# Patient Record
Sex: FEMALE | Race: WHITE | Employment: OTHER | URBAN - METROPOLITAN AREA
[De-identification: names, ages, dates, MRNs, and addresses within clinical notes are randomized per-mention and may not be internally consistent; named-entity substitution may affect disease eponyms.]

---

## 2017-01-09 DIAGNOSIS — M47.812 OSTEOARTHRITIS OF CERVICAL SPINE, UNSPECIFIED SPINAL OSTEOARTHRITIS COMPLICATION STATUS: ICD-10-CM

## 2017-01-09 DIAGNOSIS — G43.909 MIGRAINE SYNDROME: ICD-10-CM

## 2017-01-09 NOTE — TELEPHONE ENCOUNTER
----- Message from Jose Payton sent at 1/9/2017 11:04 AM EST -----  Regarding: Dr. Lisa Koo   Pt requests a Rx for Meperidine.  The best number is   [phone]

## 2017-01-31 RX ORDER — PROMETHAZINE HYDROCHLORIDE 25 MG/1
TABLET ORAL
Qty: 50 TAB | Refills: 0 | Status: SHIPPED | OUTPATIENT
Start: 2017-01-31 | End: 2017-02-28 | Stop reason: SDUPTHER

## 2017-02-01 DIAGNOSIS — G43.909 MIGRAINE SYNDROME: ICD-10-CM

## 2017-02-01 NOTE — TELEPHONE ENCOUNTER
----- Message from Joana Lackey sent at 2/1/2017  4:40 PM EST -----  Regarding: Dr. Juan Crespo          Patient needs refill for Hydrocodone 7.5-3.25mg. Her father advises that he came to the  to  prescription but  told him  it was called into the pharmacy. He went to the pharmacy and found that it was not called in. Best contact number is [phone].

## 2017-02-02 RX ORDER — HYDROCODONE BITARTRATE AND ACETAMINOPHEN 7.5; 325 MG/1; MG/1
1 TABLET ORAL 3 TIMES DAILY
Qty: 90 TAB | Refills: 0 | Status: SHIPPED | OUTPATIENT
Start: 2017-02-02 | End: 2017-03-13 | Stop reason: SDUPTHER

## 2017-02-09 DIAGNOSIS — M47.812 OSTEOARTHRITIS OF CERVICAL SPINE, UNSPECIFIED SPINAL OSTEOARTHRITIS COMPLICATION STATUS: ICD-10-CM

## 2017-02-09 DIAGNOSIS — G43.909 MIGRAINE SYNDROME: ICD-10-CM

## 2017-02-09 NOTE — TELEPHONE ENCOUNTER
----- Message from Kelvin Mckeon sent at 2/9/2017  8:20 AM EST -----  Regarding: Dr. Kavya Victor: [phone]  Patient request Rx refill \"Meperidine\"  Best contact number is [phone]. Patient is out of medication, call when ready to pickup.

## 2017-03-01 RX ORDER — PROMETHAZINE HYDROCHLORIDE 25 MG/1
TABLET ORAL
Qty: 50 TAB | Refills: 0 | Status: SHIPPED | OUTPATIENT
Start: 2017-03-01 | End: 2017-04-11 | Stop reason: SDUPTHER

## 2017-03-09 DIAGNOSIS — G43.909 MIGRAINE SYNDROME: ICD-10-CM

## 2017-03-09 DIAGNOSIS — M47.812 OSTEOARTHRITIS OF CERVICAL SPINE, UNSPECIFIED SPINAL OSTEOARTHRITIS COMPLICATION STATUS: ICD-10-CM

## 2017-03-09 NOTE — TELEPHONE ENCOUNTER
----- Message from Lainey Barillas sent at 3/9/2017  1:24 PM EST -----  Regarding:  Dr. Ruslan Garcia / Refill  Notes: Patient's mother, Elle Kirkpatrick, is requesting a written refill of the controlled medication \"Meperidine\" to be prepared to  at the office. The patient is out of the medication. Best contact number is [phone].

## 2017-03-13 DIAGNOSIS — G43.909 MIGRAINE SYNDROME: ICD-10-CM

## 2017-03-13 RX ORDER — HYDROCODONE BITARTRATE AND ACETAMINOPHEN 7.5; 325 MG/1; MG/1
1 TABLET ORAL 3 TIMES DAILY
Qty: 90 TAB | Refills: 0 | Status: SHIPPED | OUTPATIENT
Start: 2017-03-13 | End: 2017-04-11 | Stop reason: SDUPTHER

## 2017-03-13 RX ORDER — CYCLOBENZAPRINE HCL 10 MG
TABLET ORAL
Qty: 50 TAB | Refills: 5 | Status: SHIPPED | OUTPATIENT
Start: 2017-03-13 | End: 2017-10-23 | Stop reason: SDUPTHER

## 2017-03-24 ENCOUNTER — OFFICE VISIT (OUTPATIENT)
Dept: FAMILY MEDICINE CLINIC | Age: 42
End: 2017-03-24

## 2017-03-24 VITALS
WEIGHT: 284.6 LBS | SYSTOLIC BLOOD PRESSURE: 130 MMHG | OXYGEN SATURATION: 97 % | TEMPERATURE: 98.3 F | HEIGHT: 66 IN | RESPIRATION RATE: 28 BRPM | HEART RATE: 90 BPM | DIASTOLIC BLOOD PRESSURE: 88 MMHG | BODY MASS INDEX: 45.74 KG/M2

## 2017-03-24 DIAGNOSIS — M47.812 OSTEOARTHRITIS OF CERVICAL SPINE, UNSPECIFIED SPINAL OSTEOARTHRITIS COMPLICATION STATUS: ICD-10-CM

## 2017-03-24 DIAGNOSIS — M79.7 FIBROMYALGIA: ICD-10-CM

## 2017-03-24 DIAGNOSIS — F32.A DEPRESSION, UNSPECIFIED DEPRESSION TYPE: ICD-10-CM

## 2017-03-24 DIAGNOSIS — K21.00 GASTROESOPHAGEAL REFLUX DISEASE WITH ESOPHAGITIS: ICD-10-CM

## 2017-03-24 DIAGNOSIS — R73.9 HYPERGLYCEMIA: ICD-10-CM

## 2017-03-24 DIAGNOSIS — E78.2 MIXED HYPERLIPIDEMIA: ICD-10-CM

## 2017-03-24 LAB
GLUCOSE POC: 76 MG/DL
HBA1C MFR BLD HPLC: 5.2 %

## 2017-03-24 RX ORDER — ZOLPIDEM TARTRATE 5 MG/1
5 TABLET ORAL
Qty: 30 TAB | Refills: 2 | Status: SHIPPED | OUTPATIENT
Start: 2017-03-24 | End: 2017-06-13 | Stop reason: SDUPTHER

## 2017-03-24 RX ORDER — GABAPENTIN 100 MG/1
300 CAPSULE ORAL 3 TIMES DAILY
Qty: 270 CAP | Refills: 2 | Status: SHIPPED | OUTPATIENT
Start: 2017-03-24 | End: 2020-10-02 | Stop reason: RX

## 2017-03-24 NOTE — PROGRESS NOTES
HISTORY OF PRESENT ILLNESS  Mick Dietrich is a 38 y.o. female. f/u fibro,chronic migraine, followed by Dr Elena Sifuentes at [facility] 1257 Botox injections q 3 mo. Remains completely debilitated with daily head pain and nausea. Depression worsening,gaining weight,worsening myalgias. Disorganised sleepstaying up all night ,often sleeping till after noon. Migraine    The history is provided by the patient. This is a chronic problem. The problem has not changed since onset. The pain is located in the occipital region. The quality of the pain is described as dull. The pain is at a severity of 8/10. Associated symptoms include malaise/fatigue, dizziness and nausea. Pertinent negatives include no fever and no visual change. Nausea    The history is provided by the patient. This is a recurrent problem. The problem has not changed since onset. Associated symptoms include headaches, myalgias and headaches. Pertinent negatives include no fever and no diarrhea. Myalgia   The history is provided by the patient. This is a chronic problem. The problem occurs daily. The problem has not changed since onset. Associated symptoms include headaches. Review of Systems   Constitutional: Positive for malaise/fatigue. Negative for fever. Gastrointestinal: Positive for nausea. Negative for diarrhea. Musculoskeletal: Positive for myalgias. Neurological: Positive for dizziness and headaches. Physical Exam   Constitutional: She is oriented to person, place, and time. She appears well-developed and well-nourished. She appears distressed. HENT:   Head: Normocephalic and atraumatic. Right Ear: External ear normal.   Left Ear: External ear normal.   Nose: Nose normal.   Mouth/Throat: Oropharynx is clear and moist.   Cardiovascular: Normal rate and regular rhythm. Pulmonary/Chest: Effort normal and breath sounds normal.   Abdominal: Soft.  Bowel sounds are normal.   Musculoskeletal:        Cervical back: She exhibits decreased range of motion and tenderness. Lumbar back: She exhibits decreased range of motion and tenderness. She exhibits no spasm. Back:    Neurological: She is alert and oriented to person, place, and time. She has normal reflexes. Skin: Skin is warm and dry. Psychiatric: She has a normal mood and affect. ASSESSMENT and PLAN  Jess Rios was seen today for migraine. Diagnoses and all orders for this visit:    Chronic migraine  -     METABOLIC PANEL, COMPREHENSIVE  -     CBC WITH AUTOMATED DIFF  -     AMB POC GLUCOSE, QUANTITATIVE, BLOOD  -     [id number] 9+OXYCODONE+CRT-UNBUND  -     gabapentin (NEURONTIN) 100 mg capsule; Take 3 Caps by mouth three (3) times daily. Mixed hyperlipidemia  -     LIPID PANEL    Hyperglycemia  -     LIPID PANEL  -     METABOLIC PANEL, COMPREHENSIVE  -     AMB POC HEMOGLOBIN A1C    Gastroesophageal reflux disease with esophagitis    Depression, unspecified depression type  -     zolpidem (AMBIEN) 5 mg tablet; Take 1 Tab by mouth nightly as needed for Sleep. Max Daily Amount: 5 mg. Osteoarthritis of cervical spine, unspecified spinal osteoarthritis complication status    Fibromyalgia      Follow-up Disposition:  Return in about 3 months (around 6/24/2017).

## 2017-03-24 NOTE — MR AVS SNAPSHOT
Visit Information Date & Time Provider Department Dept. Phone Encounter #  
 3/24/2017  2:00 PM Abi EnglandAlejandra [phone] [id number] Follow-up Instructions Return in about 3 months (around 6/24/2017). Upcoming Health Maintenance Date Due  
 PAP AKA CERVICAL CYTOLOGY 7/18/2016 INFLUENZA AGE 9 TO ADULT 8/1/2016 COLONOSCOPY 5/8/2018 DTaP/Tdap/Td series (2 - Td) 12/19/2024 Allergies as of 3/24/2017  Review Complete On: 3/24/2017 By: Abi England MD  
  
 Severity Noted Reaction Type Reactions Atacand [Candesartan] High 08/29/2014   Systemic Other (comments) Patient B/P increase Compazine [Prochlorperazine] High 04/16/2010   Side Effect Other (comments), Unable to Obtain Ciprofloxacin  04/16/2010   Systemic Other (comments) Codeine  04/16/2010   Side Effect Nausea and Vomiting Zonisamide Low 04/11/2011   Side Effect Nausea and Vomiting Current Immunizations  Reviewed on 12/19/2014 Name Date Influenza Vaccine PF 12/19/2014 Tdap 12/19/2014 Not reviewed this visit You Were Diagnosed With   
  
 Codes Comments Chronic migraine    -  Primary ICD-10-CM: K24.039 ICD-9-CM: 346.70 Mixed hyperlipidemia     ICD-10-CM: E78.2 ICD-9-CM: 272.2 Hyperglycemia     ICD-10-CM: R73.9 ICD-9-CM: 790.29 Gastroesophageal reflux disease with esophagitis     ICD-10-CM: K21.0 ICD-9-CM: 530.11 Depression, unspecified depression type     ICD-10-CM: F32.9 ICD-9-CM: 782 Osteoarthritis of cervical spine, unspecified spinal osteoarthritis complication status     ZAS-08-FU: G23.460 ICD-9-CM: 721.0 Vitals BP Pulse Temp Resp Height(growth percentile) Weight(growth percentile) 130/88 (BP 1 Location: Right arm, BP Patient Position: Sitting) 90 98.3 °F (36.8 °C) (Oral) 28 5' 6\" (1.676 m) 284 lb 9.6 oz (129.1 kg) SpO2 BMI OB Status Smoking Status 97% 45.94 kg/m2 Unknown Never Smoker Vitals History BMI and BSA Data Body Mass Index Body Surface Area 45.94 kg/m 2 2.45 m 2 Preferred Pharmacy Pharmacy Name Phone [facility] [address] [place] [address] [address] [phone] Your Updated Medication List  
  
   
This list is accurate as of: 3/24/17  2:34 PM.  Always use your most recent med list.  
  
  
  
  
 ALPRAZolam 1 mg tablet Commonly known as:  Jayme Beecham Take 1 Tab by mouth nightly as needed for Anxiety. Max Daily Amount: 1 mg. cholecalciferol (VITAMIN D3) 5,000 unit Tab tablet Commonly known as:  VITAMIN D3 Take 5,000 Units by mouth. Takes one tablet 4 times a week  
  
 cyclobenzaprine 10 mg tablet Commonly known as:  FLEXERIL  
TAKE 1 TABLET BY MOUTH 3 TIMES A DAY WITH MEALS  
  
 DENTA 5000 PLUS 1.1 % Crea Generic drug:  sodium fluoride DULoxetine 60 mg capsule Commonly known as:  CYMBALTA Take 1 Cap by mouth daily. furosemide 20 mg tablet Commonly known as:  LASIX TAKE 1 TAB BY MOUTH DAILY. AS NEEDED FOR SWELLING  
  
 hydroCHLOROthiazide 25 mg tablet Commonly known as:  HYDRODIURIL  
TAKE 1 TABLET BY MOUTH EVERY DAY  
  
 HYDROcodone-acetaminophen 7.5-325 mg per tablet Commonly known as:  Juanetta Rasp Take 1 Tab by mouth three (3) times daily. meperidine 100 mg tablet Commonly known as:  DEMEROL Take 1 Tab by mouth every four (4) hours as needed. Max Daily Amount: 600 mg.  
  
 metFORMIN  mg tablet Commonly known as:  GLUCOPHAGE XR Take 1 Tab by mouth daily (with dinner). nystatin 100,000 unit/mL suspension Commonly known as:  MYCOSTATIN Take 5 mL by mouth four (4) times daily. omeprazole 40 mg capsule Commonly known as:  PRILOSEC  
TAKE 1 CAPSULE BY MOUTH DAILY promethazine 25 mg tablet Commonly known as:  PHENERGAN  
TAKE 1 TABLET BY MOUTH EVERY 8 HOURS AS NEEDED FOR NAUSEA  
  
 STOOL SOFTENER 100 mg capsule Generic drug:  docusate sodium 100 mg two (2) times a day. Once daily  
  
 topiramate 100 mg tablet Commonly known as:  TOPAMAX  
  
 verapamil  mg CR tablet Commonly known as:  CALAN-SR  
TAKE 1 TABLET BY MOUTH AT BEDTIME  
  
 vitamin E 400 unit capsule Commonly known as:  [address] Take  by mouth daily. We Performed the Following [id number] 9+OXYCODONE+CRT-UNBUND [19344 CPT(R)] AMB POC GLUCOSE, QUANTITATIVE, BLOOD [65149 CPT(R)] AMB POC HEMOGLOBIN A1C [97759 CPT(R)] CBC WITH AUTOMATED DIFF [52125 CPT(R)] LIPID PANEL [17231 CPT(R)] METABOLIC PANEL, COMPREHENSIVE [49294 CPT(R)] Follow-up Instructions Return in about 3 months (around 6/24/2017). Introducing [facility] SERVICES! Dear Letty Green: Thank you for requesting a [employer] account. Our records indicate that you already have an active [employer] account. You can access your account anytime at [email or web address] Did you know that you can access your hospital and ER discharge instructions at any time in [employer]? You can also review all of your test results from your hospital stay or ER visit. Additional Information If you have questions, please visit the Frequently Asked Questions section of the [employer] website at [email or web address] Remember, [employer] is NOT to be used for urgent needs. For medical emergencies, dial 911. Now available from your iPhone and Android! Please provide this summary of care documentation to your next provider. Your primary care clinician is listed as Janet Prieto. If you have any questions after today's visit, please call [phone].

## 2017-03-25 LAB
ALBUMIN SERPL-MCNC: 4.2 G/DL (ref 3.5–5.5)
ALBUMIN/GLOB SERPL: 1.4 {RATIO} (ref 1.2–2.2)
ALP SERPL-CCNC: 71 IU/L (ref 39–117)
ALT SERPL-CCNC: 15 IU/L (ref 0–32)
AST SERPL-CCNC: 17 IU/L (ref 0–40)
BASOPHILS # BLD AUTO: 0 X10E3/UL (ref 0–0.2)
BASOPHILS NFR BLD AUTO: 0 %
BILIRUB SERPL-MCNC: <0.2 MG/DL (ref 0–1.2)
BUN SERPL-MCNC: 12 MG/DL (ref 6–24)
BUN/CREAT SERPL: 20 (ref 9–23)
CALCIUM SERPL-MCNC: 9.5 MG/DL (ref 8.7–10.2)
CHLORIDE SERPL-SCNC: 98 MMOL/L (ref 96–106)
CHOLEST SERPL-MCNC: 249 MG/DL (ref 100–199)
CO2 SERPL-SCNC: 23 MMOL/L (ref 18–29)
CREAT SERPL-MCNC: 0.61 MG/DL (ref 0.57–1)
EOSINOPHIL # BLD AUTO: 0.2 X10E3/UL (ref 0–0.4)
EOSINOPHIL NFR BLD AUTO: 2 %
ERYTHROCYTE [DISTWIDTH] IN BLOOD BY AUTOMATED COUNT: 14.7 % (ref 12.3–15.4)
GLOBULIN SER CALC-MCNC: 2.9 G/DL (ref 1.5–4.5)
GLUCOSE SERPL-MCNC: 80 MG/DL (ref 65–99)
HCT VFR BLD AUTO: 38.8 % (ref 34–46.6)
HDLC SERPL-MCNC: 35 MG/DL
HGB BLD-MCNC: 13 G/DL (ref 11.1–15.9)
IMM GRANULOCYTES # BLD: 0.1 X10E3/UL (ref 0–0.1)
IMM GRANULOCYTES NFR BLD: 1 %
INTERPRETATION, 910389: NORMAL
LDLC SERPL CALC-MCNC: 148 MG/DL (ref 0–99)
LYMPHOCYTES # BLD AUTO: 4.2 X10E3/UL (ref 0.7–3.1)
LYMPHOCYTES NFR BLD AUTO: 38 %
MCH RBC QN AUTO: 31.3 PG (ref 26.6–33)
MCHC RBC AUTO-ENTMCNC: 33.5 G/DL (ref 31.5–35.7)
MCV RBC AUTO: 93 FL (ref 79–97)
MONOCYTES # BLD AUTO: 0.9 X10E3/UL (ref 0.1–0.9)
MONOCYTES NFR BLD AUTO: 8 %
NEUTROPHILS # BLD AUTO: 5.6 X10E3/UL (ref 1.4–7)
NEUTROPHILS NFR BLD AUTO: 51 %
PLATELET # BLD AUTO: 561 X10E3/UL (ref 150–379)
POTASSIUM SERPL-SCNC: 4 MMOL/L (ref 3.5–5.2)
PROT SERPL-MCNC: 7.1 G/DL (ref 6–8.5)
RBC # BLD AUTO: 4.16 X10E6/UL (ref 3.77–5.28)
SODIUM SERPL-SCNC: 139 MMOL/L (ref 134–144)
TRIGL SERPL-MCNC: 331 MG/DL (ref 0–149)
VLDLC SERPL CALC-MCNC: 66 MG/DL (ref 5–40)
WBC # BLD AUTO: 11 X10E3/UL (ref 3.4–10.8)

## 2017-03-31 LAB
ALPRAZ UR CFM-MCNC: 137 NG/ML
ALPRAZ UR QL: POSITIVE
AMPHETAMINES UR QL SCN: NEGATIVE NG/ML
BARBITURATES UR QL SCN: NEGATIVE NG/ML
BENZODIAZ UR QL SCN: NORMAL NG/ML
BENZODIAZ UR QL: POSITIVE NG/ML
BZE UR QL SCN: NEGATIVE NG/ML
CANNABINOIDS UR QL SCN: NEGATIVE NG/ML
CLONAZEPAM UR QL: NEGATIVE
CODEINE UR QL: NEGATIVE
CREAT UR-MCNC: 142.4 MG/DL (ref 20–300)
FLURAZEPAM UR QL: NEGATIVE
HYDROCODONE UR CFM-MCNC: 1030 NG/ML
HYDROCODONE UR QL: POSITIVE
HYDROMORPHONE UR QL: NEGATIVE
LORAZEPAM UR QL: NEGATIVE
METHADONE UR QL SCN: NEGATIVE NG/ML
MIDAZOLAM UR QL CFM: NEGATIVE
MORPHINE UR QL: NEGATIVE
NORDIAZEPAM UR QL: NEGATIVE
OPIATES UR QL SCN: NORMAL NG/ML
OPIATES UR QL SCN: POSITIVE NG/ML
OXAZEPAM UR QL: NEGATIVE
OXYCODONE+OXYMORPHONE UR QL SCN: NEGATIVE NG/ML
PCP UR QL: NEGATIVE NG/ML
PH UR: 5.6 [PH] (ref 4.5–8.9)
PLEASE NOTE:, 733157: NORMAL
PROPOXYPH UR QL SCN: NEGATIVE NG/ML
TEMAZEPAM UR QL CFM: NEGATIVE
TRIAZOLAM UR QL: NEGATIVE

## 2017-04-11 DIAGNOSIS — M47.812 OSTEOARTHRITIS OF CERVICAL SPINE, UNSPECIFIED SPINAL OSTEOARTHRITIS COMPLICATION STATUS: ICD-10-CM

## 2017-04-11 DIAGNOSIS — G43.909 MIGRAINE SYNDROME: ICD-10-CM

## 2017-04-11 RX ORDER — PROMETHAZINE HYDROCHLORIDE 25 MG/1
25 TABLET ORAL
Qty: 50 TAB | Refills: 0 | Status: SHIPPED | OUTPATIENT
Start: 2017-04-11 | End: 2017-05-10 | Stop reason: SDUPTHER

## 2017-04-11 RX ORDER — HYDROCODONE BITARTRATE AND ACETAMINOPHEN 7.5; 325 MG/1; MG/1
1 TABLET ORAL 3 TIMES DAILY
Qty: 90 TAB | Refills: 0 | Status: SHIPPED | OUTPATIENT
Start: 2017-04-11 | End: 2017-05-10 | Stop reason: SDUPTHER

## 2017-04-18 RX ORDER — TOPIRAMATE 100 MG/1
TABLET, FILM COATED ORAL
Qty: 90 TAB | Refills: 5 | Status: SHIPPED | OUTPATIENT
Start: 2017-04-18 | End: 2017-11-20 | Stop reason: SDUPTHER

## 2017-04-30 RX ORDER — OMEPRAZOLE 40 MG/1
CAPSULE, DELAYED RELEASE ORAL
Qty: 30 CAP | Refills: 7 | Status: SHIPPED | OUTPATIENT
Start: 2017-04-30 | End: 2017-12-20 | Stop reason: SDUPTHER

## 2017-04-30 RX ORDER — ALPRAZOLAM 1 MG/1
TABLET ORAL
Qty: 30 TAB | Refills: 2 | Status: SHIPPED | OUTPATIENT
Start: 2017-04-30 | End: 2017-09-24 | Stop reason: SDUPTHER

## 2017-05-10 DIAGNOSIS — G43.909 MIGRAINE SYNDROME: ICD-10-CM

## 2017-05-10 DIAGNOSIS — M47.812 OSTEOARTHRITIS OF CERVICAL SPINE, UNSPECIFIED SPINAL OSTEOARTHRITIS COMPLICATION STATUS: ICD-10-CM

## 2017-05-10 RX ORDER — HYDROCODONE BITARTRATE AND ACETAMINOPHEN 7.5; 325 MG/1; MG/1
1 TABLET ORAL 3 TIMES DAILY
Qty: 90 TAB | Refills: 0 | Status: SHIPPED | OUTPATIENT
Start: 2017-05-10 | End: 2017-06-13 | Stop reason: SDUPTHER

## 2017-05-10 RX ORDER — PROMETHAZINE HYDROCHLORIDE 25 MG/1
25 TABLET ORAL
Qty: 50 TAB | Refills: 0 | Status: SHIPPED | OUTPATIENT
Start: 2017-05-10 | End: 2017-06-13 | Stop reason: SDUPTHER

## 2017-05-11 DIAGNOSIS — J32.0 MAXILLARY SINUSITIS, UNSPECIFIED CHRONICITY: Primary | ICD-10-CM

## 2017-05-11 RX ORDER — AMOXICILLIN 500 MG/1
500 CAPSULE ORAL 3 TIMES DAILY
Qty: 21 CAP | Refills: 0 | Status: SHIPPED | OUTPATIENT
Start: 2017-05-11 | End: 2017-05-18

## 2017-05-11 RX ORDER — PROMETHAZINE HYDROCHLORIDE AND DEXTROMETHORPHAN HYDROBROMIDE 6.25; 15 MG/5ML; MG/5ML
5 SYRUP ORAL
Qty: 180 ML | Refills: 1 | Status: SHIPPED | OUTPATIENT
Start: 2017-05-11 | End: 2017-05-18

## 2017-06-06 RX ORDER — VERAPAMIL HYDROCHLORIDE 240 MG/1
TABLET, FILM COATED, EXTENDED RELEASE ORAL
Qty: 30 TAB | Refills: 11 | Status: SHIPPED | OUTPATIENT
Start: 2017-06-06 | End: 2017-12-19 | Stop reason: SDUPTHER

## 2017-06-13 DIAGNOSIS — M47.812 OSTEOARTHRITIS OF CERVICAL SPINE, UNSPECIFIED SPINAL OSTEOARTHRITIS COMPLICATION STATUS: ICD-10-CM

## 2017-06-13 DIAGNOSIS — F32.A DEPRESSION, UNSPECIFIED DEPRESSION TYPE: ICD-10-CM

## 2017-06-13 DIAGNOSIS — G43.909 MIGRAINE SYNDROME: ICD-10-CM

## 2017-06-13 RX ORDER — HYDROCODONE BITARTRATE AND ACETAMINOPHEN 7.5; 325 MG/1; MG/1
1 TABLET ORAL 3 TIMES DAILY
Qty: 90 TAB | Refills: 0 | Status: SHIPPED | OUTPATIENT
Start: 2017-06-13 | End: 2017-07-13 | Stop reason: SDUPTHER

## 2017-06-13 RX ORDER — ZOLPIDEM TARTRATE 5 MG/1
5 TABLET ORAL
Qty: 30 TAB | Refills: 2 | Status: SHIPPED | OUTPATIENT
Start: 2017-06-13 | End: 2017-09-18 | Stop reason: SDUPTHER

## 2017-06-13 RX ORDER — PROMETHAZINE HYDROCHLORIDE 25 MG/1
25 TABLET ORAL
Qty: 50 TAB | Refills: 0 | Status: SHIPPED | OUTPATIENT
Start: 2017-06-13 | End: 2017-07-13 | Stop reason: SDUPTHER

## 2017-06-13 NOTE — TELEPHONE ENCOUNTER
From: Gavi Jiménez  To: Suzi Matson MD  Sent: 6/13/2017 3:31 AM EDT  Subject: Medication Renewal Request    Original authorizing provider: MD Gavi Holguin would like a refill of the following medications:  zolpidem (AMBIEN) 5 mg tablet Suzi Matson MD]  meperidine (DEMEROL) 100 mg tablet Suzi Matson MD]  promethazine (PHENERGAN) 25 mg tablet Suzi Matson MD]  HYDROcodone-acetaminophen (NORCO) 7.5-325 mg per tablet Suzi Matson MD]    Preferred pharmacy: [place] [id number].:  When ready at  please have them call [phone] & leave a message if no one answers! That way I can know when to come pick them up! Thank you!

## 2017-06-13 NOTE — TELEPHONE ENCOUNTER
Called patient to  script, per Dr. Chen Gains an appt is needed before the next refill, information given to patient

## 2017-06-27 DIAGNOSIS — M47.812 OSTEOARTHRITIS OF CERVICAL SPINE, UNSPECIFIED SPINAL OSTEOARTHRITIS COMPLICATION STATUS: ICD-10-CM

## 2017-07-06 ENCOUNTER — OFFICE VISIT (OUTPATIENT)
Dept: FAMILY MEDICINE CLINIC | Age: 42
End: 2017-07-06

## 2017-07-06 VITALS
BODY MASS INDEX: 46.67 KG/M2 | TEMPERATURE: 99.1 F | OXYGEN SATURATION: 96 % | DIASTOLIC BLOOD PRESSURE: 86 MMHG | HEIGHT: 66 IN | HEART RATE: 101 BPM | WEIGHT: 290.4 LBS | RESPIRATION RATE: 28 BRPM | SYSTOLIC BLOOD PRESSURE: 138 MMHG

## 2017-07-06 DIAGNOSIS — F32.89 DEPRESSIVE DISORDER, NOT ELSEWHERE CLASSIFIED: ICD-10-CM

## 2017-07-06 DIAGNOSIS — G43.711 INTRACTABLE CHRONIC MIGRAINE WITHOUT AURA AND WITH STATUS MIGRAINOSUS: Primary | ICD-10-CM

## 2017-07-06 DIAGNOSIS — M47.812 OSTEOARTHRITIS OF CERVICAL SPINE, UNSPECIFIED SPINAL OSTEOARTHRITIS COMPLICATION STATUS: ICD-10-CM

## 2017-07-06 RX ORDER — KETOROLAC TROMETHAMINE 30 MG/ML
30 INJECTION, SOLUTION INTRAMUSCULAR; INTRAVENOUS ONCE
Qty: 1 VIAL | Refills: 0
Start: 2017-07-06 | End: 2017-07-06

## 2017-07-06 NOTE — PROGRESS NOTES
HISTORY OF PRESENT ILLNESS  Olive Ragsdale is a 38 y.o. female. severe headache for past 2 days ,constant,severe ,throbbing. with nausea and vomiting  Migraine    The history is provided by the patient. This is a chronic problem. The problem occurs constantly. The problem has been gradually worsening. The headache is aggravated by nothing. The pain is located in the occipital region. The quality of the pain is described as throbbing. The pain is at a severity of 7/10. Associated symptoms include malaise/fatigue, dizziness, visual change, nausea and vomiting. Pertinent negatives include no fever. Review of Systems   Constitutional: Positive for malaise/fatigue. Negative for fever and weight loss. Cardiovascular: Negative for chest pain. Gastrointestinal: Positive for nausea and vomiting. Musculoskeletal: Positive for neck pain. Skin: Negative for rash. Neurological: Positive for dizziness and headaches. Psychiatric/Behavioral: Negative for depression and substance abuse. The patient is not nervous/anxious. Physical Exam   Constitutional: She is oriented to person, place, and time. She appears well-developed and well-nourished. She appears distressed. HENT:   Head: Normocephalic and atraumatic. Right Ear: External ear normal.   Left Ear: External ear normal.   Nose: Nose normal.   Mouth/Throat: Oropharynx is clear and moist.   Cardiovascular: Normal rate and regular rhythm. Pulmonary/Chest: Effort normal and breath sounds normal.   Abdominal: Soft. Bowel sounds are normal.   Musculoskeletal: Normal range of motion. Neurological: She is alert and oriented to person, place, and time. She has normal reflexes. Skin: Skin is warm and dry. Psychiatric: She has a normal mood and affect. ASSESSMENT and PLAN  Sudhakar Moncada was seen today for headache and generalized body aches.     Diagnoses and all orders for this visit:    Intractable chronic migraine without aura and with status migrainosus. Subacute worsening of chronic headache disorder. Remains quite disabled . Neuro appt next weak  -     ketorolac (TORADOL) 30 mg/mL (1 mL) injection; 1 mL by IntraVENous route once for 1 dose. -     KETOROLAC TROMETHAMINE INJ  -     TX THER/PROPH/DIAG INJECTION, SUBCUT/IM    Osteoarthritis of cervical spine, unspecified spinal osteoarthritis complication status    Depressive disorder, not elsewhere classified      Follow-up Disposition:  Return in about 4 weeks (around 8/3/2017).

## 2017-07-06 NOTE — PROGRESS NOTES
Chief Complaint   Patient presents with    Headache     Pt having migraine.  Generalized Body Aches     Pt having bodyaches all over.

## 2017-07-13 ENCOUNTER — OFFICE VISIT (OUTPATIENT)
Dept: NEUROLOGY | Age: 42
End: 2017-07-13

## 2017-07-13 VITALS
BODY MASS INDEX: 46.93 KG/M2 | HEART RATE: 99 BPM | SYSTOLIC BLOOD PRESSURE: 116 MMHG | DIASTOLIC BLOOD PRESSURE: 74 MMHG | OXYGEN SATURATION: 98 % | WEIGHT: 292 LBS | HEIGHT: 66 IN

## 2017-07-13 DIAGNOSIS — M47.812 OSTEOARTHRITIS OF CERVICAL SPINE, UNSPECIFIED SPINAL OSTEOARTHRITIS COMPLICATION STATUS: ICD-10-CM

## 2017-07-13 DIAGNOSIS — G43.909 MIGRAINE SYNDROME: ICD-10-CM

## 2017-07-13 DIAGNOSIS — M79.7 FIBROMYALGIA: ICD-10-CM

## 2017-07-13 DIAGNOSIS — G43.711 INTRACTABLE CHRONIC MIGRAINE WITHOUT AURA AND WITH STATUS MIGRAINOSUS: Primary | ICD-10-CM

## 2017-07-13 NOTE — PATIENT INSTRUCTIONS
[facility]   Statement to Patients  April 1, 2014      In an effort to ensure the large volume of patient prescription refills is processed in the most efficient and expeditious manner, we are asking our patients to assist us by calling your Pharmacy for all prescription refills, this will include also your  Mail Order Pharmacy. The pharmacy will contact our office electronically to continue the refill process. Please do not wait until the last minute to call your pharmacy. We need at least 48 hours (2days) to fill prescriptions. We also encourage you to call your pharmacy before going to  your prescription to make sure it is ready. With regard to controlled substance prescription refill requests (narcotic refills) that need to be picked up at our office, we ask your cooperation by providing us with at least 72 hours (3days) notice that you will need a refill. We will not refill narcotic prescription refill requests after 4:00pm on any weekday, Monday through Thursday, or after 2:00pm on Fridays, or on the weekends. We encourage everyone to explore another way of getting your prescription refill request processed using [employer], our patient web portal through our electronic medical record system. [employer] is an efficient and effective way to communicate your medication request directly to the office and  downloadable as an ina on your smart phone . [employer] also features a review functionality that allows you to view your medication list as well as leave messages for your physician. Are you ready to get connected? If so please review the attatched instructions or speak to any of our staff to get you set up right away! Thank you so much for your cooperation. Should you have any questions please contact our Practice Administrator.     The Physicians and Staff,  [facility]          A Healthy Lifestyle: Care Instructions  Your Care Instructions  A healthy lifestyle can help you feel good, stay at a healthy weight, and have plenty of energy for both work and play. A healthy lifestyle is something you can share with your whole family. A healthy lifestyle also can lower your risk for serious health problems, such as high blood pressure, heart disease, and diabetes. You can follow a few steps listed below to improve your health and the health of your family. Follow-up care is a key part of your treatment and safety. Be sure to make and go to all appointments, and call your doctor if you are having problems. Its also a good idea to know your test results and keep a list of the medicines you take. How can you care for yourself at home? · Do not eat too much sugar, fat, or fast foods. You can still have dessert and treats now and then. The goal is moderation. · Start small to improve your eating habits. Pay attention to portion sizes, drink less juice and soda pop, and eat more fruits and vegetables. ¨ Eat a healthy amount of food. A 3-ounce serving of meat, for example, is about the size of a deck of cards. Fill the rest of your plate with vegetables and whole grains. ¨ Limit the amount of soda and sports drinks you have every day. Drink more water when you are thirsty. ¨ Eat at least 5 servings of fruits and vegetables every day. It may seem like a lot, but it is not hard to reach this goal. A serving or helping is 1 piece of fruit, 1 cup of vegetables, or 2 cups of leafy, raw vegetables. Have an apple or some carrot sticks as an afternoon snack instead of a candy bar. Try to have fruits and/or vegetables at every meal.  · Make exercise part of your daily routine. You may want to start with simple activities, such as walking, bicycling, or slow swimming. Try to be active 30 to 60 minutes every day. You do not need to do all 30 to 60 minutes all at once. For example, you can exercise 3 times a day for 10 or 20 minutes.  Moderate exercise is safe for most people, but it is always a good idea to talk to your doctor before starting an exercise program.  · Keep moving. Caleen Newer the lawn, work in the garden, or [employer]. Take the stairs instead of the elevator at work. · If you smoke, quit. People who smoke have an increased risk for heart attack, stroke, cancer, and other lung illnesses. Quitting is hard, but there are ways to boost your chance of quitting tobacco for good. ¨ Use nicotine gum, patches, or lozenges. ¨ Ask your doctor about stop-smoking programs and medicines. ¨ Keep trying. In addition to reducing your risk of diseases in the future, you will notice some benefits soon after you stop using tobacco. If you have shortness of breath or asthma symptoms, they will likely get better within a few weeks after you quit. · Limit how much alcohol you drink. Moderate amounts of alcohol (up to 2 drinks a day for men, 1 drink a day for women) are okay. But drinking too much can lead to liver problems, high blood pressure, and other health problems. Family health  If you have a family, there are many things you can do together to improve your health. · Eat meals together as a family as often as possible. · Eat healthy foods. This includes fruits, vegetables, lean meats and dairy, and whole grains. · Include your family in your fitness plan. Most people think of activities such as jogging or tennis as the way to fitness, but there are many ways you and your family can be more active. Anything that makes you breathe hard and gets your heart pumping is exercise. Here are some tips:  ¨ Walk to do errands or to take your child to school or the bus. ¨ Go for a family bike ride after dinner instead of watching TV. Where can you learn more? Go to http://falguni-candice.info/. Enter B646 in the search box to learn more about \"A Healthy Lifestyle: Care Instructions. \"  Current as of: July 26, 2016  Content Version: 11.3  © 8551-8935 [employer]. Care instructions adapted under license by [employer] (which disclaims liability or warranty for this information). If you have questions about a medical condition or this instruction, always ask your healthcare professional. [address] any warranty or liability for your use of this information.

## 2017-07-13 NOTE — MR AVS SNAPSHOT
Visit Information Date & Time Provider Department Dept. Phone Encounter #  
 7/13/2017  3:00 PM Adelaida Portillo MD Neurology Clinic at [facility] [phone] [id number] Follow-up Instructions Return in about 2 months (around 9/13/2017). Your Appointments 8/9/2017  4:00 PM  
ROUTINE CARE with Heide Mratin MD  
[facility]) Appt Note: per ike [id number] [facility] [place] [facility] [id number] [phone] [address] [place] [address] [address] Upcoming Health Maintenance Date Due  
 PAP AKA CERVICAL CYTOLOGY 7/18/2016 INFLUENZA AGE 9 TO ADULT 8/1/2017 COLONOSCOPY 5/8/2018 DTaP/Tdap/Td series (2 - Td) 12/19/2024 Allergies as of 7/13/2017  Review Complete On: 7/13/2017 By: Adelaida Portillo MD  
  
 Severity Noted Reaction Type Reactions Atacand [Candesartan] High 08/29/2014   Systemic Other (comments) Patient B/P increase Compazine [Prochlorperazine] High 04/16/2010   Side Effect Other (comments), Unable to Obtain Ciprofloxacin  04/16/2010   Systemic Other (comments) Codeine  04/16/2010   Side Effect Nausea and Vomiting Zonisamide Low 04/11/2011   Side Effect Nausea and Vomiting Current Immunizations  Reviewed on 12/19/2014 Name Date Influenza Vaccine PF 12/19/2014 Tdap 12/19/2014 Not reviewed this visit You Were Diagnosed With   
  
 Codes Comments Intractable chronic migraine without aura and with status migrainosus    -  Primary ICD-10-CM: Y08.333 ICD-9-CM: 346.73 Fibromyalgia     ICD-10-CM: M79.7 ICD-9-CM: 729.1 Osteoarthritis of cervical spine, unspecified spinal osteoarthritis complication status     LZS-76-PI: Q48.107 ICD-9-CM: 721.0 Vitals BP Pulse Height(growth percentile) Weight(growth percentile) SpO2 BMI  
 116/74 99 5' 6\" (1.676 m) 292 lb (132.5 kg) 98% 47.13 kg/m2 OB Status Smoking Status Unknown Never Smoker Vitals History BMI and BSA Data Body Mass Index Body Surface Area  
 47.13 kg/m 2 2.48 m 2 Preferred Pharmacy Pharmacy Name Phone [facility] [address] [place] [address] [phone] Your Updated Medication List  
  
   
This list is accurate as of: 7/13/17  3:53 PM.  Always use your most recent med list.  
  
  
  
  
 ALPRAZolam 1 mg tablet Commonly known as:  XANAX  
TAKE ONE TAB BY MOUTH EVERY NIGHTY AT BEDTIME AS NEEDED FOR ANXIETY  
  
 cholecalciferol (VITAMIN D3) 5,000 unit Tab tablet Commonly known as:  VITAMIN D3 Take 5,000 Units by mouth. Takes one tablet 4 times a week  
  
 cyclobenzaprine 10 mg tablet Commonly known as:  FLEXERIL  
TAKE 1 TABLET BY MOUTH 3 TIMES A DAY WITH MEALS  
  
 DENTA 5000 PLUS 1.1 % Crea Generic drug:  sodium fluoride DULoxetine 60 mg capsule Commonly known as:  CYMBALTA Take 1 Cap by mouth daily. furosemide 20 mg tablet Commonly known as:  LASIX TAKE 1 TAB BY MOUTH DAILY. AS NEEDED FOR SWELLING  
  
 * gabapentin 100 mg capsule Commonly known as:  NEURONTIN Take 3 Caps by mouth three (3) times daily. * gabapentin 300 mg capsule Commonly known as:  NEURONTIN  
TAKE ONE CAPSULE BY MOUTH 3 TIMES A DAY  
  
 hydroCHLOROthiazide 25 mg tablet Commonly known as:  HYDRODIURIL  
TAKE 1 TABLET BY MOUTH EVERY DAY  
  
 HYDROcodone-acetaminophen 7.5-325 mg per tablet Commonly known as:  Lisa Matthews Take 1 Tab by mouth three (3) times daily. meperidine 100 mg tablet Commonly known as:  DEMEROL Take 1 Tab by mouth every four (4) hours as needed. Max Daily Amount: 600 mg.  
  
 metFORMIN  mg tablet Commonly known as:  GLUCOPHAGE XR Take 1 Tab by mouth daily (with dinner). nystatin 100,000 unit/mL suspension Commonly known as:  MYCOSTATIN Take 5 mL by mouth four (4) times daily. omeprazole 40 mg capsule Commonly known as:  PRILOSEC  
TAKE 1 CAPSULE BY MOUTH DAILY  
  
 onabotulinumtoxinA 200 unit injection Commonly known as:  BOTOX To be injected in the muscles of the head and neck  
  
 promethazine 25 mg tablet Commonly known as:  PHENERGAN Take 1 Tab by mouth every eight (8) hours as needed for Nausea. STOOL SOFTENER 100 mg capsule Generic drug:  docusate sodium 100 mg two (2) times a day. Once daily  
  
 topiramate 100 mg tablet Commonly known as:  TOPAMAX START WITH 1/2 TABLET IN MORNING AND 1 TAB AT NIGHT FOR 2 WEEKS, THEN INCREASE TO 1 TAB TWICE A DAY  
  
 verapamil  mg CR tablet Commonly known as:  CALAN-SR  
TAKE 1 TABLET BY MOUTH AT BEDTIME  
  
 vitamin E 400 unit capsule Commonly known as:  [address] Take  by mouth daily. zolpidem 5 mg tablet Commonly known as:  AMBIEN Take 1 Tab by mouth nightly as needed for Sleep. Max Daily Amount: 5 mg.  
  
 * Notice: This list has 2 medication(s) that are the same as other medications prescribed for you. Read the directions carefully, and ask your doctor or other care provider to review them with you. Prescriptions Printed Refills  
 onabotulinumtoxinA (BOTOX) 200 unit injection 3 Sig: To be injected in the muscles of the head and neck Class: Print We Performed the Following REFERRAL TO NEUROLOGY [ZAI54 Custom] Comments:  
 Please set patient up for botox for chronic migraine Follow-up Instructions Return in about 2 months (around 9/13/2017). Referral Information Referral ID Referred By Referred To  
  
 [id number] Joi BOUDREAUX MD   
   [address] [id number], [address] Phone: [phone] Fax: [phone] Visits Status Start Date End Date 1 New Request 7/13/17 7/13/18  If your referral has a status of pending review or denied, additional information will be sent to support the outcome of this decision. Patient Instructions PRESCRIPTION REFILL POLICY [facility] Statement to Patients April 1, 2014 In an effort to ensure the large volume of patient prescription refills is processed in the most efficient and expeditious manner, we are asking our patients to assist us by calling your Pharmacy for all prescription refills, this will include also your  Mail Order Pharmacy. The pharmacy will contact our office electronically to continue the refill process. Please do not wait until the last minute to call your pharmacy. We need at least 48 hours (2days) to fill prescriptions. We also encourage you to call your pharmacy before going to  your prescription to make sure it is ready. With regard to controlled substance prescription refill requests (narcotic refills) that need to be picked up at our office, we ask your cooperation by providing us with at least 72 hours (3days) notice that you will need a refill. We will not refill narcotic prescription refill requests after 4:00pm on any weekday, Monday through Thursday, or after 2:00pm on Fridays, or on the weekends. We encourage everyone to explore another way of getting your prescription refill request processed using [employer], our patient web portal through our electronic medical record system. [employer] is an efficient and effective way to communicate your medication request directly to the office and  downloadable as an ina on your smart phone . [employer] also features a review functionality that allows you to view your medication list as well as leave messages for your physician. Are you ready to get connected? If so please review the attatched instructions or speak to any of our staff to get you set up right away! Thank you so much for your cooperation. Should you have any questions please contact our Practice Administrator. The Physicians and Staff,  [facility] A Healthy Lifestyle: Care Instructions Your Care Instructions A healthy lifestyle can help you feel good, stay at a healthy weight, and have plenty of energy for both work and play. A healthy lifestyle is something you can share with your whole family. A healthy lifestyle also can lower your risk for serious health problems, such as high blood pressure, heart disease, and diabetes. You can follow a few steps listed below to improve your health and the health of your family. Follow-up care is a key part of your treatment and safety. Be sure to make and go to all appointments, and call your doctor if you are having problems. Its also a good idea to know your test results and keep a list of the medicines you take. How can you care for yourself at home? · Do not eat too much sugar, fat, or fast foods. You can still have dessert and treats now and then. The goal is moderation. · Start small to improve your eating habits. Pay attention to portion sizes, drink less juice and soda pop, and eat more fruits and vegetables. ¨ Eat a healthy amount of food. A 3-ounce serving of meat, for example, is about the size of a deck of cards. Fill the rest of your plate with vegetables and whole grains. ¨ Limit the amount of soda and sports drinks you have every day. Drink more water when you are thirsty. ¨ Eat at least 5 servings of fruits and vegetables every day. It may seem like a lot, but it is not hard to reach this goal. A serving or helping is 1 piece of fruit, 1 cup of vegetables, or 2 cups of leafy, raw vegetables. Have an apple or some carrot sticks as an afternoon snack instead of a candy bar. Try to have fruits and/or vegetables at every meal. 
· Make exercise part of your daily routine. You may want to start with simple activities, such as walking, bicycling, or slow swimming.  Try to be active 30 to 60 minutes every day. You do not need to do all 30 to 60 minutes all at once. For example, you can exercise 3 times a day for 10 or 20 minutes. Moderate exercise is safe for most people, but it is always a good idea to talk to your doctor before starting an exercise program. 
· Keep moving. Garrett [employer] the lawn, work in the garden, or [employer]. Take the stairs instead of the elevator at work. · If you smoke, quit. People who smoke have an increased risk for heart attack, stroke, cancer, and other lung illnesses. Quitting is hard, but there are ways to boost your chance of quitting tobacco for good. ¨ Use nicotine gum, patches, or lozenges. ¨ Ask your doctor about stop-smoking programs and medicines. ¨ Keep trying. In addition to reducing your risk of diseases in the future, you will notice some benefits soon after you stop using tobacco. If you have shortness of breath or asthma symptoms, they will likely get better within a few weeks after you quit. · Limit how much alcohol you drink. Moderate amounts of alcohol (up to 2 drinks a day for men, 1 drink a day for women) are okay. But drinking too much can lead to liver problems, high blood pressure, and other health problems. Family health If you have a family, there are many things you can do together to improve your health. · Eat meals together as a family as often as possible. · Eat healthy foods. This includes fruits, vegetables, lean meats and dairy, and whole grains. · Include your family in your fitness plan. Most people think of activities such as jogging or tennis as the way to fitness, but there are many ways you and your family can be more active. Anything that makes you breathe hard and gets your heart pumping is exercise. Here are some tips: 
¨ Walk to do errands or to take your child to school or the bus. ¨ Go for a family bike ride after dinner instead of watching TV. Where can you learn more? Go to http://falguni-candice.info/. Enter E026 in the search box to learn more about \"A Healthy Lifestyle: Care Instructions. \" Current as of: July 26, 2016 Content Version: 11.3 © 7530-8539 [employer]. Care instructions adapted under license by [employer] (which disclaims liability or warranty for this information). If you have questions about a medical condition or this instruction, always ask your healthcare professional. [address] any warranty or liability for your use of this information. Introducing \[facility]\"" & HEALTH SERVICES! Dear Lisset Salamanca: Thank you for requesting a TappIn account. Our records indicate that you already have an active TappIn account. You can access your account anytime at [email or web address] Did you know that you can access your hospital and ER discharge instructions at any time in TappIn? You can also review all of your test results from your hospital stay or ER visit. Additional Information If you have questions, please visit the Frequently Asked Questions section of the TappIn website at [email or web address] Remember, TappIn is NOT to be used for urgent needs. For medical emergencies, dial 911. Now available from your iPhone and Android! Please provide this summary of care documentation to your next provider. Your primary care clinician is listed as John Dumont. If you have any questions after today's visit, please call [phone].

## 2017-07-13 NOTE — PROGRESS NOTES
NEUROLOGY HISTORY AND PHYSICAL    Name Misbah Mack Age 38 y.o. MRN [id number]  1975     Referring Physician: Juan Aly MD        Chief Complaint:  headaches     This is a 38 y.o. right handed female with along history of migraines was seen at [facility]. Migraine are 15-20 days of the months more than four hours in durations throbbing and associated with photophobia and nausea. She has been treated here by Dr. Suman Lopez and also by Sabi Desir at [facility]. She was sent to the [facility] but could not afford the treatment and was afraid of an unacceptable mortality rate. They finally were frustrated with Dr. Perdomo Favorite and they parted ways. The best control she had was on botox. She had a more than 60 percent reduction in the intensity of her headaches. Assessment and Plan   1. Migraine headaches  Has had significant releif with botox  She is on topamax  Has tried elavil  Has tried cymbalata  SHe is on verapamil. She has tried fioricet   She has tried imitex  She as on all these medicatrions more than two months and they did not help her migraines  She had migraines since age 30. They are daily bitemporal headaches, throbbing in nature. Associated with photophobia and nausea. 2. Depression  continue cymbalta. 3. Hypertension  Continue hydrochlorothiazide    60  minutes spent more than 50% spent in face to face  examination and discussion of treatment options and approach    Patient Allergies    Atacand [candesartan]; Compazine [prochlorperazine]; Ciprofloxacin; Codeine; and Zonisamide     Current Outpatient Prescriptions   Medication Sig    gabapentin (NEURONTIN) 300 mg capsule TAKE ONE CAPSULE BY MOUTH 3 TIMES A DAY    zolpidem (AMBIEN) 5 mg tablet Take 1 Tab by mouth nightly as needed for Sleep. Max Daily Amount: 5 mg.  meperidine (DEMEROL) 100 mg tablet Take 1 Tab by mouth every four (4) hours as needed.  Max Daily Amount: 600 mg.   Tina Sanchez promethazine (PHENERGAN) 25 mg tablet Take 1 Tab by mouth every eight (8) hours as needed for Nausea.  HYDROcodone-acetaminophen (NORCO) 7.5-325 mg per tablet Take 1 Tab by mouth three (3) times daily.  verapamil ER (CALAN-SR) 240 mg CR tablet TAKE 1 TABLET BY MOUTH AT BEDTIME    ALPRAZolam (XANAX) 1 mg tablet TAKE ONE TAB BY MOUTH EVERY NIGHTY AT BEDTIME AS NEEDED FOR ANXIETY    omeprazole (PRILOSEC) 40 mg capsule TAKE 1 CAPSULE BY MOUTH DAILY    topiramate (TOPAMAX) 100 mg tablet START WITH 1/2 TABLET IN MORNING AND 1 TAB AT NIGHT FOR 2 WEEKS, THEN INCREASE TO 1 TAB TWICE A DAY    gabapentin (NEURONTIN) 100 mg capsule Take 3 Caps by mouth three (3) times daily.  cyclobenzaprine (FLEXERIL) 10 mg tablet TAKE 1 TABLET BY MOUTH 3 TIMES A DAY WITH MEALS    metFORMIN ER (GLUCOPHAGE XR) 500 mg tablet Take 1 Tab by mouth daily (with dinner).  DULoxetine (CYMBALTA) 60 mg capsule Take 1 Cap by mouth daily.  hydrochlorothiazide (HYDRODIURIL) 25 mg tablet TAKE 1 TABLET BY MOUTH EVERY DAY    DENTA 5000 PLUS 1.1 % crea     nystatin (MYCOSTATIN) 100,000 unit/mL suspension Take 5 mL by mouth four (4) times daily.  cholecalciferol, VITAMIN D3, (VITAMIN D3) 5,000 unit tab tablet Take 5,000 Units by mouth. Takes one tablet 4 times a week    furosemide (LASIX) 20 mg tablet TAKE 1 TAB BY MOUTH DAILY. AS NEEDED FOR SWELLING    docusate sodium (STOOL SOFTENER) 100 mg capsule 100 mg two (2) times a day. Once daily     vitamin E (AQUA GEMS) 400 unit capsule Take  by mouth daily. No current facility-administered medications for this visit.         Past Medical History:   Diagnosis Date    Arthritis     DDD    Carpal tunnel syndrome on left 2/22/2011    Cervical spondylarthritis 2/20/2011    Depressive disorder, not elsewhere classified 2/17/2011    Encounter for long-term (current) use of other medications 2/20/2011    GERD (gastroesophageal reflux disease)     Headache     Migraines    Hepatic hemangioma 2/17/2011    Hypertension     IBS (irritable bowel syndrome) 2/20/2011    Migraine syndrome 2/17/2011    Obesity 2/20/2011    Other ill-defined conditions     hx.of syncope    Psychiatric disorder     depression       Social History   Substance Use Topics    Smoking status: Never Smoker    Smokeless tobacco: Never Used    Alcohol use Yes      Comment: socially       Family History   Problem Relation Age of Onset    Heart Disease Mother     Hypertension Mother     Diabetes Mother     Heart Disease Father     Lung Disease Father     Hypertension Father        Review of Systems   Constitutional: Negative for chills and fever. HENT: Negative for ear pain. Eyes: Negative for pain and discharge. Respiratory: Negative for cough and hemoptysis. Cardiovascular: Negative for chest pain and claudication. Gastrointestinal: Negative for constipation and diarrhea. Genitourinary: Negative for flank pain and hematuria. Musculoskeletal: Positive for myalgias and neck pain. Negative for back pain. Skin: Negative for itching and rash. Neurological: Positive for headaches. Endo/Heme/Allergies: Negative for environmental allergies. Does not bruise/bleed easily. Psychiatric/Behavioral: Positive for depression. Negative for hallucinations. The patient has insomnia. Visit Vitals    /74    Pulse 99    Ht 5' 6\" (1.676 m)    Wt 292 lb (132.5 kg)    SpO2 98%    BMI 47.13 kg/m2         General: Well developed, well nourished. Wearing sunglasses   Head: Normocephalic, atraumatic, anicteric sclera   Neck Normal ROM, No thyromegally   Lungs:  Clear to auscultation bilaterally, No wheezes or rubs   Cardiac: Regular rate and rhythm with no murmurs. Abd: Bowel sounds were audible.  No tenderness on palpation   Ext: No pedal edema   Skin: No overt signs of rash or insect bites     NeurologicExam:  Mental Status: Alert and oriented to person place and time   Speech: Fluent no aphasia or dysarthria. Cranial Nerves:   II Intact visual fields. III, IV VI Extra ocular movements intact  V Facial sensation is normal.   VII Facial movement is symmetric. VIII Hearing intact no nystagmus    IX, X Normal gag, symmetric movement of palate and uvula  XI Symmetric shoulder shrug and head turn   XII Tongue midline with no atrophy   Motor:  Full and symmetric strength of upper and lower proximal and distal muscles. Normal bulk and tone. Reflexes:   Deep tendon reflexes 2+/4 and symmetric. Sensory:   Symmetric and intact with no perceived deficits modalities involving small or large fibers. Gait:  Gait is balanced and fluid with normal arm swing. Tremor:   No tremor noted. Cerebellar:  Coordination intact. Neurovascular: No carotid bruits. No JVD         Imaging    MRI Results (most recent):    Results from Hospital Encounter encounter on 07/11/14   MRI BRAIN W WO CONT   Narrative **Final Report**      ICD Codes / Adm. Diagnosis: 346.73  787.02 / Chronic migraine without aura,    Examination:  MRI BRAIN W AND WO CON  - [id number] - Jul 11 2014 11:04AM  Accession No:  [id number]  Reason:  Chronic migraine without aura, with intractable migraine, so   stated, with status      REPORT:  EXAM:  MRI BRAIN W AND WO CON    INDICATION:  Chronic migraine without aura, with intractable migraine,,   right optic nerve pallor    COMPARISON STUDY: CT head of 1/17/2014    STUDY PARAMETERS:    Sagittal and axial T1-weighted; axial FLAIR, T2-weighted, diffusion   weighted, and gradient echo, precontrast; coronal and axial postcontrast   T1-weighted images of the brain; thin section coronal STIR, fat-suppressed   T1-weighted images pre- and postgadolinium and axial fat-suppressed   postcontrast T1-weighted images of the orbits following the IV injection of   20 cc Magnevist. The study was performed on the low-field open MRI unit   because of the patient's body habitus.     FINDINGS:    The ventricles are normal in size and are midline. There is no significant   white matter disease. No focal intracranial lesions are identified. There   are no areas of abnormal parenchymal or meningeal enhancement. There is no   evidence of acute infarction or hemorrhage. There is a small left choroidal   fissure cyst which is unchanged since prior CT studies dating back to 2006. Normal flow voids are seen in the vessels at the skull base. No orbital abnormalities are identified. The optic nerves demonstrate no   signal abnormality or enhancement. The cavernous sinus has a normal   appearance. The paranasal sinuses are clear. IMPRESSION:  No significant abnormalities on MRI of the brain and optic pathways. Stable   small left choroidal fissure cyst.           Signing/Reading Doctor: Rober Lane ([id number])    Approved: Rober Lane ([id number])  Jul 11 2014 12:14PM                                   CT Results (most recent):    Results from Hospital Encounter encounter on 05/21/14   CT ABD PELV WO CONT   Narrative **Final Report**      ICD Codes / Adm. Diagnosis: 822605  838399 / Abdominal Pain  Migraine  Examination:  CT ABD PELVIS WO CON  - [id number] - May 21 2014  6:43AM  Accession No:  [id number]  Reason:  Abd Pain/KS      REPORT:  INDICATION: Right flank pain    Comparison to 1/12/2009. Multiple axial images were obtained from the dome of the diaphragm to the   pubic symphysis without the use of oral or intravenous contrast. Lung bases   are clear. No renal or ureteral stone or hydronephrosis is evident. Multiple   liver lesions are again noted, which have been previously described as   hemangiomas. The solid organs are otherwise normal in appearance given the   lack of intravenous contrast. There is no small or large bowel distention. The appendix is normal in appearance. There is no free fluid. IMPRESSION: No stone or hydronephrosis. Multiple liver lesions are again   noted.  While these are of indeterminate etiology on the basis of this   examination, they have been previously characterized as hemangiomas. No   acute process.            Signing/Reading Doctor: Xander Tsang ([id number])    Approved: Xander Tsang ([id number])  May 21 2014  7:35AM                                   Lab Review  Lab Results   Component Value Date/Time    WBC 11.0 03/24/2017 02:37 PM    HCT 38.8 03/24/2017 02:37 PM    HGB 13.0 03/24/2017 02:37 PM    PLATELET 376 05/09/5261 02:37 PM     Lab Results   Component Value Date/Time    Sodium 139 03/24/2017 02:37 PM    Potassium 4.0 03/24/2017 02:37 PM    Chloride 98 03/24/2017 02:37 PM    CO2 23 03/24/2017 02:37 PM    Glucose 80 03/24/2017 02:37 PM    BUN 12 03/24/2017 02:37 PM    Creatinine 0.61 03/24/2017 02:37 PM    Calcium 9.5 03/24/2017 02:37 PM     Lab Results   Component Value Date/Time    Vitamin B12 >1999 12/30/2015 02:36 PM     Lab Results   Component Value Date/Time    LDL, calculated 148 03/24/2017 02:37 PM     Lab Results   Component Value Date/Time    Hemoglobin A1c (POC) 5.2 03/24/2017 02:37 PM

## 2017-07-14 RX ORDER — HYDROCODONE BITARTRATE AND ACETAMINOPHEN 7.5; 325 MG/1; MG/1
1 TABLET ORAL 3 TIMES DAILY
Qty: 90 TAB | Refills: 0 | Status: SHIPPED | OUTPATIENT
Start: 2017-07-14 | End: 2017-08-14 | Stop reason: SDUPTHER

## 2017-07-14 RX ORDER — PROMETHAZINE HYDROCHLORIDE 25 MG/1
25 TABLET ORAL
Qty: 50 TAB | Refills: 0 | Status: SHIPPED | OUTPATIENT
Start: 2017-07-14 | End: 2017-08-14 | Stop reason: SDUPTHER

## 2017-07-14 RX ORDER — NALOXONE HYDROCHLORIDE 1 MG/ML
2 INJECTION INTRAMUSCULAR; INTRAVENOUS; SUBCUTANEOUS
Qty: 1 SYRINGE | Refills: 1 | Status: SHIPPED | OUTPATIENT
Start: 2017-07-14 | End: 2020-10-02

## 2017-07-14 NOTE — TELEPHONE ENCOUNTER
From: Carla Johnson  To: Fatou Michel MD  Sent: 7/13/2017 11:55 PM EDT  Subject: Medication Renewal Request    Original authorizing provider: MD Carla Dumont would like a refill of the following medications:  meperidine (DEMEROL) 100 mg tablet Fatou Michel MD]  promethazine (PHENERGAN) 25 mg tablet Fatou Michel MD]  HYDROcodone-acetaminophen (NORCO) 7.5-325 mg per tablet Fatou Michel MD]    Preferred pharmacy: [facility].:  I will be coming in with my parents on the 14th for their 3p.m. appointment! I figured I could get my prescriptions at that time if it was allright with you.

## 2017-07-27 ENCOUNTER — PATIENT MESSAGE (OUTPATIENT)
Dept: NEUROLOGY | Age: 42
End: 2017-07-27

## 2017-07-28 ENCOUNTER — TELEPHONE (OUTPATIENT)
Dept: FAMILY MEDICINE CLINIC | Age: 42
End: 2017-07-28

## 2017-07-28 NOTE — TELEPHONE ENCOUNTER
Nelida Jefferson  Female, 38 y.o., 1975  Weight:   292 lb (132.5 kg)  Phone:   [phone]  PCP:   Juan Aly MD  FYI  Controlled Substance Contract on File  MRN:   [id number]  MyChart: Active  Next Appt:   08/09/2017    Update Medical Information        From     Magalys Lucero Shyam Sweeney      To     [facility] Nurse Pool      Sent     7/27/2017  1:54 PM            Dr. Clayborne Boast,   I just received these final questionnaires for my Social Security Disability hearing that is coming up on August 15.  My , Mr. Parish Winston says they need to be completed & faxed no later than August 8, 2017 to be received into evidence. He wants to include all three topics to give The  a better overview of my health.  The completed questionnaires can be faxed to [phone]. Please address the fax to Alysia Flor would also like a hard copy for my files if someone could call & let me know when there ready to ! Candy Brunner you so much for taking the time to do this for me & I'm praying so much this nightmare may finally have a glimmer of some hope for my future.    Sincerely,   Lilly Hernandez              Attachments                               Mental IQ - Dr. Clayborne Boast                       Headaches IQ - Dr. Carmen Lomeli IQ - Dr. Tami Robertson Composed From To Subject       Y 7/27/2017  1:54 PM Magalys Aly MD Update Medical Information

## 2017-07-31 ENCOUNTER — TELEPHONE (OUTPATIENT)
Dept: NEUROLOGY | Age: 42
End: 2017-07-31

## 2017-07-31 ENCOUNTER — OFFICE VISIT (OUTPATIENT)
Dept: FAMILY MEDICINE CLINIC | Age: 42
End: 2017-07-31

## 2017-07-31 DIAGNOSIS — G43.909 MIGRAINE SYNDROME: Primary | ICD-10-CM

## 2017-07-31 RX ORDER — KETOROLAC TROMETHAMINE 30 MG/ML
30 INJECTION, SOLUTION INTRAMUSCULAR; INTRAVENOUS ONCE
Qty: 1 VIAL | Refills: 0
Start: 2017-07-31 | End: 2017-07-31

## 2017-07-31 RX ORDER — HYDROCHLOROTHIAZIDE 25 MG/1
TABLET ORAL
Qty: 30 TAB | Refills: 10 | Status: SHIPPED | OUTPATIENT
Start: 2017-07-31 | End: 2018-06-20 | Stop reason: SDUPTHER

## 2017-07-31 RX ORDER — FUROSEMIDE 20 MG/1
20 TABLET ORAL DAILY
Qty: 30 TAB | Refills: 5 | Status: SHIPPED | OUTPATIENT
Start: 2017-07-31 | End: 2018-08-19 | Stop reason: SDUPTHER

## 2017-07-31 NOTE — TELEPHONE ENCOUNTER
Spoke with the patient and advised that the paperwork for the questionnaire was received, Dr. Lillian Almeida wanted to patient to come in to have it completed.     -Advised of the next appointment available would be 08/08; patient stated that she didn't want to worry about the paperwork, that was just something that the  wanted just incase, patient stated not to worry about the paperwork and she said it was okay to shred the information

## 2017-08-03 ENCOUNTER — TELEPHONE (OUTPATIENT)
Dept: FAMILY MEDICINE CLINIC | Age: 42
End: 2017-08-03

## 2017-08-03 NOTE — TELEPHONE ENCOUNTER
----- Message from Tj Rivas sent at 8/3/2017 10:18 AM EDT -----  Regarding: Dr. Christi Manjarrez from [facility] will be faxing over a disability questionnaire on the pt, however Dr. Krishna Jarvis did not sign nor date the last page. [facility] needs Dr. Krishna Jarvis to sign and date the last page and fax it back to [phone].  Juanita's best contact number is [phone]

## 2017-08-07 NOTE — TELEPHONE ENCOUNTER
From: Lisha Bernstein  Sent: 7/29/2017 1:47 PM EDT  To: [facility]  Subject: RE: Update Medical Information    [facility] thank you for letting me know! I will fax the paperwork to the number you provided! If there is anyway it can be forwarded to Dr. Ramandeep Hargrove I would greatly appreciate it! 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 Fortunately I believe since my hearing is on the 15th it's the only form he will have to fill out. I'm praying this all ends positively finally after three years of excruciating pain, major life changes, depression & waiting for this date to be determined. Sincerely,  Antonieta Young  ----- Message -----  From: Enma Avila LPN  Sent: 1/15/8398 8:26 AM EDT  To: Lisha Bernstein  Subject: RE: Update Medical Information    We have received your message and we did not receive any attached forms for disability. Please fax them, Mail them, or bring to our office. Dr. Ramandeep Hargrove will not be in the office next week . He is working in the hospital.   He will return on August 14th     ----- Message -----   From: Lisha Bernstein   Sent: 7/27/2017 2:12 PM EDT   To: Bea Eubanks MD  Subject: Iggy Hargrove,  I know we have just met once however my  would like you to fill out the attached form for my Social Security Disability hearing that is being held on August 15. The form needs to be completed & faxed to the below number no later than August 8 to be added into evidence. The completed questionnaires can be faxed to [phone]. Please address the fax to [facility]. I'm praying this hearing will finally afford me a glimmer of hope for my future! It will never help with my pain but to help with some of my stress for how I will take care of myself would be of great comfort!   If it's not too much to ask for a copy to be mailed to my home for my records as well I would appreciate it!   Sincerely,  Nikko Pendleton

## 2017-08-14 DIAGNOSIS — M47.812 OSTEOARTHRITIS OF CERVICAL SPINE, UNSPECIFIED SPINAL OSTEOARTHRITIS COMPLICATION STATUS: ICD-10-CM

## 2017-08-14 DIAGNOSIS — G43.909 MIGRAINE SYNDROME: ICD-10-CM

## 2017-08-14 RX ORDER — HYDROCODONE BITARTRATE AND ACETAMINOPHEN 7.5; 325 MG/1; MG/1
1 TABLET ORAL 3 TIMES DAILY
Qty: 90 TAB | Refills: 0 | Status: SHIPPED | OUTPATIENT
Start: 2017-08-14 | End: 2017-09-14 | Stop reason: SDUPTHER

## 2017-08-14 RX ORDER — PROMETHAZINE HYDROCHLORIDE 25 MG/1
25 TABLET ORAL
Qty: 50 TAB | Refills: 0 | Status: SHIPPED | OUTPATIENT
Start: 2017-08-14 | End: 2017-09-14 | Stop reason: SDUPTHER

## 2017-08-14 NOTE — TELEPHONE ENCOUNTER
From: Lindsey Arce  To: Stefani Vizcaino MD  Sent: 8/14/2017 12:44 AM EDT  Subject: Medication Renewal Request    Original authorizing provider: MD Lindsey Kamara would like a refill of the following medications:  meperidine (DEMEROL) 100 mg tablet Stefani Vizcaino MD]  promethazine (PHENERGAN) 25 mg tablet Stefani Vizcaino MD]  HYDROcodone-acetaminophen (NORCO) 7.5-325 mg per tablet Stefani Vizcaino MD]    Preferred pharmacy: [address] [place] [address], [id number] [place] [address] [facility] [phone] so you don't have to look it up! Even though you probably know it by heart now.  Thanks, Jed Valerio

## 2017-08-21 DIAGNOSIS — F43.23 ADJUSTMENT DISORDER WITH MIXED ANXIETY AND DEPRESSED MOOD: Primary | ICD-10-CM

## 2017-08-21 RX ORDER — BUSPIRONE HYDROCHLORIDE 7.5 MG/1
7.5 TABLET ORAL
Qty: 50 TAB | Refills: 1 | Status: SHIPPED | OUTPATIENT
Start: 2017-08-21 | End: 2017-09-07 | Stop reason: SDUPTHER

## 2017-08-30 ENCOUNTER — TELEPHONE (OUTPATIENT)
Dept: NEUROLOGY | Age: 42
End: 2017-08-30

## 2017-08-31 ENCOUNTER — TELEPHONE (OUTPATIENT)
Dept: NEUROLOGY | Age: 42
End: 2017-08-31

## 2017-09-06 ENCOUNTER — TELEPHONE (OUTPATIENT)
Dept: NEUROLOGY | Age: 42
End: 2017-09-06

## 2017-09-06 NOTE — TELEPHONE ENCOUNTER
Re: Botox - called local [facility] for Rx Bin, PCN, Group as patient does not have card scanned in CC. Rx [id number]  Rx PCN [id number]  Rx Group [id number]    ID [id number]    Sent Botox PA via CMM w/ copy of office notes.

## 2017-09-07 DIAGNOSIS — F43.23 ADJUSTMENT DISORDER WITH MIXED ANXIETY AND DEPRESSED MOOD: ICD-10-CM

## 2017-09-07 RX ORDER — BUSPIRONE HYDROCHLORIDE 7.5 MG/1
7.5 TABLET ORAL
Qty: 50 TAB | Refills: 1 | Status: SHIPPED | OUTPATIENT
Start: 2017-09-07 | End: 2020-10-02

## 2017-09-08 ENCOUNTER — TELEPHONE (OUTPATIENT)
Dept: NEUROLOGY | Age: 42
End: 2017-09-08

## 2017-09-08 NOTE — TELEPHONE ENCOUNTER
Re: Botox  - Called [employer] for update on PA request (sent via CMM on 9/6). I had the option to leave a detailed vm of request. Done today. Also, resent request through [facility].     9/6/17  Thank you for submitting your request to [employer]. Preethi Muro will review your patients information and notify you when they reach a decision. You can also check the status of this review by reopening this request.    If you have any questions about your submission, call [employer] at [phone].

## 2017-09-14 DIAGNOSIS — G43.909 MIGRAINE SYNDROME: ICD-10-CM

## 2017-09-14 DIAGNOSIS — M47.812 OSTEOARTHRITIS OF CERVICAL SPINE, UNSPECIFIED SPINAL OSTEOARTHRITIS COMPLICATION STATUS: ICD-10-CM

## 2017-09-15 ENCOUNTER — TELEPHONE (OUTPATIENT)
Dept: NEUROLOGY | Age: 42
End: 2017-09-15

## 2017-09-15 RX ORDER — HYDROCODONE BITARTRATE AND ACETAMINOPHEN 7.5; 325 MG/1; MG/1
1 TABLET ORAL 3 TIMES DAILY
Qty: 90 TAB | Refills: 0 | Status: SHIPPED | OUTPATIENT
Start: 2017-09-15 | End: 2017-10-16 | Stop reason: SDUPTHER

## 2017-09-15 RX ORDER — PROMETHAZINE HYDROCHLORIDE 25 MG/1
25 TABLET ORAL
Qty: 50 TAB | Refills: 0 | Status: SHIPPED | OUTPATIENT
Start: 2017-09-15 | End: 2017-10-16 | Stop reason: SDUPTHER

## 2017-09-15 NOTE — TELEPHONE ENCOUNTER
From: Mya Mayo  To: Lety Laguna MD  Sent: 9/14/2017 4:14 PM EDT  Subject: Medication Renewal Request    Original authorizing provider: MD Mya Serna would like a refill of the following medications:  meperidine (DEMEROL) 100 mg tablet Lety Laguna MD]  promethazine (PHENERGAN) 25 mg tablet Lety Laguna MD]  HYDROcodone-acetaminophen (NORCO) 7.5-325 mg per tablet Lety Laguna MD]    Preferred pharmacy: [place] [id number]  I would like to pick this up if at all possible tomorrow at the end of the day! Please call me at [phone] when I can come pick them up - thank you!!!  Wood List

## 2017-09-15 NOTE — TELEPHONE ENCOUNTER
Botox approval received via [facility] on 9/10/17. Auth good from 9/6/17 - 2/6/18. [facility]   [phone]. Diego Bravo,   Rx is attached to order (at my desk) if you need it.

## 2017-09-15 NOTE — TELEPHONE ENCOUNTER
Provided the script for Nixon Rust from [facility] and he stated that it will take 1-2 days for processing, advised that the patient has an appointment on 9/22;   -Nixon Rust advised to me that the patient can call with in an a hour and speed up the process    -advised would call the patient and update and advise to call

## 2017-09-15 NOTE — TELEPHONE ENCOUNTER
Spoke with the patient and advised to call the pharmacy to set up shipment so her botox will be here before her appointment, provided the number to the patient to call

## 2017-09-18 DIAGNOSIS — F32.A DEPRESSION, UNSPECIFIED DEPRESSION TYPE: ICD-10-CM

## 2017-09-19 ENCOUNTER — TELEPHONE (OUTPATIENT)
Dept: NEUROLOGY | Age: 42
End: 2017-09-19

## 2017-09-19 RX ORDER — ZOLPIDEM TARTRATE 5 MG/1
TABLET ORAL
Qty: 30 TAB | Refills: 2 | Status: SHIPPED | OUTPATIENT
Start: 2017-09-19 | End: 2017-11-21 | Stop reason: SDUPTHER

## 2017-09-19 NOTE — TELEPHONE ENCOUNTER
Called and spoke with the insurance company and they advised they cannot release the shipment until the patient's give the ok     Called the patient and advised that the insurance company needs her okay to ship the botox

## 2017-09-21 ENCOUNTER — TELEPHONE (OUTPATIENT)
Dept: NEUROLOGY | Age: 42
End: 2017-09-21

## 2017-09-22 ENCOUNTER — OFFICE VISIT (OUTPATIENT)
Dept: NEUROLOGY | Age: 42
End: 2017-09-22

## 2017-09-22 VITALS
SYSTOLIC BLOOD PRESSURE: 122 MMHG | OXYGEN SATURATION: 98 % | HEART RATE: 114 BPM | DIASTOLIC BLOOD PRESSURE: 82 MMHG | WEIGHT: 293 LBS | HEIGHT: 66 IN | BODY MASS INDEX: 47.09 KG/M2

## 2017-09-22 DIAGNOSIS — G43.711 CHRONIC MIGRAINE WITHOUT AURA, INTRACTABLE, WITH STATUS MIGRAINOSUS: Primary | ICD-10-CM

## 2017-09-22 DIAGNOSIS — M79.7 FIBROMYALGIA: ICD-10-CM

## 2017-09-22 NOTE — PATIENT INSTRUCTIONS
[facility]   Statement to Patients  April 1, 2014      In an effort to ensure the large volume of patient prescription refills is processed in the most efficient and expeditious manner, we are asking our patients to assist us by calling your Pharmacy for all prescription refills, this will include also your  Mail Order Pharmacy. The pharmacy will contact our office electronically to continue the refill process. Please do not wait until the last minute to call your pharmacy. We need at least 48 hours (2days) to fill prescriptions. We also encourage you to call your pharmacy before going to  your prescription to make sure it is ready. With regard to controlled substance prescription refill requests (narcotic refills) that need to be picked up at our office, we ask your cooperation by providing us with at least 72 hours (3days) notice that you will need a refill. We will not refill narcotic prescription refill requests after 4:00pm on any weekday, Monday through Thursday, or after 2:00pm on Fridays, or on the weekends. We encourage everyone to explore another way of getting your prescription refill request processed using [employer], our patient web portal through our electronic medical record system. [employer] is an efficient and effective way to communicate your medication request directly to the office and  downloadable as an ina on your smart phone . [employer] also features a review functionality that allows you to view your medication list as well as leave messages for your physician. Are you ready to get connected? If so please review the attatched instructions or speak to any of our staff to get you set up right away! Thank you so much for your cooperation. Should you have any questions please contact our Practice Administrator.     The Physicians and Staff,  [facility]       OnabotulinumtoxinA (By injection)   OnabotulinumtoxinA (ze-c-ufx-yc-PWN-wsf-tox-in-ay)  Treats muscle stiffness, muscle spasms, excessive sweating, overactive bladder, or loss of bladder control. Prevents chronic migraine headaches. Improves the appearance of wrinkles on the face. Brand Name(s): Botox, Botox Cosmetic   There may be other brand names for this medicine. When This Medicine Should Not Be Used: This medicine is not right for everyone. You should not receive this medicine if you had an allergic reaction to onabotulinumtoxinA or any other botulinum toxin product. How to Use This Medicine:   Injectable  · Your doctor will prescribe your exact dose and tell you how often it should be given. This medicine is given by a healthcare provider as a shot under your skin or into a muscle. · You may be given medicine to numb the area where the shot will be injected. If you receive the medicine around your eyes, you may be given eye drops or ointment to numb the area. After your injection, you may need to wear a protective contact lens or eye patch. · If you are being treated for excessive sweating, shave your underarms but do not use deodorant for 24 hours before your injection. Avoid exercise, hot foods or liquids, or anything else that could make you sweat for 30 minutes before your injection. · The recommended treatment schedule for chronic migraine is every 12 weeks. · This medicine works slowly. Once your condition has improved, the medicine will last about 3 months, then the effects will slowly go away. You might need more injections to treat your condition. ¨ Muscle spasms in the eyelids should improve within 3 to 10 days. ¨ Eye muscle problems should improve 1 or 2 days after the injection, and the improvement should last for 2 to 6 weeks. ¨ Neck pain should improve within 2 to 6 weeks. ¨ Arm stiffness should improve within 4 to 6 weeks. ¨ Facial lines or wrinkles should improve 1 or 2 days. · This medicine should come with a Medication Guide. Ask your pharmacist for a copy if you do not have one. · Missed dose:Call your doctor or pharmacist for instructions. Drugs and Foods to Avoid:   Ask your doctor or pharmacist before using any other medicine, including over-the-counter medicines, vitamins, and herbal products. · Some foods and medicine can affect how onabotulinumtoxinA works. Tell your doctor if you are using any of the following:  ¨ Aspirin or a blood thinner (such as ticlopidine, warfarin)  ¨ Muscle relaxer  ¨ Medicine for an infection (such as amikacin, gentamicin, streptomycin, tobramycin)  · Tell your doctor if you have received an injection of any botulinum toxin product within the past 4 months. Warnings While Using This Medicine:   · Tell your doctor if you are pregnant or breastfeeding, or if you have breathing or lung problems, bleeding problems, heart or blood vessel disease, or nerve or muscle problems (such as myasthenia gravis). Tell your doctor if you have ever had face surgery or if you have a urinary tract infection or trouble urinating, diabetes, or multiple sclerosis. · This medicine may cause the following problems:  ¨ Muscle weakness, loss of bladder control, trouble swallowing, speaking, or breathing (caused by the toxin spreading to other parts of your body)  · This medicine may make your muscles weak or cause vision problems. Do not drive or do anything else that could be dangerous until you know how this medicine affects you. · There are some warnings that only apply if you are receiving this medicine to treat the following:   ¨ Injections near the eye: This medicine may reduce blinking, which can raise the risk of eye problems such as corneal exposure and ulcers. Tell your doctor right away if you notice that you are blinking less than usual or your eyes feel dry. ¨ Urinary incontinence: This medicine may cause autonomic dysreflexia, which can be a life-threatening condition.   ¨ Overactive bladder: Check with your doctor right away if you have trouble urinating or a burning sensation while urinating. · This medicine contains products from donated human blood, so it may contain viruses, although the risk is low. Human donors and blood are always tested for viruses to keep the risk low. Talk with your doctor about this risk if you are concerned. · Your doctor will check your progress and the effects of this medicine at regular visits. Keep all appointments. Possible Side Effects While Using This Medicine:   Call your doctor right away if you notice any of these side effects:  · Allergic reaction: Itching or hives, swelling in your face or hands, swelling or tingling in your mouth or throat, chest tightness, trouble breathing  · Blurred or double vision, droopy eyelids  · Change in how much or how often you urinate, trouble urinating, or painful urination  · Chest pain, slow or uneven heartbeat  · Headache, increased sweating, warmth or redness in your face, neck, or arm  · Muscle weakness  · Trouble swallowing, talking, or breathing  If you notice these less serious side effects, talk with your doctor:   · Fever, chills, cough, stuffy or runny nose, sore throat, and body aches  · Pain in your neck, back, arms, or legs  · Redness, pain, tenderness, bruising, swelling, or weakness where the shot was given  If you notice other side effects that you think are caused by this medicine, tell your doctor. Call your doctor for medical advice about side effects. You may report side effects to FDA at 5-355-FDA-8877  © 2017 [address] [employer] Information is for End User's use only and may not be sold, redistributed or otherwise used for commercial purposes. The above information is an  only. It is not intended as medical advice for individual conditions or treatments. Talk to your doctor, nurse or pharmacist before following any medical regimen to see if it is safe and effective for you.

## 2017-09-22 NOTE — MR AVS SNAPSHOT
Visit Information Date & Time Provider Department Dept. Phone Encounter #  
 9/22/2017 12:00 PM Shakir Cooney MD [facility]rology [facility] at [facility] [phone] [id number] Follow-up Instructions Return in about 1 month (around 10/22/2017). Upcoming Health Maintenance Date Due  
 PAP AKA CERVICAL CYTOLOGY 7/18/2016 INFLUENZA AGE 9 TO ADULT 8/1/2017 COLONOSCOPY 5/8/2018 DTaP/Tdap/Td series (2 - Td) 12/19/2024 Allergies as of 9/22/2017  Review Complete On: 9/22/2017 By: Shakir Cooney MD  
  
 Severity Noted Reaction Type Reactions Atacand [Candesartan] High 08/29/2014   Systemic Other (comments) Patient B/P increase Compazine [Prochlorperazine] High 04/16/2010   Side Effect Other (comments), Unable to Obtain Ciprofloxacin  04/16/2010   Systemic Other (comments) Codeine  04/16/2010   Side Effect Nausea and Vomiting Zonisamide Low 04/11/2011   Side Effect Nausea and Vomiting Current Immunizations  Reviewed on 12/19/2014 Name Date Influenza Vaccine PF 12/19/2014 Tdap 12/19/2014 Not reviewed this visit You Were Diagnosed With   
  
 Codes Comments Chronic migraine without aura, intractable, with status migrainosus    -  Primary ICD-10-CM: E73.841 ICD-9-CM: 346.73 Fibromyalgia     ICD-10-CM: M79.7 ICD-9-CM: 729.1 Vitals BP Pulse Height(growth percentile) Weight(growth percentile) SpO2 BMI  
 122/82 (!) 114 5' 6\" (1.676 m) 303 lb (137.4 kg) 98% 48.91 kg/m2 OB Status Smoking Status Unknown Never Smoker BMI and BSA Data Body Mass Index Body Surface Area 48.91 kg/m 2 2.53 m 2 Preferred Pharmacy Pharmacy Name Phone [facility] [address] [place] [address] [phone] Your Updated Medication List  
  
   
This list is accurate as of: 9/22/17 12:43 PM.  Always use your most recent med list.  
  
  
  
  
 ALPRAZolam 1 mg tablet Commonly known as:  XANAX  
TAKE ONE TAB BY MOUTH EVERY NIGHTY AT BEDTIME AS NEEDED FOR ANXIETY  
  
 busPIRone 7.5 mg tablet Commonly known as:  BUSPAR Take 1 Tab by mouth three (3) times daily as needed. cholecalciferol (VITAMIN D3) 5,000 unit Tab tablet Commonly known as:  VITAMIN D3 Take 5,000 Units by mouth. Takes one tablet 4 times a week  
  
 cyclobenzaprine 10 mg tablet Commonly known as:  FLEXERIL  
TAKE 1 TABLET BY MOUTH 3 TIMES A DAY WITH MEALS  
  
 DENTA 5000 PLUS 1.1 % Crea Generic drug:  fluoride (sodium) DULoxetine 60 mg capsule Commonly known as:  CYMBALTA Take 1 Cap by mouth daily. furosemide 20 mg tablet Commonly known as:  LASIX Take 1 Tab by mouth daily. * gabapentin 100 mg capsule Commonly known as:  NEURONTIN Take 3 Caps by mouth three (3) times daily. * gabapentin 300 mg capsule Commonly known as:  NEURONTIN  
TAKE ONE CAPSULE BY MOUTH 3 TIMES A DAY  
  
 hydroCHLOROthiazide 25 mg tablet Commonly known as:  HYDRODIURIL  
TAKE 1 TABLET BY MOUTH EVERY DAY  
  
 HYDROcodone-acetaminophen 7.5-325 mg per tablet Commonly known as:  Renuka Kevin Take 1 Tab by mouth three (3) times daily. meperidine 100 mg tablet Commonly known as:  DEMEROL Take 1 Tab by mouth every four (4) hours as needed. Max Daily Amount: 600 mg.  
  
 metFORMIN  mg tablet Commonly known as:  GLUCOPHAGE XR Take 1 Tab by mouth daily (with dinner). naloxone 1 mg/mL injection Commonly known as:  NARCAN  
2 mL by IntraMUSCular route once as needed for up to 1 dose. nystatin 100,000 unit/mL suspension Commonly known as:  MYCOSTATIN Take 5 mL by mouth four (4) times daily. omeprazole 40 mg capsule Commonly known as:  PRILOSEC  
TAKE 1 CAPSULE BY MOUTH DAILY  
  
 onabotulinumtoxinA 200 unit injection Commonly known as:  BOTOX To be injected in the muscles of the head and neck promethazine 25 mg tablet Commonly known as:  PHENERGAN Take 1 Tab by mouth every eight (8) hours as needed for Nausea. STOOL SOFTENER 100 mg capsule Generic drug:  docusate sodium 100 mg two (2) times a day. Once daily  
  
 topiramate 100 mg tablet Commonly known as:  TOPAMAX START WITH 1/2 TABLET IN MORNING AND 1 TAB AT NIGHT FOR 2 WEEKS, THEN INCREASE TO 1 TAB TWICE A DAY  
  
 verapamil  mg CR tablet Commonly known as:  CALAN-SR  
TAKE 1 TABLET BY MOUTH AT BEDTIME  
  
 vitamin E 400 unit capsule Commonly known as:  [address] Take  by mouth daily. zolpidem 5 mg tablet Commonly known as:  AMBIEN  
TAKE 1 TABLET BY MOUTH NIGHTLY AS NEEDED FOR SLEEP * Notice: This list has 2 medication(s) that are the same as other medications prescribed for you. Read the directions carefully, and ask your doctor or other care provider to review them with you. Follow-up Instructions Return in about 1 month (around 10/22/2017). Patient Instructions PRESCRIPTION REFILL POLICY [facility] Statement to Patients April 1, 2014 In an effort to ensure the large volume of patient prescription refills is processed in the most efficient and expeditious manner, we are asking our patients to assist us by calling your Pharmacy for all prescription refills, this will include also your  Mail Order Pharmacy. The pharmacy will contact our office electronically to continue the refill process. Please do not wait until the last minute to call your pharmacy. We need at least 48 hours (2days) to fill prescriptions. We also encourage you to call your pharmacy before going to  your prescription to make sure it is ready.   
 
With regard to controlled substance prescription refill requests (narcotic refills) that need to be picked up at our office, we ask your cooperation by providing us with at least 72 hours (3days) notice that you will need a refill. We will not refill narcotic prescription refill requests after 4:00pm on any weekday, Monday through Thursday, or after 2:00pm on Fridays, or on the weekends. We encourage everyone to explore another way of getting your prescription refill request processed using ralali, our patient web portal through our electronic medical record system. ralali is an efficient and effective way to communicate your medication request directly to the office and  downloadable as an ina on your smart phone . ralali also features a review functionality that allows you to view your medication list as well as leave messages for your physician. Are you ready to get connected? If so please review the attatched instructions or speak to any of our staff to get you set up right away! Thank you so much for your cooperation. Should you have any questions please contact our Practice Administrator. The Physicians and Staff,  Nael [facility] OnabotulinumtoxinA (By injection) OnabotulinumtoxinA (lp-e-baw-mw-JZI-oyw-tox-in-ay) Treats muscle stiffness, muscle spasms, excessive sweating, overactive bladder, or loss of bladder control. Prevents chronic migraine headaches. Improves the appearance of wrinkles on the face. Brand Name(s): Botox, Botox Cosmetic There may be other brand names for this medicine. When This Medicine Should Not Be Used: This medicine is not right for everyone. You should not receive this medicine if you had an allergic reaction to onabotulinumtoxinA or any other botulinum toxin product. How to Use This Medicine:  
Injectable · Your doctor will prescribe your exact dose and tell you how often it should be given. This medicine is given by a healthcare provider as a shot under your skin or into a muscle. · You may be given medicine to numb the area where the shot will be injected.  If you receive the medicine around your eyes, you may be given eye drops or ointment to numb the area. After your injection, you may need to wear a protective contact lens or eye patch. · If you are being treated for excessive sweating, shave your underarms but do not use deodorant for 24 hours before your injection. Avoid exercise, hot foods or liquids, or anything else that could make you sweat for 30 minutes before your injection. · The recommended treatment schedule for chronic migraine is every 12 weeks. · This medicine works slowly. Once your condition has improved, the medicine will last about 3 months, then the effects will slowly go away. You might need more injections to treat your condition. ¨ Muscle spasms in the eyelids should improve within 3 to 10 days. ¨ Eye muscle problems should improve 1 or 2 days after the injection, and the improvement should last for 2 to 6 weeks. ¨ Neck pain should improve within 2 to 6 weeks. ¨ Arm stiffness should improve within 4 to 6 weeks. ¨ Facial lines or wrinkles should improve 1 or 2 days. · This medicine should come with a Medication Guide. Ask your pharmacist for a copy if you do not have one. · Missed dose:Call your doctor or pharmacist for instructions. Drugs and Foods to Avoid: Ask your doctor or pharmacist before using any other medicine, including over-the-counter medicines, vitamins, and herbal products. · Some foods and medicine can affect how onabotulinumtoxinA works. Tell your doctor if you are using any of the following: ¨ Aspirin or a blood thinner (such as ticlopidine, warfarin) ¨ Muscle relaxer ¨ Medicine for an infection (such as amikacin, gentamicin, streptomycin, tobramycin) · Tell your doctor if you have received an injection of any botulinum toxin product within the past 4 months. Warnings While Using This Medicine: · Tell your doctor if you are pregnant or breastfeeding, or if you have breathing or lung problems, bleeding problems, heart or blood vessel disease, or nerve or muscle problems (such as myasthenia gravis). Tell your doctor if you have ever had face surgery or if you have a urinary tract infection or trouble urinating, diabetes, or multiple sclerosis. · This medicine may cause the following problems: ¨ Muscle weakness, loss of bladder control, trouble swallowing, speaking, or breathing (caused by the toxin spreading to other parts of your body) · This medicine may make your muscles weak or cause vision problems. Do not drive or do anything else that could be dangerous until you know how this medicine affects you. · There are some warnings that only apply if you are receiving this medicine to treat the following: ¨ Injections near the eye: This medicine may reduce blinking, which can raise the risk of eye problems such as corneal exposure and ulcers. Tell your doctor right away if you notice that you are blinking less than usual or your eyes feel dry. ¨ Urinary incontinence: This medicine may cause autonomic dysreflexia, which can be a life-threatening condition. ¨ Overactive bladder: Check with your doctor right away if you have trouble urinating or a burning sensation while urinating. · This medicine contains products from donated human blood, so it may contain viruses, although the risk is low. Human donors and blood are always tested for viruses to keep the risk low. Talk with your doctor about this risk if you are concerned. · Your doctor will check your progress and the effects of this medicine at regular visits. Keep all appointments. Possible Side Effects While Using This Medicine:  
Call your doctor right away if you notice any of these side effects: · Allergic reaction: Itching or hives, swelling in your face or hands, swelling or tingling in your mouth or throat, chest tightness, trouble breathing · Blurred or double vision, droopy eyelids · Change in how much or how often you urinate, trouble urinating, or painful urination · Chest pain, slow or uneven heartbeat · Headache, increased sweating, warmth or redness in your face, neck, or arm · Muscle weakness · Trouble swallowing, talking, or breathing If you notice these less serious side effects, talk with your doctor: · Fever, chills, cough, stuffy or runny nose, sore throat, and body aches · Pain in your neck, back, arms, or legs · Redness, pain, tenderness, bruising, swelling, or weakness where the shot was given If you notice other side effects that you think are caused by this medicine, tell your doctor. Call your doctor for medical advice about side effects. You may report side effects to FDA at 0-575-OQR-2945 © 2017 [address] Information is for End User's use only and may not be sold, redistributed or otherwise used for commercial purposes. The above information is an  only. It is not intended as medical advice for individual conditions or treatments. Talk to your doctor, nurse or pharmacist before following any medical regimen to see if it is safe and effective for you. Introducing [facility]! Dear Khari Hutchinson: Thank you for requesting a Applaud account. Our records indicate that you already have an active Applaud account. You can access your account anytime at [email or web address] Did you know that you can access your hospital and ER discharge instructions at any time in Applaud? You can also review all of your test results from your hospital stay or ER visit. Additional Information If you have questions, please visit the Frequently Asked Questions section of the App[employer]ud website at [email or web address] Remember, Applaud is NOT to be used for urgent needs. For medical emergencies, dial 911. Now available from your iPhone and Android! Please provide this summary of care documentation to your next provider. Your primary care clinician is listed as Jamie Givens. If you have any questions after today's visit, please call [phone].

## 2017-09-25 RX ORDER — ALPRAZOLAM 1 MG/1
TABLET ORAL
Qty: 30 TAB | Refills: 2 | Status: SHIPPED | OUTPATIENT
Start: 2017-09-25 | End: 2017-09-28 | Stop reason: SDUPTHER

## 2017-09-29 RX ORDER — ALPRAZOLAM 1 MG/1
TABLET ORAL
Qty: 30 TAB | Refills: 2 | Status: SHIPPED | OUTPATIENT
Start: 2017-09-29 | End: 2018-03-14 | Stop reason: SDUPTHER

## 2017-10-06 DIAGNOSIS — H53.9 VISION DISTURBANCE: Primary | ICD-10-CM

## 2017-10-16 DIAGNOSIS — G43.909 MIGRAINE SYNDROME: ICD-10-CM

## 2017-10-16 DIAGNOSIS — M47.812 OSTEOARTHRITIS OF CERVICAL SPINE, UNSPECIFIED SPINAL OSTEOARTHRITIS COMPLICATION STATUS: ICD-10-CM

## 2017-10-16 PROBLEM — E78.2 MIXED HYPERLIPIDEMIA: Status: ACTIVE | Noted: 2017-10-16

## 2017-10-16 RX ORDER — PROMETHAZINE HYDROCHLORIDE 25 MG/1
25 TABLET ORAL
Qty: 50 TAB | Refills: 0 | Status: SHIPPED | OUTPATIENT
Start: 2017-10-16 | End: 2017-11-14 | Stop reason: SDUPTHER

## 2017-10-16 RX ORDER — HYDROCODONE BITARTRATE AND ACETAMINOPHEN 7.5; 325 MG/1; MG/1
1 TABLET ORAL 3 TIMES DAILY
Qty: 90 TAB | Refills: 0 | Status: SHIPPED | OUTPATIENT
Start: 2017-10-16 | End: 2017-11-15 | Stop reason: SDUPTHER

## 2017-10-16 NOTE — TELEPHONE ENCOUNTER
From: Cristine Montes  To: Marna Fabry, MD  Sent: 10/16/2017 2:44 AM EDT  Subject: Medication Renewal Request    Original authorizing provider: Marna Fabry, MD Odette Infante would like a refill of the following medications:  meperidine (DEMEROL) 100 mg tablet Marna Fabry, MD]  promethazine (PHENERGAN) 25 mg tablet Marna Fabry, MD]  HYDROcodone-acetaminophen (NORCO) 7.5-325 mg per tablet Marna Fabry, MD]    Preferred pharmacy: [place] [id number].:  I'm also going to ask for an appointment! I don't know if I could get one Mon thru [place] this week or next week but I'm still dealing with an overwhelming bit of depression! I can't seem to get through a day without crying several times! It's been 2 months 2 the day since the last time I saw my soulmate & love before he  of cirrhosis & liver cancer! Then 16 days later out of the blue my best girlfriend  at home! I'm a complete mess & think I may need to double my anti-depressant!

## 2017-10-17 ENCOUNTER — OFFICE VISIT (OUTPATIENT)
Dept: FAMILY MEDICINE CLINIC | Age: 42
End: 2017-10-17

## 2017-10-17 VITALS
BODY MASS INDEX: 47.09 KG/M2 | HEIGHT: 66 IN | TEMPERATURE: 97.7 F | HEART RATE: 93 BPM | WEIGHT: 293 LBS | RESPIRATION RATE: 22 BRPM | DIASTOLIC BLOOD PRESSURE: 84 MMHG | SYSTOLIC BLOOD PRESSURE: 126 MMHG | OXYGEN SATURATION: 98 %

## 2017-10-17 DIAGNOSIS — F32.A DEPRESSION, UNSPECIFIED DEPRESSION TYPE: Primary | ICD-10-CM

## 2017-10-17 DIAGNOSIS — E78.2 MIXED HYPERLIPIDEMIA: ICD-10-CM

## 2017-10-17 DIAGNOSIS — R73.9 HYPERGLYCEMIA: ICD-10-CM

## 2017-10-17 DIAGNOSIS — M47.812 OSTEOARTHRITIS OF CERVICAL SPINE, UNSPECIFIED SPINAL OSTEOARTHRITIS COMPLICATION STATUS: ICD-10-CM

## 2017-10-17 DIAGNOSIS — G89.4 CHRONIC PAIN DISORDER: ICD-10-CM

## 2017-10-17 DIAGNOSIS — M79.7 FIBROMYALGIA: ICD-10-CM

## 2017-10-17 LAB — HBA1C MFR BLD HPLC: 5.7 %

## 2017-10-17 RX ORDER — ARIPIPRAZOLE 5 MG/1
5 TABLET ORAL
Qty: 30 TAB | Refills: 2 | Status: SHIPPED | OUTPATIENT
Start: 2017-10-17 | End: 2018-01-07 | Stop reason: SDUPTHER

## 2017-10-17 RX ORDER — PROMETHAZINE HYDROCHLORIDE 25 MG/ML
25 INJECTION, SOLUTION INTRAMUSCULAR; INTRAVENOUS ONCE
Qty: 1 VIAL | Refills: 0
Start: 2017-10-17 | End: 2017-10-17

## 2017-10-17 RX ORDER — GABAPENTIN 400 MG/1
400 CAPSULE ORAL 3 TIMES DAILY
Qty: 90 CAP | Refills: 11 | Status: SHIPPED | OUTPATIENT
Start: 2017-10-17 | End: 2020-10-02

## 2017-10-17 RX ORDER — KETOROLAC TROMETHAMINE 30 MG/ML
30 INJECTION, SOLUTION INTRAMUSCULAR; INTRAVENOUS ONCE
Qty: 1 VIAL | Refills: 0
Start: 2017-10-17 | End: 2017-10-17

## 2017-10-17 NOTE — PROGRESS NOTES
HISTORY OF PRESENT ILLNESS  Jair Hampton is a 38 y.o. female. worsening migraines for past weeks. Increasing depression/grief over recent losses  Migraine    The history is provided by the patient. This is a chronic problem. The problem occurs constantly. Associated symptoms include malaise/fatigue. Pertinent negatives include no fever and no visual change. Depression   This is a chronic problem. The problem occurs daily. The problem has been gradually worsening. Review of Systems   Constitutional: Positive for malaise/fatigue. Negative for fever. Musculoskeletal: Positive for myalgias. Psychiatric/Behavioral: Positive for depression. Negative for suicidal ideas. The patient is not nervous/anxious. Physical Exam   Constitutional: She is oriented to person, place, and time. She appears well-developed and well-nourished. She appears distressed. HENT:   Head: Normocephalic and atraumatic. Right Ear: External ear normal.   Left Ear: External ear normal.   Nose: Nose normal.   Mouth/Throat: Oropharynx is clear and moist.   Cardiovascular: Normal rate and regular rhythm. Pulmonary/Chest: Effort normal and breath sounds normal.   Abdominal: Bowel sounds are normal.   Neurological: She is alert and oriented to person, place, and time. Skin: Skin is warm and dry. Psychiatric:   Flat,depressed       ASSESSMENT and PLAN  Diagnoses and all orders for this visit:    1. Depression, unspecified depression type  -     REFERRAL TO PSYCHIATRY  -     ARIPiprazole (ABILIFY) 5 mg tablet; Take 1 Tab by mouth nightly. 2. Chronic migraine  -     ketorolac (TORADOL) 30 mg/mL (1 mL) injection; 1 mL by IntraVENous route once for 1 dose. -     KETOROLAC TROMETHAMINE INJ  -     WI THER/PROPH/DIAG INJECTION, SUBCUT/IM  -     PROMETHAZINE HCL INJECTION  -     promethazine (PHENERGAN) 25 mg/mL injection; 1 mL by IntraMUSCular route once for 1 dose.   -     WI THER/PROPH/DIAG INJECTION, SUBCUT/IM  - gabapentin (NEURONTIN) 400 mg capsule; Take 1 Cap by mouth three (3) times daily. 3. Fibromyalgia  -     METABOLIC PANEL, COMPREHENSIVE  -     CBC WITH AUTOMATED DIFF  -     ketorolac (TORADOL) 30 mg/mL (1 mL) injection; 1 mL by IntraVENous route once for 1 dose. -     KETOROLAC TROMETHAMINE INJ  -     UT THER/PROPH/DIAG INJECTION, SUBCUT/IM  -     PROMETHAZINE HCL INJECTION  -     promethazine (PHENERGAN) 25 mg/mL injection; 1 mL by IntraMUSCular route once for 1 dose. -     UT THER/PROPH/DIAG INJECTION, SUBCUT/IM  -     gabapentin (NEURONTIN) 400 mg capsule; Take 1 Cap by mouth three (3) times daily. 4. Hyperglycemia  -     AMB POC HEMOGLOBIN A1C    5. Osteoarthritis of cervical spine, unspecified spinal osteoarthritis complication status    6. Chronic pain disorder. risks of opiod use discussed and reduction in therapy highly recommended. Pt more depressed today,not effective time to reduce meds  -     METABOLIC PANEL, COMPREHENSIVE    7.  Mixed hyperlipidemia  -     LIPID PANEL      Follow-up Disposition: Not on File

## 2017-10-17 NOTE — MR AVS SNAPSHOT
Visit Information Date & Time Provider Department Dept. Phone Encounter #  
 10/17/2017  9:30 AM Margie VelaKirill [phone] [id number] Your Appointments 10/19/2017  1:20 PM  
Follow Up with Rober Grant MD  
[facility]) Appt Note: F/u botox,09/22/17,lsw  
 [facility], 
[address] [place] [address], [address] [phone]  
[address], [address], [address] [address] Upcoming Health Maintenance Date Due  
 PAP AKA CERVICAL CYTOLOGY 7/18/2016 INFLUENZA AGE 9 TO ADULT 8/1/2017 COLONOSCOPY 5/8/2018 DTaP/Tdap/Td series (2 - Td) 12/19/2024 Allergies as of 10/17/2017  Review Complete On: 10/17/2017 By: Margie Vela MD  
  
 Severity Noted Reaction Type Reactions Atacand [Candesartan] High 08/29/2014   Systemic Other (comments) Patient B/P increase Compazine [Prochlorperazine] High 04/16/2010   Side Effect Other (comments), Unable to Obtain Ciprofloxacin  04/16/2010   Systemic Other (comments) Codeine  04/16/2010   Side Effect Nausea and Vomiting Zonisamide Low 04/11/2011   Side Effect Nausea and Vomiting Current Immunizations  Reviewed on 12/19/2014 Name Date Influenza Vaccine PF 12/19/2014 Tdap 12/19/2014 Not reviewed this visit You Were Diagnosed With   
  
 Codes Comments Depression, unspecified depression type    -  Primary ICD-10-CM: F32.9 ICD-9-CM: 753 Chronic migraine     ICD-10-CM: L48.553 ICD-9-CM: 346.70 Fibromyalgia     ICD-10-CM: M79.7 ICD-9-CM: 729.1 Hyperglycemia     ICD-10-CM: R73.9 ICD-9-CM: 790.29 Osteoarthritis of cervical spine, unspecified spinal osteoarthritis complication status     KAX-43-: M87.171 ICD-9-CM: 721.0 Chronic pain disorder     ICD-10-CM: G89.4 ICD-9-CM: 338.4 Mixed hyperlipidemia     ICD-10-CM: E78.2 ICD-9-CM: 272.2 Vitals BP Pulse Temp Resp Height(growth percentile) Weight(growth percentile) 126/84 (BP 1 Location: Left arm, BP Patient Position: Sitting) 93 97.7 °F (36.5 °C) (Oral) 22 5' 6\" (1.676 m) 304 lb 9.6 oz (138.2 kg) SpO2 BMI OB Status Smoking Status 98% 49.16 kg/m2 Unknown Never Smoker Vitals History BMI and BSA Data Body Mass Index Body Surface Area  
 49.16 kg/m 2 2.54 m 2 Preferred Pharmacy Pharmacy Name Phone [facility] [address] [place] [address] [phone] Your Updated Medication List  
  
   
This list is accurate as of: 10/17/17  9:54 AM.  Always use your most recent med list.  
  
  
  
  
 ALPRAZolam 1 mg tablet Commonly known as:  XANAX  
TAKE 1 TABLET BY MOUTH AT BEDTIME AS NEEDED FOR ANXIETY ARIPiprazole 5 mg tablet Commonly known as:  ABILIFY Take 1 Tab by mouth nightly. busPIRone 7.5 mg tablet Commonly known as:  BUSPAR Take 1 Tab by mouth three (3) times daily as needed. cholecalciferol (VITAMIN D3) 5,000 unit Tab tablet Commonly known as:  VITAMIN D3 Take 5,000 Units by mouth. Takes one tablet 4 times a week  
  
 cyclobenzaprine 10 mg tablet Commonly known as:  FLEXERIL  
TAKE 1 TABLET BY MOUTH 3 TIMES A DAY WITH MEALS  
  
 DENTA 5000 PLUS 1.1 % Crea Generic drug:  fluoride (sodium) DULoxetine 60 mg capsule Commonly known as:  CYMBALTA Take 1 Cap by mouth daily. furosemide 20 mg tablet Commonly known as:  LASIX Take 1 Tab by mouth daily. * gabapentin 100 mg capsule Commonly known as:  NEURONTIN Take 3 Caps by mouth three (3) times daily. * gabapentin 300 mg capsule Commonly known as:  NEURONTIN  
TAKE ONE CAPSULE BY MOUTH 3 TIMES A DAY  
  
 hydroCHLOROthiazide 25 mg tablet Commonly known as:  HYDRODIURIL  
TAKE 1 TABLET BY MOUTH EVERY DAY  
  
 HYDROcodone-acetaminophen 7.5-325 mg per tablet Commonly known as:  Cristin Lind  
 Take 1 Tab by mouth three (3) times daily. ketorolac 30 mg/mL (1 mL) injection Commonly known as:  TORADOL  
1 mL by IntraVENous route once for 1 dose. meperidine 100 mg tablet Commonly known as:  DEMEROL Take 1 Tab by mouth every four (4) hours as needed. Max Daily Amount: 600 mg.  
  
 metFORMIN  mg tablet Commonly known as:  GLUCOPHAGE XR Take 1 Tab by mouth daily (with dinner). naloxone 1 mg/mL injection Commonly known as:  NARCAN  
2 mL by IntraMUSCular route once as needed for up to 1 dose. nystatin 100,000 unit/mL suspension Commonly known as:  MYCOSTATIN Take 5 mL by mouth four (4) times daily. omeprazole 40 mg capsule Commonly known as:  PRILOSEC  
TAKE 1 CAPSULE BY MOUTH DAILY  
  
 onabotulinumtoxinA 200 unit injection Commonly known as:  BOTOX To be injected in the muscles of the head and neck * promethazine 25 mg tablet Commonly known as:  PHENERGAN Take 1 Tab by mouth every eight (8) hours as needed for Nausea. * promethazine 25 mg/mL injection Commonly known as:  PHENERGAN  
1 mL by IntraMUSCular route once for 1 dose. STOOL SOFTENER 100 mg capsule Generic drug:  docusate sodium 100 mg two (2) times a day. Once daily  
  
 topiramate 100 mg tablet Commonly known as:  TOPAMAX START WITH 1/2 TABLET IN MORNING AND 1 TAB AT NIGHT FOR 2 WEEKS, THEN INCREASE TO 1 TAB TWICE A DAY  
  
 verapamil  mg CR tablet Commonly known as:  CALAN-SR  
TAKE 1 TABLET BY MOUTH AT BEDTIME  
  
 vitamin E 400 unit capsule Commonly known as:  [address] Take  by mouth daily. zolpidem 5 mg tablet Commonly known as:  AMBIEN  
TAKE 1 TABLET BY MOUTH NIGHTLY AS NEEDED FOR SLEEP * Notice: This list has 4 medication(s) that are the same as other medications prescribed for you. Read the directions carefully, and ask your doctor or other care provider to review them with you. Prescriptions Sent to Pharmacy Refills ARIPiprazole (ABILIFY) 5 mg tablet 2 Sig: Take 1 Tab by mouth nightly. Class: Normal  
 Pharmacy: [address] [place] [address], [address] [place] [address] #: [phone] Route: Oral  
  
We Performed the Following 9-DRUG SCREEN + OXY, UR [id number] CPT(R)] AMB POC HEMOGLOBIN A1C [02176 CPT(R)] CBC WITH AUTOMATED DIFF [11490 CPT(R)] KETOROLAC TROMETHAMINE INJ [ [facility]] LIPID PANEL [99867 CPT(R)] METABOLIC PANEL, COMPREHENSIVE [95035 CPT(R)] KS THER/PROPH/DIAG INJECTION, SUBCUT/IM [id number] CPT(R)] KS THER/PROPH/DIAG INJECTION, SUBCUT/IM [id number] CPT(R)] PROMETHAZINE HCL INJECTION [ [facility]] REFERRAL TO PSYCHIATRY [REF91 Custom] Referral Information Referral ID Referred By Referred To  
  
 [id number] Bri Oneil Not Available Visits Status Start Date End Date 1 New Request 10/17/17 10/17/18 If your referral has a status of pending review or denied, additional information will be sent to support the outcome of this decision. Introducing [facility] & HEALTH SERVICES! Dear Oneil Simpson: Thank you for requesting a Olo account. Our records indicate that you already have an active Olo account. You can access your account anytime at [email or web address] Did you know that you can access your hospital and ER discharge instructions at any time in Olo? You can also review all of your test results from your hospital stay or ER visit. Additional Information If you have questions, please visit the Frequently Asked Questions section of the Olo website at [email or web address] Remember, Olo is NOT to be used for urgent needs. For medical emergencies, dial 911. Now available from your iPhone and Android! Please provide this summary of care documentation to your next provider. Your primary care clinician is listed as Trung Cons.  If you have any questions after today's visit, please call [phone].

## 2017-10-19 LAB
ALBUMIN SERPL-MCNC: 4 G/DL (ref 3.5–5.5)
ALBUMIN/GLOB SERPL: 1.4 {RATIO} (ref 1.2–2.2)
ALP SERPL-CCNC: 69 IU/L (ref 39–117)
ALT SERPL-CCNC: 27 IU/L (ref 0–32)
AST SERPL-CCNC: 16 IU/L (ref 0–40)
BASOPHILS # BLD AUTO: 0 X10E3/UL (ref 0–0.2)
BASOPHILS NFR BLD AUTO: 0 %
BILIRUB SERPL-MCNC: <0.2 MG/DL (ref 0–1.2)
BUN SERPL-MCNC: 10 MG/DL (ref 6–24)
BUN/CREAT SERPL: 14 (ref 9–23)
CALCIUM SERPL-MCNC: 9.3 MG/DL (ref 8.7–10.2)
CHLORIDE SERPL-SCNC: 99 MMOL/L (ref 96–106)
CHOLEST SERPL-MCNC: 241 MG/DL (ref 100–199)
CO2 SERPL-SCNC: 22 MMOL/L (ref 18–29)
CREAT SERPL-MCNC: 0.72 MG/DL (ref 0.57–1)
EOSINOPHIL # BLD AUTO: 0.2 X10E3/UL (ref 0–0.4)
EOSINOPHIL NFR BLD AUTO: 2 %
ERYTHROCYTE [DISTWIDTH] IN BLOOD BY AUTOMATED COUNT: 15 % (ref 12.3–15.4)
GLOBULIN SER CALC-MCNC: 2.9 G/DL (ref 1.5–4.5)
GLUCOSE SERPL-MCNC: 141 MG/DL (ref 65–99)
HCT VFR BLD AUTO: 37.5 % (ref 34–46.6)
HDLC SERPL-MCNC: 40 MG/DL
HGB BLD-MCNC: 12.4 G/DL (ref 11.1–15.9)
IMM GRANULOCYTES # BLD: 0.1 X10E3/UL (ref 0–0.1)
IMM GRANULOCYTES NFR BLD: 1 %
INTERPRETATION, 910389: NORMAL
LDLC SERPL CALC-MCNC: 140 MG/DL (ref 0–99)
LYMPHOCYTES # BLD AUTO: 4.3 X10E3/UL (ref 0.7–3.1)
LYMPHOCYTES NFR BLD AUTO: 42 %
MCH RBC QN AUTO: 30.8 PG (ref 26.6–33)
MCHC RBC AUTO-ENTMCNC: 33.1 G/DL (ref 31.5–35.7)
MCV RBC AUTO: 93 FL (ref 79–97)
MONOCYTES # BLD AUTO: 0.7 X10E3/UL (ref 0.1–0.9)
MONOCYTES NFR BLD AUTO: 7 %
NEUTROPHILS # BLD AUTO: 4.8 X10E3/UL (ref 1.4–7)
NEUTROPHILS NFR BLD AUTO: 48 %
PLATELET # BLD AUTO: 461 X10E3/UL (ref 150–379)
POTASSIUM SERPL-SCNC: 3.9 MMOL/L (ref 3.5–5.2)
PROT SERPL-MCNC: 6.9 G/DL (ref 6–8.5)
RBC # BLD AUTO: 4.03 X10E6/UL (ref 3.77–5.28)
SODIUM SERPL-SCNC: 142 MMOL/L (ref 134–144)
TRIGL SERPL-MCNC: 305 MG/DL (ref 0–149)
VLDLC SERPL CALC-MCNC: 61 MG/DL (ref 5–40)
WBC # BLD AUTO: 10 X10E3/UL (ref 3.4–10.8)

## 2017-10-24 RX ORDER — CYCLOBENZAPRINE HCL 10 MG
TABLET ORAL
Qty: 50 TAB | Refills: 5 | Status: SHIPPED | OUTPATIENT
Start: 2017-10-24 | End: 2019-04-08 | Stop reason: SDUPTHER

## 2017-10-25 RX ORDER — METFORMIN HYDROCHLORIDE 500 MG/1
TABLET, EXTENDED RELEASE ORAL
Qty: 30 TAB | Refills: 5 | Status: SHIPPED | OUTPATIENT
Start: 2017-10-25 | End: 2017-12-19 | Stop reason: SDUPTHER

## 2017-10-31 ENCOUNTER — OFFICE VISIT (OUTPATIENT)
Dept: NEUROLOGY | Age: 42
End: 2017-10-31

## 2017-10-31 VITALS
BODY MASS INDEX: 47.09 KG/M2 | DIASTOLIC BLOOD PRESSURE: 74 MMHG | OXYGEN SATURATION: 100 % | SYSTOLIC BLOOD PRESSURE: 110 MMHG | WEIGHT: 293 LBS | HEIGHT: 66 IN | HEART RATE: 94 BPM

## 2017-10-31 NOTE — MR AVS SNAPSHOT
Visit Information Date & Time Provider Department Dept. Phone Encounter #  
 10/31/2017  8:30 AM Jesus Johns NP [facility] [phone] [id number] Upcoming Health Maintenance Date Due  
 PAP AKA CERVICAL CYTOLOGY 7/18/2016 INFLUENZA AGE 9 TO ADULT 8/1/2017 COLONOSCOPY 5/8/2018 DTaP/Tdap/Td series (2 - Td) 12/19/2024 Allergies as of 10/31/2017  Review Complete On: 10/31/2017 By: Leopold Goring, LPN Severity Noted Reaction Type Reactions Atacand [Candesartan] High 08/29/2014   Systemic Other (comments) Patient B/P increase Compazine [Prochlorperazine] High 04/16/2010   Side Effect Other (comments), Unable to Obtain Ciprofloxacin  04/16/2010   Systemic Other (comments) Codeine  04/16/2010   Side Effect Nausea and Vomiting Zonisamide Low 04/11/2011   Side Effect Nausea and Vomiting Current Immunizations  Reviewed on 12/19/2014 Name Date Influenza Vaccine PF 12/19/2014 Tdap 12/19/2014 Not reviewed this visit You Were Diagnosed With   
  
 Codes Comments Chronic migraine    -  Primary ICD-10-CM: J47.664 ICD-9-CM: 346.70 Vitals BP Pulse Height(growth percentile) Weight(growth percentile) SpO2 BMI  
 110/74 94 5' 6\" (1.676 m) 304 lb (137.9 kg) 100% 49.07 kg/m2 OB Status Smoking Status Unknown Never Smoker Vitals History BMI and BSA Data Body Mass Index Body Surface Area 49.07 kg/m 2 2.53 m 2 Preferred Pharmacy [facility] Name Phone [facility] [address] [place] [address] [phone] Your Updated Medication List  
  
   
This list is accurate as of: 10/31/17  9:01 AM.  Always use your most recent med list.  
  
  
  
  
 ALPRAZolam 1 mg tablet Commonly known as:  XANAX  
TAKE 1 TABLET BY MOUTH AT BEDTIME AS NEEDED FOR ANXIETY ARIPiprazole 5 mg tablet Commonly known as:  ABILIFY Take 1 Tab by mouth nightly. busPIRone 7.5 mg tablet Commonly known as:  BUSPAR Take 1 Tab by mouth three (3) times daily as needed. cholecalciferol (VITAMIN D3) 5,000 unit Tab tablet Commonly known as:  VITAMIN D3 Take 5,000 Units by mouth. Takes one tablet 4 times a week  
  
 cyclobenzaprine 10 mg tablet Commonly known as:  FLEXERIL  
TAKE 1 TABLET BY MOUTH 3 TIMES A DAY WITH MEALS  
  
 DENTA 5000 PLUS 1.1 % Crea Generic drug:  fluoride (sodium) DULoxetine 60 mg capsule Commonly known as:  CYMBALTA Take 1 Cap by mouth daily. furosemide 20 mg tablet Commonly known as:  LASIX Take 1 Tab by mouth daily. * gabapentin 100 mg capsule Commonly known as:  NEURONTIN Take 3 Caps by mouth three (3) times daily. * gabapentin 400 mg capsule Commonly known as:  NEURONTIN Take 1 Cap by mouth three (3) times daily. hydroCHLOROthiazide 25 mg tablet Commonly known as:  HYDRODIURIL  
TAKE 1 TABLET BY MOUTH EVERY DAY  
  
 HYDROcodone-acetaminophen 7.5-325 mg per tablet Commonly known as:  Nelma Taty Take 1 Tab by mouth three (3) times daily. meperidine 100 mg tablet Commonly known as:  DEMEROL Take 1 Tab by mouth every four (4) hours as needed. Max Daily Amount: 600 mg.  
  
 metFORMIN  mg tablet Commonly known as:  GLUCOPHAGE XR  
TAKE 1 TABLET BY MOUTH EVERY DAY WITH DINNER  
  
 naloxone 1 mg/mL injection Commonly known as:  NARCAN  
2 mL by IntraMUSCular route once as needed for up to 1 dose. nystatin 100,000 unit/mL suspension Commonly known as:  MYCOSTATIN Take 5 mL by mouth four (4) times daily. omeprazole 40 mg capsule Commonly known as:  PRILOSEC  
TAKE 1 CAPSULE BY MOUTH DAILY  
  
 onabotulinumtoxinA 200 unit injection Commonly known as:  BOTOX To be injected in the muscles of the head and neck  
  
 promethazine 25 mg tablet Commonly known as:  PHENERGAN  
 Take 1 Tab by mouth every eight (8) hours as needed for Nausea. STOOL SOFTENER 100 mg capsule Generic drug:  docusate sodium 100 mg two (2) times a day. Once daily  
  
 topiramate 100 mg tablet Commonly known as:  TOPAMAX START WITH 1/2 TABLET IN MORNING AND 1 TAB AT NIGHT FOR 2 WEEKS, THEN INCREASE TO 1 TAB TWICE A DAY  
  
 verapamil  mg CR tablet Commonly known as:  CALAN-SR  
TAKE 1 TABLET BY MOUTH AT BEDTIME  
  
 vitamin E 400 unit capsule Commonly known as:  [address] Take  by mouth daily. zolpidem 5 mg tablet Commonly known as:  AMBIEN  
TAKE 1 TABLET BY MOUTH NIGHTLY AS NEEDED FOR SLEEP * Notice: This list has 2 medication(s) that are the same as other medications prescribed for you. Read the directions carefully, and ask your doctor or other care provider to review them with you. Patient Instructions PRESCRIPTION REFILL POLICY [facility] Statement to Patients April 1, 2014 In an effort to ensure the large volume of patient prescription refills is processed in the most efficient and expeditious manner, we are asking our patients to assist us by calling your Pharmacy for all prescription refills, this will include also your  Mail Order Pharmacy. The pharmacy will contact our office electronically to continue the refill process. Please do not wait until the last minute to call your pharmacy. We need at least 48 hours (2days) to fill prescriptions. We also encourage you to call your pharmacy before going to  your prescription to make sure it is ready. With regard to controlled substance prescription refill requests (narcotic refills) that need to be picked up at our office, we ask your cooperation by providing us with at least 72 hours (3days) notice that you will need a refill.  
 
We will not refill narcotic prescription refill requests after 4:00pm on any weekday, Monday through Thursday, or after 2:00pm on Fridays, or on the weekends. We encourage everyone to explore another way of getting your prescription refill request processed using [employer], our patient web portal through our electronic medical record system. Song[employer]et is an efficient and effective way to communicate your medication request directly to the office and  downloadable as an ina on your smart phone . [employer] also features a review functionality that allows you to view your medication list as well as leave messages for your physician. Are you ready to get connected? If so please review the attatched instructions or speak to any of our staff to get you set up right away! Thank you so much for your cooperation. Should you have any questions please contact our Practice Administrator. The Physicians and Staff,  [facility] A Healthy Lifestyle: Care Instructions Your Care Instructions A healthy lifestyle can help you feel good, stay at a healthy weight, and have plenty of energy for both work and play. A healthy lifestyle is something you can share with your whole family. A healthy lifestyle also can lower your risk for serious health problems, such as high blood pressure, heart disease, and diabetes. You can follow a few steps listed below to improve your health and the health of your family. Follow-up care is a key part of your treatment and safety. Be sure to make and go to all appointments, and call your doctor if you are having problems. It's also a good idea to know your test results and keep a list of the medicines you take. How can you care for yourself at home? · Do not eat too much sugar, fat, or fast foods. You can still have dessert and treats now and then. The goal is moderation. · Start small to improve your eating habits. Pay attention to portion sizes, drink less juice and soda pop, and eat more fruits and vegetables. ¨ Eat a healthy amount of food. A 3-ounce serving of meat, for example, is about the size of a deck of cards. Fill the rest of your plate with vegetables and whole grains. ¨ Limit the amount of soda and sports drinks you have every day. Drink more water when you are thirsty. ¨ Eat at least 5 servings of fruits and vegetables every day. It may seem like a lot, but it is not hard to reach this goal. A serving or helping is 1 piece of fruit, 1 cup of vegetables, or 2 cups of leafy, raw vegetables. Have an apple or some carrot sticks as an afternoon snack instead of a candy bar. Try to have fruits and/or vegetables at every meal. 
· Make exercise part of your daily routine. You may want to start with simple activities, such as walking, bicycling, or slow swimming. Try to be active 30 to 60 minutes every day. You do not need to do all 30 to 60 minutes all at once. For example, you can exercise 3 times a day for 10 or 20 minutes. Moderate exercise is safe for most people, but it is always a good idea to talk to your doctor before starting an exercise program. 
· Keep moving. Sapnadamaris Kimler the lawn, work in the garden, or [employer]. Take the stairs instead of the elevator at work. · If you smoke, quit. People who smoke have an increased risk for heart attack, stroke, cancer, and other lung illnesses. Quitting is hard, but there are ways to boost your chance of quitting tobacco for good. ¨ Use nicotine gum, patches, or lozenges. ¨ Ask your doctor about stop-smoking programs and medicines. ¨ Keep trying. In addition to reducing your risk of diseases in the future, you will notice some benefits soon after you stop using tobacco. If you have shortness of breath or asthma symptoms, they will likely get better within a few weeks after you quit. · Limit how much alcohol you drink. Moderate amounts of alcohol (up to 2 drinks a day for men, 1 drink a day for women) are okay.  But drinking too much can lead to liver problems, high blood pressure, and other health problems. Family health If you have a family, there are many things you can do together to improve your health. · Eat meals together as a family as often as possible. · Eat healthy foods. This includes fruits, vegetables, lean meats and dairy, and whole grains. · Include your family in your fitness plan. Most people think of activities such as jogging or tennis as the way to fitness, but there are many ways you and your family can be more active. Anything that makes you breathe hard and gets your heart pumping is exercise. Here are some tips: 
¨ Walk to do errands or to take your child to school or the bus. ¨ Go for a family bike ride after dinner instead of watching TV. Where can you learn more? Go to http://falguni-candice.info/. Enter R780 in the search box to learn more about \"A Healthy Lifestyle: Care Instructions. \" Current as of: May 12, 2017 Content Version: 11.4 © 6037-3485 [employer]. Care instructions adapted under license by [facility] (which disclaims liability or warranty for this information). If you have questions about a medical condition or this instruction, always ask your healthcare professional. [address] any warranty or liability for your use of this information. Introducing [facility] & HEALTH SERVICES! Dear Priscilla Yoo: Thank you for requesting a Ask The Doctor account. Our records indicate that you already have an active Ask The Doctor account. You can access your account anytime at [email or web address] Did you know that you can access your hospital and ER discharge instructions at any time in Ask The Doctor? You can also review all of your test results from your hospital stay or ER visit. Additional Information If you have questions, please visit the Frequently Asked Questions section of the Bizdom website at [email or web address] Remember, Bizdom is NOT to be used for urgent needs. For medical emergencies, dial 911. Now available from your iPhone and Android! Please provide this summary of care documentation to your next provider. Your primary care clinician is listed as Naoma Brittle. If you have any questions after today's visit, please call [phone].

## 2017-10-31 NOTE — PROGRESS NOTES
Date:            2017    Name:  Norman Quintana  :  1975  MRN:  [id number]     PCP:  Blanca Mcgraw MD    Chief Complaint   Patient presents with    Follow-up    Migraine         HISTORY OF PRESENT ILLNESS:  Brannon Medellin is a 38 y.o., female who presents today for follow up for headaches, she is on Botox for preventative. She has been on that for a few years, she started it with Dr. oJrdan Hernandez at our office, then received it from Dr. Preet Law at [facility], and has just resumed Botox with our office performed by Dr. Vijaya Shook. It has reduced her headache frequency and intensity. She reports that she has had a permanent migraine since 2014, it used to be daily but now she is down to about 18 days per month of full-blown migraines. She has tried and failed multiple preventatives including Topamax, verapamil, gabapentin. She went to the [facility] and was offered IV lidocaine for her headaches but was not comfortable with the risks. On a day where she has a 6-7/10 headache she takes hydrocodone. She is not on anything today because she had to drive and her pain is at about a 9. She also takes demerol and promethazine for abortive. She has a lot of light and sound sensitivity with her headaches. Her last attempt to wean from opioids was about 2 years ago, she was off medication for about a month and was in agony. She tries not to take her medication on a lower intensity day. She does not like the way she feels on her medication. She thinks that she takes pain medication about 18 days a month, before botox she was on medication every single day. She reports a history of basilar migraines. Her PCP fills her demerol and norco. She is in the process of going on disability for her headaches. 2017 recap    This is a 38 y.o. right handed female with along history of migraines was seen at [facility].  Migraine are 15-20 days of the months more than four hours in durations throbbing and associated with photophobia and nausea. She has been treated here by Dr. Elizabeth Keenan and also by Shelton Little at [facility]. She was sent to the [facility] but could not afford the treatment and was afraid of an unacceptable mortality rate. They finally were frustrated with Dr. Taylor Romero and they parted ways. The best control she had was on botox. She had a more than 60 percent reduction in the intensity of her headaches.         Current Outpatient Prescriptions   Medication Sig    metFORMIN ER (GLUCOPHAGE XR) 500 mg tablet TAKE 1 TABLET BY MOUTH EVERY DAY WITH DINNER    cyclobenzaprine (FLEXERIL) 10 mg tablet TAKE 1 TABLET BY MOUTH 3 TIMES A DAY WITH MEALS    ARIPiprazole (ABILIFY) 5 mg tablet Take 1 Tab by mouth nightly.  gabapentin (NEURONTIN) 400 mg capsule Take 1 Cap by mouth three (3) times daily.  meperidine (DEMEROL) 100 mg tablet Take 1 Tab by mouth every four (4) hours as needed. Max Daily Amount: 600 mg.  promethazine (PHENERGAN) 25 mg tablet Take 1 Tab by mouth every eight (8) hours as needed for Nausea.  HYDROcodone-acetaminophen (NORCO) 7.5-325 mg per tablet Take 1 Tab by mouth three (3) times daily.  ALPRAZolam (XANAX) 1 mg tablet TAKE 1 TABLET BY MOUTH AT BEDTIME AS NEEDED FOR ANXIETY    zolpidem (AMBIEN) 5 mg tablet TAKE 1 TABLET BY MOUTH NIGHTLY AS NEEDED FOR SLEEP    busPIRone (BUSPAR) 7.5 mg tablet Take 1 Tab by mouth three (3) times daily as needed.  hydroCHLOROthiazide (HYDRODIURIL) 25 mg tablet TAKE 1 TABLET BY MOUTH EVERY DAY    furosemide (LASIX) 20 mg tablet Take 1 Tab by mouth daily.  naloxone (NARCAN) 1 mg/mL injection 2 mL by IntraMUSCular route once as needed for up to 1 dose.     onabotulinumtoxinA (BOTOX) 200 unit injection To be injected in the muscles of the head and neck    verapamil ER (CALAN-SR) 240 mg CR tablet TAKE 1 TABLET BY MOUTH AT BEDTIME    omeprazole (PRILOSEC) 40 mg capsule TAKE 1 CAPSULE BY MOUTH DAILY    topiramate (TOPAMAX) 100 mg tablet START WITH 1/2 TABLET IN MORNING AND 1 TAB AT NIGHT FOR 2 WEEKS, THEN INCREASE TO 1 TAB TWICE A DAY    gabapentin (NEURONTIN) 100 mg capsule Take 3 Caps by mouth three (3) times daily.  DULoxetine (CYMBALTA) 60 mg capsule Take 1 Cap by mouth daily.  DENTA 5000 PLUS 1.1 % crea     nystatin (MYCOSTATIN) 100,000 unit/mL suspension Take 5 mL by mouth four (4) times daily.  cholecalciferol, VITAMIN D3, (VITAMIN D3) 5,000 unit tab tablet Take 5,000 Units by mouth. Takes one tablet 4 times a week    docusate sodium (STOOL SOFTENER) 100 mg capsule 100 mg two (2) times a day. Once daily     vitamin E (AQUA GEMS) 400 unit capsule Take  by mouth daily. No current facility-administered medications for this visit.       Allergies   Allergen Reactions    Atacand [Candesartan] Other (comments)     Patient B/P increase    Compazine [Prochlorperazine] Other (comments) and Unable to Obtain    Ciprofloxacin Other (comments)    Codeine Nausea and Vomiting    Zonisamide Nausea and Vomiting     Past Medical History:   Diagnosis Date    Arthritis     DDD    Carpal tunnel syndrome on left 2/22/2011    Cervical spondylarthritis 2/20/2011    Depressive disorder, not elsewhere classified 2/17/2011    Encounter for long-term (current) use of other medications 2/20/2011    GERD (gastroesophageal reflux disease)     Headache(784.0)     Migraines    Hepatic hemangioma 2/17/2011    Hypertension     IBS (irritable bowel syndrome) 2/20/2011    Migraine syndrome 2/17/2011    Obesity 2/20/2011    Other ill-defined conditions(799.89)     hx.of syncope    Psychiatric disorder     depression     Past Surgical History:   Procedure Laterality Date    HX HEENT      tonsillectomy    HX ORTHOPAEDIC      carpal tunnel and tigger finger release rt     Social History     Social History    Marital status: SINGLE     Spouse name: N/A    Number of children: N/A    Years of education: N/A Occupational History    Not on file. Social History Main Topics    Smoking status: Never Smoker    Smokeless tobacco: Never Used    Alcohol use Yes      Comment: socially    Drug use: Yes     Special: Prescription, OTC    Sexual activity: Not on file     Other Topics Concern    Not on file     Social History Narrative     Family History   Problem Relation Age of Onset    Heart Disease Mother     Hypertension Mother     Diabetes Mother     Heart Disease Father     Lung Disease Father     Hypertension Father          PHYSICAL EXAMINATION:    Visit Vitals    /74    Pulse 94    Ht 5' 6\" (1.676 m)    Wt 304 lb (137.9 kg)    SpO2 100%    BMI 49.07 kg/m2     General:  Well defined, morbidly obese, and groomed individual in no acute distress. Neck: Supple, nontender, no bruits, no pain with resistance to active range of motion. Heart: Regular rate and rhythm, no murmurs, rub, or gallop. Normal S1S2. Lungs:  Clear to auscultation bilaterally with equal chest expansion, no cough, no wheeze  Musculoskeletal:  Extremities revealed no edema and had full range of motion of joints. Psych:  Good mood and bright affect    NEUROLOGICAL EXAMINATION:     Mental Status:   Alert and oriented to person, place, and time with recent and remote memory intact. Attention span and concentration are normal. Speech is fluent with a full fund of knowledge. Cranial Nerves:    II, III, IV, VI:  Visual acuity grossly intact. Extra-ocular movements are full and fluid. No ptosis or nystagmus. V-XII: Hearing is grossly intact. Facial features are symmetric, with normal sensation and strength. The palate rises symmetrically and the tongue protrudes midline. Sternocleidomastoids 5/5. Motor Examination: Normal tone, bulk, and strength, 5/5 muscle strength throughout.    Coordination:  Finger to nose testing was normal.   No resting or intention tremor  Gait and Station:  Steady while walking and with tandem walking. Normal arm swing. No pronator drift. No muscle wasting or fasciculations noted. ASSESSMENT AND PLAN    ICD-10-CM ICD-9-CM    1. Chronic migraine G43.709 346.70      39-year-old female seen in follow-up for migraine. She has seen a reduction in headache frequency and intensity with Botox for preventative, has seen a reduction of at least 7-10 headache days per month with that medication. She uses Norco and Demerol for abortive, provided by PCP. She is on Topamax and verapamil, gabapentin for preventative currently in addition to her Botox. 1.  Continue Botox q. 12 weeks for per chronic migraine prevention protocol   2. Can continue use of Norco and Demerol as managed by PCP for abortive therapy, but discussed that use of this medication was more than 10 days per month is likely to trigger rebound headache  3. If patient becomes unhappy with current level of headache control, would recommend she go back to the [facility] headache clinic because we do not have any options aside from continuing Botox  4. Can continue Topamax, verapamil, gabapentin for preventative if she sees benefit. I do not want to add more medications as she already has a considerable amount for somebody her age    18+ minutes, >50% discussing above/answering questions/formulating plan      Follow-up in December for Botox, call sooner with concerns    Oswaldo Theodore NP    This note was created using voice recognition software. Despite editing, there may be syntax errors.

## 2017-10-31 NOTE — PATIENT INSTRUCTIONS
[facility]   Statement to Patients  April 1, 2014      In an effort to ensure the large volume of patient prescription refills is processed in the most efficient and expeditious manner, we are asking our patients to assist us by calling your Pharmacy for all prescription refills, this will include also your  Mail Order Pharmacy. The pharmacy will contact our office electronically to continue the refill process. Please do not wait until the last minute to call your pharmacy. We need at least 48 hours (2days) to fill prescriptions. We also encourage you to call your pharmacy before going to  your prescription to make sure it is ready. With regard to controlled substance prescription refill requests (narcotic refills) that need to be picked up at our office, we ask your cooperation by providing us with at least 72 hours (3days) notice that you will need a refill. We will not refill narcotic prescription refill requests after 4:00pm on any weekday, Monday through Thursday, or after 2:00pm on Fridays, or on the weekends. We encourage everyone to explore another way of getting your prescription refill request processed using Te[employer]n Go, our patient web portal through our electronic medical record system. Te[employer] Go is an efficient and effective way to communicate your medication request directly to the office and  downloadable as an ina on your smart phone . Te[employer] Go also features a review functionality that allows you to view your medication list as well as leave messages for your physician. Are you ready to get connected? If so please review the attatched instructions or speak to any of our staff to get you set up right away! Thank you so much for your cooperation. Should you have any questions please contact our Practice Administrator.     The Physicians and Staff,  [facility]          A Healthy Lifestyle: Care Instructions  Your Care Instructions    A healthy lifestyle can help you feel good, stay at a healthy weight, and have plenty of energy for both work and play. A healthy lifestyle is something you can share with your whole family. A healthy lifestyle also can lower your risk for serious health problems, such as high blood pressure, heart disease, and diabetes. You can follow a few steps listed below to improve your health and the health of your family. Follow-up care is a key part of your treatment and safety. Be sure to make and go to all appointments, and call your doctor if you are having problems. It's also a good idea to know your test results and keep a list of the medicines you take. How can you care for yourself at home? · Do not eat too much sugar, fat, or fast foods. You can still have dessert and treats now and then. The goal is moderation. · Start small to improve your eating habits. Pay attention to portion sizes, drink less juice and soda pop, and eat more fruits and vegetables. ¨ Eat a healthy amount of food. A 3-ounce serving of meat, for example, is about the size of a deck of cards. Fill the rest of your plate with vegetables and whole grains. ¨ Limit the amount of soda and sports drinks you have every day. Drink more water when you are thirsty. ¨ Eat at least 5 servings of fruits and vegetables every day. It may seem like a lot, but it is not hard to reach this goal. A serving or helping is 1 piece of fruit, 1 cup of vegetables, or 2 cups of leafy, raw vegetables. Have an apple or some carrot sticks as an afternoon snack instead of a candy bar. Try to have fruits and/or vegetables at every meal.  · Make exercise part of your daily routine. You may want to start with simple activities, such as walking, bicycling, or slow swimming. Try to be active 30 to 60 minutes every day. You do not need to do all 30 to 60 minutes all at once. For example, you can exercise 3 times a day for 10 or 20 minutes.  Moderate exercise is safe for most people, but it is always a good idea to talk to your doctor before starting an exercise program.  · Keep moving. Tyshawn Cluck the lawn, work in the garden, or [employer]. Take the stairs instead of the elevator at work. · If you smoke, quit. People who smoke have an increased risk for heart attack, stroke, cancer, and other lung illnesses. Quitting is hard, but there are ways to boost your chance of quitting tobacco for good. ¨ Use nicotine gum, patches, or lozenges. ¨ Ask your doctor about stop-smoking programs and medicines. ¨ Keep trying. In addition to reducing your risk of diseases in the future, you will notice some benefits soon after you stop using tobacco. If you have shortness of breath or asthma symptoms, they will likely get better within a few weeks after you quit. · Limit how much alcohol you drink. Moderate amounts of alcohol (up to 2 drinks a day for men, 1 drink a day for women) are okay. But drinking too much can lead to liver problems, high blood pressure, and other health problems. Family health  If you have a family, there are many things you can do together to improve your health. · Eat meals together as a family as often as possible. · Eat healthy foods. This includes fruits, vegetables, lean meats and dairy, and whole grains. · Include your family in your fitness plan. Most people think of activities such as jogging or tennis as the way to fitness, but there are many ways you and your family can be more active. Anything that makes you breathe hard and gets your heart pumping is exercise. Here are some tips:  ¨ Walk to do errands or to take your child to school or the bus. ¨ Go for a family bike ride after dinner instead of watching TV. Where can you learn more? Go to http://falguni-candice.info/. Enter S123 in the search box to learn more about \"A Healthy Lifestyle: Care Instructions. \"  Current as of:  May 12, 2017  Content Version: 11.4  © 7780-3526 Healthwise, Incorporated. Care instructions adapted under license by [employer] (which disclaims liability or warranty for this information). If you have questions about a medical condition or this instruction, always ask your healthcare professional. [place] [address] any warranty or liability for your use of this information.

## 2017-11-07 ENCOUNTER — OFFICE VISIT (OUTPATIENT)
Dept: FAMILY MEDICINE CLINIC | Age: 42
End: 2017-11-07

## 2017-11-07 VITALS
HEIGHT: 66 IN | OXYGEN SATURATION: 98 % | DIASTOLIC BLOOD PRESSURE: 76 MMHG | TEMPERATURE: 99.2 F | SYSTOLIC BLOOD PRESSURE: 132 MMHG | RESPIRATION RATE: 22 BRPM | WEIGHT: 293 LBS | HEART RATE: 105 BPM | BODY MASS INDEX: 47.09 KG/M2

## 2017-11-07 DIAGNOSIS — M54.41 CHRONIC BILATERAL LOW BACK PAIN WITH BILATERAL SCIATICA: Primary | ICD-10-CM

## 2017-11-07 DIAGNOSIS — G89.29 CHRONIC BILATERAL LOW BACK PAIN WITH BILATERAL SCIATICA: Primary | ICD-10-CM

## 2017-11-07 DIAGNOSIS — M54.42 CHRONIC BILATERAL LOW BACK PAIN WITH BILATERAL SCIATICA: Primary | ICD-10-CM

## 2017-11-07 DIAGNOSIS — M79.7 FIBROMYALGIA: ICD-10-CM

## 2017-11-07 DIAGNOSIS — Z23 ENCOUNTER FOR IMMUNIZATION: ICD-10-CM

## 2017-11-07 RX ORDER — PREDNISONE 5 MG/1
TABLET ORAL
Qty: 21 TAB | Refills: 0 | Status: SHIPPED | OUTPATIENT
Start: 2017-11-07 | End: 2017-12-12 | Stop reason: ALTCHOICE

## 2017-11-07 RX ORDER — TIZANIDINE 2 MG/1
2 TABLET ORAL
Qty: 30 TAB | Refills: 1 | Status: SHIPPED | OUTPATIENT
Start: 2017-11-07 | End: 2020-10-02

## 2017-11-07 NOTE — MR AVS SNAPSHOT
Visit Information Date & Time Provider Department Dept. Phone Encounter #  
 11/7/2017  4:15 PM Candy IsabelKirill [phone] [id number] Follow-up Instructions Return if symptoms worsen or fail to improve. Your Appointments 11/7/2017  4:15 PM  
ROUTINE CARE with Candy Isabel MD  
[facility]) Appt Note: Sciatic nerve pain Per [address] [id number]  
[phone] [id number]  
  
    
 12/15/2017  8:20 AM  
PROCEDURE with Alessandra Nair MD  
Neurology Clinic at [facility]) Appt Note: Botox $0CP CC TDB 10/31/17  
 [address] [place] [address], 
[address] [place] [address], [address] [phone]  
[address] [facility], [address] [place] [address], [address] [phone] Upcoming Health Maintenance Date Due  
 PAP AKA CERVICAL CYTOLOGY 7/18/2016 Influenza Age 5 to Adult 8/1/2017 COLONOSCOPY 5/8/2018 DTaP/Tdap/Td series (2 - Td) 12/19/2024 Allergies as of 11/7/2017  Review Complete On: 11/7/2017 By: Leslie Haddad Severity Noted Reaction Type Reactions Atacand [Candesartan] High 08/29/2014   Systemic Other (comments) Patient B/P increase Compazine [Prochlorperazine] High 04/16/2010   Side Effect Other (comments), Unable to Obtain Ciprofloxacin  04/16/2010   Systemic Other (comments) Codeine  04/16/2010   Side Effect Nausea and Vomiting Zonisamide Low 04/11/2011   Side Effect Nausea and Vomiting Current Immunizations  Reviewed on 12/19/2014 Name Date Influenza Vaccine PF 12/19/2014 Tdap 12/19/2014 Not reviewed this visit You Were Diagnosed With   
  
 Codes Comments Chronic bilateral low back pain with bilateral sciatica    -  Primary ICD-10-CM: M54.42, M54.41, G89.29 ICD-9-CM: 724.2, 724.3, 338.29   
 Fibromyalgia     ICD-10-CM: M79.7 ICD-9-CM: 729.1 Vitals BP Pulse Temp Resp Height(growth percentile) Weight(growth percentile) 132/76 (BP 1 Location: Left arm, BP Patient Position: Sitting) (!) 105 99.2 °F (37.3 °C) (Oral) 22 5' 6\" (1.676 m) 306 lb (138.8 kg) SpO2 BMI OB Status Smoking Status 98% 49.39 kg/m2 Unknown Never Smoker Vitals History BMI and BSA Data Body Mass Index Body Surface Area  
 49.39 kg/m 2 2.54 m 2 Preferred Pharmacy Pharmacy Name Phone [facility] [address] [phone] Your Updated Medication List  
  
   
This list is accurate as of: 11/7/17  3:17 PM.  Always use your most recent med list.  
  
  
  
  
 ALPRAZolam 1 mg tablet Commonly known as:  XANAX  
TAKE 1 TABLET BY MOUTH AT BEDTIME AS NEEDED FOR ANXIETY ARIPiprazole 5 mg tablet Commonly known as:  ABILIFY Take 1 Tab by mouth nightly. busPIRone 7.5 mg tablet Commonly known as:  BUSPAR Take 1 Tab by mouth three (3) times daily as needed. cholecalciferol (VITAMIN D3) 5,000 unit Tab tablet Commonly known as:  VITAMIN D3 Take 5,000 Units by mouth. Takes one tablet 4 times a week  
  
 cyclobenzaprine 10 mg tablet Commonly known as:  FLEXERIL  
TAKE 1 TABLET BY MOUTH 3 TIMES A DAY WITH MEALS  
  
 DENTA 5000 PLUS 1.1 % Crea Generic drug:  fluoride (sodium) DULoxetine 60 mg capsule Commonly known as:  CYMBALTA Take 1 Cap by mouth daily. furosemide 20 mg tablet Commonly known as:  LASIX Take 1 Tab by mouth daily. * gabapentin 100 mg capsule Commonly known as:  NEURONTIN Take 3 Caps by mouth three (3) times daily. * gabapentin 400 mg capsule Commonly known as:  NEURONTIN Take 1 Cap by mouth three (3) times daily. hydroCHLOROthiazide 25 mg tablet Commonly known as:  HYDRODIURIL  
TAKE 1 TABLET BY MOUTH EVERY DAY  
  
 HYDROcodone-acetaminophen 7.5-325 mg per tablet Commonly known as:  Lizzy Petri Take 1 Tab by mouth three (3) times daily. meperidine 100 mg tablet Commonly known as:  DEMEROL Take 1 Tab by mouth every four (4) hours as needed. Max Daily Amount: 600 mg.  
  
 metFORMIN  mg tablet Commonly known as:  GLUCOPHAGE XR  
TAKE 1 TABLET BY MOUTH EVERY DAY WITH DINNER  
  
 naloxone 1 mg/mL injection Commonly known as:  NARCAN  
2 mL by IntraMUSCular route once as needed for up to 1 dose. nystatin 100,000 unit/mL suspension Commonly known as:  MYCOSTATIN Take 5 mL by mouth four (4) times daily. omeprazole 40 mg capsule Commonly known as:  PRILOSEC  
TAKE 1 CAPSULE BY MOUTH DAILY  
  
 onabotulinumtoxinA 200 unit injection Commonly known as:  BOTOX To be injected in the muscles of the head and neck  
  
 predniSONE 5 mg dose pack Commonly known as:  STERAPRED See administration instruction per 5mg dose pack  
  
 promethazine 25 mg tablet Commonly known as:  PHENERGAN Take 1 Tab by mouth every eight (8) hours as needed for Nausea. STOOL SOFTENER 100 mg capsule Generic drug:  docusate sodium 100 mg two (2) times a day. Once daily tiZANidine 2 mg tablet Commonly known as:  Jayy Maria Elena Take 1 Tab by mouth four (4) times daily (with meals). topiramate 100 mg tablet Commonly known as:  TOPAMAX START WITH 1/2 TABLET IN MORNING AND 1 TAB AT NIGHT FOR 2 WEEKS, THEN INCREASE TO 1 TAB TWICE A DAY  
  
 verapamil  mg CR tablet Commonly known as:  CALAN-SR  
TAKE 1 TABLET BY MOUTH AT BEDTIME  
  
 vitamin E 400 unit capsule Commonly known as:  [address] Take  by mouth daily. zolpidem 5 mg tablet Commonly known as:  AMBIEN  
TAKE 1 TABLET BY MOUTH NIGHTLY AS NEEDED FOR SLEEP * Notice: This list has 2 medication(s) that are the same as other medications prescribed for you.  Read the directions carefully, and ask your doctor or other care provider to review them with you. Prescriptions Sent to Pharmacy Refills  
 predniSONE (STERAPRED) 5 mg dose pack 0 Sig: See administration instruction per 5mg dose pack Class: Normal  
 Pharmacy: [address] [place] [address] Ph #: [phone]  
 tiZANidine (ZANAFLEX) 2 mg tablet 1 Sig: Take 1 Tab by mouth four (4) times daily (with meals). Class: Normal  
 Pharmacy: [address] [place] [address] Ph #: [phone] Route: Oral  
  
Follow-up Instructions Return if symptoms worsen or fail to improve. To-Do List   
 11/07/2017 Imaging:  XR SPINE LUMB 2 OR 3 V [facility]! Dear Olivia Bueno: Thank you for requesting a Catch[employer] account. Our records indicate that you already have an active Catchafire account. You can access your account anytime at [email or web address] Did you know that you can access your hospital and ER discharge instructions at any time in Catchafire? You can also review all of your test results from your hospital stay or ER visit. Additional Information If you have questions, please visit the Frequently Asked Questions section of the Catchafire website at [email or web address] Remember, Catchafire is NOT to be used for urgent needs. For medical emergencies, dial 911. Now available from your iPhone and Android! Please provide this summary of care documentation to your next provider. Your primary care clinician is listed as Jori Gaona. If you have any questions after today's visit, please call [phone].

## 2017-11-07 NOTE — PROGRESS NOTES
HISTORY OF PRESENT ILLNESS  Radha Vaughan is a 38 y.o. female. 2 weeks low back pain radiating down both legs,no apparent injury  Back Pain    The history is provided by the patient. This is a new problem. The current episode started more than 1 week ago. The problem occurs hourly. The pain is associated with no known injury. The pain is present in the sacro-iliac joint and gluteal region. The quality of the pain is described as aching. The pain radiates to the left knee and right knee. The pain is at a severity of 6/10. Pertinent negatives include no chest pain and no fever. Review of Systems   Constitutional: Negative for fever. Respiratory: Negative for cough, hemoptysis and sputum production. Cardiovascular: Negative for chest pain, palpitations and orthopnea. Musculoskeletal: Positive for back pain. Negative for myalgias. Psychiatric/Behavioral: Positive for depression. Physical Exam   Constitutional: She is oriented to person, place, and time. She appears well-developed and well-nourished. HENT:   Head: Normocephalic and atraumatic. Right Ear: External ear normal.   Left Ear: External ear normal.   Nose: Nose normal.   Mouth/Throat: Oropharynx is clear and moist.   Cardiovascular: Normal rate and regular rhythm. Pulmonary/Chest: Effort normal and breath sounds normal.   Abdominal: Soft. Bowel sounds are normal.   Musculoskeletal:        Lumbar back: She exhibits decreased range of motion and tenderness. Back:    SLRs 90/90,DTRs normal and symmetrical     Neurological: She is alert and oriented to person, place, and time. Skin: Skin is dry. ASSESSMENT and PLAN  Diagnoses and all orders for this visit:    1. Chronic bilateral low back pain with bilateral sciatica  -     XR SPINE LUMB 2 OR 3 V; Future  -     predniSONE (STERAPRED) 5 mg dose pack; See administration instruction per 5mg dose pack  -     tiZANidine (ZANAFLEX) 2 mg tablet;  Take 1 Tab by mouth four (4) times daily (with meals). 2. Fibromyalgia    3. Encounter for immunization  -     Influenza virus vaccine (QUADRIVALENT PRES FREE SYRINGE) IM ([id number])  -     HI IMMUNIZ ADMIN,1 SINGLE/COMB VAC/TOXOID      Follow-up Disposition:  Return if symptoms worsen or fail to improve.

## 2017-11-07 NOTE — PROGRESS NOTES
Chief Complaint   Patient presents with    Back Pain     Pt having sciatic pain and going down both legs.

## 2017-11-14 ENCOUNTER — OFFICE VISIT (OUTPATIENT)
Dept: FAMILY MEDICINE CLINIC | Age: 42
End: 2017-11-14

## 2017-11-14 VITALS
SYSTOLIC BLOOD PRESSURE: 128 MMHG | HEIGHT: 66 IN | HEART RATE: 86 BPM | OXYGEN SATURATION: 96 % | RESPIRATION RATE: 22 BRPM | WEIGHT: 293 LBS | TEMPERATURE: 98.9 F | DIASTOLIC BLOOD PRESSURE: 84 MMHG | BODY MASS INDEX: 47.09 KG/M2

## 2017-11-14 DIAGNOSIS — G43.909 MIGRAINE SYNDROME: Primary | ICD-10-CM

## 2017-11-14 DIAGNOSIS — M47.812 OSTEOARTHRITIS OF CERVICAL SPINE, UNSPECIFIED SPINAL OSTEOARTHRITIS COMPLICATION STATUS: ICD-10-CM

## 2017-11-14 DIAGNOSIS — R11.0 NAUSEA: ICD-10-CM

## 2017-11-14 DIAGNOSIS — F32.A DEPRESSION, UNSPECIFIED DEPRESSION TYPE: ICD-10-CM

## 2017-11-14 RX ORDER — PROMETHAZINE HYDROCHLORIDE 25 MG/1
TABLET ORAL
Qty: 50 TAB | Refills: 0 | Status: SHIPPED | OUTPATIENT
Start: 2017-11-14 | End: 2017-12-12 | Stop reason: SDUPTHER

## 2017-11-14 RX ORDER — KETOROLAC TROMETHAMINE 30 MG/ML
30 INJECTION, SOLUTION INTRAMUSCULAR; INTRAVENOUS ONCE
Qty: 1 VIAL | Refills: 0
Start: 2017-11-14 | End: 2017-11-14

## 2017-11-14 RX ORDER — PROMETHAZINE HYDROCHLORIDE 25 MG/ML
25 INJECTION, SOLUTION INTRAMUSCULAR; INTRAVENOUS ONCE
Qty: 1 VIAL | Refills: 0
Start: 2017-11-14 | End: 2017-11-14

## 2017-11-15 DIAGNOSIS — M47.812 OSTEOARTHRITIS OF CERVICAL SPINE, UNSPECIFIED SPINAL OSTEOARTHRITIS COMPLICATION STATUS: ICD-10-CM

## 2017-11-15 DIAGNOSIS — G43.909 MIGRAINE SYNDROME: ICD-10-CM

## 2017-11-15 NOTE — PROGRESS NOTES
HISTORY OF PRESENT ILLNESS  Stella Edwards is a 38 y.o. female. severe occipital headache/neck pain ,onset this am ,constant,severe ,throbbing. with nausea and vomiting  Migraine    The history is provided by the patient. This is a chronic problem. The problem occurs constantly. The problem has been gradually worsening. The headache is aggravated by nothing. The pain is located in the occipital region. The quality of the pain is described as throbbing. The pain is at a severity of 7/10. Associated symptoms include malaise/fatigue, dizziness, visual change, nausea and vomiting. Pertinent negatives include no fever. Review of Systems   Constitutional: Positive for malaise/fatigue. Negative for fever and weight loss. Cardiovascular: Negative for chest pain. Gastrointestinal: Positive for nausea and vomiting. Musculoskeletal: Positive for neck pain. Skin: Negative for rash. Neurological: Positive for dizziness and headaches. Psychiatric/Behavioral: Negative for depression and substance abuse. The patient is not nervous/anxious. Physical Exam   Constitutional: She is oriented to person, place, and time. She appears well-developed and well-nourished. She appears distressed. HENT:   Head: Normocephalic and atraumatic. Right Ear: External ear normal.   Left Ear: External ear normal.   Nose: Nose normal.   Mouth/Throat: Oropharynx is clear and moist.   Cardiovascular: Normal rate and regular rhythm. Pulmonary/Chest: Effort normal and breath sounds normal.   Abdominal: Soft. Bowel sounds are normal.   Musculoskeletal: Normal range of motion. Neurological: She is alert and oriented to person, place, and time. She has normal reflexes. Skin: Skin is warm and dry. Psychiatric: She has a normal mood and affect. Diagnoses and all orders for this visit:    1. Migraine syndrome  -     ketorolac (TORADOL) 30 mg/mL (1 mL) injection; 1 mL by IntraVENous route once for 1 dose.   - PROMETHAZINE HCL INJECTION  -     promethazine (PHENERGAN) 25 mg/mL injection; 1 mL by IntraMUSCular route once for 1 dose. -     [place] THER/PROPH/DIAG INJECTION, SUBCUT/IM    2. Nausea  -     ketorolac (TORADOL) 30 mg/mL (1 mL) injection; 1 mL by IntraVENous route once for 1 dose. -     PROMETHAZINE HCL INJECTION  -     promethazine (PHENERGAN) 25 mg/mL injection; 1 mL by IntraMUSCular route once for 1 dose. -     [place] THER/PROPH/DIAG INJECTION, SUBCUT/IM    3. Osteoarthritis of cervical spine, unspecified spinal osteoarthritis complication status    4. Depression, unspecified depression type      Follow-up Disposition:  Return if symptoms worsen or fail to improve. Follow-up Disposition:  Return if symptoms worsen or fail to improve.

## 2017-11-16 NOTE — TELEPHONE ENCOUNTER
From: Mick Back  To:  Webster Severs, MD  Sent: 11/15/2017 9:59 PM EST  Subject: Medication Renewal Request    Original authorizing provider: Webster Severs, MD Sandralee Back would like a refill of the following medications:  meperidine (DEMEROL) 100 mg tablet Webster Severs, MD]  HYDROcodone-acetaminophen (NORCO) 7.5-325 mg per tablet Webster Severs, MD]    Preferred pharmacy: [address] [place] [address], [address] [place] [address] Road:  [phone]

## 2017-11-17 RX ORDER — HYDROCODONE BITARTRATE AND ACETAMINOPHEN 7.5; 325 MG/1; MG/1
1 TABLET ORAL 3 TIMES DAILY
Qty: 90 TAB | Refills: 0 | Status: SHIPPED | OUTPATIENT
Start: 2017-11-17 | End: 2017-12-12 | Stop reason: SDUPTHER

## 2017-11-20 RX ORDER — DULOXETIN HYDROCHLORIDE 60 MG/1
CAPSULE, DELAYED RELEASE ORAL
Qty: 30 CAP | Refills: 5 | Status: SHIPPED | OUTPATIENT
Start: 2017-11-20 | End: 2018-05-19 | Stop reason: SDUPTHER

## 2017-11-20 RX ORDER — TOPIRAMATE 100 MG/1
TABLET, FILM COATED ORAL
Qty: 90 TAB | Refills: 5 | Status: SHIPPED | OUTPATIENT
Start: 2017-11-20 | End: 2018-05-19 | Stop reason: SDUPTHER

## 2017-11-21 ENCOUNTER — TELEPHONE (OUTPATIENT)
Dept: NEUROLOGY | Age: 42
End: 2017-11-21

## 2017-11-21 DIAGNOSIS — F32.A DEPRESSION, UNSPECIFIED DEPRESSION TYPE: ICD-10-CM

## 2017-11-21 RX ORDER — ZOLPIDEM TARTRATE 5 MG/1
5 TABLET ORAL
Qty: 30 TAB | Refills: 2 | Status: SHIPPED | OUTPATIENT
Start: 2017-11-21 | End: 2018-02-14 | Stop reason: SDUPTHER

## 2017-11-21 NOTE — TELEPHONE ENCOUNTER
Left a message  (Advised to the patient to call [facility] to provide them permission to ship in order to get the botox here on time for her appointment.)

## 2017-11-21 NOTE — TELEPHONE ENCOUNTER
Called and spoke with Korin Waddell and they stated that the Botox was be shipped on 11/27/17 for delivery on the 11/28th    They also stated that they will contact the patient to give permission to ship the botox

## 2017-11-21 NOTE — TELEPHONE ENCOUNTER
From: Cipriano Judd  To:  Hai Esparza MD  Sent: 11/21/2017 3:55 AM EST  Subject: Medication Renewal Request    Original authorizing provider: MD Cipriano Bajwa would like a refill of the following medications:  zolpidem (AMBIEN) 5 mg tablet Hai Esparza MD]    Preferred pharmacy: [facility]    Comment:

## 2017-11-24 DIAGNOSIS — J32.0 MAXILLARY SINUSITIS, UNSPECIFIED CHRONICITY: Primary | ICD-10-CM

## 2017-11-24 RX ORDER — AZITHROMYCIN 250 MG/1
TABLET, FILM COATED ORAL
Qty: 6 TAB | Refills: 0 | Status: SHIPPED | OUTPATIENT
Start: 2017-11-24 | End: 2019-09-11 | Stop reason: ALTCHOICE

## 2017-11-27 DIAGNOSIS — J32.0 MAXILLARY SINUSITIS, UNSPECIFIED CHRONICITY: Primary | ICD-10-CM

## 2017-11-27 RX ORDER — AMOXICILLIN AND CLAVULANATE POTASSIUM 875; 125 MG/1; MG/1
1 TABLET, FILM COATED ORAL 2 TIMES DAILY WITH MEALS
Qty: 10 TAB | Refills: 0 | Status: SHIPPED | OUTPATIENT
Start: 2017-11-27 | End: 2017-12-02

## 2017-11-30 ENCOUNTER — DOCUMENTATION ONLY (OUTPATIENT)
Dept: NEUROLOGY | Age: 42
End: 2017-11-30

## 2017-11-30 NOTE — PROGRESS NOTES
botox came in the office: 11/30/17  MFR: claudia  LOT: [id number]  Exp: 6/2020  Appointment: 12/15/17  Specialty pharmacy: [facility]

## 2017-12-12 ENCOUNTER — OFFICE VISIT (OUTPATIENT)
Dept: FAMILY MEDICINE CLINIC | Age: 42
End: 2017-12-12

## 2017-12-12 VITALS
HEIGHT: 66 IN | OXYGEN SATURATION: 93 % | HEART RATE: 106 BPM | WEIGHT: 293 LBS | TEMPERATURE: 97.8 F | RESPIRATION RATE: 24 BRPM | DIASTOLIC BLOOD PRESSURE: 78 MMHG | SYSTOLIC BLOOD PRESSURE: 122 MMHG | BODY MASS INDEX: 47.09 KG/M2

## 2017-12-12 DIAGNOSIS — M47.812 OSTEOARTHRITIS OF CERVICAL SPINE, UNSPECIFIED SPINAL OSTEOARTHRITIS COMPLICATION STATUS: ICD-10-CM

## 2017-12-12 DIAGNOSIS — G43.909 MIGRAINE SYNDROME: Primary | ICD-10-CM

## 2017-12-12 DIAGNOSIS — F32.A DEPRESSION, UNSPECIFIED DEPRESSION TYPE: ICD-10-CM

## 2017-12-12 RX ORDER — PROMETHAZINE HYDROCHLORIDE 25 MG/ML
25 INJECTION, SOLUTION INTRAMUSCULAR; INTRAVENOUS ONCE
Qty: 1 VIAL | Refills: 0
Start: 2017-12-12 | End: 2017-12-12

## 2017-12-12 RX ORDER — PROMETHAZINE HYDROCHLORIDE 25 MG/1
TABLET ORAL
Qty: 50 TAB | Refills: 0 | Status: SHIPPED | OUTPATIENT
Start: 2017-12-12 | End: 2018-01-12 | Stop reason: SDUPTHER

## 2017-12-12 RX ORDER — HYDROCODONE BITARTRATE AND ACETAMINOPHEN 7.5; 325 MG/1; MG/1
1 TABLET ORAL 3 TIMES DAILY
Qty: 90 TAB | Refills: 0 | Status: SHIPPED | OUTPATIENT
Start: 2017-12-12 | End: 2017-12-12 | Stop reason: SDUPTHER

## 2017-12-12 RX ORDER — HYDROCODONE BITARTRATE AND ACETAMINOPHEN 7.5; 325 MG/1; MG/1
1 TABLET ORAL 3 TIMES DAILY
Qty: 90 TAB | Refills: 0 | Status: SHIPPED | OUTPATIENT
Start: 2017-12-12 | End: 2018-01-12 | Stop reason: SDUPTHER

## 2017-12-12 RX ORDER — KETOROLAC TROMETHAMINE 30 MG/ML
30 INJECTION, SOLUTION INTRAMUSCULAR; INTRAVENOUS ONCE
Qty: 1 VIAL | Refills: 0
Start: 2017-12-12 | End: 2017-12-12

## 2017-12-12 RX ORDER — PROMETHAZINE HYDROCHLORIDE 25 MG/ML
25 INJECTION, SOLUTION INTRAMUSCULAR; INTRAVENOUS ONCE
Qty: 1 VIAL | Refills: 0
Start: 2017-12-12 | End: 2017-12-12 | Stop reason: SDUPTHER

## 2017-12-12 NOTE — MR AVS SNAPSHOT
Visit Information Date & Time Provider Department Dept. Phone Encounter #  
 12/12/2017  4:45 PM Kirill Akers [phone] [id number] Follow-up Instructions Return if symptoms worsen or fail to improve. Your Appointments 12/15/2017  8:20 AM  
PROCEDURE with Alessandra Nair MD  
[facility]) Appt Note: Botox $0CP CC TDB 10/31/17  
 [address], [address] [id number]  
[address] [facility], [address] [place] [address], [address] [address] Upcoming Health Maintenance Date Due  
 PAP AKA CERVICAL CYTOLOGY 7/18/2016 COLONOSCOPY 5/8/2018 DTaP/Tdap/Td series (2 - Td) 12/19/2024 Allergies as of 12/12/2017  Review Complete On: 12/12/2017 By: Leslie Haddad Severity Noted Reaction Type Reactions Atacand [Candesartan] High 08/29/2014   Systemic Other (comments) Patient B/P increase Compazine [Prochlorperazine] High 04/16/2010   Side Effect Other (comments), Unable to Obtain Ciprofloxacin  04/16/2010   Systemic Other (comments) Codeine  04/16/2010   Side Effect Nausea and Vomiting Zonisamide Low 04/11/2011   Side Effect Nausea and Vomiting Current Immunizations  Reviewed on 12/19/2014 Name Date Influenza Vaccine (Quad) PF 11/7/2017 Influenza Vaccine PF 12/19/2014 Tdap 12/19/2014 Not reviewed this visit You Were Diagnosed With   
  
 Codes Comments Migraine syndrome    -  Primary ICD-10-CM: Q81.346 ICD-9-CM: 346.00 Chronic migraine     ICD-10-CM: B09.610 ICD-9-CM: 346.70 Depression, unspecified depression type     ICD-10-CM: F32.9 ICD-9-CM: 286 Osteoarthritis of cervical spine, unspecified spinal osteoarthritis complication status     AIE-77-GX: B11.750 ICD-9-CM: 721.0 Vitals BP Pulse Temp Resp Height(growth percentile) Weight(growth percentile) 122/78 (BP 1 Location: Left arm, BP Patient Position: Sitting) (!) 106 97.8 °F (36.6 °C) (Oral) 24 5' 6\" (1.676 m) 306 lb (138.8 kg) SpO2 BMI OB Status Smoking Status 93% 49.39 kg/m2 Unknown Never Smoker Vitals History BMI and BSA Data Body Mass Index Body Surface Area  
 49.39 kg/m 2 2.54 m 2 Preferred Pharmacy Pharmacy Name Phone [facility] [address] [place] [address] [phone] Your Updated Medication List  
  
   
This list is accurate as of: 12/12/17  4:50 PM.  Always use your most recent med list.  
  
  
  
  
 ALPRAZolam 1 mg tablet Commonly known as:  XANAX  
TAKE 1 TABLET BY MOUTH AT BEDTIME AS NEEDED FOR ANXIETY ARIPiprazole 5 mg tablet Commonly known as:  ABILIFY Take 1 Tab by mouth nightly. azithromycin 250 mg tablet Commonly known as:  Daphne Avery Take 2 tablets today, then take 1 tablet daily  
  
 busPIRone 7.5 mg tablet Commonly known as:  BUSPAR Take 1 Tab by mouth three (3) times daily as needed. cholecalciferol (VITAMIN D3) 5,000 unit Tab tablet Commonly known as:  VITAMIN D3 Take 5,000 Units by mouth. Takes one tablet 4 times a week  
  
 cyclobenzaprine 10 mg tablet Commonly known as:  FLEXERIL  
TAKE 1 TABLET BY MOUTH 3 TIMES A DAY WITH MEALS  
  
 DENTA 5000 PLUS 1.1 % Crea Generic drug:  fluoride (sodium) DULoxetine 60 mg capsule Commonly known as:  CYMBALTA TAKE ONE CAPSULE BY MOUTH EVERY DAY  
  
 furosemide 20 mg tablet Commonly known as:  LASIX Take 1 Tab by mouth daily. * gabapentin 100 mg capsule Commonly known as:  NEURONTIN Take 3 Caps by mouth three (3) times daily. * gabapentin 400 mg capsule Commonly known as:  NEURONTIN Take 1 Cap by mouth three (3) times daily. hydroCHLOROthiazide 25 mg tablet Commonly known as:  HYDRODIURIL  
TAKE 1 TABLET BY MOUTH EVERY DAY  
  
 HYDROcodone-acetaminophen 7.5-325 mg per tablet Commonly known as:  Bob Songster Take 1 Tab by mouth three (3) times daily. ketorolac 30 mg/mL (1 mL) injection Commonly known as:  TORADOL  
1 mL by IntraVENous route once for 1 dose. meperidine 100 mg tablet Commonly known as:  DEMEROL Take 1 Tab by mouth every four (4) hours as needed. Max Daily Amount: 600 mg.  
  
 metFORMIN  mg tablet Commonly known as:  GLUCOPHAGE XR  
TAKE 1 TABLET BY MOUTH EVERY DAY WITH DINNER  
  
 naloxone 1 mg/mL injection Commonly known as:  NARCAN  
2 mL by IntraMUSCular route once as needed for up to 1 dose. nystatin 100,000 unit/mL suspension Commonly known as:  MYCOSTATIN Take 5 mL by mouth four (4) times daily. omeprazole 40 mg capsule Commonly known as:  PRILOSEC  
TAKE 1 CAPSULE BY MOUTH DAILY  
  
 onabotulinumtoxinA 200 unit injection Commonly known as:  BOTOX To be injected in the muscles of the head and neck * promethazine 25 mg tablet Commonly known as:  PHENERGAN  
TAKE 1 TABLET BY MOUTH EVERY 8 HOURS AS NEEDED FOR NAUSEA * promethazine 25 mg/mL injection Commonly known as:  PHENERGAN  
1 mL by IntraMUSCular route once for 1 dose. STOOL SOFTENER 100 mg capsule Generic drug:  docusate sodium 100 mg two (2) times a day. Once daily tiZANidine 2 mg tablet Commonly known as:  Mena Mail Take 1 Tab by mouth four (4) times daily (with meals). topiramate 100 mg tablet Commonly known as:  TOPAMAX START WITH 1/2 TABLET IN MORNING AND 1 TAB AT NIGHT FOR 2 WEEKS, THEN INCREASE TO 1 TAB TWICE A DAY  
  
 verapamil  mg CR tablet Commonly known as:  CALAN-SR  
TAKE 1 TABLET BY MOUTH AT BEDTIME  
  
 vitamin E 400 unit capsule Commonly known as:  [address] Take  by mouth daily. zolpidem 5 mg tablet Commonly known as:  AMBIEN Take 1 Tab by mouth nightly as needed for Sleep. Max Daily Amount: 5 mg.  
  
 * Notice:   This list has 4 medication(s) that are the same as other medications prescribed for you. Read the directions carefully, and ask your doctor or other care provider to review them with you. Prescriptions Printed Refills  
 meperidine (DEMEROL) 100 mg tablet 0 Sig: Take 1 Tab by mouth every four (4) hours as needed. Max Daily Amount: 600 mg. Class: Print Route: Oral  
 HYDROcodone-acetaminophen (NORCO) 7.5-325 mg per tablet 0 Sig: Take 1 Tab by mouth three (3) times daily. Class: Print Route: Oral  
  
We Performed the Following OR THER/PROPH/DIAG INJECTION, SUBCUT/IM [id number] CPT(R)] PROMETHAZINE HCL INJECTION [ [facility]] Follow-up Instructions Return if symptoms worsen or fail to improve. Introducing \[facility]\"" & HEALTH SERVICES! Dear Jess Rios: Thank you for requesting a [employer]One account. Our records indicate that you already have an active g[employer]One account. You can access your account anytime at [email or web address] Did you know that you can access your hospital and ER discharge instructions at any time in g[employer]One? You can also review all of your test results from your hospital stay or ER visit. Additional Information If you have questions, please visit the Frequently Asked Questions section of the g2One website at [email or web address] Remember, g2One is NOT to be used for urgent needs. For medical emergencies, dial 911. Now available from your iPhone and Android! Please provide this summary of care documentation to your next provider. Your primary care clinician is listed as Berl Ore. If you have any questions after today's visit, please call [phone].

## 2017-12-12 NOTE — PROGRESS NOTES
Chief Complaint   Patient presents with    Migraine     Pt having migraine.  Shoulder Pain     Pt having R shoulder pain.

## 2017-12-14 NOTE — PROGRESS NOTES
HISTORY OF PRESENT ILLNESS  Hailey Pacheco is a 38 y.o. female. severe headache for past 4 days ,constant,severe ,throbbing. with nausea and vomiting  Migraine    The history is provided by the patient. This is a chronic problem. The problem occurs constantly. The problem has been gradually worsening. The headache is aggravated by nothing. The pain is located in the occipital region. The quality of the pain is described as throbbing. The pain is at a severity of 7/10. Associated symptoms include malaise/fatigue, dizziness, visual change, nausea and vomiting. Pertinent negatives include no fever. Review of Systems   Constitutional: Positive for malaise/fatigue. Negative for fever and weight loss. Cardiovascular: Negative for chest pain. Gastrointestinal: Positive for nausea and vomiting. Musculoskeletal: Positive for neck pain. Skin: Negative for rash. Neurological: Positive for dizziness and headaches. Psychiatric/Behavioral: Negative for depression and substance abuse. The patient is not nervous/anxious. Physical Exam   Constitutional: She is oriented to person, place, and time. She appears well-developed and well-nourished. She appears distressed. HENT:   Head: Normocephalic and atraumatic. Right Ear: External ear normal.   Left Ear: External ear normal.   Nose: Nose normal.   Mouth/Throat: Oropharynx is clear and moist.   Cardiovascular: Normal rate and regular rhythm. Pulmonary/Chest: Effort normal and breath sounds normal.   Abdominal: Soft. Bowel sounds are normal.   Musculoskeletal: Normal range of motion. Neurological: She is alert and oriented to person, place, and time. She has normal reflexes. Skin: Skin is warm and dry. Psychiatric: She has a normal mood and affect. Diagnoses and all orders for this visit:    1. Migraine syndrome  -     ketorolac (TORADOL) 30 mg/mL (1 mL) injection; 1 mL by IntraVENous route once for 1 dose.   -     PROMETHAZINE HCL INJECTION  -     [place] THER/PROPH/DIAG INJECTION, SUBCUT/IM  -     promethazine (PHENERGAN) 25 mg/mL injection; 1 mL by IntraMUSCular route once for 1 dose.  -     meperidine (DEMEROL) 100 mg tablet; Take 1 Tab by mouth every four (4) hours as needed. Max Daily Amount: 600 mg.  -     HYDROcodone-acetaminophen (NORCO) 7.5-325 mg per tablet; Take 1 Tab by mouth three (3) times daily. 2. Chronic migraine  -     meperidine (DEMEROL) 100 mg tablet; Take 1 Tab by mouth every four (4) hours as needed. Max Daily Amount: 600 mg.  -     promethazine (PHENERGAN) 25 mg tablet; TAKE 1 TABLET BY MOUTH EVERY 8 HOURS AS NEEDED FOR NAUSEA    3. Depression, unspecified depression type    4. Osteoarthritis of cervical spine, unspecified spinal osteoarthritis complication status  -     meperidine (DEMEROL) 100 mg tablet; Take 1 Tab by mouth every four (4) hours as needed. Max Daily Amount: 600 mg. Follow-up Disposition:  Return if symptoms worsen or fail to improve. Follow-up Disposition:  Return if symptoms worsen or fail to improve.

## 2017-12-15 ENCOUNTER — OFFICE VISIT (OUTPATIENT)
Dept: NEUROLOGY | Age: 42
End: 2017-12-15

## 2017-12-15 VITALS
BODY MASS INDEX: 47.09 KG/M2 | DIASTOLIC BLOOD PRESSURE: 72 MMHG | HEART RATE: 107 BPM | OXYGEN SATURATION: 98 % | HEIGHT: 66 IN | SYSTOLIC BLOOD PRESSURE: 120 MMHG | WEIGHT: 293 LBS

## 2017-12-15 DIAGNOSIS — G43.711 INTRACTABLE CHRONIC MIGRAINE WITHOUT AURA AND WITH STATUS MIGRAINOSUS: Primary | ICD-10-CM

## 2017-12-15 NOTE — MR AVS SNAPSHOT
Visit Information Date & Time Provider Department Dept. Phone Encounter #  
 12/15/2017  8:20 AM Rayo Singer MD [facility] [phone] [id number] Follow-up Instructions Return in about 3 months (around 3/15/2018) for botox. Your Appointments 3/22/2018  8:00 AM  
PROCEDURE with Rayo Singer MD  
[facility] [address]) Appt Note: Botox  Auth good from 9/6/17 to 2/6/18 (per CMM on 9/10/17). bb Botox 59737 no PA required per [employer] website - see CC notes. bb (8/30/17). SPP is [facility] [phone].UV, lsw,12/15/17  
 [address] [place] [address],  [address] [phone]  
[address] [facility], [address] [place] [address], [address] [place] [address] St [address] [phone] Upcoming Health Maintenance Date Due  
 PAP AKA CERVICAL CYTOLOGY 7/18/2016 COLONOSCOPY 5/8/2018 DTaP/Tdap/Td series (2 - Td) 12/19/2024 Allergies as of 12/15/2017  Review Complete On: 12/15/2017 By: Rayo Singer MD  
  
 Severity Noted Reaction Type Reactions Atacand [Candesartan] High 08/29/2014   Systemic Other (comments) Patient B/P increase Compazine [Prochlorperazine] High 04/16/2010   Side Effect Other (comments), Unable to Obtain Ciprofloxacin  04/16/2010   Systemic Other (comments) Codeine  04/16/2010   Side Effect Nausea and Vomiting Zonisamide Low 04/11/2011   Side Effect Nausea and Vomiting Current Immunizations  Reviewed on 12/19/2014 Name Date Influenza Vaccine (Quad) PF 11/7/2017 Influenza Vaccine PF 12/19/2014 Tdap 12/19/2014 Not reviewed this visit You Were Diagnosed With   
  
 Codes Comments Intractable chronic migraine without aura and with status migrainosus    -  Primary ICD-10-CM: E79.383 ICD-9-CM: 346.73 Vitals BP Pulse Height(growth percentile) Weight(growth percentile) SpO2 BMI 120/72 (!) 107 5' 6\" (1.676 m) 311 lb (141.1 kg) 98% 50.2 kg/m2 OB Status Smoking Status Unknown Never Smoker BMI and BSA Data Body Mass Index Body Surface Area  
 50.2 kg/m 2 2.56 m 2 Preferred Pharmacy Pharmacy Name Phone [facility] [address] [phone] Your Updated Medication List  
  
   
This list is accurate as of: 12/15/17  8:54 AM.  Always use your most recent med list.  
  
  
  
  
 ALPRAZolam 1 mg tablet Commonly known as:  XANAX  
TAKE 1 TABLET BY MOUTH AT BEDTIME AS NEEDED FOR ANXIETY ARIPiprazole 5 mg tablet Commonly known as:  ABILIFY Take 1 Tab by mouth nightly. azithromycin 250 mg tablet Commonly known as:  Queta Collar Take 2 tablets today, then take 1 tablet daily  
  
 busPIRone 7.5 mg tablet Commonly known as:  BUSPAR Take 1 Tab by mouth three (3) times daily as needed. cholecalciferol (VITAMIN D3) 5,000 unit Tab tablet Commonly known as:  VITAMIN D3 Take 5,000 Units by mouth. Takes one tablet 4 times a week  
  
 cyclobenzaprine 10 mg tablet Commonly known as:  FLEXERIL  
TAKE 1 TABLET BY MOUTH 3 TIMES A DAY WITH MEALS  
  
 DENTA 5000 PLUS 1.1 % Crea Generic drug:  fluoride (sodium) DULoxetine 60 mg capsule Commonly known as:  CYMBALTA TAKE ONE CAPSULE BY MOUTH EVERY DAY  
  
 furosemide 20 mg tablet Commonly known as:  LASIX Take 1 Tab by mouth daily. * gabapentin 100 mg capsule Commonly known as:  NEURONTIN Take 3 Caps by mouth three (3) times daily. * gabapentin 400 mg capsule Commonly known as:  NEURONTIN Take 1 Cap by mouth three (3) times daily. hydroCHLOROthiazide 25 mg tablet Commonly known as:  HYDRODIURIL  
TAKE 1 TABLET BY MOUTH EVERY DAY  
  
 HYDROcodone-acetaminophen 7.5-325 mg per tablet Commonly known as:  Lenon Gauze Take 1 Tab by mouth three (3) times daily. meperidine 100 mg tablet Commonly known as:  DEMEROL Take 1 Tab by mouth every four (4) hours as needed. Max Daily Amount: 600 mg.  
  
 metFORMIN  mg tablet Commonly known as:  GLUCOPHAGE XR  
TAKE 1 TABLET BY MOUTH EVERY DAY WITH DINNER  
  
 naloxone 1 mg/mL injection Commonly known as:  NARCAN  
2 mL by IntraMUSCular route once as needed for up to 1 dose. nystatin 100,000 unit/mL suspension Commonly known as:  MYCOSTATIN Take 5 mL by mouth four (4) times daily. omeprazole 40 mg capsule Commonly known as:  PRILOSEC  
TAKE 1 CAPSULE BY MOUTH DAILY  
  
 onabotulinumtoxinA 200 unit injection Commonly known as:  BOTOX To be injected in the muscles of the head and neck  
  
 promethazine 25 mg tablet Commonly known as:  PHENERGAN  
TAKE 1 TABLET BY MOUTH EVERY 8 HOURS AS NEEDED FOR NAUSEA STOOL SOFTENER 100 mg capsule Generic drug:  docusate sodium 100 mg two (2) times a day. Once daily tiZANidine 2 mg tablet Commonly known as:  Emmanuelle Vallez Take 1 Tab by mouth four (4) times daily (with meals). topiramate 100 mg tablet Commonly known as:  TOPAMAX START WITH 1/2 TABLET IN MORNING AND 1 TAB AT NIGHT FOR 2 WEEKS, THEN INCREASE TO 1 TAB TWICE A DAY  
  
 verapamil  mg CR tablet Commonly known as:  CALAN-SR  
TAKE 1 TABLET BY MOUTH AT BEDTIME  
  
 vitamin E 400 unit capsule Commonly known as:  [address] Take  by mouth daily. zolpidem 5 mg tablet Commonly known as:  AMBIEN Take 1 Tab by mouth nightly as needed for Sleep. Max Daily Amount: 5 mg.  
  
 * Notice: This list has 2 medication(s) that are the same as other medications prescribed for you. Read the directions carefully, and ask your doctor or other care provider to review them with you. Follow-up Instructions Return in about 3 months (around 3/15/2018) for botox. Patient Instructions PRESCRIPTION REFILL POLICY [facility] Statement to Patients April 1, 2014 In an effort to ensure the large volume of patient prescription refills is processed in the most efficient and expeditious manner, we are asking our patients to assist us by calling your Pharmacy for all prescription refills, this will include also your  Mail Order Pharmacy. The pharmacy will contact our office electronically to continue the refill process. Please do not wait until the last minute to call your pharmacy. We need at least 48 hours (2days) to fill prescriptions. We also encourage you to call your pharmacy before going to  your prescription to make sure it is ready. With regard to controlled substance prescription refill requests (narcotic refills) that need to be picked up at our office, we ask your cooperation by providing us with at least 72 hours (3days) notice that you will need a refill. We will not refill narcotic prescription refill requests after 4:00pm on any weekday, Monday through Thursday, or after 2:00pm on Fridays, or on the weekends. We encourage everyone to explore another way of getting your prescription refill request processed using [employer], our patient web portal through our electronic medical record system. [employer] is an efficient and effective way to communicate your medication request directly to the office and  downloadable as an ina on your smart phone . [employer] also features a review functionality that allows you to view your medication list as well as leave messages for your physician. Are you ready to get connected? If so please review the attatched instructions or speak to any of our staff to get you set up right away! Thank you so much for your cooperation. Should you have any questions please contact our Practice Administrator. The Physicians and Staff,  [facility] Please be advised there is a $25 fee for all paperwork to be completed from our  providers. This is to be paid by the patient prior to picking up the completed forms. A Healthy Lifestyle: Care Instructions Your Care Instructions A healthy lifestyle can help you feel good, stay at a healthy weight, and have plenty of energy for both work and play. A healthy lifestyle is something you can share with your whole family. A healthy lifestyle also can lower your risk for serious health problems, such as high blood pressure, heart disease, and diabetes. You can follow a few steps listed below to improve your health and the health of your family. Follow-up care is a key part of your treatment and safety. Be sure to make and go to all appointments, and call your doctor if you are having problems. It's also a good idea to know your test results and keep a list of the medicines you take. How can you care for yourself at home? · Do not eat too much sugar, fat, or fast foods. You can still have dessert and treats now and then. The goal is moderation. · Start small to improve your eating habits. Pay attention to portion sizes, drink less juice and soda pop, and eat more fruits and vegetables. ¨ Eat a healthy amount of food. A 3-ounce serving of meat, for example, is about the size of a deck of cards. Fill the rest of your plate with vegetables and whole grains. ¨ Limit the amount of soda and sports drinks you have every day. Drink more water when you are thirsty. ¨ Eat at least 5 servings of fruits and vegetables every day. It may seem like a lot, but it is not hard to reach this goal. A serving or helping is 1 piece of fruit, 1 cup of vegetables, or 2 cups of leafy, raw vegetables. Have an apple or some carrot sticks as an afternoon snack instead of a candy bar. Try to have fruits and/or vegetables at every meal. 
· Make exercise part of your daily routine. You may want to start with simple activities, such as walking, bicycling, or slow swimming.  Try to be active 30 to 60 minutes every day. You do not need to do all 30 to 60 minutes all at once. For example, you can exercise 3 times a day for 10 or 20 minutes. Moderate exercise is safe for most people, but it is always a good idea to talk to your doctor before starting an exercise program. 
· Keep moving. Edgard Roland the lawn, work in the garden, or tenXer. Take the stairs instead of the elevator at work. · If you smoke, quit. People who smoke have an increased risk for heart attack, stroke, cancer, and other lung illnesses. Quitting is hard, but there are ways to boost your chance of quitting tobacco for good. ¨ Use nicotine gum, patches, or lozenges. ¨ Ask your doctor about stop-smoking programs and medicines. ¨ Keep trying. In addition to reducing your risk of diseases in the future, you will notice some benefits soon after you stop using tobacco. If you have shortness of breath or asthma symptoms, they will likely get better within a few weeks after you quit. · Limit how much alcohol you drink. Moderate amounts of alcohol (up to 2 drinks a day for men, 1 drink a day for women) are okay. But drinking too much can lead to liver problems, high blood pressure, and other health problems. Family health If you have a family, there are many things you can do together to improve your health. · Eat meals together as a family as often as possible. · Eat healthy foods. This includes fruits, vegetables, lean meats and dairy, and whole grains. · Include your family in your fitness plan. Most people think of activities such as jogging or tennis as the way to fitness, but there are many ways you and your family can be more active. Anything that makes you breathe hard and gets your heart pumping is exercise. Here are some tips: 
¨ Walk to do errands or to take your child to school or the bus. ¨ Go for a family bike ride after dinner instead of watching TV. Where can you learn more? Go to http://falguni-candice.info/. Enter Z289 in the search box to learn more about \"A Healthy Lifestyle: Care Instructions. \" Current as of: May 12, 2017 Content Version: 11.4 © 9527-3628 [employer]. Care instructions adapted under license by [employer] (which disclaims liability or warranty for this information). If you have questions about a medical condition or this instruction, always ask your healthcare professional. [address] any warranty or liability for your use of this information. OnabotulinumtoxinA (By injection) OnabotulinumtoxinA (bi-b-jhy-lh-ZER-mri-tox-in-ay) Treats muscle stiffness, muscle spasms, excessive sweating, overactive bladder, or loss of bladder control. Prevents chronic migraine headaches. Improves the appearance of wrinkles on the face. Brand Name(s): Botox, Botox Cosmetic There may be other brand names for this medicine. When This Medicine Should Not Be Used: This medicine is not right for everyone. You should not receive this medicine if you had an allergic reaction to onabotulinumtoxinA or any other botulinum toxin product. How to Use This Medicine:  
Injectable · Your doctor will prescribe your exact dose and tell you how often it should be given. This medicine is given by a healthcare provider as a shot under your skin or into a muscle. · You may be given medicine to numb the area where the shot will be injected. If you receive the medicine around your eyes, you may be given eye drops or ointment to numb the area. After your injection, you may need to wear a protective contact lens or eye patch. · If you are being treated for excessive sweating, shave your underarms but do not use deodorant for 24 hours before your injection. Avoid exercise, hot foods or liquids, or anything else that could make you sweat for 30 minutes before your injection. · The recommended treatment schedule for chronic migraine is every 12 weeks. · This medicine works slowly. Once your condition has improved, the medicine will last about 3 months, then the effects will slowly go away. You might need more injections to treat your condition. ¨ Muscle spasms in the eyelids should improve within 3 to 10 days. ¨ Eye muscle problems should improve 1 or 2 days after the injection, and the improvement should last for 2 to 6 weeks. ¨ Neck pain should improve within 2 to 6 weeks. ¨ Arm stiffness should improve within 4 to 6 weeks. ¨ Facial lines or wrinkles should improve 1 or 2 days. · This medicine should come with a Medication Guide. Ask your pharmacist for a copy if you do not have one. · Missed dose:Call your doctor or pharmacist for instructions. Drugs and Foods to Avoid: Ask your doctor or pharmacist before using any other medicine, including over-the-counter medicines, vitamins, and herbal products. · Some foods and medicine can affect how onabotulinumtoxinA works. Tell your doctor if you are using any of the following: ¨ Aspirin or a blood thinner (such as ticlopidine, warfarin) ¨ Muscle relaxer ¨ Medicine for an infection (such as amikacin, gentamicin, streptomycin, tobramycin) · Tell your doctor if you have received an injection of any botulinum toxin product within the past 4 months. Warnings While Using This Medicine: · Tell your doctor if you are pregnant or breastfeeding, or if you have breathing or lung problems, bleeding problems, heart or blood vessel disease, or nerve or muscle problems (such as myasthenia gravis). Tell your doctor if you have ever had face surgery or if you have a urinary tract infection or trouble urinating, diabetes, or multiple sclerosis. · This medicine may cause the following problems: ¨ Muscle weakness, loss of bladder control, trouble swallowing, speaking, or breathing (caused by the toxin spreading to other parts of your body) · This medicine may make your muscles weak or cause vision problems. Do not drive or do anything else that could be dangerous until you know how this medicine affects you. · There are some warnings that only apply if you are receiving this medicine to treat the following: ¨ Injections near the eye: This medicine may reduce blinking, which can raise the risk of eye problems such as corneal exposure and ulcers. Tell your doctor right away if you notice that you are blinking less than usual or your eyes feel dry. ¨ Urinary incontinence: This medicine may cause autonomic dysreflexia, which can be a life-threatening condition. ¨ Overactive bladder: Check with your doctor right away if you have trouble urinating or a burning sensation while urinating. · This medicine contains products from donated human blood, so it may contain viruses, although the risk is low. Human donors and blood are always tested for viruses to keep the risk low. Talk with your doctor about this risk if you are concerned. · Your doctor will check your progress and the effects of this medicine at regular visits. Keep all appointments. Possible Side Effects While Using This Medicine:  
Call your doctor right away if you notice any of these side effects: · Allergic reaction: Itching or hives, swelling in your face or hands, swelling or tingling in your mouth or throat, chest tightness, trouble breathing · Blurred or double vision, droopy eyelids · Change in how much or how often you urinate, trouble urinating, or painful urination · Chest pain, slow or uneven heartbeat · Headache, increased sweating, warmth or redness in your face, neck, or arm · Muscle weakness · Trouble swallowing, talking, or breathing If you notice these less serious side effects, talk with your doctor: · Fever, chills, cough, stuffy or runny nose, sore throat, and body aches · Pain in your neck, back, arms, or legs · Redness, pain, tenderness, bruising, swelling, or weakness where the shot was given If you notice other side effects that you think are caused by this medicine, tell your doctor. Call your doctor for medical advice about side effects. You may report side effects to FDA at 0-250-GEY-0412 © 2017 [address] Information is for End User's use only and may not be sold, redistributed or otherwise used for commercial purposes. The above information is an  only. It is not intended as medical advice for individual conditions or treatments. Talk to your doctor, nurse or pharmacist before following any medical regimen to see if it is safe and effective for you. Introducing [facility]! Dear Layne Singh: Thank you for requesting a [employer] account. Our records indicate that you already have an active [employer] account. You can access your account anytime at [email or web address] Did you know that you can access your hospital and ER discharge instructions at any time in STYLHUNT? You can also review all of your test results from your hospital stay or ER visit. Additional Information If you have questions, please visit the Frequently Asked Questions section of the [employer] website at [email or web address] Remember, STYL[employer]T is NOT to be used for urgent needs. For medical emergencies, dial 911. Now available from your iPhone and Android! Please provide this summary of care documentation to your next provider. Your primary care clinician is listed as Alexia Egan. If you have any questions after today's visit, please call [phone].

## 2017-12-15 NOTE — PATIENT INSTRUCTIONS
[facility]   Statement to Patients  April 1, 2014      In an effort to ensure the large volume of patient prescription refills is processed in the most efficient and expeditious manner, we are asking our patients to assist us by calling your Pharmacy for all prescription refills, this will include also your  Mail Order Pharmacy. The pharmacy will contact our office electronically to continue the refill process. Please do not wait until the last minute to call your pharmacy. We need at least 48 hours (2days) to fill prescriptions. We also encourage you to call your pharmacy before going to  your prescription to make sure it is ready. With regard to controlled substance prescription refill requests (narcotic refills) that need to be picked up at our office, we ask your cooperation by providing us with at least 72 hours (3days) notice that you will need a refill. We will not refill narcotic prescription refill requests after 4:00pm on any weekday, Monday through Thursday, or after 2:00pm on Fridays, or on the weekends. We encourage everyone to explore another way of getting your prescription refill request processed using G-Snap!, our patient web portal through our electronic medical record system. G-Snap! is an efficient and effective way to communicate your medication request directly to the office and  downloadable as an ina on your smart phone . G-Snap! also features a review functionality that allows you to view your medication list as well as leave messages for your physician. Are you ready to get connected? If so please review the attatched instructions or speak to any of our staff to get you set up right away! Thank you so much for your cooperation. Should you have any questions please contact our Practice Administrator.     The Physicians and Staff,  [facility]     Please be advised there is a $25 fee for all paperwork to be completed from our  providers. This is to be paid by the patient prior to picking up the completed forms. A Healthy Lifestyle: Care Instructions  Your Care Instructions    A healthy lifestyle can help you feel good, stay at a healthy weight, and have plenty of energy for both work and play. A healthy lifestyle is something you can share with your whole family. A healthy lifestyle also can lower your risk for serious health problems, such as high blood pressure, heart disease, and diabetes. You can follow a few steps listed below to improve your health and the health of your family. Follow-up care is a key part of your treatment and safety. Be sure to make and go to all appointments, and call your doctor if you are having problems. It's also a good idea to know your test results and keep a list of the medicines you take. How can you care for yourself at home? · Do not eat too much sugar, fat, or fast foods. You can still have dessert and treats now and then. The goal is moderation. · Start small to improve your eating habits. Pay attention to portion sizes, drink less juice and soda pop, and eat more fruits and vegetables. ¨ Eat a healthy amount of food. A 3-ounce serving of meat, for example, is about the size of a deck of cards. Fill the rest of your plate with vegetables and whole grains. ¨ Limit the amount of soda and sports drinks you have every day. Drink more water when you are thirsty. ¨ Eat at least 5 servings of fruits and vegetables every day. It may seem like a lot, but it is not hard to reach this goal. A serving or helping is 1 piece of fruit, 1 cup of vegetables, or 2 cups of leafy, raw vegetables. Have an apple or some carrot sticks as an afternoon snack instead of a candy bar. Try to have fruits and/or vegetables at every meal.  · Make exercise part of your daily routine. You may want to start with simple activities, such as walking, bicycling, or slow swimming.  Try to be active 30 to 60 minutes every day. You do not need to do all 30 to 60 minutes all at once. For example, you can exercise 3 times a day for 10 or 20 minutes. Moderate exercise is safe for most people, but it is always a good idea to talk to your doctor before starting an exercise program.  · Keep moving. Sapna Truong the lawn, work in the garden, or [place]. Take the stairs instead of the elevator at work. · If you smoke, quit. People who smoke have an increased risk for heart attack, stroke, cancer, and other lung illnesses. Quitting is hard, but there are ways to boost your chance of quitting tobacco for good. ¨ Use nicotine gum, patches, or lozenges. ¨ Ask your doctor about stop-smoking programs and medicines. ¨ Keep trying. In addition to reducing your risk of diseases in the future, you will notice some benefits soon after you stop using tobacco. If you have shortness of breath or asthma symptoms, they will likely get better within a few weeks after you quit. · Limit how much alcohol you drink. Moderate amounts of alcohol (up to 2 drinks a day for men, 1 drink a day for women) are okay. But drinking too much can lead to liver problems, high blood pressure, and other health problems. Family health  If you have a family, there are many things you can do together to improve your health. · Eat meals together as a family as often as possible. · Eat healthy foods. This includes fruits, vegetables, lean meats and dairy, and whole grains. · Include your family in your fitness plan. Most people think of activities such as jogging or tennis as the way to fitness, but there are many ways you and your family can be more active. Anything that makes you breathe hard and gets your heart pumping is exercise. Here are some tips:  ¨ Walk to do errands or to take your child to school or the bus. ¨ Go for a family bike ride after dinner instead of watching TV. Where can you learn more?   Go to http://falguni-candice.info/. Enter J818 in the search box to learn more about \"A Healthy Lifestyle: Care Instructions. \"  Current as of: May 12, 2017  Content Version: 11.4  © 4034-8371 [employer]. Care instructions adapted under license by [employer] (which disclaims liability or warranty for this information). If you have questions about a medical condition or this instruction, always ask your healthcare professional. [address] any warranty or liability for your use of this information. OnabotulinumtoxinA (By injection)   OnabotulinumtoxinA (ra-h-xjv-os-MRC-hrg-tox-in-ay)  Treats muscle stiffness, muscle spasms, excessive sweating, overactive bladder, or loss of bladder control. Prevents chronic migraine headaches. Improves the appearance of wrinkles on the face. Brand Name(s): Botox, Botox Cosmetic   There may be other brand names for this medicine. When This Medicine Should Not Be Used: This medicine is not right for everyone. You should not receive this medicine if you had an allergic reaction to onabotulinumtoxinA or any other botulinum toxin product. How to Use This Medicine:   Injectable  · Your doctor will prescribe your exact dose and tell you how often it should be given. This medicine is given by a healthcare provider as a shot under your skin or into a muscle. · You may be given medicine to numb the area where the shot will be injected. If you receive the medicine around your eyes, you may be given eye drops or ointment to numb the area. After your injection, you may need to wear a protective contact lens or eye patch. · If you are being treated for excessive sweating, shave your underarms but do not use deodorant for 24 hours before your injection. Avoid exercise, hot foods or liquids, or anything else that could make you sweat for 30 minutes before your injection.   · The recommended treatment schedule for chronic migraine is every 12 weeks. · This medicine works slowly. Once your condition has improved, the medicine will last about 3 months, then the effects will slowly go away. You might need more injections to treat your condition. ¨ Muscle spasms in the eyelids should improve within 3 to 10 days. ¨ Eye muscle problems should improve 1 or 2 days after the injection, and the improvement should last for 2 to 6 weeks. ¨ Neck pain should improve within 2 to 6 weeks. ¨ Arm stiffness should improve within 4 to 6 weeks. ¨ Facial lines or wrinkles should improve 1 or 2 days. · This medicine should come with a Medication Guide. Ask your pharmacist for a copy if you do not have one. · Missed dose:Call your doctor or pharmacist for instructions. Drugs and Foods to Avoid:   Ask your doctor or pharmacist before using any other medicine, including over-the-counter medicines, vitamins, and herbal products. · Some foods and medicine can affect how onabotulinumtoxinA works. Tell your doctor if you are using any of the following:  ¨ Aspirin or a blood thinner (such as ticlopidine, warfarin)  ¨ Muscle relaxer  ¨ Medicine for an infection (such as amikacin, gentamicin, streptomycin, tobramycin)  · Tell your doctor if you have received an injection of any botulinum toxin product within the past 4 months. Warnings While Using This Medicine:   · Tell your doctor if you are pregnant or breastfeeding, or if you have breathing or lung problems, bleeding problems, heart or blood vessel disease, or nerve or muscle problems (such as myasthenia gravis). Tell your doctor if you have ever had face surgery or if you have a urinary tract infection or trouble urinating, diabetes, or multiple sclerosis.   · This medicine may cause the following problems:  ¨ Muscle weakness, loss of bladder control, trouble swallowing, speaking, or breathing (caused by the toxin spreading to other parts of your body)  · This medicine may make your muscles weak or cause vision problems. Do not drive or do anything else that could be dangerous until you know how this medicine affects you. · There are some warnings that only apply if you are receiving this medicine to treat the following:   ¨ Injections near the eye: This medicine may reduce blinking, which can raise the risk of eye problems such as corneal exposure and ulcers. Tell your doctor right away if you notice that you are blinking less than usual or your eyes feel dry. ¨ Urinary incontinence: This medicine may cause autonomic dysreflexia, which can be a life-threatening condition. ¨ Overactive bladder: Check with your doctor right away if you have trouble urinating or a burning sensation while urinating. · This medicine contains products from donated human blood, so it may contain viruses, although the risk is low. Human donors and blood are always tested for viruses to keep the risk low. Talk with your doctor about this risk if you are concerned. · Your doctor will check your progress and the effects of this medicine at regular visits. Keep all appointments.   Possible Side Effects While Using This Medicine:   Call your doctor right away if you notice any of these side effects:  · Allergic reaction: Itching or hives, swelling in your face or hands, swelling or tingling in your mouth or throat, chest tightness, trouble breathing  · Blurred or double vision, droopy eyelids  · Change in how much or how often you urinate, trouble urinating, or painful urination  · Chest pain, slow or uneven heartbeat  · Headache, increased sweating, warmth or redness in your face, neck, or arm  · Muscle weakness  · Trouble swallowing, talking, or breathing  If you notice these less serious side effects, talk with your doctor:   · Fever, chills, cough, stuffy or runny nose, sore throat, and body aches  · Pain in your neck, back, arms, or legs  · Redness, pain, tenderness, bruising, swelling, or weakness where the shot was given  If you notice other side effects that you think are caused by this medicine, tell your doctor. Call your doctor for medical advice about side effects. You may report side effects to FDA at 0-245-CDC-9225  © 2017 [address] Information is for End User's use only and may not be sold, redistributed or otherwise used for commercial purposes. The above information is an  only. It is not intended as medical advice for individual conditions or treatments. Talk to your doctor, nurse or pharmacist before following any medical regimen to see if it is safe and effective for you.

## 2017-12-19 RX ORDER — METFORMIN HYDROCHLORIDE 500 MG/1
TABLET, EXTENDED RELEASE ORAL
Qty: 90 TAB | Refills: 4 | Status: SHIPPED | OUTPATIENT
Start: 2017-12-19 | End: 2018-01-16 | Stop reason: SDUPTHER

## 2017-12-19 RX ORDER — VERAPAMIL HYDROCHLORIDE 240 MG/1
TABLET, FILM COATED, EXTENDED RELEASE ORAL
Qty: 90 TAB | Refills: 4 | Status: SHIPPED | OUTPATIENT
Start: 2017-12-19 | End: 2018-01-16 | Stop reason: SDUPTHER

## 2017-12-19 NOTE — PROCEDURES
Patient comes for Chemodenervation for chronic migraine headaches. After consenting the patient and discussing the procedure and possible side effects. The chemodenervant was reconstituted as follows:  200 units of botox was mixed with 4ml of perservative free saline and withdrawn, using a into four sterile 1ml BD syringes. Sterile 30 gauge half inch needles were affixed to the syringes. Procedure   Chemodenervant was injected into the following muscles which were identified using their anatomical land marks. Each site was aspirated prior to injection to ensure that there was no vascular compromise. Muscle Units/inj No. Of injections   Trapezius BL 5 U 3 each   Cervical Paraspinals BL 5 U 3 each   Occipitalis 5 U 3 each   Temporalis BL 5 U 4 each    BL 5 U 1 each   Procerus 5 U 1   Frontalis BL 5 U 5   Total units 170      Nominal bleeding at injection sites. Patient tolerated the procedure well. No reported pain following the procedure. Nehemiah Umana

## 2017-12-20 RX ORDER — OMEPRAZOLE 40 MG/1
CAPSULE, DELAYED RELEASE ORAL
Qty: 30 CAP | Refills: 7 | Status: SHIPPED | OUTPATIENT
Start: 2017-12-20 | End: 2018-08-18 | Stop reason: SDUPTHER

## 2017-12-20 RX ORDER — GABAPENTIN 300 MG/1
CAPSULE ORAL
Qty: 90 CAP | Refills: 5 | Status: SHIPPED | OUTPATIENT
Start: 2017-12-20 | End: 2018-06-20 | Stop reason: SDUPTHER

## 2018-01-07 DIAGNOSIS — F32.A DEPRESSION, UNSPECIFIED DEPRESSION TYPE: ICD-10-CM

## 2018-01-08 RX ORDER — ARIPIPRAZOLE 5 MG/1
TABLET ORAL
Qty: 30 TAB | Refills: 2 | Status: SHIPPED | OUTPATIENT
Start: 2018-01-08 | End: 2018-04-19 | Stop reason: SDUPTHER

## 2018-01-12 DIAGNOSIS — G43.909 MIGRAINE SYNDROME: ICD-10-CM

## 2018-01-12 DIAGNOSIS — M47.812 OSTEOARTHRITIS OF CERVICAL SPINE, UNSPECIFIED SPINAL OSTEOARTHRITIS COMPLICATION STATUS: ICD-10-CM

## 2018-01-15 RX ORDER — PROMETHAZINE HYDROCHLORIDE 25 MG/1
TABLET ORAL
Qty: 50 TAB | Refills: 0 | Status: SHIPPED | OUTPATIENT
Start: 2018-01-15 | End: 2018-02-14 | Stop reason: SDUPTHER

## 2018-01-15 RX ORDER — HYDROCODONE BITARTRATE AND ACETAMINOPHEN 7.5; 325 MG/1; MG/1
1 TABLET ORAL 3 TIMES DAILY
Qty: 90 TAB | Refills: 0 | Status: SHIPPED | OUTPATIENT
Start: 2018-01-15 | End: 2018-02-14 | Stop reason: SDUPTHER

## 2018-01-15 NOTE — TELEPHONE ENCOUNTER
From: Declan Rodgers  To:  Aliya Rush MD  Sent: 1/12/2018 2:33 PM EST  Subject: Medication Renewal Request    Original authorizing provider: MD Declan Walter would like a refill of the following medications:  meperidine (DEMEROL) 100 mg tablet Aliya Rush MD]  HYDROcodone-acetaminophen (NORCO) 7.5-325 mg per tablet Aliya Rush MD]  promethazine (PHENERGAN) 25 mg tablet Aliya Rush MD]    Preferred pharmacy: [facility]/PHARMACY [facility] 32    Comment:

## 2018-01-17 RX ORDER — METFORMIN HYDROCHLORIDE 500 MG/1
TABLET, EXTENDED RELEASE ORAL
Qty: 90 TAB | Refills: 0 | Status: SHIPPED | OUTPATIENT
Start: 2018-01-17 | End: 2019-09-11

## 2018-01-17 RX ORDER — VERAPAMIL HYDROCHLORIDE 240 MG/1
TABLET, FILM COATED, EXTENDED RELEASE ORAL
Qty: 90 TAB | Refills: 0 | Status: SHIPPED | OUTPATIENT
Start: 2018-01-17 | End: 2020-03-03

## 2018-02-14 DIAGNOSIS — M47.812 OSTEOARTHRITIS OF CERVICAL SPINE, UNSPECIFIED SPINAL OSTEOARTHRITIS COMPLICATION STATUS: ICD-10-CM

## 2018-02-14 DIAGNOSIS — F32.A DEPRESSION, UNSPECIFIED DEPRESSION TYPE: ICD-10-CM

## 2018-02-14 DIAGNOSIS — G43.909 MIGRAINE SYNDROME: ICD-10-CM

## 2018-02-15 RX ORDER — ZOLPIDEM TARTRATE 5 MG/1
5 TABLET ORAL
Qty: 30 TAB | Refills: 2 | Status: SHIPPED | OUTPATIENT
Start: 2018-02-15 | End: 2018-05-20 | Stop reason: SDUPTHER

## 2018-02-15 RX ORDER — HYDROCODONE BITARTRATE AND ACETAMINOPHEN 7.5; 325 MG/1; MG/1
1 TABLET ORAL 3 TIMES DAILY
Qty: 90 TAB | Refills: 0 | Status: SHIPPED | OUTPATIENT
Start: 2018-02-15 | End: 2018-03-14 | Stop reason: SDUPTHER

## 2018-02-15 RX ORDER — PROMETHAZINE HYDROCHLORIDE 25 MG/1
TABLET ORAL
Qty: 50 TAB | Refills: 0 | Status: SHIPPED | OUTPATIENT
Start: 2018-02-15 | End: 2018-03-14 | Stop reason: SDUPTHER

## 2018-03-05 ENCOUNTER — TELEPHONE (OUTPATIENT)
Dept: NEUROLOGY | Age: 43
End: 2018-03-05

## 2018-03-06 DIAGNOSIS — R25.2 MUSCLE CRAMP: Primary | ICD-10-CM

## 2018-03-06 RX ORDER — CYCLOBENZAPRINE HCL 10 MG
10 TABLET ORAL
Qty: 30 TAB | Refills: 1 | Status: SHIPPED | OUTPATIENT
Start: 2018-03-06 | End: 2020-08-26

## 2018-03-09 ENCOUNTER — TELEPHONE (OUTPATIENT)
Dept: NEUROLOGY | Age: 43
End: 2018-03-09

## 2018-03-09 NOTE — TELEPHONE ENCOUNTER
Called pt about her inactive insurance. She stated she told Dr. Ariana Mary that she has [employer] which we are not in their network and she has to find a new neurologist. She is still looking for a new doctor. Confirmed we will cancel her Botox appt for 3/22/18.

## 2018-04-19 DIAGNOSIS — F32.A DEPRESSION, UNSPECIFIED DEPRESSION TYPE: ICD-10-CM

## 2018-04-19 RX ORDER — ARIPIPRAZOLE 5 MG/1
TABLET ORAL
Qty: 30 TAB | Refills: 2 | Status: SHIPPED | OUTPATIENT
Start: 2018-04-19 | End: 2018-07-16 | Stop reason: SDUPTHER

## 2018-04-28 DIAGNOSIS — F41.1 GENERALIZED ANXIETY DISORDER: ICD-10-CM

## 2018-04-30 RX ORDER — ALPRAZOLAM 1 MG/1
1 TABLET ORAL
Qty: 30 TAB | Refills: 0 | Status: SHIPPED | OUTPATIENT
Start: 2018-04-30 | End: 2018-06-25 | Stop reason: SDUPTHER

## 2018-04-30 NOTE — TELEPHONE ENCOUNTER
From: Faustino Better  To:  Dora Muller MD  Sent: 4/28/2018 8:46 PM EDT  Subject: Medication Renewal Request    Original authorizing provider: MD Faustino Vaz would like a refill of the following medications:  ALPRAZolam Briana Chandra) 1 mg tablet Dora Muller MD]    Preferred pharmacy: [facility]/[place] [id number]    Comment:

## 2018-05-17 DIAGNOSIS — G43.909 MIGRAINE SYNDROME: ICD-10-CM

## 2018-05-17 RX ORDER — HYDROCODONE BITARTRATE AND ACETAMINOPHEN 7.5; 325 MG/1; MG/1
1 TABLET ORAL 3 TIMES DAILY
Qty: 50 TAB | Refills: 0 | Status: SHIPPED | OUTPATIENT
Start: 2018-05-17 | End: 2018-07-07 | Stop reason: SDUPTHER

## 2018-05-20 DIAGNOSIS — F32.A DEPRESSION, UNSPECIFIED DEPRESSION TYPE: ICD-10-CM

## 2018-05-20 RX ORDER — DULOXETIN HYDROCHLORIDE 60 MG/1
CAPSULE, DELAYED RELEASE ORAL
Qty: 30 CAP | Refills: 5 | Status: SHIPPED | OUTPATIENT
Start: 2018-05-20 | End: 2018-11-23 | Stop reason: SDUPTHER

## 2018-05-20 RX ORDER — TOPIRAMATE 100 MG/1
TABLET, FILM COATED ORAL
Qty: 90 TAB | Refills: 5 | Status: SHIPPED | OUTPATIENT
Start: 2018-05-20 | End: 2018-11-23 | Stop reason: SDUPTHER

## 2018-05-21 RX ORDER — PROMETHAZINE HYDROCHLORIDE 25 MG/1
TABLET ORAL
Qty: 50 TAB | Refills: 0 | Status: SHIPPED | OUTPATIENT
Start: 2018-05-21 | End: 2018-07-07 | Stop reason: SDUPTHER

## 2018-05-21 RX ORDER — ZOLPIDEM TARTRATE 5 MG/1
5 TABLET ORAL
Qty: 30 TAB | Refills: 2 | Status: SHIPPED | OUTPATIENT
Start: 2018-05-21 | End: 2018-08-20 | Stop reason: SDUPTHER

## 2018-05-21 NOTE — TELEPHONE ENCOUNTER
From: Cristela Hill  To:  Myrtha Opitz, MD  Sent: 5/20/2018 9:02 PM EDT  Subject: Medication Renewal Request    Original authorizing provider: Myrtha Opitz, MD Arva Motes would like a refill of the following medications:  zolpidem (AMBIEN) 5 mg tablet Myrtha Opitz, MD]  promethazine (PHENERGAN) 25 mg tablet Myrtha Opitz, MD]    Preferred pharmacy: [facility]/PHARMACY [facility] 32    Comment:

## 2018-05-25 ENCOUNTER — OFFICE VISIT (OUTPATIENT)
Dept: FAMILY MEDICINE CLINIC | Age: 43
End: 2018-05-25

## 2018-05-25 VITALS
TEMPERATURE: 98.1 F | OXYGEN SATURATION: 98 % | HEIGHT: 66 IN | WEIGHT: 292.8 LBS | DIASTOLIC BLOOD PRESSURE: 77 MMHG | RESPIRATION RATE: 18 BRPM | SYSTOLIC BLOOD PRESSURE: 110 MMHG | HEART RATE: 103 BPM | BODY MASS INDEX: 47.06 KG/M2

## 2018-05-25 DIAGNOSIS — S33.5XXA LUMBAR SPRAIN, INITIAL ENCOUNTER: Primary | ICD-10-CM

## 2018-05-25 DIAGNOSIS — G89.4 CHRONIC PAIN SYNDROME: ICD-10-CM

## 2018-05-25 DIAGNOSIS — G43.909 MIGRAINE SYNDROME: ICD-10-CM

## 2018-05-25 RX ORDER — CYCLOBENZAPRINE HCL 10 MG
10 TABLET ORAL
Qty: 30 TAB | Refills: 2 | Status: SHIPPED | OUTPATIENT
Start: 2018-05-25 | End: 2018-07-07 | Stop reason: SDUPTHER

## 2018-05-25 RX ORDER — PREDNISONE 10 MG/1
10 TABLET ORAL 2 TIMES DAILY
Qty: 14 TAB | Refills: 0 | Status: SHIPPED | OUTPATIENT
Start: 2018-05-25 | End: 2020-08-13 | Stop reason: ALTCHOICE

## 2018-05-25 NOTE — MR AVS SNAPSHOT
[facility] 
 
 
 [address] [facility] [address] [phone] 
[phone] Patient: Giovanny Allen MRN: [id number] BAL:4[birth year]52[id number] Visit Information Date & Time Provider Department Dept. Phone Encounter #  
 5/25/2018  4:00 PM Mehdi Jordan, [facility] [phone] [id number] Follow-up Instructions Return if symptoms worsen or fail to improve. Upcoming Health Maintenance Date Due  
 PAP AKA CERVICAL CYTOLOGY 7/18/2016 COLONOSCOPY 5/8/2018 Influenza Age 5 to Adult 8/1/2018 DTaP/Tdap/Td series (2 - Td) 12/19/2024 Allergies as of 5/25/2018  Review Complete On: 5/25/2018 By: Mehdi Jordan MD  
  
 Severity Noted Reaction Type Reactions Atacand [Candesartan] High 08/29/2014   Systemic Other (comments) Patient B/P increase Compazine [Prochlorperazine] High 04/16/2010   Side Effect Other (comments), Unable to Obtain Ciprofloxacin  04/16/2010   Systemic Other (comments) Codeine  04/16/2010   Side Effect Nausea and Vomiting Zonisamide Low 04/11/2011   Side Effect Nausea and Vomiting Current Immunizations  Reviewed on 12/19/2014 Name Date Influenza Vaccine (Quad) PF 11/7/2017 Influenza Vaccine PF 12/19/2014 Tdap 12/19/2014 Not reviewed this visit You Were Diagnosed With   
  
 Codes Comments Lumbar sprain, initial encounter    -  Primary ICD-10-CM: S33. [place] Ted ICD-9-CM: 847.2 Migraine syndrome     ICD-10-CM: W12.929 ICD-9-CM: 346.00 Chronic migraine     ICD-10-CM: A81.436 ICD-9-CM: 346.70 Chronic pain syndrome     ICD-10-CM: G89.4 ICD-9-CM: 338. 4 Vitals BP Pulse Temp Resp Height(growth percentile) Weight(growth percentile) 110/77 (BP 1 Location: Right arm, BP Patient Position: Sitting) (!) 103 98.1 °F (36.7 °C) (Oral) 18 5' 6\" (1.676 m) 292 lb 12.8 oz (132.8 kg) LMP SpO2 BMI OB Status Smoking Status 04/18/2018 98% 47.26 kg/m2 Unknown Never Smoker Vitals History BMI and BSA Data Body Mass Index Body Surface Area  
 47.26 kg/m 2 2.49 m 2 Preferred Pharmacy Pharmacy Name Phone [facility] [address] [phone] Your Updated Medication List  
  
   
This list is accurate as of 5/25/18  4:10 PM.  Always use your most recent med list.  
  
  
  
  
 ALPRAZolam 1 mg tablet Commonly known as:  Marisela Belling Take 1 Tab by mouth nightly as needed for Anxiety. Max Daily Amount: 1 mg. ARIPiprazole 5 mg tablet Commonly known as:  ABILIFY TAKE 1 TABLET BY MOUTH NIGHTLY  
  
 azithromycin 250 mg tablet Commonly known as:  [place] Cramp Take 2 tablets today, then take 1 tablet daily  
  
 busPIRone 7.5 mg tablet Commonly known as:  BUSPAR Take 1 Tab by mouth three (3) times daily as needed. cholecalciferol (VITAMIN D3) 5,000 unit Tab tablet Commonly known as:  VITAMIN D3 Take 5,000 Units by mouth. Takes one tablet 4 times a week * cyclobenzaprine 10 mg tablet Commonly known as:  FLEXERIL  
TAKE 1 TABLET BY MOUTH 3 TIMES A DAY WITH MEALS * cyclobenzaprine 10 mg tablet Commonly known as:  FLEXERIL Take 1 Tab by mouth three (3) times daily as needed for Muscle Spasm(s). * cyclobenzaprine 10 mg tablet Commonly known as:  FLEXERIL Take 1 Tab by mouth three (3) times daily as needed for Muscle Spasm(s). DENTA 5000 PLUS 1.1 % Crea Generic drug:  fluoride (sodium) DULoxetine 60 mg capsule Commonly known as:  CYMBALTA TAKE ONE CAPSULE BY MOUTH EVERY DAY  
  
 furosemide 20 mg tablet Commonly known as:  LASIX Take 1 Tab by mouth daily. * gabapentin 100 mg capsule Commonly known as:  NEURONTIN Take 3 Caps by mouth three (3) times daily. * gabapentin 400 mg capsule Commonly known as:  NEURONTIN Take 1 Cap by mouth three (3) times daily. * gabapentin 300 mg capsule Commonly known as:  NEURONTIN  
TAKE ONE CAPSULE BY MOUTH 3 TIMES A DAY  
  
 hydroCHLOROthiazide 25 mg tablet Commonly known as:  HYDRODIURIL  
TAKE 1 TABLET BY MOUTH EVERY DAY  
  
 HYDROcodone-acetaminophen 7.5-325 mg per tablet Commonly known as:  [address] Take 1 Tab by mouth three (3) times daily. meperidine 100 mg tablet Commonly known as:  DEMEROL Take 1 Tab by mouth every four (4) hours as needed. Max Daily Amount: 600 mg.  
  
 metFORMIN  mg tablet Commonly known as:  GLUCOPHAGE XR  
TAKE 1 TABLET BY MOUTH EVERY DAY WITH DINNER  
  
 naloxone 1 mg/mL injection Commonly known as:  NARCAN  
2 mL by IntraMUSCular route once as needed for up to 1 dose. nystatin 100,000 unit/mL suspension Commonly known as:  MYCOSTATIN Take 5 mL by mouth four (4) times daily. omeprazole 40 mg capsule Commonly known as:  PRILOSEC  
TAKE 1 CAPSULE BY MOUTH DAILY  
  
 onabotulinumtoxinA 200 unit injection Commonly known as:  BOTOX To be injected in the muscles of the head and neck  
  
 predniSONE 10 mg tablet Commonly known as:  Gladis Dulce Take 1 Tab by mouth two (2) times a day. promethazine 25 mg tablet Commonly known as:  PHENERGAN  
TAKE 1 TABLET BY MOUTH EVERY 8 HOURS AS NEEDED FOR NAUSEA STOOL SOFTENER 100 mg capsule Generic drug:  docusate sodium 100 mg two (2) times a day. Once daily tiZANidine 2 mg tablet Commonly known as:  Jaunita Shallow Take 1 Tab by mouth four (4) times daily (with meals). topiramate 100 mg tablet Commonly known as:  TOPAMAX START WITH 1/2 TABLET IN MORNING AND 1 TAB AT NIGHT FOR 2 WEEKS, THEN INCREASE TO 1 TAB TWICE A DAY  
  
 verapamil  mg CR tablet Commonly known as:  CALAN-SR  
TAKE 1 TABLET BY MOUTH AT BEDTIME  
  
 vitamin E 400 unit capsule Commonly known as:  [address] Take  by mouth daily. zolpidem 5 mg tablet Commonly known as:  AMBIEN  
 Take 1 Tab by mouth nightly as needed for Sleep. Max Daily Amount: 5 mg.  
  
 * Notice: This list has 6 medication(s) that are the same as other medications prescribed for you. Read the directions carefully, and ask your doctor or other care provider to review them with you. Prescriptions Sent to Pharmacy Refills  
 predniSONE (DELTASONE) 10 mg tablet 0 Sig: Take 1 Tab by mouth two (2) times a day. Class: Normal  
 Pharmacy: [address] [place] [address] Ph #: [phone] Route: Oral  
 cyclobenzaprine (FLEXERIL) 10 mg tablet 2 Sig: Take 1 Tab by mouth three (3) times daily as needed for Muscle Spasm(s). Class: Normal  
 Pharmacy: [address] [place] [address] Ph #: [phone] Route: Oral  
  
Follow-up Instructions Return if symptoms worsen or fail to improve. Introducing [facility]! Dear Lisset Salamanca: Thank you for requesting a QuoVadis account. Our records indicate that you already have an active QuoVadis account. You can access your account anytime at [email or web address] Did you know that you can access your hospital and ER discharge instructions at any time in QuoVadis? You can also review all of your test results from your hospital stay or ER visit. Additional Information If you have questions, please visit the Frequently Asked Questions section of the QuoVadis website at [email or web address] Remember, QuoVadis is NOT to be used for urgent needs. For medical emergencies, dial 911. Now available from your iPhone and Android! Please provide this summary of care documentation to your next provider. Your primary care clinician is listed as John Dumont. If you have any questions after today's visit, please call [phone].

## 2018-05-25 NOTE — PROGRESS NOTES
Chief Complaint   Patient presents with    Hip Pain     bilateral      C/o  teresa hip pain and spasms,   Radiates to lower back x 2 weeks,  Taking prescribed meds as  Ordered.   May have injured it  While assisting mom

## 2018-06-01 LAB
AMPHETAMINES UR QL SCN: NEGATIVE NG/ML
BARBITURATES UR QL SCN: NEGATIVE NG/ML
BENZODIAZ UR QL SCN: POSITIVE NG/ML
BZE UR QL SCN: NEGATIVE NG/ML
CANNABINOIDS UR QL SCN: NEGATIVE NG/ML
CREAT UR-MCNC: 139.2 MG/DL (ref 20–300)
FAX REPORT, 100898: NORMAL
METHADONE UR QL SCN: NEGATIVE NG/ML
OPIATES UR QL SCN: POSITIVE NG/ML
OXYCODONE+OXYMORPHONE UR QL SCN: NEGATIVE NG/ML
PCP UR QL: NEGATIVE NG/ML
PH UR: 7.1 [PH] (ref 4.5–8.9)
PLEASE NOTE:, 733163: NORMAL
PROPOXYPH UR QL SCN: NEGATIVE NG/ML

## 2018-06-20 RX ORDER — GABAPENTIN 300 MG/1
CAPSULE ORAL
Qty: 90 CAP | Refills: 5 | Status: SHIPPED | OUTPATIENT
Start: 2018-06-20 | End: 2018-12-11 | Stop reason: SDUPTHER

## 2018-06-20 RX ORDER — HYDROCHLOROTHIAZIDE 25 MG/1
TABLET ORAL
Qty: 30 TAB | Refills: 10 | Status: SHIPPED | OUTPATIENT
Start: 2018-06-20 | End: 2019-06-04 | Stop reason: SDUPTHER

## 2018-06-25 DIAGNOSIS — F41.1 GENERALIZED ANXIETY DISORDER: ICD-10-CM

## 2018-06-26 RX ORDER — ALPRAZOLAM 1 MG/1
1 TABLET ORAL
Qty: 30 TAB | Refills: 0 | Status: SHIPPED | OUTPATIENT
Start: 2018-06-26 | End: 2018-09-05 | Stop reason: SDUPTHER

## 2018-06-26 NOTE — TELEPHONE ENCOUNTER
From: Christiano Landaverde  To: Luciano Morgan MD  Sent: 6/25/2018 9:55 PM EDT  Subject: Medication Renewal Request    Original authorizing provider: MD Shelli Rojason Akhil would like a refill of the following medications:  ALPRAZolam Cleda Contreras) 1 mg tablet Luciano Morgan MD]    Preferred pharmacy: [address] [place] [address], [address] [place] [address]    Comment:      Medication renewals requested in this message routed to other providers:  meperidine (DEMEROL) 100 mg tablet Heide Martin MD]  HYDROcodone-acetaminophen (NORCO) 7.5-325 mg per tablet Heide Martin MD]  promethazine (PHENERGAN) 25 mg tablet Heide Martin MD]

## 2018-07-16 DIAGNOSIS — F32.A DEPRESSION, UNSPECIFIED DEPRESSION TYPE: ICD-10-CM

## 2018-07-16 RX ORDER — ARIPIPRAZOLE 5 MG/1
TABLET ORAL
Qty: 30 TAB | Refills: 2 | Status: SHIPPED | OUTPATIENT
Start: 2018-07-16 | End: 2018-10-24 | Stop reason: SDUPTHER

## 2018-08-10 DIAGNOSIS — G43.909 MIGRAINE SYNDROME: ICD-10-CM

## 2018-08-10 NOTE — TELEPHONE ENCOUNTER
From: Ryanne Larkin  To:  Romulo Mcginnis MD  Sent: 8/10/2018 9:23 AM EDT  Subject: Medication Renewal Request    Original authorizing provider: MD Ryanne Yang would like a refill of the following medications:  HYDROcodone-acetaminophen (Lenon Gauze) 7.5-325 mg per tablet Romulo Mcginnis MD]  promethazine (PHENERGAN) 25 mg tablet Romulo Mcginnis MD]    Preferred pharmacy: [facility]/PHARMACY [address]    Comment:

## 2018-08-13 RX ORDER — PROMETHAZINE HYDROCHLORIDE 25 MG/1
TABLET ORAL
Qty: 50 TAB | Refills: 0 | Status: SHIPPED | OUTPATIENT
Start: 2018-08-13 | End: 2018-09-12 | Stop reason: SDUPTHER

## 2018-08-13 RX ORDER — HYDROCODONE BITARTRATE AND ACETAMINOPHEN 7.5; 325 MG/1; MG/1
1 TABLET ORAL 3 TIMES DAILY
Qty: 50 TAB | Refills: 0 | Status: SHIPPED | OUTPATIENT
Start: 2018-08-13 | End: 2018-09-12 | Stop reason: SDUPTHER

## 2018-08-19 RX ORDER — OMEPRAZOLE 40 MG/1
CAPSULE, DELAYED RELEASE ORAL
Qty: 30 CAP | Refills: 7 | Status: SHIPPED | OUTPATIENT
Start: 2018-08-19 | End: 2019-05-06 | Stop reason: SDUPTHER

## 2018-08-19 RX ORDER — FUROSEMIDE 20 MG/1
TABLET ORAL
Qty: 30 TAB | Refills: 3 | Status: SHIPPED | OUTPATIENT
Start: 2018-08-19 | End: 2018-12-11 | Stop reason: SDUPTHER

## 2018-08-20 DIAGNOSIS — F32.A DEPRESSION, UNSPECIFIED DEPRESSION TYPE: ICD-10-CM

## 2018-08-21 RX ORDER — ZOLPIDEM TARTRATE 5 MG/1
TABLET ORAL
Qty: 30 TAB | Refills: 2 | Status: SHIPPED | OUTPATIENT
Start: 2018-08-21 | End: 2018-09-20 | Stop reason: SDUPTHER

## 2018-09-05 DIAGNOSIS — F41.1 GENERALIZED ANXIETY DISORDER: ICD-10-CM

## 2018-09-05 RX ORDER — ALPRAZOLAM 1 MG/1
TABLET ORAL
Qty: 30 TAB | Refills: 0 | Status: SHIPPED | OUTPATIENT
Start: 2018-09-05 | End: 2018-11-11 | Stop reason: SDUPTHER

## 2018-09-12 DIAGNOSIS — G43.909 MIGRAINE SYNDROME: ICD-10-CM

## 2018-09-13 ENCOUNTER — TELEPHONE (OUTPATIENT)
Dept: FAMILY MEDICINE CLINIC | Age: 43
End: 2018-09-13

## 2018-09-13 RX ORDER — PROMETHAZINE HYDROCHLORIDE 25 MG/1
TABLET ORAL
Qty: 50 TAB | Refills: 0 | Status: SHIPPED | OUTPATIENT
Start: 2018-09-13 | End: 2018-10-15 | Stop reason: SDUPTHER

## 2018-09-13 RX ORDER — HYDROCODONE BITARTRATE AND ACETAMINOPHEN 7.5; 325 MG/1; MG/1
1 TABLET ORAL 3 TIMES DAILY
Qty: 50 TAB | Refills: 0 | Status: SHIPPED | OUTPATIENT
Start: 2018-09-13 | End: 2018-10-15 | Stop reason: SDUPTHER

## 2018-09-13 NOTE — TELEPHONE ENCOUNTER
From: Herlinda Severin  To:  Abi England MD  Sent: 9/12/2018 8:57 PM EDT  Subject: Medication Renewal Request    Original authorizing provider: Calleen Bathe, MD Herlinda Severin would like a refill of the following medications:  HYDROcodone-acetaminophen ([address]) 7.5-325 mg per tablet Abi England MD]  promethazine (PHENERGAN) 25 mg tablet Abi England MD]    Preferred pharmacy: [facility]/PHARMACY [place] [address]    Comment:

## 2018-09-13 NOTE — TELEPHONE ENCOUNTER
Last Visit: 5/25-Trotter  Next Appt: None  Previous Refill Encounter: 8/13-50+0    Requested Prescriptions     Pending Prescriptions Disp Refills    HYDROcodone-acetaminophen (NORCO) 7.5-325 mg per tablet 50 Tab 0     Sig: Take 1 Tab by mouth three (3) times daily.     promethazine (PHENERGAN) 25 mg tablet 50 Tab 0     Sig: TAKE 1 TABLET BY MOUTH EVERY 8 HOURS AS NEEDED FOR NAUSEA

## 2018-09-20 DIAGNOSIS — F32.A DEPRESSION, UNSPECIFIED DEPRESSION TYPE: ICD-10-CM

## 2018-09-21 RX ORDER — ZOLPIDEM TARTRATE 5 MG/1
TABLET ORAL
Qty: 30 TAB | Refills: 0 | Status: SHIPPED | OUTPATIENT
Start: 2018-09-21 | End: 2018-10-24 | Stop reason: SDUPTHER

## 2018-10-15 DIAGNOSIS — S33.5XXA LUMBAR SPRAIN, INITIAL ENCOUNTER: ICD-10-CM

## 2018-10-15 DIAGNOSIS — G43.909 MIGRAINE SYNDROME: ICD-10-CM

## 2018-10-15 RX ORDER — CYCLOBENZAPRINE HCL 10 MG
10 TABLET ORAL
Qty: 30 TAB | Refills: 2 | Status: SHIPPED | OUTPATIENT
Start: 2018-10-15 | End: 2019-09-11 | Stop reason: SDUPTHER

## 2018-10-15 RX ORDER — HYDROCODONE BITARTRATE AND ACETAMINOPHEN 7.5; 325 MG/1; MG/1
1 TABLET ORAL 3 TIMES DAILY
Qty: 50 TAB | Refills: 0 | Status: SHIPPED | OUTPATIENT
Start: 2018-10-15 | End: 2018-11-19 | Stop reason: SDUPTHER

## 2018-10-15 RX ORDER — PROMETHAZINE HYDROCHLORIDE 25 MG/1
TABLET ORAL
Qty: 50 TAB | Refills: 0 | Status: SHIPPED | OUTPATIENT
Start: 2018-10-15 | End: 2018-11-19 | Stop reason: SDUPTHER

## 2018-10-15 NOTE — TELEPHONE ENCOUNTER
From: Phong Almazan  To:  Sherlyn Swift MD  Sent: 10/15/2018 2:12 PM EDT  Subject: Medication Renewal Request    Original authorizing provider: MD Phong Rangel would like a refill of the following medications:  cyclobenzaprine (FLEXERIL) 10 mg tablet Sherlyn Swift MD]  HYDROcodone-acetaminophen (NORCO) 7.5-325 mg per tablet Sherlyn Swift MD]  promethazine (PHENERGAN) 25 mg tablet Sherlyn Swift MD]    Preferred pharmacy: [facility] [address]    Comment:

## 2018-10-24 DIAGNOSIS — F32.A DEPRESSION, UNSPECIFIED DEPRESSION TYPE: ICD-10-CM

## 2018-10-24 RX ORDER — ZOLPIDEM TARTRATE 5 MG/1
TABLET ORAL
Qty: 30 TAB | Refills: 0 | Status: SHIPPED | OUTPATIENT
Start: 2018-10-24 | End: 2018-11-27 | Stop reason: SDUPTHER

## 2018-10-24 RX ORDER — ARIPIPRAZOLE 5 MG/1
TABLET ORAL
Qty: 30 TAB | Refills: 2 | Status: SHIPPED | OUTPATIENT
Start: 2018-10-24 | End: 2019-01-25 | Stop reason: SDUPTHER

## 2018-11-11 DIAGNOSIS — F41.1 GENERALIZED ANXIETY DISORDER: ICD-10-CM

## 2018-11-12 RX ORDER — ALPRAZOLAM 1 MG/1
TABLET ORAL
Qty: 30 TAB | Refills: 0 | Status: SHIPPED | OUTPATIENT
Start: 2018-11-12 | End: 2018-12-27 | Stop reason: SDUPTHER

## 2018-11-19 DIAGNOSIS — G43.909 MIGRAINE SYNDROME: ICD-10-CM

## 2018-11-19 RX ORDER — PROMETHAZINE HYDROCHLORIDE 25 MG/1
TABLET ORAL
Qty: 50 TAB | Refills: 0 | Status: SHIPPED | OUTPATIENT
Start: 2018-11-19 | End: 2018-12-18 | Stop reason: SDUPTHER

## 2018-11-19 RX ORDER — HYDROCODONE BITARTRATE AND ACETAMINOPHEN 7.5; 325 MG/1; MG/1
1 TABLET ORAL 3 TIMES DAILY
Qty: 50 TAB | Refills: 0 | Status: SHIPPED | OUTPATIENT
Start: 2018-11-19 | End: 2018-12-18 | Stop reason: SDUPTHER

## 2018-11-23 RX ORDER — TOPIRAMATE 100 MG/1
TABLET, FILM COATED ORAL
Qty: 90 TAB | Refills: 5 | Status: SHIPPED | OUTPATIENT
Start: 2018-11-23 | End: 2021-08-02 | Stop reason: SDUPTHER

## 2018-11-23 RX ORDER — DULOXETIN HYDROCHLORIDE 60 MG/1
CAPSULE, DELAYED RELEASE ORAL
Qty: 30 CAP | Refills: 5 | Status: SHIPPED | OUTPATIENT
Start: 2018-11-23 | End: 2019-06-04 | Stop reason: SDUPTHER

## 2018-11-27 DIAGNOSIS — F32.A DEPRESSION, UNSPECIFIED DEPRESSION TYPE: ICD-10-CM

## 2018-11-27 RX ORDER — ZOLPIDEM TARTRATE 5 MG/1
TABLET ORAL
Qty: 30 TAB | Refills: 0 | Status: SHIPPED | OUTPATIENT
Start: 2018-11-27 | End: 2018-11-28 | Stop reason: SDUPTHER

## 2018-11-28 DIAGNOSIS — F32.A DEPRESSION, UNSPECIFIED DEPRESSION TYPE: ICD-10-CM

## 2018-11-28 RX ORDER — ZOLPIDEM TARTRATE 5 MG/1
TABLET ORAL
Qty: 30 TAB | Refills: 0 | Status: SHIPPED | OUTPATIENT
Start: 2018-11-28 | End: 2018-12-27 | Stop reason: SDUPTHER

## 2018-12-11 RX ORDER — FUROSEMIDE 20 MG/1
TABLET ORAL
Qty: 30 TAB | Refills: 3 | Status: SHIPPED | OUTPATIENT
Start: 2018-12-11 | End: 2019-06-08 | Stop reason: SDUPTHER

## 2018-12-11 RX ORDER — GABAPENTIN 300 MG/1
CAPSULE ORAL
Qty: 90 CAP | Refills: 5 | Status: SHIPPED | OUTPATIENT
Start: 2018-12-11 | End: 2019-07-07 | Stop reason: SDUPTHER

## 2018-12-18 DIAGNOSIS — G43.909 MIGRAINE SYNDROME: ICD-10-CM

## 2018-12-18 RX ORDER — PROMETHAZINE HYDROCHLORIDE 25 MG/1
TABLET ORAL
Qty: 50 TAB | Refills: 0 | Status: SHIPPED | OUTPATIENT
Start: 2018-12-18 | End: 2019-07-29 | Stop reason: SDUPTHER

## 2018-12-18 RX ORDER — HYDROCODONE BITARTRATE AND ACETAMINOPHEN 7.5; 325 MG/1; MG/1
1 TABLET ORAL 3 TIMES DAILY
Qty: 50 TAB | Refills: 0 | Status: SHIPPED | OUTPATIENT
Start: 2018-12-18 | End: 2019-03-04 | Stop reason: SDUPTHER

## 2018-12-27 DIAGNOSIS — F41.1 GENERALIZED ANXIETY DISORDER: ICD-10-CM

## 2018-12-27 DIAGNOSIS — F32.A DEPRESSION, UNSPECIFIED DEPRESSION TYPE: ICD-10-CM

## 2018-12-28 RX ORDER — ZOLPIDEM TARTRATE 5 MG/1
TABLET ORAL
Qty: 30 TAB | Refills: 0 | Status: SHIPPED | OUTPATIENT
Start: 2018-12-28 | End: 2019-03-03 | Stop reason: SDUPTHER

## 2018-12-28 RX ORDER — ALPRAZOLAM 1 MG/1
TABLET ORAL
Qty: 30 TAB | Refills: 0 | Status: SHIPPED | OUTPATIENT
Start: 2018-12-28 | End: 2019-03-03 | Stop reason: SDUPTHER

## 2019-01-25 DIAGNOSIS — F32.A DEPRESSION, UNSPECIFIED DEPRESSION TYPE: ICD-10-CM

## 2019-01-25 RX ORDER — ARIPIPRAZOLE 5 MG/1
TABLET ORAL
Qty: 30 TAB | Refills: 2 | Status: SHIPPED | OUTPATIENT
Start: 2019-01-25 | End: 2019-06-16 | Stop reason: SDUPTHER

## 2019-03-03 DIAGNOSIS — F41.1 GENERALIZED ANXIETY DISORDER: ICD-10-CM

## 2019-03-03 DIAGNOSIS — F32.A DEPRESSION, UNSPECIFIED DEPRESSION TYPE: ICD-10-CM

## 2019-03-04 DIAGNOSIS — G43.909 MIGRAINE SYNDROME: ICD-10-CM

## 2019-03-04 RX ORDER — ALPRAZOLAM 1 MG/1
TABLET ORAL
Qty: 30 TAB | Refills: 0 | Status: SHIPPED | OUTPATIENT
Start: 2019-03-04 | End: 2019-04-14 | Stop reason: SDUPTHER

## 2019-03-04 RX ORDER — ZOLPIDEM TARTRATE 5 MG/1
TABLET ORAL
Qty: 30 TAB | Refills: 0 | Status: SHIPPED | OUTPATIENT
Start: 2019-03-04 | End: 2019-04-08 | Stop reason: SDUPTHER

## 2019-03-04 RX ORDER — PROMETHAZINE HYDROCHLORIDE 25 MG/1
TABLET ORAL
Qty: 50 TAB | Refills: 0 | Status: SHIPPED | OUTPATIENT
Start: 2019-03-04 | End: 2019-04-08 | Stop reason: SDUPTHER

## 2019-03-04 RX ORDER — VERAPAMIL HYDROCHLORIDE 240 MG/1
TABLET, FILM COATED, EXTENDED RELEASE ORAL
Qty: 90 TAB | Refills: 3 | Status: SHIPPED | OUTPATIENT
Start: 2019-03-04 | End: 2019-09-11 | Stop reason: SDUPTHER

## 2019-03-08 RX ORDER — HYDROCODONE BITARTRATE AND ACETAMINOPHEN 7.5; 325 MG/1; MG/1
1 TABLET ORAL 3 TIMES DAILY
Qty: 50 TAB | Refills: 0 | Status: SHIPPED | OUTPATIENT
Start: 2019-03-08 | End: 2019-04-08 | Stop reason: SDUPTHER

## 2019-03-18 RX ORDER — METFORMIN HYDROCHLORIDE 500 MG/1
TABLET, EXTENDED RELEASE ORAL
Qty: 90 TAB | Refills: 4 | Status: SHIPPED | OUTPATIENT
Start: 2019-03-18 | End: 2019-09-11 | Stop reason: SDUPTHER

## 2019-04-08 DIAGNOSIS — F32.A DEPRESSION, UNSPECIFIED DEPRESSION TYPE: ICD-10-CM

## 2019-04-08 DIAGNOSIS — G43.909 MIGRAINE SYNDROME: ICD-10-CM

## 2019-04-08 RX ORDER — HYDROCODONE BITARTRATE AND ACETAMINOPHEN 7.5; 325 MG/1; MG/1
1 TABLET ORAL 3 TIMES DAILY
Qty: 50 TAB | Refills: 0 | Status: SHIPPED | OUTPATIENT
Start: 2019-04-08 | End: 2019-05-10 | Stop reason: SDUPTHER

## 2019-04-08 RX ORDER — ZOLPIDEM TARTRATE 5 MG/1
5 TABLET ORAL
Qty: 30 TAB | Refills: 0 | Status: SHIPPED | OUTPATIENT
Start: 2019-04-08 | End: 2019-05-07 | Stop reason: SDUPTHER

## 2019-04-08 RX ORDER — CYCLOBENZAPRINE HCL 10 MG
10 TABLET ORAL
Qty: 50 TAB | Refills: 5 | Status: SHIPPED | OUTPATIENT
Start: 2019-04-08 | End: 2019-09-11 | Stop reason: SDUPTHER

## 2019-04-08 RX ORDER — PROMETHAZINE HYDROCHLORIDE 25 MG/1
25 TABLET ORAL
Qty: 50 TAB | Refills: 0 | Status: SHIPPED | OUTPATIENT
Start: 2019-04-08 | End: 2019-05-10 | Stop reason: SDUPTHER

## 2019-05-06 RX ORDER — OMEPRAZOLE 40 MG/1
CAPSULE, DELAYED RELEASE ORAL
Qty: 30 CAP | Refills: 7 | Status: SHIPPED | OUTPATIENT
Start: 2019-05-06 | End: 2020-01-30

## 2019-05-07 DIAGNOSIS — F32.A DEPRESSION, UNSPECIFIED DEPRESSION TYPE: ICD-10-CM

## 2019-05-08 RX ORDER — ZOLPIDEM TARTRATE 5 MG/1
TABLET ORAL
Qty: 30 TAB | Refills: 0 | Status: SHIPPED | OUTPATIENT
Start: 2019-05-08 | End: 2019-05-10 | Stop reason: SDUPTHER

## 2019-05-10 DIAGNOSIS — G43.909 MIGRAINE SYNDROME: ICD-10-CM

## 2019-05-10 DIAGNOSIS — F32.A DEPRESSION, UNSPECIFIED DEPRESSION TYPE: ICD-10-CM

## 2019-05-13 RX ORDER — ZOLPIDEM TARTRATE 5 MG/1
5 TABLET ORAL
Qty: 30 TAB | Refills: 0 | Status: SHIPPED | OUTPATIENT
Start: 2019-05-13 | End: 2019-07-08 | Stop reason: SDUPTHER

## 2019-05-13 RX ORDER — HYDROCODONE BITARTRATE AND ACETAMINOPHEN 7.5; 325 MG/1; MG/1
1 TABLET ORAL 3 TIMES DAILY
Qty: 50 TAB | Refills: 0 | Status: SHIPPED | OUTPATIENT
Start: 2019-05-13 | End: 2019-06-24 | Stop reason: SDUPTHER

## 2019-05-13 RX ORDER — PROMETHAZINE HYDROCHLORIDE 25 MG/1
25 TABLET ORAL
Qty: 50 TAB | Refills: 0 | Status: SHIPPED | OUTPATIENT
Start: 2019-05-13 | End: 2019-06-24 | Stop reason: SDUPTHER

## 2019-05-25 NOTE — PROCEDURES
Patient comes for Chemodenervation for chronic migraine headaches. After consenting the patient and discussing the procedure and possible side effects. The chemodenervant was reconstituted as follows:  200 units of botox was mixed with 4ml of perservative free saline and withdrawn, using a into four sterile 1ml BD syringes. Sterile 30 gauge half inch needles were affixed to the syringes. Procedure   Chemodenervant was injected into the following muscles which were identified using their anatomical land marks. Each site was aspirated prior to injection to ensure that there was no vascular compromise. Muscle Units/inj No. Of injections   Trapezius BL 5 U 3 each   Cervical Paraspinals BL 5 U 3 each   Occipitalis 5 U 3 each   Temporalis BL 5 U 3 each    BL 5 U 1 each   Procerus 5 U 1   Frontalis BL 5 U 5   Masseter BL 5 U  2 each   Total units 180      Nominal bleeding at injection sites. Patient tolerated the procedure well. No reported pain following the procedure. Melinda Muñoz
Family

## 2019-06-05 RX ORDER — DULOXETIN HYDROCHLORIDE 60 MG/1
CAPSULE, DELAYED RELEASE ORAL
Qty: 30 CAP | Refills: 5 | Status: SHIPPED | OUTPATIENT
Start: 2019-06-05 | End: 2019-06-11 | Stop reason: ALTCHOICE

## 2019-06-05 RX ORDER — HYDROCHLOROTHIAZIDE 25 MG/1
TABLET ORAL
Qty: 30 TAB | Refills: 10 | Status: SHIPPED | OUTPATIENT
Start: 2019-06-05 | End: 2020-04-06

## 2019-06-09 RX ORDER — FUROSEMIDE 20 MG/1
TABLET ORAL
Qty: 30 TAB | Refills: 3 | Status: SHIPPED | OUTPATIENT
Start: 2019-06-09 | End: 2019-10-04 | Stop reason: SDUPTHER

## 2019-06-11 ENCOUNTER — OFFICE VISIT (OUTPATIENT)
Dept: FAMILY MEDICINE CLINIC | Age: 44
End: 2019-06-11

## 2019-06-11 VITALS
TEMPERATURE: 98.4 F | WEIGHT: 275.8 LBS | OXYGEN SATURATION: 98 % | DIASTOLIC BLOOD PRESSURE: 80 MMHG | BODY MASS INDEX: 44.32 KG/M2 | HEIGHT: 66 IN | SYSTOLIC BLOOD PRESSURE: 116 MMHG | HEART RATE: 94 BPM | RESPIRATION RATE: 20 BRPM

## 2019-06-11 DIAGNOSIS — G43.909 MIGRAINE SYNDROME: Primary | ICD-10-CM

## 2019-06-11 DIAGNOSIS — S73.102S HIP SPRAIN, LEFT, SEQUELA: ICD-10-CM

## 2019-06-11 DIAGNOSIS — F41.1 GENERALIZED ANXIETY DISORDER: ICD-10-CM

## 2019-06-11 DIAGNOSIS — F32.A DEPRESSION, UNSPECIFIED DEPRESSION TYPE: ICD-10-CM

## 2019-06-11 RX ORDER — VENLAFAXINE HYDROCHLORIDE 150 MG/1
150 CAPSULE, EXTENDED RELEASE ORAL DAILY
Qty: 30 CAP | Refills: 11 | Status: SHIPPED | OUTPATIENT
Start: 2019-06-11 | End: 2020-07-06

## 2019-06-11 NOTE — PROGRESS NOTES
HISTORY OF PRESENT ILLNESS  Hailey Pacheco is a 53 y.o. female. Continues to suffrer intractable occipital,rt sided  migraines 3-4 times per week,despite maximal treatment. No longer seeing Neuro who seemed more interested in Botox therapy than other treatments. Pt suffering worsening depressive sx. She fell 1 week ago wrenching l hip ,improving pain,though very slow  Migraine    The history is provided by the patient. This is a recurrent problem. The current episode started more than 2 days ago. The problem occurs hourly. The problem has not changed since onset. The headache is aggravated by nothing. The pain is located in the right unilateral and occipital region. The quality of the pain is described as throbbing. The pain is at a severity of 5/10. The pain is moderate. Associated symptoms include malaise/fatigue. Pertinent negatives include no fever and no visual change. Depression   The history is provided by the patient. This is a chronic problem. The problem occurs daily. The problem has been gradually worsening. Associated symptoms include headaches. Pertinent negatives include no abdominal pain. Hip Injury    The history is provided by the patient. This is a new problem. The current episode started more than 1 week ago. The problem occurs daily. The problem has been gradually improving. The pain is present in the left hip. The quality of the pain is described as aching. The pain is at a severity of 5/10. The pain is moderate. Associated symptoms include limited range of motion and stiffness. The symptoms are aggravated by heat. Review of Systems   Constitutional: Positive for malaise/fatigue. Negative for fever. Respiratory: Negative for cough. Gastrointestinal: Negative for abdominal pain. Musculoskeletal: Positive for joint pain, myalgias and stiffness. Neurological: Positive for headaches. Negative for focal weakness. Psychiatric/Behavioral: Positive for depression.  Negative for suicidal ideas. The patient is not nervous/anxious. Physical Exam   Constitutional: She is oriented to person, place, and time. She appears well-developed and well-nourished. No distress. HENT:   Head: Normocephalic and atraumatic. Right Ear: External ear normal.   Left Ear: External ear normal.   Nose: Nose normal.   Mouth/Throat: Oropharynx is clear and moist.   Cardiovascular: Normal rate and regular rhythm. Pulmonary/Chest: Effort normal and breath sounds normal.   Abdominal: Bowel sounds are normal.   Musculoskeletal:        Left hip: She exhibits decreased range of motion and decreased strength. She exhibits no swelling, no crepitus and no deformity. Legs:  Neurological: She is alert and oriented to person, place, and time. She has normal reflexes. She displays normal reflexes. No cranial nerve deficit. Coordination normal.   Skin: Skin is warm and dry. Psychiatric:   Flat,depressed       ASSESSMENT and PLAN  Diagnoses and all orders for this visit:    Diagnoses and all orders for this visit:    1. Migraine syndrome intractable severe incapacitating unilateral headache 3- 5 days per week. Remains completely disabled    2. Generalized anxiety disorder  -     venlafaxine-SR (EFFEXOR-XR) 150 mg capsule; Take 1 Cap by mouth daily. 3. Depression, unspecified depression type,worsening ,will change too effexor  -     venlafaxine-SR (EFFEXOR-XR) 150 mg capsule; Take 1 Cap by mouth daily. 4. Hip sprain, left, sequela,heat rest,call prn              Follow-up and Dispositions    · Return in about 3 months (around 9/11/2019).

## 2019-06-11 NOTE — PROGRESS NOTES
Chief Complaint   Patient presents with    Hip Pain     Pt having L hip pain due to a fall. 1. Have you been to the ER, urgent care clinic since your last visit? Hospitalized since your last visit? No.    2. Have you seen or consulted any other health care providers outside of the [address] [place] [address] since your last visit? Include any pap smears or colon screening.  No.

## 2019-06-16 DIAGNOSIS — F32.A DEPRESSION, UNSPECIFIED DEPRESSION TYPE: ICD-10-CM

## 2019-06-16 RX ORDER — ARIPIPRAZOLE 5 MG/1
TABLET ORAL
Qty: 30 TAB | Refills: 2 | Status: SHIPPED | OUTPATIENT
Start: 2019-06-16 | End: 2019-09-28 | Stop reason: SDUPTHER

## 2019-06-24 DIAGNOSIS — G43.909 MIGRAINE SYNDROME: ICD-10-CM

## 2019-06-25 NOTE — TELEPHONE ENCOUNTER
Last Visit: 06/11/2019 with MD Dakota Huynh  Next Appointment: 09/11/2019 with MD Dakota Huynh  Previous Refill Encounter(s): 05/13/2019 per MD Ailin Barker #50; Norco #50    Requested Prescriptions     Pending Prescriptions Disp Refills    promethazine (PHENERGAN) 25 mg tablet 50 Tab 0     Sig: Take 1 Tab by mouth every eight (8) hours as needed for Nausea.  HYDROcodone-acetaminophen (NORCO) 7.5-325 mg per tablet 50 Tab 0     Sig: Take 1 Tab by mouth three (3) times daily for 17 days. Max Daily Amount: 3 Tabs.

## 2019-06-26 RX ORDER — PROMETHAZINE HYDROCHLORIDE 25 MG/1
25 TABLET ORAL
Qty: 25 TAB | Refills: 0 | Status: SHIPPED | OUTPATIENT
Start: 2019-06-26 | End: 2019-09-11 | Stop reason: SDUPTHER

## 2019-06-26 RX ORDER — HYDROCODONE BITARTRATE AND ACETAMINOPHEN 7.5; 325 MG/1; MG/1
1 TABLET ORAL 3 TIMES DAILY
Qty: 25 TAB | Refills: 0 | Status: SHIPPED | OUTPATIENT
Start: 2019-06-26 | End: 2019-07-13

## 2019-07-07 RX ORDER — GABAPENTIN 300 MG/1
CAPSULE ORAL
Qty: 90 CAP | Refills: 3 | Status: SHIPPED | OUTPATIENT
Start: 2019-07-07 | End: 2019-07-10 | Stop reason: DRUGHIGH

## 2019-07-08 DIAGNOSIS — F32.A DEPRESSION, UNSPECIFIED DEPRESSION TYPE: ICD-10-CM

## 2019-07-09 NOTE — TELEPHONE ENCOUNTER
Last Visit: 06/11/2019 with MD Sharron Benedict  Next Appointment: 09/11/2019 with MD Sharron Benedict  Previous Refill Encounter(s): 05/13/2019 per MD Sharron Benedict #30    Requested Prescriptions     Pending Prescriptions Disp Refills    zolpidem (AMBIEN) 5 mg tablet 30 Tab 0     Sig: Take 1 Tab by mouth nightly as needed for Sleep. Max Daily Amount: 5 mg.

## 2019-07-10 RX ORDER — ZOLPIDEM TARTRATE 5 MG/1
5 TABLET ORAL
Qty: 30 TAB | Refills: 0 | Status: SHIPPED | OUTPATIENT
Start: 2019-07-10 | End: 2019-08-19 | Stop reason: SDUPTHER

## 2019-07-10 RX ORDER — GABAPENTIN 600 MG/1
600 TABLET ORAL 3 TIMES DAILY
Qty: 90 TAB | Refills: 5 | Status: SHIPPED | OUTPATIENT
Start: 2019-07-10 | End: 2019-08-05 | Stop reason: SDUPTHER

## 2019-07-12 NOTE — MR AVS SNAPSHOT
Visit Information Date & Time Provider Department Dept. Phone Encounter #  
 11/14/2017 11:30 AM Alejandra Inman [phone] [id number] Follow-up Instructions Return if symptoms worsen or fail to improve. Your Appointments 12/15/2017  8:20 AM  
PROCEDURE with Neo Suazo MD  
[facility]) Appt Note: Botox $0CP CC TDB 10/31/17  
 [address], [address] [phone]  
[address] [facility], [address] [place] [address], [address] [phone] Upcoming Health Maintenance Date Due  
 PAP AKA CERVICAL CYTOLOGY 7/18/2016 COLONOSCOPY 5/8/2018 DTaP/Tdap/Td series (2 - Td) 12/19/2024 Allergies as of 11/14/2017  Review Complete On: 11/14/2017 By: Lino Rondon Severity Noted Reaction Type Reactions Atacand [Candesartan] High 08/29/2014   Systemic Other (comments) Patient B/P increase Compazine [Prochlorperazine] High 04/16/2010   Side Effect Other (comments), Unable to Obtain Ciprofloxacin  04/16/2010   Systemic Other (comments) Codeine  04/16/2010   Side Effect Nausea and Vomiting Zonisamide Low 04/11/2011   Side Effect Nausea and Vomiting Current Immunizations  Reviewed on 12/19/2014 Name Date Influenza Vaccine (Quad) PF 11/7/2017 Influenza Vaccine PF 12/19/2014 Tdap 12/19/2014 Not reviewed this visit You Were Diagnosed With   
  
 Codes Comments Migraine syndrome    -  Primary ICD-10-CM: M00.667 ICD-9-CM: 346.00 Nausea     ICD-10-CM: R11.0 ICD-9-CM: 787.02 Vitals BP Pulse Temp Resp Height(growth percentile) Weight(growth percentile) 128/84 (BP 1 Location: Right arm, BP Patient Position: Sitting) 86 98.9 °F (37.2 °C) (Oral) 22 5' 6\" (1.676 m) 306 lb (138.8 kg) SpO2 BMI OB Status Smoking Status 96% 49.39 kg/m2 Unknown Never Smoker BMI and BSA Data Body Mass Index Body Surface Area  
 49.39 kg/m 2 2.54 m 2 Preferred Pharmacy Pharmacy Name Phone [facility], [facility] [address] [place] [address] [phone] Your Updated Medication List  
  
   
This list is accurate as of: 11/14/17 11:47 AM.  Always use your most recent med list.  
  
  
  
  
 ALPRAZolam 1 mg tablet Commonly known as:  XANAX  
TAKE 1 TABLET BY MOUTH AT BEDTIME AS NEEDED FOR ANXIETY ARIPiprazole 5 mg tablet Commonly known as:  ABILIFY Take 1 Tab by mouth nightly. busPIRone 7.5 mg tablet Commonly known as:  BUSPAR Take 1 Tab by mouth three (3) times daily as needed. cholecalciferol (VITAMIN D3) 5,000 unit Tab tablet Commonly known as:  VITAMIN D3 Take 5,000 Units by mouth. Takes one tablet 4 times a week  
  
 cyclobenzaprine 10 mg tablet Commonly known as:  FLEXERIL  
TAKE 1 TABLET BY MOUTH 3 TIMES A DAY WITH MEALS  
  
 DENTA 5000 PLUS 1.1 % Crea Generic drug:  fluoride (sodium) DULoxetine 60 mg capsule Commonly known as:  CYMBALTA Take 1 Cap by mouth daily. furosemide 20 mg tablet Commonly known as:  LASIX Take 1 Tab by mouth daily. * gabapentin 100 mg capsule Commonly known as:  NEURONTIN Take 3 Caps by mouth three (3) times daily. * gabapentin 400 mg capsule Commonly known as:  NEURONTIN Take 1 Cap by mouth three (3) times daily. hydroCHLOROthiazide 25 mg tablet Commonly known as:  HYDRODIURIL  
TAKE 1 TABLET BY MOUTH EVERY DAY  
  
 HYDROcodone-acetaminophen 7.5-325 mg per tablet Commonly known as:  March Castles Take 1 Tab by mouth three (3) times daily. ketorolac 30 mg/mL (1 mL) injection Commonly known as:  TORADOL  
1 mL by IntraVENous route once for 1 dose. meperidine 100 mg tablet Commonly known as:  DEMEROL Take 1 Tab by mouth every four (4) hours as needed. Max Daily Amount: 600 mg.  
  
 metFORMIN  mg tablet Commonly known as:  GLUCOPHAGE XR  
TAKE 1 TABLET BY MOUTH EVERY DAY WITH DINNER  
  
 naloxone 1 mg/mL injection Commonly known as:  NARCAN  
2 mL by IntraMUSCular route once as needed for up to 1 dose. nystatin 100,000 unit/mL suspension Commonly known as:  MYCOSTATIN Take 5 mL by mouth four (4) times daily. omeprazole 40 mg capsule Commonly known as:  PRILOSEC  
TAKE 1 CAPSULE BY MOUTH DAILY  
  
 onabotulinumtoxinA 200 unit injection Commonly known as:  BOTOX To be injected in the muscles of the head and neck  
  
 predniSONE 5 mg dose pack Commonly known as:  STERAPRED See administration instruction per 5mg dose pack * promethazine 25 mg tablet Commonly known as:  PHENERGAN  
TAKE 1 TABLET BY MOUTH EVERY 8 HOURS AS NEEDED FOR NAUSEA * promethazine 25 mg/mL injection Commonly known as:  PHENERGAN  
1 mL by IntraMUSCular route once for 1 dose. STOOL SOFTENER 100 mg capsule Generic drug:  docusate sodium 100 mg two (2) times a day. Once daily tiZANidine 2 mg tablet Commonly known as:  Selinda Lovings Take 1 Tab by mouth four (4) times daily (with meals). topiramate 100 mg tablet Commonly known as:  TOPAMAX START WITH 1/2 TABLET IN MORNING AND 1 TAB AT NIGHT FOR 2 WEEKS, THEN INCREASE TO 1 TAB TWICE A DAY  
  
 verapamil  mg CR tablet Commonly known as:  CALAN-SR  
TAKE 1 TABLET BY MOUTH AT BEDTIME  
  
 vitamin E 400 unit capsule Commonly known as:  [address] Take  by mouth daily. zolpidem 5 mg tablet Commonly known as:  AMBIEN  
TAKE 1 TABLET BY MOUTH NIGHTLY AS NEEDED FOR SLEEP * Notice: This list has 4 medication(s) that are the same as other medications prescribed for you. Read the directions carefully, and ask your doctor or other care provider to review them with you. We Performed the Following MI THER/PROPH/DIAG INJECTION, SUBCUT/IM [id number] CPT(R)] PROMETHAZINE HCL INJECTION [ [facility]] Follow-up Instructions Return if symptoms worsen or fail to improve. Introducing [facility]! Dear Sherlyn Tyler: Thank you for requesting a [employer] account. Our records indicate that you already have an active [employer] account. You can access your account anytime at [email or web address] Did you know that you can access your hospital and ER discharge instructions at any time in [employer]? You can also review all of your test results from your hospital stay or ER visit. Additional Information If you have questions, please visit the Frequently Asked Questions section of the [employer] website at [email or web address] Remember, [employer] is NOT to be used for urgent needs. For medical emergencies, dial 911. Now available from your iPhone and Android! Please provide this summary of care documentation to your next provider. Your primary care clinician is listed as Parris Gtz. If you have any questions after today's visit, please call [phone]. No

## 2019-07-29 DIAGNOSIS — G43.909 MIGRAINE SYNDROME: Primary | ICD-10-CM

## 2019-07-29 RX ORDER — PROMETHAZINE HYDROCHLORIDE 25 MG/1
TABLET ORAL
Qty: 50 TAB | Refills: 0 | Status: SHIPPED | OUTPATIENT
Start: 2019-07-29 | End: 2019-09-02 | Stop reason: SDUPTHER

## 2019-07-29 RX ORDER — HYDROCODONE BITARTRATE AND ACETAMINOPHEN 7.5; 325 MG/1; MG/1
1 TABLET ORAL
Qty: 50 TAB | Refills: 0 | Status: SHIPPED | OUTPATIENT
Start: 2019-07-29 | End: 2019-09-02 | Stop reason: SDUPTHER

## 2019-07-29 NOTE — TELEPHONE ENCOUNTER
From: Ramesh Love  Sent: 7/29/2019 12:07 PM EDT  Subject: Medication Renewal Request    Ramesh Love would like a refill of the following medications:    Other - Hydrocodone     Preferred pharmacy: [facility] [address]      Medication renewals requested in this message routed separately:     promethazine (PHENERGAN) 25 mg tablet Rivas Houston MD]

## 2019-08-05 RX ORDER — GABAPENTIN 600 MG/1
600 TABLET ORAL 3 TIMES DAILY
Qty: 270 TAB | Refills: 1 | Status: SHIPPED | OUTPATIENT
Start: 2019-08-05 | End: 2019-08-07 | Stop reason: SDUPTHER

## 2019-08-05 NOTE — TELEPHONE ENCOUNTER
Request for gabapentin 600mg three times a day. Last office visit 6/11/19, next office visit 9/11/19.  Refill pending for provider approval.

## 2019-08-06 ENCOUNTER — TELEPHONE (OUTPATIENT)
Dept: FAMILY MEDICINE CLINIC | Age: 44
End: 2019-08-06

## 2019-08-07 RX ORDER — GABAPENTIN 600 MG/1
600 TABLET ORAL 3 TIMES DAILY
Qty: 270 TAB | Refills: 1 | Status: SHIPPED | OUTPATIENT
Start: 2019-08-07 | End: 2020-02-03

## 2019-08-19 DIAGNOSIS — F32.A DEPRESSION, UNSPECIFIED DEPRESSION TYPE: ICD-10-CM

## 2019-08-19 RX ORDER — ZOLPIDEM TARTRATE 5 MG/1
5 TABLET ORAL
Qty: 30 TAB | Refills: 0 | Status: SHIPPED | OUTPATIENT
Start: 2019-08-19 | End: 2019-09-11 | Stop reason: SDUPTHER

## 2019-09-02 DIAGNOSIS — G43.909 MIGRAINE SYNDROME: ICD-10-CM

## 2019-09-03 RX ORDER — HYDROCODONE BITARTRATE AND ACETAMINOPHEN 7.5; 325 MG/1; MG/1
1 TABLET ORAL
Qty: 50 TAB | Refills: 0 | Status: SHIPPED | OUTPATIENT
Start: 2019-09-03 | End: 2019-10-04 | Stop reason: SDUPTHER

## 2019-09-03 RX ORDER — PROMETHAZINE HYDROCHLORIDE 25 MG/1
TABLET ORAL
Qty: 50 TAB | Refills: 0 | Status: SHIPPED | OUTPATIENT
Start: 2019-09-03 | End: 2019-10-04 | Stop reason: SDUPTHER

## 2019-09-11 ENCOUNTER — OFFICE VISIT (OUTPATIENT)
Dept: FAMILY MEDICINE CLINIC | Age: 44
End: 2019-09-11

## 2019-09-11 VITALS
HEIGHT: 66 IN | SYSTOLIC BLOOD PRESSURE: 110 MMHG | OXYGEN SATURATION: 98 % | WEIGHT: 279.2 LBS | TEMPERATURE: 100.1 F | DIASTOLIC BLOOD PRESSURE: 80 MMHG | RESPIRATION RATE: 20 BRPM | HEART RATE: 111 BPM | BODY MASS INDEX: 44.87 KG/M2

## 2019-09-11 DIAGNOSIS — G89.4 CHRONIC PAIN SYNDROME: ICD-10-CM

## 2019-09-11 DIAGNOSIS — M79.7 FIBROMYALGIA: ICD-10-CM

## 2019-09-11 DIAGNOSIS — E78.2 MIXED HYPERLIPIDEMIA: ICD-10-CM

## 2019-09-11 DIAGNOSIS — F32.A DEPRESSION, UNSPECIFIED DEPRESSION TYPE: ICD-10-CM

## 2019-09-11 LAB
BILIRUB UR QL STRIP: NORMAL
GLUCOSE UR-MCNC: NEGATIVE MG/DL
KETONES P FAST UR STRIP-MCNC: NORMAL MG/DL
PH UR STRIP: 5.5 [PH] (ref 4.6–8)
PROT UR QL STRIP: NEGATIVE
SP GR UR STRIP: 1.03 (ref 1–1.03)
UA UROBILINOGEN AMB POC: NORMAL (ref 0.2–1)
URINALYSIS CLARITY POC: CLEAR
URINALYSIS COLOR POC: YELLOW
URINE BLOOD POC: NEGATIVE
URINE LEUKOCYTES POC: NEGATIVE
URINE NITRITES POC: NEGATIVE

## 2019-09-11 RX ORDER — ZOLPIDEM TARTRATE 5 MG/1
5 TABLET ORAL
Qty: 30 TAB | Refills: 0 | Status: SHIPPED | OUTPATIENT
Start: 2019-09-11 | End: 2019-10-16 | Stop reason: SDUPTHER

## 2019-09-11 NOTE — PROGRESS NOTES
HISTORY OF PRESENT ILLNESS  Sophia Trejo is a 53 y.o. female. f/u chronic migraine,depression,cervicalgia,fibromyalgia. Headaches are constant daily occurances and quite debilitaing. They are nuchal/occipital,nonthrobbing. Depressive sx are somewhat improved as are fibromyalgia c/o  Migraine    The history is provided by the patient. This is a chronic problem. The problem occurs constantly. The problem has not changed since onset. The pain is at a severity of 6/10. Associated symptoms include malaise/fatigue. Pertinent negatives include no fever and no visual change. Depression   The history is provided by the patient. This is a chronic problem. The problem occurs daily. The problem has been gradually improving. Pertinent negatives include no abdominal pain. Myalgia   The history is provided by the patient. This is a chronic problem. The problem occurs daily. The problem has been gradually improving. Pertinent negatives include no abdominal pain. Review of Systems   Constitutional: Positive for malaise/fatigue. Negative for fever. Gastrointestinal: Negative for abdominal pain. Musculoskeletal: Positive for myalgias and neck pain. Psychiatric/Behavioral: Positive for depression. Negative for substance abuse and suicidal ideas. The patient has insomnia. The patient is not nervous/anxious. Physical Exam   Constitutional: She is oriented to person, place, and time. She appears well-developed and well-nourished. HENT:   Head: Normocephalic and atraumatic. Right Ear: External ear normal.   Left Ear: External ear normal.   Nose: Nose normal.   Mouth/Throat: Oropharynx is clear and moist.   Cardiovascular: Normal rate and regular rhythm. Exam reveals no gallop. No murmur heard. Pulmonary/Chest: Effort normal and breath sounds normal.   Abdominal: Bowel sounds are normal.   Neurological: She is alert and oriented to person, place, and time. She has normal reflexes. No cranial nerve deficit. Coordination normal.   Skin: Skin is warm and dry. Psychiatric: She has a normal mood and affect. Flat,depressed       Diagnoses and all orders for this visit:    1. Chronic migraine,unchanged,disabling      2. Mixed hyperlipidemia    3. Fibromyalgia,improving  -     zolpidem (AMBIEN) 5 mg tablet; Take 1 Tab by mouth nightly as needed for Sleep. Max Daily Amount: 5 mg.  -     METABOLIC PANEL, COMPREHENSIVE  -     CBC WITH AUTOMATED DIFF    4. Chronic pain syndrome,stable  -     METABOLIC PANEL, COMPREHENSIVE  -     CBC WITH AUTOMATED DIFF  -     AMB POC URINALYSIS DIP STICK AUTO W/O MICRO  -     9-DRUG SCREEN + OXY, UR    5. Depression, unspecified depression type,improved  -     zolpidem (AMBIEN) 5 mg tablet; Take 1 Tab by mouth nightly as needed for Sleep. Max Daily Amount: 5 mg. Follow-up and Dispositions    · Return in about 6 months (around 3/11/2020). Follow-up and Dispositions    · Return in about 6 months (around 3/11/2020).

## 2019-09-11 NOTE — PROGRESS NOTES
1. Have you been to the ER, urgent care clinic since your last visit? Hospitalized since your last visit?no    2. Have you seen or consulted any other health care providers outside of the [address] [place] [address] since your last visit? Include any pap smears or colon screening.  no

## 2019-09-12 LAB
ALBUMIN SERPL-MCNC: 4.2 G/DL (ref 3.5–5.5)
ALBUMIN/GLOB SERPL: 1.4 {RATIO} (ref 1.2–2.2)
ALP SERPL-CCNC: 61 IU/L (ref 39–117)
ALT SERPL-CCNC: 14 IU/L (ref 0–32)
AMPHETAMINES UR QL SCN: NEGATIVE NG/ML
AST SERPL-CCNC: 15 IU/L (ref 0–40)
BARBITURATES UR QL SCN: NEGATIVE NG/ML
BASOPHILS # BLD AUTO: 0 X10E3/UL (ref 0–0.2)
BASOPHILS NFR BLD AUTO: 0 %
BENZODIAZ UR QL SCN: POSITIVE NG/ML
BILIRUB SERPL-MCNC: <0.2 MG/DL (ref 0–1.2)
BUN SERPL-MCNC: 20 MG/DL (ref 6–24)
BUN/CREAT SERPL: 29 (ref 9–23)
BZE UR QL SCN: NEGATIVE NG/ML
CALCIUM SERPL-MCNC: 9.4 MG/DL (ref 8.7–10.2)
CANNABINOIDS UR QL SCN: NEGATIVE NG/ML
CHLORIDE SERPL-SCNC: 102 MMOL/L (ref 96–106)
CO2 SERPL-SCNC: 22 MMOL/L (ref 20–29)
CREAT SERPL-MCNC: 0.7 MG/DL (ref 0.57–1)
CREAT UR-MCNC: 238.1 MG/DL (ref 20–300)
EOSINOPHIL # BLD AUTO: 0 X10E3/UL (ref 0–0.4)
EOSINOPHIL NFR BLD AUTO: 0 %
ERYTHROCYTE [DISTWIDTH] IN BLOOD BY AUTOMATED COUNT: 14.9 % (ref 12.3–15.4)
GLOBULIN SER CALC-MCNC: 2.9 G/DL (ref 1.5–4.5)
GLUCOSE SERPL-MCNC: 96 MG/DL (ref 65–99)
HCT VFR BLD AUTO: 39.8 % (ref 34–46.6)
HGB BLD-MCNC: 13.1 G/DL (ref 11.1–15.9)
IMM GRANULOCYTES # BLD AUTO: 0 X10E3/UL (ref 0–0.1)
IMM GRANULOCYTES NFR BLD AUTO: 1 %
LYMPHOCYTES # BLD AUTO: 3.5 X10E3/UL (ref 0.7–3.1)
LYMPHOCYTES NFR BLD AUTO: 41 %
MCH RBC QN AUTO: 31.2 PG (ref 26.6–33)
MCHC RBC AUTO-ENTMCNC: 32.9 G/DL (ref 31.5–35.7)
MCV RBC AUTO: 95 FL (ref 79–97)
METHADONE UR QL SCN: NEGATIVE NG/ML
MONOCYTES # BLD AUTO: 0.8 X10E3/UL (ref 0.1–0.9)
MONOCYTES NFR BLD AUTO: 9 %
NEUTROPHILS # BLD AUTO: 4.2 X10E3/UL (ref 1.4–7)
NEUTROPHILS NFR BLD AUTO: 49 %
OPIATES UR QL SCN: POSITIVE NG/ML
OXYCODONE+OXYMORPHONE UR QL SCN: NEGATIVE NG/ML
PCP UR QL: NEGATIVE NG/ML
PH UR: 5.7 [PH] (ref 4.5–8.9)
PLATELET # BLD AUTO: 474 X10E3/UL (ref 150–450)
PLEASE NOTE:, 733163: ABNORMAL
POTASSIUM SERPL-SCNC: 3.9 MMOL/L (ref 3.5–5.2)
PROPOXYPH UR QL SCN: NEGATIVE NG/ML
PROT SERPL-MCNC: 7.1 G/DL (ref 6–8.5)
RBC # BLD AUTO: 4.2 X10E6/UL (ref 3.77–5.28)
SODIUM SERPL-SCNC: 141 MMOL/L (ref 134–144)
WBC # BLD AUTO: 8.5 X10E3/UL (ref 3.4–10.8)

## 2019-09-25 DIAGNOSIS — F41.1 GENERALIZED ANXIETY DISORDER: ICD-10-CM

## 2019-09-26 RX ORDER — ALPRAZOLAM 1 MG/1
TABLET ORAL
Qty: 30 TAB | Refills: 2 | Status: SHIPPED | OUTPATIENT
Start: 2019-09-26 | End: 2020-01-09

## 2019-09-28 DIAGNOSIS — F32.A DEPRESSION, UNSPECIFIED DEPRESSION TYPE: ICD-10-CM

## 2019-09-30 RX ORDER — ARIPIPRAZOLE 5 MG/1
TABLET ORAL
Qty: 30 TAB | Refills: 2 | Status: SHIPPED | OUTPATIENT
Start: 2019-09-30 | End: 2020-01-03

## 2019-10-04 DIAGNOSIS — G43.909 MIGRAINE SYNDROME: ICD-10-CM

## 2019-10-04 RX ORDER — HYDROCODONE BITARTRATE AND ACETAMINOPHEN 7.5; 325 MG/1; MG/1
1 TABLET ORAL
Qty: 50 TAB | Refills: 0 | Status: SHIPPED | OUTPATIENT
Start: 2019-10-04 | End: 2019-11-08 | Stop reason: SDUPTHER

## 2019-10-04 RX ORDER — PROMETHAZINE HYDROCHLORIDE 25 MG/1
TABLET ORAL
Qty: 50 TAB | Refills: 0 | Status: SHIPPED | OUTPATIENT
Start: 2019-10-04 | End: 2019-11-08 | Stop reason: SDUPTHER

## 2019-10-04 RX ORDER — FUROSEMIDE 20 MG/1
TABLET ORAL
Qty: 90 TAB | Refills: 1 | Status: SHIPPED | OUTPATIENT
Start: 2019-10-04 | End: 2020-04-06

## 2019-10-16 DIAGNOSIS — F32.A DEPRESSION, UNSPECIFIED DEPRESSION TYPE: ICD-10-CM

## 2019-10-16 DIAGNOSIS — M79.7 FIBROMYALGIA: ICD-10-CM

## 2019-10-17 RX ORDER — ZOLPIDEM TARTRATE 5 MG/1
5 TABLET ORAL
Qty: 30 TAB | Refills: 0 | Status: SHIPPED | OUTPATIENT
Start: 2019-10-17 | End: 2019-11-15 | Stop reason: SDUPTHER

## 2019-11-30 RX ORDER — CYCLOBENZAPRINE HCL 10 MG
10 TABLET ORAL
Qty: 50 TAB | Refills: 5 | Status: SHIPPED | OUTPATIENT
Start: 2019-11-30 | End: 2020-04-08

## 2019-12-12 DIAGNOSIS — G43.909 MIGRAINE SYNDROME: ICD-10-CM

## 2019-12-13 RX ORDER — HYDROCODONE BITARTRATE AND ACETAMINOPHEN 7.5; 325 MG/1; MG/1
1 TABLET ORAL
Qty: 50 TAB | Refills: 0 | Status: SHIPPED | OUTPATIENT
Start: 2019-12-13 | End: 2020-01-15 | Stop reason: SDUPTHER

## 2019-12-13 RX ORDER — PROMETHAZINE HYDROCHLORIDE 25 MG/1
TABLET ORAL
Qty: 50 TAB | Refills: 0 | Status: SHIPPED | OUTPATIENT
Start: 2019-12-13 | End: 2020-01-15 | Stop reason: SDUPTHER

## 2019-12-22 DIAGNOSIS — M79.7 FIBROMYALGIA: ICD-10-CM

## 2019-12-22 DIAGNOSIS — F32.A DEPRESSION, UNSPECIFIED DEPRESSION TYPE: ICD-10-CM

## 2019-12-23 RX ORDER — ZOLPIDEM TARTRATE 5 MG/1
5 TABLET ORAL
Qty: 30 TAB | Refills: 0 | Status: SHIPPED | OUTPATIENT
Start: 2019-12-23 | End: 2020-01-22 | Stop reason: SDUPTHER

## 2020-01-03 DIAGNOSIS — F32.A DEPRESSION, UNSPECIFIED DEPRESSION TYPE: ICD-10-CM

## 2020-01-03 RX ORDER — ARIPIPRAZOLE 5 MG/1
TABLET ORAL
Qty: 30 TAB | Refills: 2 | Status: SHIPPED | OUTPATIENT
Start: 2020-01-03 | End: 2020-02-03 | Stop reason: SDUPTHER

## 2020-01-15 DIAGNOSIS — G43.909 MIGRAINE SYNDROME: ICD-10-CM

## 2020-01-16 RX ORDER — HYDROCODONE BITARTRATE AND ACETAMINOPHEN 7.5; 325 MG/1; MG/1
1 TABLET ORAL
Qty: 50 TAB | Refills: 0 | Status: SHIPPED | OUTPATIENT
Start: 2020-01-16 | End: 2020-02-15 | Stop reason: SDUPTHER

## 2020-01-16 RX ORDER — PROMETHAZINE HYDROCHLORIDE 25 MG/1
TABLET ORAL
Qty: 50 TAB | Refills: 0 | Status: SHIPPED | OUTPATIENT
Start: 2020-01-16 | End: 2020-02-15 | Stop reason: SDUPTHER

## 2020-01-22 DIAGNOSIS — M79.7 FIBROMYALGIA: ICD-10-CM

## 2020-01-22 DIAGNOSIS — F32.A DEPRESSION, UNSPECIFIED DEPRESSION TYPE: ICD-10-CM

## 2020-01-23 RX ORDER — ZOLPIDEM TARTRATE 5 MG/1
5 TABLET ORAL
Qty: 30 TAB | Refills: 0 | Status: SHIPPED | OUTPATIENT
Start: 2020-01-23 | End: 2020-02-24 | Stop reason: SDUPTHER

## 2020-01-30 RX ORDER — OMEPRAZOLE 40 MG/1
CAPSULE, DELAYED RELEASE ORAL
Qty: 90 CAP | Refills: 2 | Status: SHIPPED | OUTPATIENT
Start: 2020-01-30 | End: 2020-09-30

## 2020-02-03 DIAGNOSIS — F32.A DEPRESSION, UNSPECIFIED DEPRESSION TYPE: ICD-10-CM

## 2020-02-03 RX ORDER — ARIPIPRAZOLE 5 MG/1
TABLET ORAL
Qty: 90 TAB | Refills: 1 | Status: SHIPPED | OUTPATIENT
Start: 2020-02-03 | End: 2020-08-04

## 2020-02-03 NOTE — TELEPHONE ENCOUNTER
[facility] day [facility] patient's Aripiprazole    Last visit: 9/11/19  Next visit:3/13/20  Previous refill 1/3/20(30+2) **patient requesting 90 day supply    Requested Prescriptions     Pending Prescriptions Disp Refills    ARIPiprazole (ABILIFY) 5 mg tablet 90 Tab 1     Sig: TAKE 1 TABLET BY MOUTH NIGHTLY

## 2020-02-15 DIAGNOSIS — G43.909 MIGRAINE SYNDROME: ICD-10-CM

## 2020-02-17 RX ORDER — PROMETHAZINE HYDROCHLORIDE 25 MG/1
TABLET ORAL
Qty: 50 TAB | Refills: 0 | Status: SHIPPED | OUTPATIENT
Start: 2020-02-17 | End: 2020-03-22 | Stop reason: SDUPTHER

## 2020-02-17 RX ORDER — HYDROCODONE BITARTRATE AND ACETAMINOPHEN 7.5; 325 MG/1; MG/1
1 TABLET ORAL
Qty: 50 TAB | Refills: 0 | Status: SHIPPED | OUTPATIENT
Start: 2020-02-17 | End: 2020-03-18

## 2020-02-24 DIAGNOSIS — M79.7 FIBROMYALGIA: ICD-10-CM

## 2020-02-24 DIAGNOSIS — F32.A DEPRESSION, UNSPECIFIED DEPRESSION TYPE: ICD-10-CM

## 2020-02-24 RX ORDER — ZOLPIDEM TARTRATE 5 MG/1
5 TABLET ORAL
Qty: 30 TAB | Refills: 0 | Status: SHIPPED | OUTPATIENT
Start: 2020-02-24 | End: 2020-03-28 | Stop reason: SDUPTHER

## 2020-03-03 RX ORDER — VERAPAMIL HYDROCHLORIDE 240 MG/1
TABLET, FILM COATED, EXTENDED RELEASE ORAL
Qty: 90 TAB | Refills: 3 | Status: SHIPPED | OUTPATIENT
Start: 2020-03-03 | End: 2021-02-17

## 2020-03-26 RX ORDER — HYDROCODONE BITARTRATE AND ACETAMINOPHEN 7.5; 325 MG/1; MG/1
1 TABLET ORAL
Qty: 50 TAB | Refills: 0 | Status: SHIPPED | OUTPATIENT
Start: 2020-03-26 | End: 2020-04-26 | Stop reason: SDUPTHER

## 2020-03-28 DIAGNOSIS — M79.7 FIBROMYALGIA: ICD-10-CM

## 2020-03-28 DIAGNOSIS — F32.A DEPRESSION, UNSPECIFIED DEPRESSION TYPE: ICD-10-CM

## 2020-03-30 RX ORDER — ZOLPIDEM TARTRATE 5 MG/1
5 TABLET ORAL
Qty: 30 TAB | Refills: 0 | Status: SHIPPED | OUTPATIENT
Start: 2020-03-30 | End: 2020-04-30 | Stop reason: SDUPTHER

## 2020-04-06 RX ORDER — FUROSEMIDE 20 MG/1
TABLET ORAL
Qty: 90 TAB | Refills: 1 | Status: SHIPPED | OUTPATIENT
Start: 2020-04-06 | End: 2020-09-30

## 2020-04-06 RX ORDER — HYDROCHLOROTHIAZIDE 25 MG/1
TABLET ORAL
Qty: 90 TAB | Refills: 3 | Status: SHIPPED | OUTPATIENT
Start: 2020-04-06 | End: 2021-04-02

## 2020-04-07 DIAGNOSIS — F41.1 GENERALIZED ANXIETY DISORDER: ICD-10-CM

## 2020-04-08 RX ORDER — CYCLOBENZAPRINE HCL 10 MG
10 TABLET ORAL
Qty: 50 TAB | Refills: 5 | Status: SHIPPED | OUTPATIENT
Start: 2020-04-08 | End: 2020-08-13 | Stop reason: SDUPTHER

## 2020-04-08 RX ORDER — ALPRAZOLAM 1 MG/1
TABLET ORAL
Qty: 30 TAB | Refills: 2 | Status: SHIPPED | OUTPATIENT
Start: 2020-04-08 | End: 2020-07-27

## 2020-04-27 RX ORDER — PROMETHAZINE HYDROCHLORIDE 25 MG/1
TABLET ORAL
Qty: 50 TAB | Refills: 0 | Status: SHIPPED | OUTPATIENT
Start: 2020-04-27 | End: 2020-05-27 | Stop reason: SDUPTHER

## 2020-04-27 RX ORDER — HYDROCODONE BITARTRATE AND ACETAMINOPHEN 7.5; 325 MG/1; MG/1
1 TABLET ORAL
Qty: 50 TAB | Refills: 0 | Status: SHIPPED | OUTPATIENT
Start: 2020-04-27 | End: 2020-05-27 | Stop reason: SDUPTHER

## 2020-04-30 DIAGNOSIS — M79.7 FIBROMYALGIA: ICD-10-CM

## 2020-04-30 DIAGNOSIS — F32.A DEPRESSION, UNSPECIFIED DEPRESSION TYPE: ICD-10-CM

## 2020-04-30 RX ORDER — ZOLPIDEM TARTRATE 5 MG/1
5 TABLET ORAL
Qty: 30 TAB | Refills: 0 | Status: SHIPPED | OUTPATIENT
Start: 2020-04-30 | End: 2020-06-01 | Stop reason: SDUPTHER

## 2020-05-27 RX ORDER — HYDROCODONE BITARTRATE AND ACETAMINOPHEN 7.5; 325 MG/1; MG/1
1 TABLET ORAL
Qty: 50 TAB | Refills: 0 | Status: SHIPPED | OUTPATIENT
Start: 2020-05-27 | End: 2020-06-27 | Stop reason: SDUPTHER

## 2020-05-27 RX ORDER — PROMETHAZINE HYDROCHLORIDE 25 MG/1
TABLET ORAL
Qty: 50 TAB | Refills: 0 | Status: SHIPPED | OUTPATIENT
Start: 2020-05-27 | End: 2020-06-27 | Stop reason: SDUPTHER

## 2020-05-28 RX ORDER — METFORMIN HYDROCHLORIDE 500 MG/1
TABLET, EXTENDED RELEASE ORAL
Qty: 90 TAB | Refills: 4 | Status: SHIPPED | OUTPATIENT
Start: 2020-05-28 | End: 2021-07-05

## 2020-06-01 DIAGNOSIS — M79.7 FIBROMYALGIA: ICD-10-CM

## 2020-06-01 DIAGNOSIS — F32.A DEPRESSION, UNSPECIFIED DEPRESSION TYPE: ICD-10-CM

## 2020-06-01 RX ORDER — ZOLPIDEM TARTRATE 5 MG/1
5 TABLET ORAL
Qty: 30 TAB | Refills: 0 | Status: SHIPPED | OUTPATIENT
Start: 2020-06-01 | End: 2020-07-01 | Stop reason: SDUPTHER

## 2020-06-29 RX ORDER — HYDROCODONE BITARTRATE AND ACETAMINOPHEN 7.5; 325 MG/1; MG/1
1 TABLET ORAL
Qty: 50 TAB | Refills: 0 | Status: SHIPPED | OUTPATIENT
Start: 2020-06-29 | End: 2020-07-29

## 2020-06-29 RX ORDER — PROMETHAZINE HYDROCHLORIDE 25 MG/1
TABLET ORAL
Qty: 50 TAB | Refills: 0 | Status: SHIPPED | OUTPATIENT
Start: 2020-06-29 | End: 2020-07-27

## 2020-06-29 NOTE — TELEPHONE ENCOUNTER
Pain contract 3/11/19  Urine for drug screen 9/11/19    Advised patient an appointment needs to be made

## 2020-07-01 DIAGNOSIS — F32.A DEPRESSION, UNSPECIFIED DEPRESSION TYPE: ICD-10-CM

## 2020-07-01 DIAGNOSIS — M79.7 FIBROMYALGIA: ICD-10-CM

## 2020-07-01 RX ORDER — ZOLPIDEM TARTRATE 5 MG/1
5 TABLET ORAL
Qty: 30 TAB | Refills: 0 | Status: SHIPPED | OUTPATIENT
Start: 2020-07-01 | End: 2020-08-07

## 2020-08-06 DIAGNOSIS — M79.7 FIBROMYALGIA: ICD-10-CM

## 2020-08-06 DIAGNOSIS — F32.A DEPRESSION, UNSPECIFIED DEPRESSION TYPE: ICD-10-CM

## 2020-08-07 RX ORDER — GABAPENTIN 600 MG/1
TABLET ORAL
Qty: 270 TAB | Refills: 0 | Status: SHIPPED | OUTPATIENT
Start: 2020-08-07 | End: 2020-09-30

## 2020-08-07 RX ORDER — ZOLPIDEM TARTRATE 5 MG/1
TABLET ORAL
Qty: 30 TAB | Refills: 0 | Status: SHIPPED | OUTPATIENT
Start: 2020-08-07 | End: 2020-09-12 | Stop reason: SDUPTHER

## 2020-08-13 ENCOUNTER — OFFICE VISIT (OUTPATIENT)
Dept: FAMILY MEDICINE CLINIC | Age: 45
End: 2020-08-13
Payer: MEDICARE

## 2020-08-13 VITALS
DIASTOLIC BLOOD PRESSURE: 90 MMHG | TEMPERATURE: 98.2 F | SYSTOLIC BLOOD PRESSURE: 128 MMHG | OXYGEN SATURATION: 99 % | HEART RATE: 114 BPM | HEIGHT: 66 IN | RESPIRATION RATE: 22 BRPM | WEIGHT: 293 LBS | BODY MASS INDEX: 47.09 KG/M2

## 2020-08-13 DIAGNOSIS — R73.9 HYPERGLYCEMIA: ICD-10-CM

## 2020-08-13 DIAGNOSIS — E78.2 MIXED HYPERLIPIDEMIA: ICD-10-CM

## 2020-08-13 DIAGNOSIS — M79.7 FIBROMYALGIA: ICD-10-CM

## 2020-08-13 DIAGNOSIS — F41.1 GENERALIZED ANXIETY DISORDER: ICD-10-CM

## 2020-08-13 DIAGNOSIS — G89.4 CHRONIC PAIN SYNDROME: ICD-10-CM

## 2020-08-13 DIAGNOSIS — F32.A DEPRESSION, UNSPECIFIED DEPRESSION TYPE: ICD-10-CM

## 2020-08-13 DIAGNOSIS — K63.5 POLYP OF COLON, UNSPECIFIED PART OF COLON, UNSPECIFIED TYPE: ICD-10-CM

## 2020-08-13 LAB — HBA1C MFR BLD HPLC: 5.7 %

## 2020-08-13 PROCEDURE — 83036 HEMOGLOBIN GLYCOSYLATED A1C: CPT | Performed by: FAMILY MEDICINE

## 2020-08-13 PROCEDURE — 99386 PREV VISIT NEW AGE 40-64: CPT | Performed by: FAMILY MEDICINE

## 2020-08-13 RX ORDER — HYDROCODONE BITARTRATE AND ACETAMINOPHEN 7.5; 325 MG/1; MG/1
1 TABLET ORAL
COMMUNITY
End: 2020-08-13 | Stop reason: SDUPTHER

## 2020-08-13 RX ORDER — HYDROCODONE BITARTRATE AND ACETAMINOPHEN 7.5; 325 MG/1; MG/1
1 TABLET ORAL
Qty: 50 TAB | Refills: 0 | Status: SHIPPED | OUTPATIENT
Start: 2020-08-13 | End: 2020-09-12 | Stop reason: SDUPTHER

## 2020-08-13 NOTE — PROGRESS NOTES
Chief Complaint   Patient presents with    Diabetes     follow up    Hypertension     follow up    Cholesterol Problem     follow up     1. Have you been to the ER, urgent care clinic since your last visit? Hospitalized since your last visit?no    2. Have you seen or consulted any other health care providers outside of the [address] [place] [address] since your last visit? Include any pap smears or colon screening.  no

## 2020-08-13 NOTE — PROGRESS NOTES
Subjective:   50 y.o. female for Well Woman Check. Her gyne and breast care is done elsewhere by her Ob-Gyne physician. Patient Active Problem List   Diagnosis Code    Migraine syndrome G43.909    Hepatic hemangioma D18.03    Depressive disorder, not elsewhere classified F32.9    IBS (irritable bowel syndrome) K58.9    Obesity E66.9    Cervical spondylarthritis M47.0    Encounter for long-term (current) use of other medications Z79.899    Carpal tunnel syndrome on left G56.02    Psychological factors affecting morbid obesity ([address] E66.01, F54    Obesity, morbid (more than 100 lbs over ideal weight or BMI > 40) ([facility]) E66.01    Chronic migraine G43.709    Depression F32.9    Esophageal reflux K21.9    Hyperglycemia R73.9    Fibromyalgia M79.7    Mixed hyperlipidemia E78.2     Patient Active Problem List    Diagnosis Date Noted    Chronic migraine 07/08/2013     Priority: 1 - One    Mixed hyperlipidemia 10/16/2017    Fibromyalgia 03/24/2017    Hyperglycemia 03/01/2015    Esophageal reflux 10/31/2014    Depression 04/14/2014    Psychological factors affecting morbid obesity ([address] 05/20/2013    Obesity, morbid (more than 100 lbs over ideal weight or BMI > 40) ([facility] [address] [address] 05/20/2013    Carpal tunnel syndrome on left 02/22/2011    IBS (irritable bowel syndrome) 02/20/2011    Obesity 02/20/2011    Cervical spondylarthritis 02/20/2011    Encounter for long-term (current) use of other medications 02/20/2011    Migraine syndrome 02/17/2011    Hepatic hemangioma 02/17/2011    Depressive disorder, not elsewhere classified 02/17/2011     Current Outpatient Medications   Medication Sig Dispense Refill    HYDROcodone-acetaminophen (NORCO) 7.5-325 mg per tablet Take 1 Tab by mouth every six (6) hours as needed for Pain for up to 30 days. Max Daily Amount: 4 Tabs.  50 Tab 0    gabapentin (NEURONTIN) 600 mg tablet TAKE 1 TABLET BY MOUTH 3 TIMES DAILY 270 Tab 0    zolpidem (AMBIEN) 5 mg tablet TAKE 1 TABLET BY MOUTH NIGHTLY AS NEEDED FOR SLEEP. 30 Tab 0    promethazine (PHENERGAN) 25 mg tablet TAKE 1 TABLET BY MOUTH EVERY 8 HOURS AS NEEDED FOR NAUSEA 50 Tab 0    ALPRAZolam (XANAX) 1 mg tablet TAKE 1 TABLET BY MOUTH EVERY EVENING AS NEEDED FOR SLEEP 30 Tab 0    venlafaxine-SR (EFFEXOR-XR) 150 mg capsule TAKE 1 CAPSULE BY MOUTH EVERY DAY 30 Cap 2    metFORMIN ER (GLUCOPHAGE XR) 500 mg tablet TAKE 1 TABLET BY MOUTH EVERY DAY WITH DINNER 90 Tab 4    hydroCHLOROthiazide (HYDRODIURIL) 25 mg tablet TAKE 1 TABLET BY MOUTH EVERY DAY 90 Tab 3    furosemide (LASIX) 20 mg tablet TAKE 1 TAB BY MOUTH DAILY. 90 Tab 1    verapamil ER (CALAN-SR) 240 mg CR tablet TAKE 1 TABLET BY MOUTH AT BEDTIME 90 Tab 3    omeprazole (PRILOSEC) 40 mg capsule TAKE 1 CAPSULE BY MOUTH DAILY 90 Cap 2    topiramate (TOPAMAX) 100 mg tablet START WITH 1/2 TABLET IN MORNING AND 1 TAB AT NIGHT FOR 2 WEEKS, THEN INCREASE TO 1 TAB TWICE A DAY 90 Tab 5    cyclobenzaprine (FLEXERIL) 10 mg tablet Take 1 Tab by mouth three (3) times daily as needed for Muscle Spasm(s). 30 Tab 1    cholecalciferol, VITAMIN D3, (VITAMIN D3) 5,000 unit tab tablet Take 5,000 Units by mouth. Takes one tablet 4 times a week      docusate sodium (STOOL SOFTENER) 100 mg capsule 100 mg two (2) times a day. Once daily       vitamin E (AQUA GEMS) 400 unit capsule Take  by mouth daily.  tiZANidine (ZANAFLEX) 2 mg tablet Take 1 Tab by mouth four (4) times daily (with meals). 30 Tab 1    gabapentin (NEURONTIN) 400 mg capsule Take 1 Cap by mouth three (3) times daily. 90 Cap 11    busPIRone (BUSPAR) 7.5 mg tablet Take 1 Tab by mouth three (3) times daily as needed. 50 Tab 1    naloxone (NARCAN) 1 mg/mL injection 2 mL by IntraMUSCular route once as needed for up to 1 dose.  1 Syringe 1    onabotulinumtoxinA (BOTOX) 200 unit injection To be injected in the muscles of the head and neck 1 Vial 3    gabapentin (NEURONTIN) 100 mg capsule Take 3 Caps by mouth three (3) times daily. 270 Cap 2    DENTA 5000 PLUS 1.1 % crea       nystatin (MYCOSTATIN) 100,000 unit/mL suspension Take 5 mL by mouth four (4) times daily.  100 mL 3     Allergies   Allergen Reactions    Atacand [Candesartan] Other (comments)     Patient B/P increase    Compazine [Prochlorperazine] Other (comments) and Unable to Obtain    Ciprofloxacin Other (comments)    Codeine Nausea and Vomiting    Zonisamide Nausea and Vomiting     Past Medical History:   Diagnosis Date    Arthritis     DDD    Carpal tunnel syndrome on left 2/22/2011    Cervical spondylarthritis 2/20/2011    Depressive disorder, not elsewhere classified 2/17/2011    Encounter for long-term (current) use of other medications 2/20/2011    GERD (gastroesophageal reflux disease)     Headache(784.0)     Migraines    Hepatic hemangioma 2/17/2011    Hypertension     IBS (irritable bowel syndrome) 2/20/2011    Migraine syndrome 2/17/2011    Obesity 2/20/2011    Other ill-defined conditions(799.89)     hx.of syncope    Psychiatric disorder     depression     Past Surgical History:   Procedure Laterality Date    HX HEENT      tonsillectomy    HX ORTHOPAEDIC      carpal tunnel and tigger finger release rt     Family History   Problem Relation Age of Onset    Heart Disease Mother     Hypertension Mother     Diabetes Mother     Heart Disease Father     Lung Disease Father     Hypertension Father      Social History     Tobacco Use    Smoking status: Never Smoker    Smokeless tobacco: Never Used   Substance Use Topics    Alcohol use: Yes     Comment: socially             Specific concerns today: check up    Review of Systems  Constitutional: negative  Eyes: negative  Ears, nose, mouth, throat, and face: negative  Respiratory: negative  Cardiovascular: negative  Gastrointestinal: negative  Genitourinary:negative  Integument/breast: negative  Musculoskeletal:negative  Neurological: positive for headaches  Behavioral/Psych: negative    Objective:   Blood pressure 128/90, pulse (!) 114, temperature 98.2 °F (36.8 °C), temperature source Temporal, resp. rate 22, height 5' 6\" (1.676 m), weight 304 lb (137.9 kg), SpO2 99 %. Physical Examination:   General appearance - alert, well appearing, and in no distress and morbidly obese  Mental status - alert, oriented to person, place, and time  Eyes - pupils equal and reactive, extraocular eye movements intact  Ears - bilateral TM's and external ear canals normal  Nose - normal and patent, no erythema, discharge or polyps  Mouth - mucous membranes moist, pharynx normal without lesions  Neck - supple, no significant adenopathy  Chest - clear to auscultation, no wheezes, rales or rhonchi, symmetric air entry  Heart - normal rate, regular rhythm, normal S1, S2, no murmurs, rubs, clicks or gallops  Abdomen - soft, nontender, nondistended, no masses or organomegaly  Neurological - alert, oriented, normal speech, no focal findings or movement disorder noted, cranial nerves II through XII intact, DTR's normal and symmetric  Musculoskeletal - no joint tenderness, deformity or swelling  Extremities - peripheral pulses normal, no pedal edema, no clubbing or cyanosis     Assessment/Plan:     lose weight, continue present plan, routine labs ordered, call if any problems  Diagnoses and all orders for this visit:    1. Chronic migraine  -     METABOLIC PANEL, COMPREHENSIVE  -     CBC WITH AUTOMATED DIFF  -     HYDROcodone-acetaminophen (NORCO) 7.5-325 mg per tablet; Take 1 Tab by mouth every six (6) hours as needed for Pain for up to 30 days. Max Daily Amount: 4 Tabs. -     REFERRAL TO NEUROLOGY    2. Depression, unspecified depression type    3. Fibromyalgia  -     METABOLIC PANEL, COMPREHENSIVE  -     CBC WITH AUTOMATED DIFF    4. Generalized anxiety disorder    5. Mixed hyperlipidemia  -     LIPID PANEL    6. Chronic pain syndrome  -     MONITOR SCREEN 10-DRUG CLASS PROFILE    7.  Hyperglycemia  -     AMB POC URINALYSIS DIP STICK AUTO W/O MICRO  -     AMB POC HEMOGLOBIN A1C    8. Polyp of colon, unspecified part of colon, unspecified type,needs colonoscopy  -     REFERRAL TO GASTROENTEROLOGY      Follow-up and Dispositions    · Return in about 6 months (around 2/13/2021).

## 2020-08-14 LAB
ALBUMIN SERPL-MCNC: 4.3 G/DL (ref 3.8–4.8)
ALBUMIN/GLOB SERPL: 1.4 {RATIO} (ref 1.2–2.2)
ALP SERPL-CCNC: 77 IU/L (ref 39–117)
ALT SERPL-CCNC: 41 IU/L (ref 0–32)
AST SERPL-CCNC: 31 IU/L (ref 0–40)
BASOPHILS # BLD AUTO: 0 X10E3/UL (ref 0–0.2)
BASOPHILS NFR BLD AUTO: 0 %
BILIRUB SERPL-MCNC: <0.2 MG/DL (ref 0–1.2)
BUN SERPL-MCNC: 11 MG/DL (ref 6–24)
BUN/CREAT SERPL: 13 (ref 9–23)
CALCIUM SERPL-MCNC: 9.5 MG/DL (ref 8.7–10.2)
CHLORIDE SERPL-SCNC: 100 MMOL/L (ref 96–106)
CHOLEST SERPL-MCNC: 216 MG/DL (ref 100–199)
CO2 SERPL-SCNC: 22 MMOL/L (ref 20–29)
CREAT SERPL-MCNC: 0.82 MG/DL (ref 0.57–1)
EOSINOPHIL # BLD AUTO: 0 X10E3/UL (ref 0–0.4)
EOSINOPHIL NFR BLD AUTO: 0 %
ERYTHROCYTE [DISTWIDTH] IN BLOOD BY AUTOMATED COUNT: 14.5 % (ref 11.7–15.4)
GLOBULIN SER CALC-MCNC: 3 G/DL (ref 1.5–4.5)
GLUCOSE SERPL-MCNC: 163 MG/DL (ref 65–99)
HCT VFR BLD AUTO: 40.9 % (ref 34–46.6)
HDLC SERPL-MCNC: 36 MG/DL
HGB BLD-MCNC: 13.4 G/DL (ref 11.1–15.9)
IMM GRANULOCYTES # BLD AUTO: 0.2 X10E3/UL (ref 0–0.1)
IMM GRANULOCYTES NFR BLD AUTO: 1 %
INTERPRETATION, 910389: NORMAL
LDLC SERPL CALC-MCNC: ABNORMAL MG/DL (ref 0–99)
LYMPHOCYTES # BLD AUTO: 3.7 X10E3/UL (ref 0.7–3.1)
LYMPHOCYTES NFR BLD AUTO: 33 %
MCH RBC QN AUTO: 30.4 PG (ref 26.6–33)
MCHC RBC AUTO-ENTMCNC: 32.8 G/DL (ref 31.5–35.7)
MCV RBC AUTO: 93 FL (ref 79–97)
MONOCYTES # BLD AUTO: 0.5 X10E3/UL (ref 0.1–0.9)
MONOCYTES NFR BLD AUTO: 4 %
NEUTROPHILS # BLD AUTO: 7 X10E3/UL (ref 1.4–7)
NEUTROPHILS NFR BLD AUTO: 62 %
PDF IMAGE, 910387: NORMAL
PLATELET # BLD AUTO: 586 X10E3/UL (ref 150–450)
POTASSIUM SERPL-SCNC: 3.8 MMOL/L (ref 3.5–5.2)
PROT SERPL-MCNC: 7.3 G/DL (ref 6–8.5)
RBC # BLD AUTO: 4.41 X10E6/UL (ref 3.77–5.28)
SODIUM SERPL-SCNC: 140 MMOL/L (ref 134–144)
TRIGL SERPL-MCNC: 434 MG/DL (ref 0–149)
VLDLC SERPL CALC-MCNC: ABNORMAL MG/DL (ref 5–40)
WBC # BLD AUTO: 11.3 X10E3/UL (ref 3.4–10.8)

## 2020-08-26 DIAGNOSIS — F41.1 GENERALIZED ANXIETY DISORDER: ICD-10-CM

## 2020-08-26 DIAGNOSIS — R25.2 MUSCLE CRAMP: ICD-10-CM

## 2020-08-26 RX ORDER — ALPRAZOLAM 1 MG/1
TABLET ORAL
Qty: 30 TAB | Refills: 0 | Status: SHIPPED | OUTPATIENT
Start: 2020-08-26 | End: 2020-09-28

## 2020-08-26 RX ORDER — CYCLOBENZAPRINE HCL 10 MG
TABLET ORAL
Qty: 50 TAB | Refills: 5 | Status: SHIPPED | OUTPATIENT
Start: 2020-08-26 | End: 2021-01-22

## 2020-09-12 DIAGNOSIS — F32.A DEPRESSION, UNSPECIFIED DEPRESSION TYPE: ICD-10-CM

## 2020-09-12 DIAGNOSIS — M79.7 FIBROMYALGIA: ICD-10-CM

## 2020-09-16 RX ORDER — HYDROCODONE BITARTRATE AND ACETAMINOPHEN 7.5; 325 MG/1; MG/1
1 TABLET ORAL
Qty: 15 TAB | Refills: 0 | Status: SHIPPED | OUTPATIENT
Start: 2020-09-16 | End: 2020-11-24 | Stop reason: SDUPTHER

## 2020-09-16 RX ORDER — ZOLPIDEM TARTRATE 5 MG/1
5 TABLET ORAL
Qty: 15 TAB | Refills: 0 | Status: SHIPPED | OUTPATIENT
Start: 2020-09-16 | End: 2020-09-30

## 2020-09-16 RX ORDER — PROMETHAZINE HYDROCHLORIDE 25 MG/1
25 TABLET ORAL
Qty: 30 TAB | Refills: 0 | Status: SHIPPED | OUTPATIENT
Start: 2020-09-16 | End: 2020-11-09

## 2020-09-16 NOTE — TELEPHONE ENCOUNTER
Reviewed report generated by the [facility]. Does not demonstrate aberrancies or inconsistencies with regard to the prescribing of controlled medications to this patient by other providers. Patient's PCP is Dr. Eliu Go. Limited refills given as he is on vacation    Orders Placed This Encounter    promethazine (PHENERGAN) 25 mg tablet     Sig: Take 1 Tab by mouth every six (6) hours as needed for Nausea. Dispense:  30 Tab     Refill:  0    zolpidem (AMBIEN) 5 mg tablet     Sig: Take 1 Tab by mouth nightly as needed for Sleep. Max Daily Amount: 5 mg. Dispense:  15 Tab     Refill:  0     Not to exceed 5 additional fills before 12/28/2020    HYDROcodone-acetaminophen (NORCO) 7.5-325 mg per tablet     Sig: Take 1 Tab by mouth every six (6) hours as needed for Pain for up to 30 days. Max Daily Amount: 4 Tabs.      Dispense:  15 Tab     Refill:  0

## 2020-09-27 DIAGNOSIS — F41.1 GENERALIZED ANXIETY DISORDER: ICD-10-CM

## 2020-09-28 RX ORDER — ALPRAZOLAM 1 MG/1
TABLET ORAL
Qty: 30 TAB | Refills: 0 | Status: SHIPPED | OUTPATIENT
Start: 2020-09-28 | End: 2020-11-09

## 2020-09-29 DIAGNOSIS — F32.A DEPRESSION, UNSPECIFIED DEPRESSION TYPE: ICD-10-CM

## 2020-09-29 DIAGNOSIS — M79.7 FIBROMYALGIA: ICD-10-CM

## 2020-09-29 DIAGNOSIS — F41.1 GENERALIZED ANXIETY DISORDER: ICD-10-CM

## 2020-09-30 RX ORDER — OMEPRAZOLE 40 MG/1
CAPSULE, DELAYED RELEASE ORAL
Qty: 90 CAP | Refills: 2 | Status: SHIPPED | OUTPATIENT
Start: 2020-09-30 | End: 2021-07-05

## 2020-09-30 RX ORDER — VENLAFAXINE HYDROCHLORIDE 150 MG/1
CAPSULE, EXTENDED RELEASE ORAL
Qty: 30 CAP | Refills: 2 | Status: SHIPPED | OUTPATIENT
Start: 2020-09-30 | End: 2020-10-26 | Stop reason: SDUPTHER

## 2020-09-30 RX ORDER — GABAPENTIN 600 MG/1
TABLET ORAL
Qty: 270 TAB | Refills: 0 | Status: SHIPPED | OUTPATIENT
Start: 2020-09-30 | End: 2021-02-21

## 2020-09-30 RX ORDER — FUROSEMIDE 20 MG/1
TABLET ORAL
Qty: 90 TAB | Refills: 1 | Status: SHIPPED | OUTPATIENT
Start: 2020-09-30 | End: 2021-04-02

## 2020-09-30 RX ORDER — ZOLPIDEM TARTRATE 5 MG/1
5 TABLET ORAL
Qty: 15 TAB | Refills: 0 | Status: SHIPPED | OUTPATIENT
Start: 2020-09-30 | End: 2020-10-02

## 2020-10-02 ENCOUNTER — OFFICE VISIT (OUTPATIENT)
Dept: NEUROLOGY | Age: 45
End: 2020-10-02
Payer: MEDICARE

## 2020-10-02 VITALS
BODY MASS INDEX: 47.09 KG/M2 | TEMPERATURE: 99.3 F | OXYGEN SATURATION: 98 % | SYSTOLIC BLOOD PRESSURE: 126 MMHG | DIASTOLIC BLOOD PRESSURE: 82 MMHG | HEART RATE: 116 BPM | WEIGHT: 293 LBS | HEIGHT: 66 IN

## 2020-10-02 DIAGNOSIS — F32.9 REACTIVE DEPRESSION: ICD-10-CM

## 2020-10-02 DIAGNOSIS — I10 ESSENTIAL HYPERTENSION: ICD-10-CM

## 2020-10-02 DIAGNOSIS — G43.909 MIGRAINE SYNDROME: Primary | ICD-10-CM

## 2020-10-02 PROCEDURE — 99204 OFFICE O/P NEW MOD 45 MIN: CPT | Performed by: PSYCHIATRY & NEUROLOGY

## 2020-10-02 RX ORDER — DIAPER,BRIEF,INFANT-TODD,DISP
EACH MISCELLANEOUS
COMMUNITY

## 2020-10-02 RX ORDER — GALCANEZUMAB 120 MG/ML
240 INJECTION, SOLUTION SUBCUTANEOUS ONCE
Qty: 2 ML | Refills: 0 | Status: SHIPPED | COMMUNITY
Start: 2020-10-02 | End: 2020-10-02

## 2020-10-02 RX ORDER — LANOLIN ALCOHOL/MO/W.PET/CERES
2000 CREAM (GRAM) TOPICAL DAILY
COMMUNITY

## 2020-10-02 NOTE — PROGRESS NOTES
NEUROLOGY HISTORY AND PHYSICAL    Name Payton Diop Age 50 y.o. MRN [id number]  1975     Referring Physician: Donn Lara MD        Chief Complaint:  headaches     This is a 50 y.o. right handed female with along history of migraines was seen at [facility]. Migraine are 15-20 days of the months more than four hours in durations throbbing and associated with photophobia and nausea. She had been treated here by Dr. Bobbi Baez and also by Greg Newell at [facility]. She was sent to the [facility] but could not afford the treatment and was afraid of an unacceptable mortality rate. They finally were frustrated with Dr. Laura Marrero and they parted ways. The best control she had was on botox. She had a more than 60 percent reduction in the intensity of her headaches. She has been receiving treatment for migraines with hydrocodone and promethazine. She is interested in trying the CGRP medications. Assessment and Plan   1. Migraine headaches  Has had significant releif with botox  She is on topamax  Has tried elavil  Has tried cymbalata  SHe is on verapamil. She has tried fioricet   She has tried imitex  She as on all these medicatrions more than two months and they did not help her migraines  She had migraines since age 30. They are daily bitemporal headaches, throbbing in nature. Associated with photophobia and nausea. Trial of emgality - sample given in office    2. Depression  continue effexor. 3. Hypertension  Continue verapamil and HCTZ     60  minutes spent more than 50% spent in face to face  examination and discussion of treatment options and approach    Patient Allergies    Atacand [candesartan]; Compazine [prochlorperazine]; Ciprofloxacin; Codeine; and Zonisamide     Current Outpatient Medications   Medication Sig    biotin 10,000 mcg cap Take  by mouth.  cyanocobalamin 1,000 mcg tablet Take 2,000 mcg by mouth daily.     furosemide (LASIX) 20 mg tablet TAKE 1 TABLET BY MOUTH EVERY DAY (Patient taking differently: daily as needed.)    venlafaxine-SR (EFFEXOR-XR) 150 mg capsule TAKE 1 CAPSULE BY MOUTH EVERY DAY    gabapentin (NEURONTIN) 600 mg tablet TAKE 1 TABLET BY MOUTH THREE TIMES A DAY    omeprazole (PRILOSEC) 40 mg capsule TAKE 1 CAPSULE BY MOUTH EVERY DAY    ALPRAZolam (XANAX) 1 mg tablet TAKE 1 TABLET BY MOUTH EVERY EVENING AS NEEDED FOR SLEEP    promethazine (PHENERGAN) 25 mg tablet Take 1 Tab by mouth every six (6) hours as needed for Nausea.  HYDROcodone-acetaminophen (NORCO) 7.5-325 mg per tablet Take 1 Tab by mouth every six (6) hours as needed for Pain for up to 30 days. Max Daily Amount: 4 Tabs.  cyclobenzaprine (FLEXERIL) 10 mg tablet TAKE 1 TAB BY MOUTH THREE (3) TIMES DAILY (WITH MEALS). (Patient taking differently: Take 10 mg by mouth nightly.)    metFORMIN ER (GLUCOPHAGE XR) 500 mg tablet TAKE 1 TABLET BY MOUTH EVERY DAY WITH DINNER    hydroCHLOROthiazide (HYDRODIURIL) 25 mg tablet TAKE 1 TABLET BY MOUTH EVERY DAY    verapamil ER (CALAN-SR) 240 mg CR tablet TAKE 1 TABLET BY MOUTH AT BEDTIME    topiramate (TOPAMAX) 100 mg tablet START WITH 1/2 TABLET IN MORNING AND 1 TAB AT NIGHT FOR 2 WEEKS, THEN INCREASE TO 1 TAB TWICE A DAY (Patient taking differently: 200 mg nightly.)    docusate sodium (STOOL SOFTENER) 100 mg capsule 100 mg two (2) times a day. Once daily      No current facility-administered medications for this visit.         Past Medical History:   Diagnosis Date    Arthritis     DDD    Carpal tunnel syndrome on left 2/22/2011    Cervical spondylarthritis 2/20/2011    Depressive disorder, not elsewhere classified 2/17/2011    Encounter for long-term (current) use of other medications 2/20/2011    GERD (gastroesophageal reflux disease)     Headache(784.0)     Migraines    Hepatic hemangioma 2/17/2011    Hypertension     IBS (irritable bowel syndrome) 2/20/2011    Migraine syndrome 2/17/2011    Obesity 2/20/2011    Other ill-defined conditions(799.89)     hx.of syncope    Psychiatric disorder     depression       Social History     Tobacco Use    Smoking status: Never Smoker    Smokeless tobacco: Never Used   Substance Use Topics    Alcohol use: Yes     Comment: socially       Family History   Problem Relation Age of Onset    Heart Disease Mother     Hypertension Mother     Diabetes Mother     Heart Disease Father     Lung Disease Father     Hypertension Father        Review of Systems   Constitutional: Negative for chills and fever. HENT: Negative for ear pain. Eyes: Negative for pain and discharge. Respiratory: Negative for cough and hemoptysis. Cardiovascular: Negative for chest pain and claudication. Gastrointestinal: Negative for constipation and diarrhea. Genitourinary: Negative for flank pain and hematuria. Musculoskeletal: Positive for myalgias and neck pain. Negative for back pain. Skin: Negative for itching and rash. Neurological: Positive for headaches. Endo/Heme/Allergies: Negative for environmental allergies. Does not bruise/bleed easily. Psychiatric/Behavioral: Positive for depression. Negative for hallucinations. The patient has insomnia. Visit Vitals  /82   Pulse (!) 116   Temp 99.3 °F (37.4 °C) (Oral)   Ht 5' 6\" (1.676 m)   Wt 302 lb (137 kg)   SpO2 98%   BMI 48.74 kg/m²         General: Well developed, well nourished. Wearing sunglasses   Head: Normocephalic, atraumatic, anicteric sclera   Neck Normal ROM, No thyromegally   Lungs:  Clear to auscultation bilaterally, No wheezes or rubs   Cardiac: Regular rate and rhythm with no murmurs. Abd: Bowel sounds were audible. No tenderness on palpation   Ext: No pedal edema   Skin: No overt signs of rash or insect bites     NeurologicExam:  Mental Status: Alert and oriented to person place and time   Speech: Fluent no aphasia or dysarthria. Cranial Nerves:   II Intact visual fields.     III, IV VI Extra ocular movements intact  V Facial sensation is normal.   VII Facial movement is symmetric. VIII Hearing intact no nystagmus    IX, X Normal gag, symmetric movement of palate and uvula  XI Symmetric shoulder shrug and head turn   XII Tongue midline with no atrophy   Motor:  Full and symmetric strength of upper and lower proximal and distal muscles. Normal bulk and tone. Reflexes:   Deep tendon reflexes 2+/4 and symmetric. Sensory:   Symmetric and intact with no perceived deficits modalities involving small or large fibers. Gait:  Gait is balanced and fluid with normal arm swing. Tremor:   No tremor noted. Cerebellar:  Coordination intact. Neurovascular: No carotid bruits. No JVD         Imaging    MRI Results (most recent):  Results from Hospital Encounter encounter on 07/11/14   MRI BRAIN W WO CONT    Narrative **Final Report**       ICD Codes / Adm. Diagnosis: 346.73  787.02 / Chronic migraine without aura,    Examination:  MRI BRAIN W AND WO CON  - [id number] - Jul 11 2014 11:04AM  Accession No:  [id number]  Reason:  Chronic migraine without aura, with intractable migraine, so   stated, with status      REPORT:  EXAM:  MRI BRAIN W AND WO CON    INDICATION:  Chronic migraine without aura, with intractable migraine,,   right optic nerve pallor    COMPARISON STUDY: CT head of 1/17/2014    STUDY PARAMETERS:    Sagittal and axial T1-weighted; axial FLAIR, T2-weighted, diffusion   weighted, and gradient echo, precontrast; coronal and axial postcontrast   T1-weighted images of the brain; thin section coronal STIR, fat-suppressed   T1-weighted images pre- and postgadolinium and axial fat-suppressed   postcontrast T1-weighted images of the orbits following the IV injection of   20 cc Magnevist. The study was performed on the low-field open MRI unit   because of the patient's body habitus. FINDINGS:    The ventricles are normal in size and are midline. There is no significant   white matter disease.   No focal intracranial lesions are identified. There   are no areas of abnormal parenchymal or meningeal enhancement. There is no   evidence of acute infarction or hemorrhage. There is a small left choroidal   fissure cyst which is unchanged since prior CT studies dating back to 2006. Normal flow voids are seen in the vessels at the skull base. No orbital abnormalities are identified. The optic nerves demonstrate no   signal abnormality or enhancement. The cavernous sinus has a normal   appearance. The paranasal sinuses are clear. IMPRESSION:  No significant abnormalities on MRI of the brain and optic pathways. Stable   small left choroidal fissure cyst.              Signing/Reading Doctor: Gutierrez Rowan ([id number])    Approved: Gutierrez Rowan ([id number])  Jul 11 2014 12:14PM                                   CT Results (most recent):  Results from Hospital Encounter encounter on 05/21/14   CT ABD PELV WO CONT    Narrative **Final Report**       ICD Codes / Adm. Diagnosis: 386582  270849 / Abdominal Pain  Migraine  Examination:  CT ABD PELVIS WO CON  - [id number] - May 21 2014  6:43AM  Accession No:  [id number]  Reason:  Abd Pain/KS      REPORT:  INDICATION: Right flank pain    Comparison to 1/12/2009. Multiple axial images were obtained from the dome of the diaphragm to the   pubic symphysis without the use of oral or intravenous contrast. Lung bases   are clear. No renal or ureteral stone or hydronephrosis is evident. Multiple   liver lesions are again noted, which have been previously described as   hemangiomas. The solid organs are otherwise normal in appearance given the   lack of intravenous contrast. There is no small or large bowel distention. The appendix is normal in appearance. There is no free fluid. IMPRESSION: No stone or hydronephrosis. Multiple liver lesions are again   noted.  While these are of indeterminate etiology on the basis of this   examination, they have been previously characterized as hemangiomas. No   acute process.               Signing/Reading Doctor: Darline Ortiz ([id number])    Approved: Darline Ortiz ([id number])  May 21 2014  7:35AM                                   Lab Review  Lab Results   Component Value Date/Time    WBC 11.3 (H) 08/13/2020 01:50 PM    HCT 40.9 08/13/2020 01:50 PM    HGB 13.4 08/13/2020 01:50 PM    PLATELET 569 (H) 03/14/0609 01:50 PM     Lab Results   Component Value Date/Time    Sodium 140 08/13/2020 01:50 PM    Potassium 3.8 08/13/2020 01:50 PM    Chloride 100 08/13/2020 01:50 PM    CO2 22 08/13/2020 01:50 PM    Glucose 163 (H) 08/13/2020 01:50 PM    BUN 11 08/13/2020 01:50 PM    Creatinine 0.82 08/13/2020 01:50 PM    Calcium 9.5 08/13/2020 01:50 PM     Lab Results   Component Value Date/Time    Vitamin B12 >1999 (H) 12/30/2015 02:36 PM     Lab Results   Component Value Date/Time    LDL, calculated Comment 08/13/2020 01:50 PM     Lab Results   Component Value Date/Time    Hemoglobin A1c (POC) 5.7 08/13/2020 01:25 PM

## 2020-10-13 ENCOUNTER — TELEPHONE (OUTPATIENT)
Dept: FAMILY MEDICINE CLINIC | Age: 45
End: 2020-10-13

## 2020-10-13 NOTE — TELEPHONE ENCOUNTER
Neurology Consult Patient father states that patient is having a lot of cough and congestion and would like to have a virtual appointment tomorrow she can be reached @ [phone]

## 2020-10-26 DIAGNOSIS — F41.1 GENERALIZED ANXIETY DISORDER: ICD-10-CM

## 2020-10-26 DIAGNOSIS — F32.A DEPRESSION, UNSPECIFIED DEPRESSION TYPE: ICD-10-CM

## 2020-10-26 RX ORDER — VENLAFAXINE HYDROCHLORIDE 150 MG/1
CAPSULE, EXTENDED RELEASE ORAL
Qty: 90 CAP | Refills: 1 | Status: SHIPPED | OUTPATIENT
Start: 2020-10-26 | End: 2021-05-03

## 2020-11-04 DIAGNOSIS — F32.A DEPRESSION, UNSPECIFIED DEPRESSION TYPE: ICD-10-CM

## 2020-11-04 RX ORDER — ARIPIPRAZOLE 5 MG/1
TABLET ORAL
Qty: 90 TAB | Refills: 0 | Status: SHIPPED | OUTPATIENT
Start: 2020-11-04 | End: 2021-08-09

## 2020-11-09 DIAGNOSIS — F41.1 GENERALIZED ANXIETY DISORDER: ICD-10-CM

## 2020-11-09 RX ORDER — ALPRAZOLAM 1 MG/1
TABLET ORAL
Qty: 30 TAB | Refills: 0 | Status: SHIPPED | OUTPATIENT
Start: 2020-11-09 | End: 2020-12-30

## 2020-11-09 RX ORDER — PROMETHAZINE HYDROCHLORIDE 25 MG/1
TABLET ORAL
Qty: 50 TAB | Refills: 0 | Status: SHIPPED | OUTPATIENT
Start: 2020-11-09 | End: 2020-12-17 | Stop reason: SDUPTHER

## 2020-11-20 RX ORDER — HYDROCODONE BITARTRATE AND ACETAMINOPHEN 7.5; 325 MG/1; MG/1
1 TABLET ORAL
Qty: 15 TAB | Refills: 0 | Status: CANCELLED | OUTPATIENT
Start: 2020-11-20 | End: 2020-12-20

## 2020-11-20 NOTE — TELEPHONE ENCOUNTER
Patient called and left message asking for a refill on Norco 7.5/325mg.       Last visit:8/13/20  Next visit:12/17/20  Previous refill 9/16/20(15+0R)    Please review  before prescribing new script for this medication.     Thanks,  Maryjo      Requested Prescriptions     Pending Prescriptions Disp Refills   • HYDROcodone-acetaminophen (NORCO) 7.5-325 mg per tablet 15 Tab 0     Sig: Take 1 Tab by mouth every six (6) hours as needed for Pain for up to 30 days. Max Daily Amount: 4 Tabs.

## 2020-11-24 DIAGNOSIS — M79.7 FIBROMYALGIA: ICD-10-CM

## 2020-11-24 DIAGNOSIS — F32.A DEPRESSION, UNSPECIFIED DEPRESSION TYPE: ICD-10-CM

## 2020-11-24 RX ORDER — HYDROCODONE BITARTRATE AND ACETAMINOPHEN 7.5; 325 MG/1; MG/1
1 TABLET ORAL
Qty: 30 TAB | Refills: 0 | Status: SHIPPED | OUTPATIENT
Start: 2020-11-24 | End: 2020-12-17 | Stop reason: SDUPTHER

## 2020-11-24 RX ORDER — ZOLPIDEM TARTRATE 5 MG/1
5 TABLET ORAL
Qty: 30 TAB | Refills: 0 | Status: SHIPPED | OUTPATIENT
Start: 2020-11-24 | End: 2020-12-17 | Stop reason: SDUPTHER

## 2020-11-24 NOTE — TELEPHONE ENCOUNTER
Last visit:8/13/20  Next visit:12/17/20  Previous refill 9/30/20(15+0R)- Bethene Kras    9/16/20(15+0R) - norco    Requested Prescriptions     Pending Prescriptions Disp Refills    HYDROcodone-acetaminophen (NORCO) 7.5-325 mg per tablet 15 Tab 0     Sig: Take 1 Tab by mouth every six (6) hours as needed for Pain for up to 30 days. Max Daily Amount: 4 Tabs.  zolpidem (AMBIEN) 5 mg tablet 15 Tab 0     Sig: Take 1 Tab by mouth nightly as needed for Sleep. Max Daily Amount: 5 mg. Please review  before prescribing new script(s) for these medications.      Thanks,  Adela Huffman

## 2020-12-17 ENCOUNTER — HOSPITAL ENCOUNTER (OUTPATIENT)
Dept: LAB | Age: 45
Discharge: HOME | End: 2020-12-17
Payer: MEDICARE

## 2020-12-17 ENCOUNTER — OFFICE VISIT (OUTPATIENT)
Dept: FAMILY MEDICINE CLINIC | Age: 45
End: 2020-12-17
Payer: MEDICARE

## 2020-12-17 VITALS
HEART RATE: 101 BPM | DIASTOLIC BLOOD PRESSURE: 86 MMHG | WEIGHT: 293 LBS | RESPIRATION RATE: 18 BRPM | TEMPERATURE: 96.8 F | SYSTOLIC BLOOD PRESSURE: 132 MMHG | BODY MASS INDEX: 47.09 KG/M2 | HEIGHT: 66 IN | OXYGEN SATURATION: 98 %

## 2020-12-17 DIAGNOSIS — E78.2 MIXED HYPERLIPIDEMIA: ICD-10-CM

## 2020-12-17 DIAGNOSIS — F32.A DEPRESSION, UNSPECIFIED DEPRESSION TYPE: ICD-10-CM

## 2020-12-17 DIAGNOSIS — M79.7 FIBROMYALGIA: ICD-10-CM

## 2020-12-17 DIAGNOSIS — R73.9 HYPERGLYCEMIA: ICD-10-CM

## 2020-12-17 DIAGNOSIS — Z00.00 MEDICARE ANNUAL WELLNESS VISIT, INITIAL: Primary | ICD-10-CM

## 2020-12-17 DIAGNOSIS — F41.1 GENERALIZED ANXIETY DISORDER: ICD-10-CM

## 2020-12-17 PROCEDURE — 36415 COLL VENOUS BLD VENIPUNCTURE: CPT

## 2020-12-17 PROCEDURE — 83036 HEMOGLOBIN GLYCOSYLATED A1C: CPT

## 2020-12-17 PROCEDURE — 99213 OFFICE O/P EST LOW 20 MIN: CPT | Performed by: FAMILY MEDICINE

## 2020-12-17 PROCEDURE — 85025 COMPLETE CBC W/AUTO DIFF WBC: CPT

## 2020-12-17 PROCEDURE — G9717 DOC PT DX DEP/BP F/U NT REQ: HCPCS | Performed by: FAMILY MEDICINE

## 2020-12-17 PROCEDURE — G0463 HOSPITAL OUTPT CLINIC VISIT: HCPCS | Performed by: FAMILY MEDICINE

## 2020-12-17 PROCEDURE — 80061 LIPID PANEL: CPT

## 2020-12-17 PROCEDURE — G0439 PPPS, SUBSEQ VISIT: HCPCS | Performed by: FAMILY MEDICINE

## 2020-12-17 PROCEDURE — G8427 DOCREV CUR MEDS BY ELIG CLIN: HCPCS | Performed by: FAMILY MEDICINE

## 2020-12-17 PROCEDURE — G8417 CALC BMI ABV UP PARAM F/U: HCPCS | Performed by: FAMILY MEDICINE

## 2020-12-17 PROCEDURE — 80053 COMPREHEN METABOLIC PANEL: CPT

## 2020-12-17 RX ORDER — HYDROCODONE BITARTRATE AND ACETAMINOPHEN 7.5; 325 MG/1; MG/1
1 TABLET ORAL
Qty: 50 TAB | Refills: 0 | Status: SHIPPED | OUTPATIENT
Start: 2020-12-17 | End: 2021-01-17 | Stop reason: SDUPTHER

## 2020-12-17 RX ORDER — PROMETHAZINE HYDROCHLORIDE 25 MG/1
25 TABLET ORAL
Qty: 50 TAB | Refills: 0 | Status: SHIPPED | OUTPATIENT
Start: 2020-12-17 | End: 2021-01-17 | Stop reason: SDUPTHER

## 2020-12-17 RX ORDER — ZOLPIDEM TARTRATE 5 MG/1
5 TABLET ORAL
Qty: 30 TAB | Refills: 0 | Status: SHIPPED | OUTPATIENT
Start: 2020-12-17 | End: 2021-01-28

## 2020-12-17 NOTE — PROGRESS NOTES
This is the Subsequent Medicare Annual Wellness Exam, performed 12 months or more after the Initial AWV or the last Subsequent AWV    I have reviewed the patient's medical history in detail and updated the computerized patient record. Depression Risk Factor Screening:   No flowsheet data found. Alcohol Risk Screen    Do you average more than 1 drink per night or more than 7 drinks a week:  No    On any one occasion in the past three months have you have had more than 3 drinks containing alcohol:  No        Functional Ability and Level of Safety:    Hearing: Hearing is good. Activities of Daily Living: The home contains: handrails  Patient does total self care      Ambulation: with no difficulty     Fall Risk:  No flowsheet data found. Abuse Screen:  Patient is not abused       Cognitive Screening    Has your family/caregiver stated any concerns about your memory: no     Cognitive Screenin    Assessment/Plan   Education and counseling provided:  Are appropriate based on today's review and evaluation    Diagnoses and all orders for this visit:    1. Medicare annual wellness visit, initial    2. Chronic migraine  -     HYDROcodone-acetaminophen (NORCO) 7.5-325 mg per tablet; Take 1 Tab by mouth every six (6) hours as needed for Pain for up to 30 days. Max Daily Amount: 4 Tabs. -     promethazine (PHENERGAN) 25 mg tablet; Take 1 Tab by mouth every eight (8) hours as needed for Nausea. 3. Fibromyalgia  -     METABOLIC PANEL, COMPREHENSIVE  -     CBC WITH AUTOMATED DIFF  -     zolpidem (AMBIEN) 5 mg tablet; Take 1 Tab by mouth nightly as needed for Sleep. Max Daily Amount: 5 mg. 4. Generalized anxiety disorder    5. Mixed hyperlipidemia  -     LIPID PANEL    6. Hyperglycemia  -     HEMOGLOBIN A1C WITH EAG    7. Depression, unspecified depression type  -     zolpidem (AMBIEN) 5 mg tablet; Take 1 Tab by mouth nightly as needed for Sleep. Max Daily Amount: 5 mg.         Health Maintenance Due Health Maintenance Due   Topic Date Due    PAP AKA CERVICAL CYTOLOGY  07/18/2016    Medicare Yearly Exam  10/02/2020       Patient Care Team   Patient Care Team:  Saba Mcmahon MD as PCP - General (Family Medicine)  Saba Mcmahon MD as PCP - [facility] Empaneled Provider    History     Patient Active Problem List   Diagnosis Code    Migraine syndrome G43.909    Hepatic hemangioma D18.03    Depressive disorder, not elsewhere classified F32.9    IBS (irritable bowel syndrome) K58.9    Obesity E66.9    Cervical spondylarthritis M47.0    Encounter for long-term (current) use of other medications Z79.899    Carpal tunnel syndrome on left G56.02    Psychological factors affecting morbid obesity ([address] E66.01, F54    Obesity, morbid (more than 100 lbs over ideal weight or BMI > 40) ([facility]) E66.01    Chronic migraine G43.709    Depression F32.9    Esophageal reflux K21.9    Hyperglycemia R73.9    Fibromyalgia M79.7    Mixed hyperlipidemia E78.2     Past Medical History:   Diagnosis Date    Arthritis     DDD    Carpal tunnel syndrome on left 2/22/2011    Cervical spondylarthritis 2/20/2011    Depressive disorder, not elsewhere classified 2/17/2011    Encounter for long-term (current) use of other medications 2/20/2011    GERD (gastroesophageal reflux disease)     Headache(784.0)     Migraines    Hepatic hemangioma 2/17/2011    Hypertension     IBS (irritable bowel syndrome) 2/20/2011    Migraine syndrome 2/17/2011    Obesity 2/20/2011    Other ill-defined conditions(799.89)     hx.of syncope    Psychiatric disorder     depression      Past Surgical History:   Procedure Laterality Date    HX HEENT      tonsillectomy    HX ORTHOPAEDIC      carpal tunnel and tigger finger release rt     Current Outpatient Medications   Medication Sig Dispense Refill    HYDROcodone-acetaminophen (NORCO) 7.5-325 mg per tablet Take 1 Tab by mouth every six (6) hours as needed for Pain for up to 30 days. Max Daily Amount: 4 Tabs. 50 Tab 0    zolpidem (AMBIEN) 5 mg tablet Take 1 Tab by mouth nightly as needed for Sleep. Max Daily Amount: 5 mg. 30 Tab 0    promethazine (PHENERGAN) 25 mg tablet Take 1 Tab by mouth every eight (8) hours as needed for Nausea. 50 Tab 0    ALPRAZolam (XANAX) 1 mg tablet TAKE 1 TABLET BY MOUTH EVERY EVENING AS NEEDED FOR SLEEP 30 Tab 0    ARIPiprazole (ABILIFY) 5 mg tablet TAKE 1 TABLET BY MOUTH EVERY DAY AT NIGHT 90 Tab 0    venlafaxine-SR (EFFEXOR-XR) 150 mg capsule TAKE 1 CAPSULE BY MOUTH EVERY DAY 90 Cap 1    biotin 10,000 mcg cap Take  by mouth.  cyanocobalamin 1,000 mcg tablet Take 2,000 mcg by mouth daily.  furosemide (LASIX) 20 mg tablet TAKE 1 TABLET BY MOUTH EVERY DAY (Patient taking differently: daily as needed.) 90 Tab 1    gabapentin (NEURONTIN) 600 mg tablet TAKE 1 TABLET BY MOUTH THREE TIMES A  Tab 0    omeprazole (PRILOSEC) 40 mg capsule TAKE 1 CAPSULE BY MOUTH EVERY DAY 90 Cap 2    cyclobenzaprine (FLEXERIL) 10 mg tablet TAKE 1 TAB BY MOUTH THREE (3) TIMES DAILY (WITH MEALS). (Patient taking differently: Take 10 mg by mouth nightly.) 50 Tab 5    metFORMIN ER (GLUCOPHAGE XR) 500 mg tablet TAKE 1 TABLET BY MOUTH EVERY DAY WITH DINNER 90 Tab 4    hydroCHLOROthiazide (HYDRODIURIL) 25 mg tablet TAKE 1 TABLET BY MOUTH EVERY DAY 90 Tab 3    verapamil ER (CALAN-SR) 240 mg CR tablet TAKE 1 TABLET BY MOUTH AT BEDTIME 90 Tab 3    topiramate (TOPAMAX) 100 mg tablet START WITH 1/2 TABLET IN MORNING AND 1 TAB AT NIGHT FOR 2 WEEKS, THEN INCREASE TO 1 TAB TWICE A DAY (Patient taking differently: 200 mg nightly.) 90 Tab 5    docusate sodium (STOOL SOFTENER) 100 mg capsule 100 mg two (2) times a day.  Once daily        Allergies   Allergen Reactions    Atacand [Candesartan] Other (comments)     Patient B/P increase    Compazine [Prochlorperazine] Other (comments) and Unable to Obtain    Ciprofloxacin Other (comments)    Codeine Nausea and Vomiting  Zonisamide Nausea and Vomiting       Family History   Problem Relation Age of Onset    Heart Disease Mother     Hypertension Mother     Diabetes Mother     Heart Disease Father     Lung Disease Father     Hypertension Father      Social History     Tobacco Use    Smoking status: Never Smoker    Smokeless tobacco: Never Used   Substance Use Topics    Alcohol use: Yes     Comment: socially

## 2020-12-17 NOTE — LETTER
CONTROLLED SUBSTANCE MEDICATION AGREEMENT Patient Name: Tova Brunner Patient YOB: 1975 I understand, that controlled substance medications may be used to help better manage my symptoms and to improve my ability to function at home, work and in social settings. However, I also understand that these medications do have risks, which have been discussed with me, including possible development of physical or psychological dependence. I understand that successful treatment requires mutual trust and honesty between me and my provider. I understand and agree that following this Medication Agreement is necessary in continuing my provider-patient relationship and the success of my treatment plan. Explanation from my Provider: Benefits and Goals of Controlled Substance Medications: There are two potential goals for your treatment: (1) decreased pain and suffering (2) improved daily life functions. There are many possible treatments for your chronic condition(s). Alternatives such as physical therapy, yoga, massage, home daily exercise, meditation, relaxation techniques, injections, chiropractic manipulations, surgery, cognitive therapy, hypnosis and many medications that are not habit-forming may be used. Use of controlled substance medications may be helpful, but they are unlikely to resolve all symptoms or restore all function. Explanation from my Provider: Risks of Controlled Substance Medications: Opioid pain medications: These medications can lead to problems such as addiction/dependence, sedation, lightheadedness/dizziness, memory issues, falls, constipation, nausea, or vomiting. They may also impair the ability to drive or operate machinery. Additionally, these medications may lower testosterone levels, leading to loss of bone strength, stamina and sex drive. They may cause problems with breathing, sleep apnea and reduced coughing, which is especially dangerous for patients with lung disease. Overdose or dangerous interactions with alcohol and other medications may occur, leading to death. Hyperalgesia may develop, which means patients receiving opioids for the treatment of pain may become more sensitive to certain painful stimuli, and in some cases, experience pain from ordinarily non-painful stimuli. Women between the ages of 14-53 who could become pregnant should carefully weigh the risks and benefits of opioids with their physicians, as these medications increase the risk of pregnancy complications, including miscarriage,  delivery and stillbirth. It is also possible for babies to be born addicted to opioids. Opioid dependence withdrawal symptoms may include; feelings of uneasiness, increased pain, irritability, belly pain, diarrhea, sweats and goose-flesh. Benzodiazepines and non-benzodiazepine sleep medications: These medications can lead to problems such as addiction/dependence, sedation, fatigue, lightheadedness, dizziness, incoordination, falls, depression, hallucinations, and impaired judgment, memory and concentration. The ability to drive and operate machinery may also be affected. Abnormal sleep-related behaviors have been reported, including sleepwalking, driving, making telephone calls, eating, or having sex while not fully awake. These medications can suppress breathing and worsen sleep apnea, particularly when combined with alcohol or other sedating medications, potentially leading to death. Dependence withdrawal symptoms may include tremors, anxiety, hallucinations and seizures. Initials:_______ Stimulants:  Common adverse effects include addiction/dependence, increased blood 
pressure and heart rate, decreased appetite, nausea, involuntary weight loss, insomnia,  irritability, and headaches. These risks may increase when these medications are combined with other stimulants, such as caffeine pills or energy drinks, certain weight loss supplements and oral decongestants. Dependence withdrawal symptoms may include depressed mood, loss of interest, suicidal thoughts, anxiety, fatigue, appetite changes and agitation. Testosterone replacement therapy:  Potential side effects include increased risk of stroke and heart attack, blood clots, increased blood pressure, increased cholesterol, enlarged prostate, sleep apnea, irritability/aggression and other mood disorders, and decreased fertility. I agree and understand that I and my prescriber have the following rights and responsibilities regarding my treatment plan:  
 
1. MY RIGHTS: 
To be informed of my treatment and medication plan. To be an active participant in my health and wellbeing. 2. MY RESPONSIBILITY AND UNDERSTANDING FOR USE OF MEDICATIONS ? I will take medications at the dose and frequency as directed. For my safety, I will not increase or change how I take my medications without the recommendation of my healthcare provider. ? I will actively participate in any program recommended by my provider which may improve function, including social, physical, psychological programs. ? I will not take my medications with alcohol or other drugs not prescribed to me. I understand that drinking alcohol with my medications increases the chances of side effects, including reduced breathing rate and could lead to personal injury when operating machinery. ? I understand that if I have a history of substance use disorders, including alcohol or other illicit drugs, that I may be at increased risk of addiction to my medications. ? I agree to notify my provider immediately if I should become pregnant so that my treatment plan can be adjusted. ? I agree and understand that I shall only receive controlled substance medications from the prescriber that signed this agreement unless there is written agreement among other prescribers of controlled substances outlining the responsibility of the medications being prescribed. ? I understand that the if the controlled medication is not helping to achieve goals, the dosage may be tapered and no longer prescribed. 3. MY RESPONSIBILITY FOR COMMUNICATION / PRESCRIPTION RENEWALS 
? I agree that all controlled substance medications that I take will be prescribed only by my provider. If another healthcare provider prescribes me medication in an emergency, I will notify my provider within seventy-two (72) hours. ? I will arrange for refills at the prescribed interval ONLY during regular office hours. I will not ask for refills earlier than agreed, after-hours, on holidays or weekends. Refills may take up to 72 hours for processing and prescriptions to reach the pharmacy. ? I will inform my other health care providers that I am taking these medications and of the existence of this [id number]. In the event of an emergency, I will provide the same information to the emergency department prescribers. Initials:_______ ? I will keep my provider updated on the pharmacy I am using for controlled medication prescription filling. 4. MY RESPONSIBILITY FOR PROTECTING MEDICATIONS ? I will protect my prescriptions and medications. I understand that lost or misplaced prescriptions will not be replaced. ? I will keep medications only for my own use and will not share them with others. I will keep all medications away from children. ? I agree that if my medications are adjusted or discontinued, I will properly dispose of any remaining medications. I understand that I will be required to dispose of any remaining controlled medications as, directed by my prescriber, prior to being provided with any prescriptions for other controlled medications. Medication drop box locations can be found at: [email or web address] 5. MY RESPONSIBILITY WITH ILLEGAL DRUGS ? I will not use illegal or street drugs or another person's prescription medications not prescribed to me.  
? If there are identified addiction type symptoms, then referral to a program may be provided by my provider and I agree to follow through with this recommendation. 6. MY RESPONSIBILITY FOR COOPERATION WITH INVESTIGATIONS ? I understand that my provider will comply with any applicable law and may discuss my use and/or possible misuse/abuse of controlled substances and alcohol, as appropriate, with any health care provider involved in my care, pharmacist, or legal authority. ? I authorize my provider and pharmacy to cooperate fully with law enforcement agencies (as permitted by law) in the investigation of any possible misuse, sale, or other diversion of my controlled substances. ? I agree to waive any applicable privilege or right of privacy or confidentiality with respect to these authorizations. 7. PROVIDERS RIGHT TO MONITOR FOR SAFETY: PRESCRIPTION MONITORING / DRUG TESTING 
? I consent to drug/toxicology screening and will submit to a drug screen upon my providers request to assure I am only taking the prescribed drugs for my safety monitoring. I understand that a drug screen is a laboratory test in which a sample of my urine, blood or saliva is checked to see what drugs I have been taking. This may entail an observed urine specimen, which means that a nurse or other health care provider may watch me provide urine, and I will cooperate if I am asked to provide an observed specimen. ? I understand that my provider will check a copy of my State Prescription Monitoring Program () Report in order to safely prescribe medications. ? Pill Counts: I consent to pill counts when requested. I may be asked to bring all my prescribed controlled substance medications, in their original bottles, to all of my scheduled appointments. In addition, my provider may ask me to come to the practice at any time for a random pill count. Initials:_______ 8. TERMINATION OF THIS AGREEMENT For my safety, my prescriber has the right to stop prescribing controlled substance medications and may end this agreement. ? Conditions that may result in termination of this agreement: 
a. I do not show any improvement in pain, or my activity has not improved. b. I develop rapid tolerance or loss of improvement, as described in my treatment plan. 
c. I develop significant side effects from the medication. d. My behavior is not consistent with the responsibilities outlined above, thereby causing safety concerns to continue prescribing controlled substance medications. e. I fail to follow the terms of this agreement. f. Other:____________________________ UNDERSTANDING THIS MEDICATION AGREEMENT:   
I have read the above and have had all my questions answered. For chronic disease management, I know that my symptoms can be managed with many types of treatments. A chronic medication trial may be part of my treatment, but I must be an active participant in my care. Medication therapy is only one part of my symptom management plan. In some cases, there may be limited scientific evidence to support the chronic use of certain medications to improve symptoms and daily function. Furthermore, in certain circumstances, there may be scientific information that suggests that the use of chronic controlled substances may worsen my symptoms and increase my risk of unintentional death directly related to this medication therapy. I know that if my provider feels my risk from controlled medications is greater than my benefit, I will have my controlled substance medication(s) compassionately lowered or removed altogether. I further agree to allow this office to contact my HIPAA contact if there are concerns about my safety and use of the controlled medications. I have agreed to use the prescribed controlled substance medications to me as instructed by my provider and as stated in this Medication Agreement. My initial on each page and my signature below shows that I have read each page and I have had the opportunity to ask questions with answers provided by my provider. Patient Name (Printed): _____________________________________ Patient Signature:  ______________________   Date: _____________ Prescriber Name (Printed): ___________________________________ Prescriber Signature: _____________________  Date: _____________

## 2020-12-17 NOTE — PROGRESS NOTES
Chief Complaint   Patient presents with    Hypertension     F/U on BP.  Migraine     Pt having migraine. 1. Have you been to the ER, urgent care clinic since your last visit? Hospitalized since your last visit? No    2. Have you seen or consulted any other health care providers outside of the [address] [place] [address] since your last visit? Include any pap smears or colon screening.  No

## 2020-12-17 NOTE — PROGRESS NOTES
HISTORY OF PRESENT ILLNESS  Ashlee Priest is a 50 y.o. female. f/u chronic migraine,depression,cervicalgia,fibromyalgia. Headaches are constant daily occurances and quite debilitaing. They are nuchal/occipital,nonthrobbing. Seeing Neuro,Fibro c/o stable. Disordered sleep remains a problem  Migraine   The history is provided by the patient. This is a chronic problem. The problem occurs constantly. The problem has not changed since onset. The pain is at a severity of 6/10. Associated symptoms include malaise/fatigue. Pertinent negatives include no fever, no palpitations and no visual change. Depression  The history is provided by the patient. This is a chronic problem. The problem occurs daily. The problem has not changed since onset. Pertinent negatives include no chest pain and no abdominal pain. Myalgia  The history is provided by the patient. This is a chronic problem. The problem occurs daily. The problem has not changed since onset. Pertinent negatives include no chest pain and no abdominal pain. Hypertension   The history is provided by the patient. This is a chronic problem. The problem has not changed since onset. Associated symptoms include malaise/fatigue and neck pain. Pertinent negatives include no chest pain and no palpitations. Review of Systems   Constitutional: Positive for malaise/fatigue. Negative for fever. Cardiovascular: Negative for chest pain and palpitations. Gastrointestinal: Negative for abdominal pain, blood in stool, constipation and melena. Genitourinary: Negative for frequency. Musculoskeletal: Positive for myalgias and neck pain. Psychiatric/Behavioral: Positive for depression. Negative for substance abuse and suicidal ideas. The patient has insomnia. The patient is not nervous/anxious. Physical Exam  Constitutional:       Appearance: She is well-developed. HENT:      Head: Normocephalic and atraumatic.       Right Ear: External ear normal.      Left Ear: External ear normal.      Nose: Nose normal.   Neck:      Musculoskeletal: Neck supple. Cardiovascular:      Rate and Rhythm: Normal rate and regular rhythm. Heart sounds: No murmur. No gallop. Pulmonary:      Effort: Pulmonary effort is normal.      Breath sounds: Normal breath sounds. Abdominal:      General: Bowel sounds are normal.   Musculoskeletal:      Cervical back: She exhibits decreased range of motion and tenderness. Lumbar back: She exhibits decreased range of motion and tenderness. Back:    Skin:     General: Skin is warm and dry. Neurological:      Mental Status: She is alert and oriented to person, place, and time. Cranial Nerves: No cranial nerve deficit. Coordination: Coordination normal.      Deep Tendon Reflexes: Reflexes are normal and symmetric. Psychiatric:      Comments: Flat,depressed               Diagnoses and all orders for this visit:    1. Medicare annual wellness visit, initial    2. Chronic migraine  -     HYDROcodone-acetaminophen (NORCO) 7.5-325 mg per tablet; Take 1 Tab by mouth every six (6) hours as needed for Pain for up to 30 days. Max Daily Amount: 4 Tabs. -     promethazine (PHENERGAN) 25 mg tablet; Take 1 Tab by mouth every eight (8) hours as needed for Nausea. 3. Fibromyalgia  -     METABOLIC PANEL, COMPREHENSIVE  -     CBC WITH AUTOMATED DIFF  -     zolpidem (AMBIEN) 5 mg tablet; Take 1 Tab by mouth nightly as needed for Sleep. Max Daily Amount: 5 mg. 4. Generalized anxiety disorder    5. Mixed hyperlipidemia  -     LIPID PANEL    6. Hyperglycemia  -     HEMOGLOBIN A1C WITH EAG    7. Depression, unspecified depression type  -     zolpidem (AMBIEN) 5 mg tablet; Take 1 Tab by mouth nightly as needed for Sleep. Max Daily Amount: 5 mg. Follow-up and Dispositions    · Return in about 4 months (around 4/17/2021). Follow-up and Dispositions    · Return in about 4 months (around 4/17/2021).

## 2020-12-18 LAB
ALBUMIN SERPL-MCNC: 4.3 G/DL (ref 3.8–4.8)
ALBUMIN/GLOB SERPL: 1.5 {RATIO} (ref 1.2–2.2)
ALP SERPL-CCNC: 83 IU/L (ref 39–117)
ALT SERPL-CCNC: 38 IU/L (ref 0–32)
AST SERPL-CCNC: 29 IU/L (ref 0–40)
BASOPHILS # BLD AUTO: 0 X10E3/UL (ref 0–0.2)
BASOPHILS NFR BLD AUTO: 0 %
BILIRUB SERPL-MCNC: <0.2 MG/DL (ref 0–1.2)
BUN SERPL-MCNC: 13 MG/DL (ref 6–24)
BUN/CREAT SERPL: 22 (ref 9–23)
CALCIUM SERPL-MCNC: 9.6 MG/DL (ref 8.7–10.2)
CHLORIDE SERPL-SCNC: 101 MMOL/L (ref 96–106)
CHOLEST SERPL-MCNC: 215 MG/DL (ref 100–199)
CO2 SERPL-SCNC: 24 MMOL/L (ref 20–29)
CREAT SERPL-MCNC: 0.58 MG/DL (ref 0.57–1)
EOSINOPHIL # BLD AUTO: 0 X10E3/UL (ref 0–0.4)
EOSINOPHIL NFR BLD AUTO: 0 %
ERYTHROCYTE [DISTWIDTH] IN BLOOD BY AUTOMATED COUNT: 14.4 % (ref 11.7–15.4)
EST. AVERAGE GLUCOSE BLD GHB EST-MCNC: 128 MG/DL
GLOBULIN SER CALC-MCNC: 2.8 G/DL (ref 1.5–4.5)
GLUCOSE SERPL-MCNC: 106 MG/DL (ref 65–99)
HBA1C MFR BLD: 6.1 % (ref 4.8–5.6)
HCT VFR BLD AUTO: 38.2 % (ref 34–46.6)
HDLC SERPL-MCNC: 36 MG/DL
HGB BLD-MCNC: 12.9 G/DL (ref 11.1–15.9)
IMM GRANULOCYTES # BLD AUTO: 0.2 X10E3/UL (ref 0–0.1)
IMM GRANULOCYTES NFR BLD AUTO: 1 %
INTERPRETATION, 910389: NORMAL
LDLC SERPL CALC-MCNC: 109 MG/DL (ref 0–99)
LYMPHOCYTES # BLD AUTO: 4.5 X10E3/UL (ref 0.7–3.1)
LYMPHOCYTES NFR BLD AUTO: 32 %
MCH RBC QN AUTO: 30.6 PG (ref 26.6–33)
MCHC RBC AUTO-ENTMCNC: 33.8 G/DL (ref 31.5–35.7)
MCV RBC AUTO: 91 FL (ref 79–97)
MONOCYTES # BLD AUTO: 1 X10E3/UL (ref 0.1–0.9)
MONOCYTES NFR BLD AUTO: 7 %
NEUTROPHILS # BLD AUTO: 8.5 X10E3/UL (ref 1.4–7)
NEUTROPHILS NFR BLD AUTO: 60 %
PLATELET # BLD AUTO: 548 X10E3/UL (ref 150–450)
POTASSIUM SERPL-SCNC: 3.9 MMOL/L (ref 3.5–5.2)
PROT SERPL-MCNC: 7.1 G/DL (ref 6–8.5)
RBC # BLD AUTO: 4.22 X10E6/UL (ref 3.77–5.28)
SODIUM SERPL-SCNC: 140 MMOL/L (ref 134–144)
TRIGL SERPL-MCNC: 406 MG/DL (ref 0–149)
VLDLC SERPL CALC-MCNC: 70 MG/DL (ref 5–40)
WBC # BLD AUTO: 14.2 X10E3/UL (ref 3.4–10.8)

## 2020-12-21 DIAGNOSIS — D75.839 THROMBOCYTOSIS: Primary | ICD-10-CM

## 2020-12-30 DIAGNOSIS — F41.1 GENERALIZED ANXIETY DISORDER: ICD-10-CM

## 2020-12-30 RX ORDER — ALPRAZOLAM 1 MG/1
TABLET ORAL
Qty: 30 TAB | Refills: 0 | Status: SHIPPED | OUTPATIENT
Start: 2020-12-30 | End: 2021-01-28

## 2021-01-18 ENCOUNTER — OFFICE VISIT (OUTPATIENT)
Dept: ONCOLOGY | Age: 46
End: 2021-01-18
Payer: MEDICARE

## 2021-01-18 VITALS
BODY MASS INDEX: 47.09 KG/M2 | DIASTOLIC BLOOD PRESSURE: 85 MMHG | TEMPERATURE: 98.4 F | SYSTOLIC BLOOD PRESSURE: 134 MMHG | HEART RATE: 120 BPM | HEIGHT: 66 IN | WEIGHT: 293 LBS

## 2021-01-18 DIAGNOSIS — D72.829 LEUKOCYTOSIS, UNSPECIFIED TYPE: Primary | ICD-10-CM

## 2021-01-18 DIAGNOSIS — D75.839 THROMBOCYTOSIS: ICD-10-CM

## 2021-01-18 PROCEDURE — 99203 OFFICE O/P NEW LOW 30 MIN: CPT | Performed by: INTERNAL MEDICINE

## 2021-01-18 PROCEDURE — G8427 DOCREV CUR MEDS BY ELIG CLIN: HCPCS | Performed by: INTERNAL MEDICINE

## 2021-01-18 PROCEDURE — G9717 DOC PT DX DEP/BP F/U NT REQ: HCPCS | Performed by: INTERNAL MEDICINE

## 2021-01-18 PROCEDURE — G8417 CALC BMI ABV UP PARAM F/U: HCPCS | Performed by: INTERNAL MEDICINE

## 2021-01-18 RX ORDER — PROMETHAZINE HYDROCHLORIDE 25 MG/1
25 TABLET ORAL
Qty: 50 TAB | Refills: 0 | Status: SHIPPED | OUTPATIENT
Start: 2021-01-18 | End: 2021-02-18 | Stop reason: SDUPTHER

## 2021-01-18 RX ORDER — HYDROCODONE BITARTRATE AND ACETAMINOPHEN 7.5; 325 MG/1; MG/1
1 TABLET ORAL
Qty: 50 TAB | Refills: 0 | Status: SHIPPED | OUTPATIENT
Start: 2021-01-18 | End: 2021-02-18 | Stop reason: SDUPTHER

## 2021-01-18 NOTE — PROGRESS NOTES
Kaylee Morton is a 50 y.o. female here for new patient appt for thrombocytosis. Referred by Dr Joyce Regan. Labs done 12/7/20   Plt 26  Was having routine lab work done that is done every 3 months for migraines and the meds she is on. She has been having some dizziness that started about 2 weeks before labs were done. She did pass out once a few weeks ago. She can lay in bed and the room would be spinning.

## 2021-01-18 NOTE — PROGRESS NOTES
VIBHA FROM DR NAVARRO CBC WITH DIFF FERRITIN IRON PROFILE JAK2 V617F,RFLEX CALR/MPL and CT GUIDED BONE MARROW BIOPSY maybe in the future.

## 2021-01-19 NOTE — PROGRESS NOTES
[facility]  [address], [address] [facility] [place], [address] [facility]  [phone]      Hematology Consultation Note        Patient: Molina Sherwood MRN: [id number]  SSN: [id number]    YOB: 1975  Age: 50 y.o. Sex: female      Subjective:      Molina Sherwood is a 50 y.o. female who I am seeing in consultation for leukocytosis and thrombocytosis. Leukocytosis and thrombocytosis has been present for a over 5 years. The blood count has waxed and waned for a number of years. She is suffering from severe vertigo. She describes the vertigo as severe, intermittent. She denies nausea and vomiting. She is only fallen once. She is able to walk. She denies weight loss.           Review of Systems:    Constitutional: negative  Eyes: negative  Ears, Nose, Mouth, Throat, and Face: negative  Respiratory: negative  Cardiovascular: negative  Gastrointestinal: negative  Genitourinary:negative  Integument/Breast: negative  Hematologic/Lymphatic: negative  Musculoskeletal:negative  Neurological: dizziness      Past Medical History:   Diagnosis Date    Arthritis     DDD    Carpal tunnel syndrome on left 2/22/2011    Cervical spondylarthritis 2/20/2011    Depressive disorder, not elsewhere classified 2/17/2011    Encounter for long-term (current) use of other medications 2/20/2011    GERD (gastroesophageal reflux disease)     Headache(784.0)     Migraines    Hepatic hemangioma 2/17/2011    Hypertension     IBS (irritable bowel syndrome) 2/20/2011    Migraine syndrome 2/17/2011    Obesity 2/20/2011    Other ill-defined conditions(799.89)     hx.of syncope    Psychiatric disorder     depression     Past Surgical History:   Procedure Laterality Date    HX HEENT      tonsillectomy    HX ORTHOPAEDIC      carpal tunnel and tigger finger release rt      Family History   Problem Relation Age of Onset    Heart Disease Mother  Hypertension Mother     Diabetes Mother     Heart Disease Father     Lung Disease Father     Hypertension Father      Social History     Tobacco Use    Smoking status: Never Smoker    Smokeless tobacco: Never Used    Tobacco comment: SECOND HAND SMOKE/PARENTS   Substance Use Topics    Alcohol use: Not Currently      Prior to Admission medications    Medication Sig Start Date End Date Taking? Authorizing Provider   HYDROcodone-acetaminophen (NORCO) 7.5-325 mg per tablet Take 1 Tab by mouth every six (6) hours as needed for Pain for up to 30 days. Max Daily Amount: 4 Tabs. 1/18/21 2/17/21 Yes Esvin Morrison MD   promethazine (PHENERGAN) 25 mg tablet Take 1 Tab by mouth every eight (8) hours as needed for Nausea. 1/18/21  Yes Esvin Morrison MD   ALPRAZolam Irlanda Tisha) 1 mg tablet TAKE 1 TABLET BY MOUTH EVERY EVENING AS NEEDED FOR SLEEP 12/30/20  Yes Esvin Morrison MD   zolpidem (AMBIEN) 5 mg tablet Take 1 Tab by mouth nightly as needed for Sleep. Max Daily Amount: 5 mg. 12/17/20  Yes Esvin Morrison MD   venlafaxine-SR [facility] 150 mg capsule TAKE 1 CAPSULE BY MOUTH EVERY DAY 10/26/20  Yes Esvin Morrison MD   biotin 10,000 mcg cap Take  by mouth. Yes Provider, Historical   cyanocobalamin 1,000 mcg tablet Take 2,000 mcg by mouth daily. Yes Provider, Historical   furosemide (LASIX) 20 mg tablet TAKE 1 TABLET BY MOUTH EVERY DAY  Patient taking differently: daily as needed. 9/30/20  Yes Esvin Morrison MD   gabapentin (NEURONTIN) 600 mg tablet TAKE 1 TABLET BY MOUTH THREE TIMES A DAY 9/30/20  Yes Esvin Morrison MD   omeprazole (PRILOSEC) 40 mg capsule TAKE 1 CAPSULE BY MOUTH EVERY DAY 9/30/20  Yes Esvin Morrison MD   cyclobenzaprine (FLEXERIL) 10 mg tablet TAKE 1 TAB BY MOUTH THREE (3) TIMES DAILY (WITH MEALS). Patient taking differently: Take 10 mg by mouth nightly.  8/26/20  Yes Esvin Morrison MD   metFORMIN ER (GLUCOPHAGE XR) 500 mg tablet TAKE 1 TABLET BY MOUTH EVERY DAY WITH DINNER 5/28/20  Yes Howard Salcido MD   hydroCHLOROthiazide (HYDRODIURIL) 25 mg tablet TAKE 1 TABLET BY MOUTH EVERY DAY 4/6/20  Yes Howard Salcido MD   verapamil ER (CALAN-SR) 240 mg CR tablet TAKE 1 TABLET BY MOUTH AT BEDTIME 3/3/20  Yes Howard Salcido MD   topiramate (TOPAMAX) 100 mg tablet START WITH 1/2 TABLET IN MORNING AND 1 TAB AT NIGHT FOR 2 WEEKS, THEN INCREASE TO 1 TAB TWICE A DAY  Patient taking differently: 200 mg nightly. 11/23/18  Yes Howard Salcido MD   docusate sodium (STOOL SOFTENER) 100 mg capsule 100 mg two (2) times a day. Once daily    Yes Provider, Brandee   ARIPiprazole (ABILIFY) 5 mg tablet TAKE 1 TABLET BY MOUTH EVERY DAY AT NIGHT 11/4/20   Howard Salcido MD              Allergies   Allergen Reactions    Atacand [Candesartan] Other (comments)     Patient B/P increase    Compazine [Prochlorperazine] Other (comments) and Unable to Obtain    Ciprofloxacin Other (comments)    Codeine Nausea and Vomiting    Zonisamide Nausea and Vomiting           Objective:     Vitals:    01/18/21 1358   BP: 134/85   Pulse: (!) 120   Temp: 98.4 °F (36.9 °C)   TempSrc: Temporal   Weight: 310 lb 12.8 oz (141 kg)   Height: 5' 6\" (1.676 m)            Physical Exam:    GENERAL: alert, cooperative, no distress, morbid obese  EYE: conjunctivae/corneas clear. PERRL, EOM's intact  LYMPHATIC: Cervical, supraclavicular, and axillary nodes normal.   THROAT & NECK: normal and no erythema or exudates noted. LUNG: clear to auscultation bilaterally  HEART: regular rate and rhythm, S1, S2 normal, no murmur, click, rub or gallop  ABDOMEN: soft, non-tender. Bowel sounds normal. No masses,  no organomegaly  EXTREMITIES:  extremities normal, atraumatic, no cyanosis or edema  SKIN: Normal.  NEUROLOGIC: AOx3. Gait normal. Reflexes and motor strength normal and symmetric. Cranial nerves 2-12 and sensation grossly intact.       Lab Results   Component Value Date/Time    WBC 14.2 (H) 12/17/2020 01:37 PM    WBC 8.2 06/01/2012 02:45 PM    HGB 12.9 12/17/2020 01:37 PM    HCT 38.2 12/17/2020 01:37 PM    PLATELET 184 (H) 40/58/0459 01:37 PM    MCV 91 12/17/2020 01:37 PM       Lab Results   Component Value Date/Time    Sodium 140 12/17/2020 01:37 PM    Potassium 3.9 12/17/2020 01:37 PM    Chloride 101 12/17/2020 01:37 PM    CO2 24 12/17/2020 01:37 PM    Anion gap 11 05/21/2014 06:17 AM    Glucose 106 (H) 12/17/2020 01:37 PM    BUN 13 12/17/2020 01:37 PM    Creatinine 0.58 12/17/2020 01:37 PM    BUN/Creatinine ratio 22 12/17/2020 01:37 PM    GFR est  12/17/2020 01:37 PM    GFR est non- 12/17/2020 01:37 PM    Calcium 9.6 12/17/2020 01:37 PM    Bilirubin, total <0.2 12/17/2020 01:37 PM    Alk. phosphatase 83 12/17/2020 01:37 PM    Protein, total 7.1 12/17/2020 01:37 PM    Albumin 4.3 12/17/2020 01:37 PM    Globulin 4.1 (H) 05/21/2014 06:17 AM    A-G Ratio 1.5 12/17/2020 01:37 PM    ALT (SGPT) 38 (H) 12/17/2020 01:37 PM    AST (SGOT) 29 12/17/2020 01:37 PM          Assessment:     1. Leukocytosis and thrombocytosis    Likely benign. Plan:       1. Repeat CBC and mutation studies       Signed by: Maegan Mercado MD                     January 19, 2021       CC.  Paco Kulkarni MD

## 2021-01-25 LAB
BACKGROUND, 081113: NORMAL
BACKGROUND: 480503: NORMAL
BASOPHILS # BLD AUTO: 0 X10E3/UL (ref 0–0.2)
BASOPHILS NFR BLD AUTO: 0 %
CALR MUTATION DETECTION RESULT, 489451: NORMAL
EOSINOPHIL # BLD AUTO: 0 X10E3/UL (ref 0–0.4)
EOSINOPHIL NFR BLD AUTO: 0 %
ERYTHROCYTE [DISTWIDTH] IN BLOOD BY AUTOMATED COUNT: 14.5 % (ref 11.7–15.4)
EXTRACTION: NORMAL
FERRITIN SERPL-MCNC: 76 NG/ML (ref 15–150)
HCT VFR BLD AUTO: 38.5 % (ref 34–46.6)
HGB BLD-MCNC: 13 G/DL (ref 11.1–15.9)
IMM GRANULOCYTES # BLD AUTO: 0.2 X10E3/UL (ref 0–0.1)
IMM GRANULOCYTES NFR BLD AUTO: 1 %
IRON SATN MFR SERPL: 13 % (ref 15–55)
IRON SERPL-MCNC: 49 UG/DL (ref 27–159)
JAK2 P.V617F BLD/T QL: NORMAL
LAB DIRECTOR NAME PROVIDER: NORMAL
LYMPHOCYTES # BLD AUTO: 5.1 X10E3/UL (ref 0.7–3.1)
LYMPHOCYTES NFR BLD AUTO: 30 %
MCH RBC QN AUTO: 30 PG (ref 26.6–33)
MCHC RBC AUTO-ENTMCNC: 33.8 G/DL (ref 31.5–35.7)
MCV RBC AUTO: 89 FL (ref 79–97)
MONOCYTES # BLD AUTO: 1.3 X10E3/UL (ref 0.1–0.9)
MONOCYTES NFR BLD AUTO: 7 %
MPL GENE MUT TESTED BLD/T: NORMAL
MPL P.W515L+W515K+S505N BLD/T QL: NORMAL
NEUTROPHILS # BLD AUTO: 10.4 X10E3/UL (ref 1.4–7)
NEUTROPHILS NFR BLD AUTO: 62 %
PLATELET # BLD AUTO: 532 X10E3/UL (ref 150–450)
RBC # BLD AUTO: 4.33 X10E6/UL (ref 3.77–5.28)
REF LAB TEST METHOD: NORMAL
REFERENCES, 150293: NORMAL
REFERENCES, 150293: NORMAL
REFLEX: NORMAL
SERVICE CMNT-IMP: NORMAL
TIBC SERPL-MCNC: 390 UG/DL (ref 250–450)
UIBC SERPL-MCNC: 341 UG/DL (ref 131–425)
WBC # BLD AUTO: 17 X10E3/UL (ref 3.4–10.8)

## 2021-01-26 ENCOUNTER — TELEPHONE (OUTPATIENT)
Dept: NEUROLOGY | Age: 46
End: 2021-01-26

## 2021-01-26 ENCOUNTER — VIRTUAL VISIT (OUTPATIENT)
Dept: NEUROLOGY | Age: 46
End: 2021-01-26
Payer: MEDICARE

## 2021-01-26 DIAGNOSIS — F33.41 RECURRENT MAJOR DEPRESSIVE DISORDER, IN PARTIAL REMISSION (HCC): ICD-10-CM

## 2021-01-26 DIAGNOSIS — R73.9 HYPERGLYCEMIA: ICD-10-CM

## 2021-01-26 DIAGNOSIS — G43.711 INTRACTABLE CHRONIC MIGRAINE WITHOUT AURA WITH STATUS MIGRAINOSUS: Primary | ICD-10-CM

## 2021-01-26 DIAGNOSIS — I10 BENIGN ESSENTIAL HTN: ICD-10-CM

## 2021-01-26 PROCEDURE — G8756 NO BP MEASURE DOC: HCPCS | Performed by: PSYCHIATRY & NEUROLOGY

## 2021-01-26 PROCEDURE — G8427 DOCREV CUR MEDS BY ELIG CLIN: HCPCS | Performed by: PSYCHIATRY & NEUROLOGY

## 2021-01-26 PROCEDURE — 99214 OFFICE O/P EST MOD 30 MIN: CPT | Performed by: PSYCHIATRY & NEUROLOGY

## 2021-01-26 PROCEDURE — G9717 DOC PT DX DEP/BP F/U NT REQ: HCPCS | Performed by: PSYCHIATRY & NEUROLOGY

## 2021-01-26 PROCEDURE — G8417 CALC BMI ABV UP PARAM F/U: HCPCS | Performed by: PSYCHIATRY & NEUROLOGY

## 2021-01-26 RX ORDER — ONABOTULINUMTOXINA 200 [USP'U]/1
INJECTION, POWDER, LYOPHILIZED, FOR SOLUTION INTRADERMAL; INTRAMUSCULAR
Qty: 1 VIAL | Refills: 3 | Status: SHIPPED | OUTPATIENT
Start: 2021-01-26 | End: 2021-03-09 | Stop reason: SDUPTHER

## 2021-01-26 NOTE — PROGRESS NOTES
Follow-up Visit    Name Diane Gomez Age 50 y.o. MRN [id number]  1975       Chief Complaint:       Patient returns for follow up. She has failed the emgality. On emgality suffered 20 migraines days per month. It appeared to her that she was worse. She would like to restart chemodenervation. Intermittent vertigo when turning head to the right. Feels severe. No head trauma. No tinnitus. No fullness or pain in the ears. No double vision. Episodes last about a 90 seconds. Assesment and Plan  1. Migraines  Has had significant releif with botox int he past and would like to restart  She is on topamax  Has tried elavil  Has tried cymbalata  SHe is on verapamil. Failed emgality  She has tried fioricet   She has tried imitex  She as on all these medicatrions more than two months and they did not help her migraines  She had migraines since age 30. They are daily bitemporal headaches, throbbing in nature. Associated with photophobia and nausea. 2. Dizziness  Has intermittent dizziness  Referral to ENT      3. Essential hypertension  Continue hctz    4.  hypergllycemia  Continue Metformin    5. Depression  Continue Effexor work and Abilify    Allergies  Atacand [candesartan], Compazine [prochlorperazine], Ciprofloxacin, Codeine, and Zonisamide     Medications  Current Outpatient Medications   Medication Sig    cyclobenzaprine (FLEXERIL) 10 mg tablet TAKE 1 TAB BY MOUTH THREE (3) TIMES DAILY (WITH MEALS).  HYDROcodone-acetaminophen (NORCO) 7.5-325 mg per tablet Take 1 Tab by mouth every six (6) hours as needed for Pain for up to 30 days. Max Daily Amount: 4 Tabs.  promethazine (PHENERGAN) 25 mg tablet Take 1 Tab by mouth every eight (8) hours as needed for Nausea.  ALPRAZolam (XANAX) 1 mg tablet TAKE 1 TABLET BY MOUTH EVERY EVENING AS NEEDED FOR SLEEP    zolpidem (AMBIEN) 5 mg tablet Take 1 Tab by mouth nightly as needed for Sleep. Max Daily Amount: 5 mg.     venlafaxine-SR (EFFEXOR-XR) 150 mg capsule TAKE 1 CAPSULE BY MOUTH EVERY DAY    biotin 10,000 mcg cap Take  by mouth.  cyanocobalamin 1,000 mcg tablet Take 2,000 mcg by mouth daily.  furosemide (LASIX) 20 mg tablet TAKE 1 TABLET BY MOUTH EVERY DAY (Patient taking differently: daily as needed.)    gabapentin (NEURONTIN) 600 mg tablet TAKE 1 TABLET BY MOUTH THREE TIMES A DAY    omeprazole (PRILOSEC) 40 mg capsule TAKE 1 CAPSULE BY MOUTH EVERY DAY    metFORMIN ER (GLUCOPHAGE XR) 500 mg tablet TAKE 1 TABLET BY MOUTH EVERY DAY WITH DINNER    hydroCHLOROthiazide (HYDRODIURIL) 25 mg tablet TAKE 1 TABLET BY MOUTH EVERY DAY    verapamil ER (CALAN-SR) 240 mg CR tablet TAKE 1 TABLET BY MOUTH AT BEDTIME    docusate sodium (STOOL SOFTENER) 100 mg capsule 100 mg two (2) times a day. Once daily     ARIPiprazole (ABILIFY) 5 mg tablet TAKE 1 TABLET BY MOUTH EVERY DAY AT NIGHT    topiramate (TOPAMAX) 100 mg tablet START WITH 1/2 TABLET IN MORNING AND 1 TAB AT NIGHT FOR 2 WEEKS, THEN INCREASE TO 1 TAB TWICE A DAY (Patient taking differently: 200 mg nightly.)     No current facility-administered medications for this visit. Medical History  Past Medical History:   Diagnosis Date    Arthritis     DDD    Carpal tunnel syndrome on left 2/22/2011    Cervical spondylarthritis 2/20/2011    Depressive disorder, not elsewhere classified 2/17/2011    Encounter for long-term (current) use of other medications 2/20/2011    GERD (gastroesophageal reflux disease)     Headache(784.0)     Migraines    Hepatic hemangioma 2/17/2011    Hypertension     IBS (irritable bowel syndrome) 2/20/2011    Migraine syndrome 2/17/2011    Obesity 2/20/2011    Other ill-defined conditions(799.89)     hx.of syncope    Psychiatric disorder     depression       Review of Systems   Eyes: Negative for blurred vision and double vision. Respiratory: Negative for cough and shortness of breath.     Cardiovascular: Negative for chest pain, palpitations and orthopnea. Gastrointestinal: Negative for nausea and vomiting. Neurological: Positive for dizziness and headaches. Psychiatric/Behavioral: Negative for depression. The patient is nervous/anxious. Exam:      General: Well developed, well nourished. Patient in no distress   Head: Normocephalic, atraumatic, anicteric sclera   Neck Normal ROM   Lungs:  Normal effort   Ext: No pedal edema   Skin: Supple no rash     NeurologicExam:  Mental Status: Alert and oriented to person place and time   Speech: Fluent no aphasia or dysarthria. Cranial Nerves:  II - XII Intact   Motor:  Full and symmetric strength of upper and lower extremities. Normal bulk. Sensory:   Symmetric and intact with no perceived deficits . Gait:  Gait is balanced  with normal arm swing. Tremor:   No tremor noted. Cerebellar:  Coordination intact.            Lab Review  Lab Results   Component Value Date/Time    WBC 17.0 (H) 01/18/2021 02:57 PM    HCT 38.5 01/18/2021 02:57 PM    HGB 13.0 01/18/2021 02:57 PM    PLATELET 926 (H) 57/91/6570 02:57 PM       Lab Results   Component Value Date/Time    Sodium 140 12/17/2020 01:37 PM    Potassium 3.9 12/17/2020 01:37 PM    Chloride 101 12/17/2020 01:37 PM    CO2 24 12/17/2020 01:37 PM    Glucose 106 (H) 12/17/2020 01:37 PM    BUN 13 12/17/2020 01:37 PM    Creatinine 0.58 12/17/2020 01:37 PM    Calcium 9.6 12/17/2020 01:37 PM         Lab Results   Component Value Date/Time    Hemoglobin A1c 6.1 (H) 12/17/2020 01:37 PM    Hemoglobin A1c (POC) 5.7 08/13/2020 01:25 PM        Lab Results   Component Value Date/Time    Vitamin B12 >1999 (H) 12/30/2015 02:36 PM       Lab Results   Component Value Date/Time    Cholesterol, total 215 (H) 12/17/2020 01:37 PM    HDL Cholesterol 36 (L) 12/17/2020 01:37 PM    LDL, calculated 109 (H) 12/17/2020 01:37 PM    LDL, calculated Comment 08/13/2020 01:50 PM    VLDL, calculated 70 (H) 12/17/2020 01:37 PM    VLDL, calculated Comment 08/13/2020 01:50 PM Triglyceride 406 (H) 12/17/2020 01:37 PM       Everardo Salguero was seen by synchronous (real-time) audio-video technology on 01/26/21. Consent:  She and/or her healthcare decision maker is aware that this patient-initiated Telehealth encounter is a billable service, with coverage as determined by her insurance carrier. She is aware that she may receive a bill and has provided verbal consent to proceed: Yes    I was in the office while conducting this encounter. Pursuant to the emergency declaration under the [facility], [facility] waiver authority and the [employer] and [employer] Act, this Virtual  Visit was conducted, with patient's consent, to reduce the patient's risk of exposure to COVID-19 and provide continuity of care for an established patient. Services were provided through a video synchronous discussion virtually to substitute for in-person clinic visit.

## 2021-01-26 NOTE — TELEPHONE ENCOUNTER
Pt setup for first botox on 3-18.  rx signed and given to [facility] , pt notified via [employer]

## 2021-01-28 DIAGNOSIS — F32.A DEPRESSION, UNSPECIFIED DEPRESSION TYPE: ICD-10-CM

## 2021-01-28 DIAGNOSIS — F41.1 GENERALIZED ANXIETY DISORDER: ICD-10-CM

## 2021-01-28 DIAGNOSIS — M79.7 FIBROMYALGIA: ICD-10-CM

## 2021-01-28 RX ORDER — ALPRAZOLAM 1 MG/1
TABLET ORAL
Qty: 30 TAB | Refills: 0 | Status: SHIPPED | OUTPATIENT
Start: 2021-01-28 | End: 2021-03-01

## 2021-01-28 RX ORDER — ZOLPIDEM TARTRATE 5 MG/1
TABLET ORAL
Qty: 30 TAB | Refills: 0 | Status: SHIPPED | OUTPATIENT
Start: 2021-01-28 | End: 2021-03-01

## 2021-02-05 ENCOUNTER — TELEPHONE (OUTPATIENT)
Dept: ONCOLOGY | Age: 46
End: 2021-02-05

## 2021-02-05 DIAGNOSIS — D75.839 THROMBOCYTOSIS: Primary | ICD-10-CM

## 2021-02-05 DIAGNOSIS — D72.829 LEUKOCYTOSIS, UNSPECIFIED TYPE: ICD-10-CM

## 2021-02-05 NOTE — TELEPHONE ENCOUNTER
Called pt. HIPAA verified by two patient identifiers. Pt aware and agreeable to do the bone marrow biopsy. I asked her to call our office by Wednesday if she is not notified.

## 2021-02-05 NOTE — TELEPHONE ENCOUNTER
----- Message from Angela Lackey MD sent at 1/25/2021  5:35 PM EST -----  Labs are normal. Consider bone marrow biopsy if she is agreeable to it.

## 2021-02-10 ENCOUNTER — HOSPITAL ENCOUNTER (OUTPATIENT)
Dept: CT IMAGING | Age: 46
Discharge: HOME | End: 2021-02-10
Attending: INTERNAL MEDICINE
Payer: MEDICARE

## 2021-02-10 VITALS
BODY MASS INDEX: 47.09 KG/M2 | RESPIRATION RATE: 15 BRPM | OXYGEN SATURATION: 98 % | WEIGHT: 293 LBS | HEART RATE: 102 BPM | HEIGHT: 66 IN | DIASTOLIC BLOOD PRESSURE: 83 MMHG | SYSTOLIC BLOOD PRESSURE: 125 MMHG | TEMPERATURE: 110.3 F

## 2021-02-10 DIAGNOSIS — D75.839 THROMBOCYTOSIS: ICD-10-CM

## 2021-02-10 DIAGNOSIS — D72.829 LEUKOCYTOSIS, UNSPECIFIED TYPE: ICD-10-CM

## 2021-02-10 LAB
BASOPHILS # BLD: 0 K/UL (ref 0–0.1)
BASOPHILS NFR BLD: 0 % (ref 0–1)
DIFFERENTIAL METHOD BLD: ABNORMAL
EOSINOPHIL # BLD: 0 K/UL (ref 0–0.4)
EOSINOPHIL NFR BLD: 0 % (ref 0–7)
ERYTHROCYTE [DISTWIDTH] IN BLOOD BY AUTOMATED COUNT: 14.1 % (ref 11.5–14.5)
HCT VFR BLD AUTO: 37.8 % (ref 35–47)
HGB BLD-MCNC: 12.6 G/DL (ref 11.5–16)
IMM GRANULOCYTES # BLD AUTO: 0.1 K/UL (ref 0–0.04)
IMM GRANULOCYTES NFR BLD AUTO: 1 % (ref 0–0.5)
LYMPHOCYTES # BLD: 3.7 K/UL (ref 0.8–3.5)
LYMPHOCYTES NFR BLD: 30 % (ref 12–49)
MCH RBC QN AUTO: 29.4 PG (ref 26–34)
MCHC RBC AUTO-ENTMCNC: 33.3 G/DL (ref 30–36.5)
MCV RBC AUTO: 88.3 FL (ref 80–99)
MONOCYTES # BLD: 0.9 K/UL (ref 0–1)
MONOCYTES NFR BLD: 8 % (ref 5–13)
NEUTS SEG # BLD: 7.5 K/UL (ref 1.8–8)
NEUTS SEG NFR BLD: 61 % (ref 32–75)
NRBC # BLD: 0 K/UL (ref 0–0.01)
NRBC BLD-RTO: 0 PER 100 WBC
PLATELET # BLD AUTO: 568 K/UL (ref 150–400)
PMV BLD AUTO: 8.9 FL (ref 8.9–12.9)
RBC # BLD AUTO: 4.28 M/UL (ref 3.8–5.2)
WBC # BLD AUTO: 12.3 K/UL (ref 3.6–11)

## 2021-02-10 PROCEDURE — 2709999900 HC NON-CHARGEABLE SUPPLY

## 2021-02-10 PROCEDURE — 88185 FLOWCYTOMETRY/TC ADD-ON: CPT

## 2021-02-10 PROCEDURE — 88313 SPECIAL STAINS GROUP 2: CPT

## 2021-02-10 PROCEDURE — 36415 COLL VENOUS BLD VENIPUNCTURE: CPT

## 2021-02-10 PROCEDURE — 99152 MOD SED SAME PHYS/QHP 5/>YRS: CPT

## 2021-02-10 PROCEDURE — 77030018781

## 2021-02-10 PROCEDURE — 88305 TISSUE EXAM BY PATHOLOGIST: CPT

## 2021-02-10 PROCEDURE — 77030003666 HC NDL SPINAL BD -A

## 2021-02-10 PROCEDURE — 77030028872 HC BN BIOP NDL ON CNTRL TY TELE -C

## 2021-02-10 PROCEDURE — 88311 DECALCIFY TISSUE: CPT

## 2021-02-10 PROCEDURE — 88264 CHROMOSOME ANALYSIS 20-25: CPT

## 2021-02-10 PROCEDURE — 85025 COMPLETE CBC W/AUTO DIFF WBC: CPT

## 2021-02-10 PROCEDURE — 74011250636 HC RX REV CODE- 250/636: Performed by: STUDENT IN AN ORGANIZED HEALTH CARE EDUCATION/TRAINING PROGRAM

## 2021-02-10 PROCEDURE — 88184 FLOWCYTOMETRY/ TC 1 MARKER: CPT

## 2021-02-10 PROCEDURE — 88341 IMHCHEM/IMCYTCHM EA ADD ANTB: CPT

## 2021-02-10 PROCEDURE — 88342 IMHCHEM/IMCYTCHM 1ST ANTB: CPT

## 2021-02-10 PROCEDURE — 88237 TISSUE CULTURE BONE MARROW: CPT

## 2021-02-10 RX ORDER — FENTANYL CITRATE 50 UG/ML
100 INJECTION, SOLUTION INTRAMUSCULAR; INTRAVENOUS
Status: DISCONTINUED | OUTPATIENT
Start: 2021-02-10 | End: 2021-02-14 | Stop reason: HOSPADM

## 2021-02-10 RX ORDER — SODIUM CHLORIDE 9 MG/ML
25 INJECTION, SOLUTION INTRAVENOUS CONTINUOUS
Status: DISCONTINUED | OUTPATIENT
Start: 2021-02-10 | End: 2021-02-14 | Stop reason: HOSPADM

## 2021-02-10 RX ORDER — MIDAZOLAM HYDROCHLORIDE 1 MG/ML
5 INJECTION, SOLUTION INTRAMUSCULAR; INTRAVENOUS
Status: DISCONTINUED | OUTPATIENT
Start: 2021-02-10 | End: 2021-02-14 | Stop reason: HOSPADM

## 2021-02-10 RX ADMIN — MIDAZOLAM HYDROCHLORIDE 3 MG: 1 INJECTION, SOLUTION INTRAMUSCULAR; INTRAVENOUS at 09:54

## 2021-02-10 RX ADMIN — SODIUM CHLORIDE 25 ML/HR: 9 INJECTION, SOLUTION INTRAVENOUS at 09:30

## 2021-02-10 RX ADMIN — FENTANYL CITRATE 75 MCG: 50 INJECTION, SOLUTION INTRAMUSCULAR; INTRAVENOUS at 09:57

## 2021-02-10 NOTE — PROCEDURES
Interventional Radiology  Procedure Note        2/10/2021 10:04 AM    Patient: Gayle Elizabeth     Informed consent obtained    Diagnosis: Leukocytosis    Procedure(s): CT guided bone marrow biopsy    Specimens removed:  5cc marrow aspirate; 2cm core    Complications: None    Primary Physician: Emmanuel White MD    Recommendations: N/A    Discharge Disposition: stable; discharge to home    Full dictated report to follow    Emmanuel White MD  Interventional Radiology  [facility]  10:04 AM, 2/10/2021

## 2021-02-10 NOTE — PROGRESS NOTES
Name of procedure: Bone Marrow Biopsy    Sedation medications given: 3 mg Versed, 75 mcg Fentanyl    Sedation tolerated: well    Vital Signs: Stable    Fluids removed: none    Samples sent to lab: YES    Any complications related to procedure: None    Post Procedure Care Needed/order sets placed in [facility].

## 2021-02-10 NOTE — H&P
Radiology History and Physical    Patient: Ekaterina Kincaid 50 y.o. female       Chief Complaint: Leukocytosis    History of Present Illness: 50year old woman with leukocytosis presents for bone marrow biopsy. Reports chronic fatigue. Mild measurable fever this morning but patient reports she feels at baseline, and temperature normally slightly elevated. Denies chest pain, abdominal pain, shortness of breath.     History:    Past Medical History:   Diagnosis Date    Arthritis     DDD    Carpal tunnel syndrome on left 2/22/2011    Cervical spondylarthritis 2/20/2011    Depressive disorder, not elsewhere classified 2/17/2011    Encounter for long-term (current) use of other medications 2/20/2011    GERD (gastroesophageal reflux disease)     Headache(784.0)     Migraines    Hepatic hemangioma 2/17/2011    Hypertension     IBS (irritable bowel syndrome) 2/20/2011    Migraine syndrome 2/17/2011    Obesity 2/20/2011    Other ill-defined conditions(799.89)     hx.of syncope    Psychiatric disorder     depression     Family History   Problem Relation Age of Onset    Heart Disease Mother     Hypertension Mother     Diabetes Mother     Heart Disease Father     Lung Disease Father     Hypertension Father      Social History     Socioeconomic History    Marital status: SINGLE     Spouse name: Not on file    Number of children: Not on file    Years of education: Not on file    Highest education level: Not on file   Occupational History    Not on file   Social Needs    Financial resource strain: Not on file    Food insecurity     Worry: Not on file     Inability: Not on file   [employer] needs     Medical: Not on file     Non-medical: Not on file   Tobacco Use    Smoking status: Never Smoker    Smokeless tobacco: Never Used    Tobacco comment: SECOND HAND SMOKE/PARENTS   Substance and Sexual Activity    Alcohol use: Not Currently    Drug use: Yes     Types: Prescription, OTC    Sexual activity: Not on file   Lifestyle    Physical activity     Days per week: Not on file     Minutes per session: Not on file    Stress: Not on file   Relationships    Social connections     Talks on phone: Not on file     Gets together: Not on file     Attends Jehovah's witness service: Not on file     Active member of club or organization: Not on file     Attends meetings of clubs or organizations: Not on file     Relationship status: Not on file    Intimate partner violence     Fear of current or ex partner: Not on file     Emotionally abused: Not on file     Physically abused: Not on file     Forced sexual activity: Not on file   Other Topics Concern    Not on file   Social History Narrative    Not on file       Allergies: Allergies   Allergen Reactions    Atacand [Candesartan] Other (comments)     Patient B/P increase    Compazine [Prochlorperazine] Other (comments) and Unable to Obtain    Ciprofloxacin Other (comments)    Codeine Nausea and Vomiting    Zonisamide Nausea and Vomiting       Current Medications:  Current Outpatient Medications   Medication Sig    ALPRAZolam (XANAX) 1 mg tablet TAKE 1 TABLET BY MOUTH EVERY EVENING AS NEEDED FOR SLEEP    zolpidem (AMBIEN) 5 mg tablet TAKE 1 TABLET BY MOUTH NIGHTLY AS NEEDED FOR SLEEP. MAX DAILY AMOUNT: 1 TABLET    onabotulinumtoxinA (Botox) 200 unit injection 200 units by IM route once every 12 weeks. Inject IM neck/face, 31 FDA approved sites. Indication: Migraine prevention. DX code: G43.71    cyclobenzaprine (FLEXERIL) 10 mg tablet TAKE 1 TAB BY MOUTH THREE (3) TIMES DAILY (WITH MEALS).  HYDROcodone-acetaminophen (NORCO) 7.5-325 mg per tablet Take 1 Tab by mouth every six (6) hours as needed for Pain for up to 30 days. Max Daily Amount: 4 Tabs.  promethazine (PHENERGAN) 25 mg tablet Take 1 Tab by mouth every eight (8) hours as needed for Nausea.     ARIPiprazole (ABILIFY) 5 mg tablet TAKE 1 TABLET BY MOUTH EVERY DAY AT NIGHT    venlafaxine-SR (EFFEXOR-XR) 150 mg capsule TAKE 1 CAPSULE BY MOUTH EVERY DAY    biotin 10,000 mcg cap Take  by mouth.  cyanocobalamin 1,000 mcg tablet Take 2,000 mcg by mouth daily.  furosemide (LASIX) 20 mg tablet TAKE 1 TABLET BY MOUTH EVERY DAY (Patient taking differently: daily as needed.)    gabapentin (NEURONTIN) 600 mg tablet TAKE 1 TABLET BY MOUTH THREE TIMES A DAY    omeprazole (PRILOSEC) 40 mg capsule TAKE 1 CAPSULE BY MOUTH EVERY DAY    metFORMIN ER (GLUCOPHAGE XR) 500 mg tablet TAKE 1 TABLET BY MOUTH EVERY DAY WITH DINNER    hydroCHLOROthiazide (HYDRODIURIL) 25 mg tablet TAKE 1 TABLET BY MOUTH EVERY DAY    verapamil ER (CALAN-SR) 240 mg CR tablet TAKE 1 TABLET BY MOUTH AT BEDTIME    topiramate (TOPAMAX) 100 mg tablet START WITH 1/2 TABLET IN MORNING AND 1 TAB AT NIGHT FOR 2 WEEKS, THEN INCREASE TO 1 TAB TWICE A DAY (Patient taking differently: 200 mg nightly.)    docusate sodium (STOOL SOFTENER) 100 mg capsule 100 mg two (2) times a day. Once daily      Current Facility-Administered Medications   Medication Dose Route Frequency    fentaNYL citrate (PF) injection 100 mcg  100 mcg IntraVENous Multiple    0.9% sodium chloride infusion  25 mL/hr IntraVENous CONTINUOUS    midazolam (PF) (VERSED) injection 5 mg  5 mg IntraVENous Rad Multiple        Physical Exam:  Blood pressure 137/84, pulse (!) 107, temperature (!) 110.3 °F (43.5 °C), resp. rate 16, height 5' 6\" (1.676 m), weight 138.3 kg (305 lb), SpO2 98 %, not currently breastfeeding. GENERAL: alert, cooperative, no distress, appears stated age,   LUNG: Nonlabored respiration on room air  HEART: regular rate and rhythm    Alerts:    Hospital Problems  Date Reviewed: 10/2/2020    None          Laboratory:      Recent Labs     02/10/21  0909   HGB 12.6   HCT 37.8   WBC 12.3*   *         Plan of Care/Planned Procedure:  Risks, benefits, and alternatives reviewed with patient and she agrees to proceed with the procedure.      CT guided bone marrow biopsy    Deemed appropriate for moderate sedation with versed and fentanyl.     Crystal Huerta MD  Interventional Radiology  [facility]  9:51 AM, 2/10/2021

## 2021-02-10 NOTE — DISCHARGE INSTRUCTIONS
[facility]  Radiology Department  [phone]    Radiologist:    Date: 2/10/2020       Bone Marrow Biopsy Discharge Instructions      Go home and rest  and restrict your activity the next 24 hours. You have been given sedating medications, so do not drive or make important decisions today. Resume your previous diet and prescribed medications. You may shower in 24 hours. Do not soak or swim until the biopsy site has healed completely to minimize any risk of infection. Watch site for redness, drainage, pus, foul odor, increasing pain and fevers. Should this occur call you doctor immediatly. You may take Tylenol if allowed, as directed on the label, for pain or discomfort. Avoid Ibuprofen (Advil, Motrin etc.) and Aspirin today as they may increase your risk of bleeding. Be sure to follow up with your physician, and let him know how you are progressing and to receive your results. If you have any questions about your procedure today please call and ask to speak to a radiology nurse.        I

## 2021-02-17 RX ORDER — VERAPAMIL HYDROCHLORIDE 240 MG/1
TABLET, FILM COATED, EXTENDED RELEASE ORAL
Qty: 90 TAB | Refills: 3 | Status: SHIPPED | OUTPATIENT
Start: 2021-02-17 | End: 2021-11-24

## 2021-02-21 RX ORDER — GABAPENTIN 600 MG/1
TABLET ORAL
Qty: 270 TAB | Refills: 0 | Status: SHIPPED | OUTPATIENT
Start: 2021-02-21 | End: 2021-05-19

## 2021-02-21 RX ORDER — PROMETHAZINE HYDROCHLORIDE 25 MG/1
25 TABLET ORAL
Qty: 50 TAB | Refills: 0 | Status: SHIPPED | OUTPATIENT
Start: 2021-02-21 | End: 2021-03-22 | Stop reason: SDUPTHER

## 2021-02-21 RX ORDER — HYDROCODONE BITARTRATE AND ACETAMINOPHEN 7.5; 325 MG/1; MG/1
1 TABLET ORAL
Qty: 50 TAB | Refills: 0 | Status: SHIPPED | OUTPATIENT
Start: 2021-02-21 | End: 2021-03-22 | Stop reason: SDUPTHER

## 2021-03-01 DIAGNOSIS — F41.1 GENERALIZED ANXIETY DISORDER: ICD-10-CM

## 2021-03-01 DIAGNOSIS — F32.A DEPRESSION, UNSPECIFIED DEPRESSION TYPE: ICD-10-CM

## 2021-03-01 DIAGNOSIS — M79.7 FIBROMYALGIA: ICD-10-CM

## 2021-03-01 RX ORDER — ZOLPIDEM TARTRATE 5 MG/1
TABLET ORAL
Qty: 30 TAB | Refills: 0 | Status: SHIPPED | OUTPATIENT
Start: 2021-03-01 | End: 2021-04-04 | Stop reason: SDUPTHER

## 2021-03-01 RX ORDER — ALPRAZOLAM 1 MG/1
TABLET ORAL
Qty: 30 TAB | Refills: 0 | Status: SHIPPED | OUTPATIENT
Start: 2021-03-01 | End: 2021-04-04 | Stop reason: SDUPTHER

## 2021-03-03 ENCOUNTER — TELEPHONE (OUTPATIENT)
Dept: ONCOLOGY | Age: 46
End: 2021-03-03

## 2021-03-03 NOTE — TELEPHONE ENCOUNTER
----- Message from Tha Hardy MD sent at 2/16/2021 10:05 AM EST -----  Bone marrow is normal. Slightly abnormal blood counts are reactive. No follow up is needed.

## 2021-03-09 ENCOUNTER — TELEPHONE (OUTPATIENT)
Dept: NEUROLOGY | Age: 46
End: 2021-03-09

## 2021-03-09 DIAGNOSIS — G43.711 INTRACTABLE CHRONIC MIGRAINE WITHOUT AURA WITH STATUS MIGRAINOSUS: ICD-10-CM

## 2021-03-09 RX ORDER — ONABOTULINUMTOXINA 200 [USP'U]/1
INJECTION, POWDER, LYOPHILIZED, FOR SOLUTION INTRADERMAL; INTRAMUSCULAR
Qty: 1 VIAL | Refills: 3 | Status: SHIPPED | OUTPATIENT
Start: 2021-03-09 | End: 2022-06-22 | Stop reason: SDUPTHER

## 2021-03-09 NOTE — TELEPHONE ENCOUNTER
Botox [id number] sent to [facility] via [facility]. Katelynn Man [id number]  3/9/21  - 6/9/21  CPT 25257 No PA required, Call Ref # today [id number]. [facility] is [facility] - but [facility] [place] [address] [place] [address] [facility] [place]. Faxed Rx, demo sheet, copy of ins card and auth letter to Juli Agudelo (formerly Jil Salinas) to    Ph [phone]  Fax [phone]. Scanned to chart. (Dr. Niurka Strong will escribe it also). I called pt, reviewed process and gave her ph # for [facility] to call them tomorrow to set up new patient enrollment and gave my number if she had any problems or questions w/ it.

## 2021-03-11 ENCOUNTER — TELEPHONE (OUTPATIENT)
Dept: NEUROLOGY | Age: 46
End: 2021-03-11

## 2021-03-16 ENCOUNTER — TELEPHONE (OUTPATIENT)
Dept: NEUROLOGY | Age: 46
End: 2021-03-16

## 2021-03-17 DIAGNOSIS — R25.2 MUSCLE CRAMP: ICD-10-CM

## 2021-03-17 NOTE — TELEPHONE ENCOUNTER
Pts botox appt rescheduled per provider as has a personal emergency.  Patient agreed to 240pm on Friday 3-19

## 2021-03-18 RX ORDER — CYCLOBENZAPRINE HCL 10 MG
TABLET ORAL
Qty: 50 TAB | Refills: 1 | Status: SHIPPED | OUTPATIENT
Start: 2021-03-18 | End: 2021-04-19 | Stop reason: ALTCHOICE

## 2021-03-19 ENCOUNTER — TELEPHONE (OUTPATIENT)
Dept: NEUROLOGY | Age: 46
End: 2021-03-19

## 2021-03-23 RX ORDER — HYDROCODONE BITARTRATE AND ACETAMINOPHEN 7.5; 325 MG/1; MG/1
1 TABLET ORAL
Qty: 50 TAB | Refills: 0 | Status: SHIPPED | OUTPATIENT
Start: 2021-03-23 | End: 2021-04-23 | Stop reason: SDUPTHER

## 2021-03-23 RX ORDER — PROMETHAZINE HYDROCHLORIDE 25 MG/1
25 TABLET ORAL
Qty: 50 TAB | Refills: 0 | Status: SHIPPED | OUTPATIENT
Start: 2021-03-23 | End: 2021-04-23 | Stop reason: SDUPTHER

## 2021-04-02 RX ORDER — FUROSEMIDE 20 MG/1
20 TABLET ORAL
Qty: 90 TAB | Refills: 0 | Status: SHIPPED | OUTPATIENT
Start: 2021-04-02

## 2021-04-02 RX ORDER — HYDROCHLOROTHIAZIDE 25 MG/1
TABLET ORAL
Qty: 90 TAB | Refills: 3 | Status: SHIPPED | OUTPATIENT
Start: 2021-04-02 | End: 2021-09-12 | Stop reason: SDUPTHER

## 2021-04-04 DIAGNOSIS — M79.7 FIBROMYALGIA: ICD-10-CM

## 2021-04-04 DIAGNOSIS — F32.A DEPRESSION, UNSPECIFIED DEPRESSION TYPE: ICD-10-CM

## 2021-04-04 DIAGNOSIS — F41.1 GENERALIZED ANXIETY DISORDER: ICD-10-CM

## 2021-04-05 RX ORDER — ZOLPIDEM TARTRATE 5 MG/1
5 TABLET ORAL
Qty: 30 TAB | Refills: 0 | Status: SHIPPED | OUTPATIENT
Start: 2021-04-05 | End: 2021-05-05 | Stop reason: SDUPTHER

## 2021-04-05 RX ORDER — ALPRAZOLAM 1 MG/1
1 TABLET ORAL
Qty: 30 TAB | Refills: 0 | Status: SHIPPED | OUTPATIENT
Start: 2021-04-05 | End: 2021-04-19 | Stop reason: DRUGHIGH

## 2021-04-15 ENCOUNTER — OFFICE VISIT (OUTPATIENT)
Dept: NEUROLOGY | Age: 46
End: 2021-04-15
Payer: MEDICARE

## 2021-04-15 VITALS — TEMPERATURE: 98.5 F

## 2021-04-15 PROCEDURE — 64615 CHEMODENERV MUSC MIGRAINE: CPT | Performed by: PSYCHIATRY & NEUROLOGY

## 2021-04-17 NOTE — PROCEDURES
Procedure: Chemodenervation for Chronic Intractable Migraines    After consenting the patient and discussing the procedure and possible side effects. The chemodenervant was reconstituted as follows:  200 units of botox was mixed with 4ml of perservative free saline and withdrawn, using a into four sterile 1ml BD syringes. Sterile 30 gauge half inch needles were affixed to the syringes. Procedure   Chemodenervant was injected into the following muscles which were identified using their anatomical land marks. Each site was aspirated prior to injection to ensure that there was no vascular compromise. Muscle Units/inj No. Of injections Total   Trapezius BL 5 U 3 each 30 units   Cervical Paraspinals BL 5 U 2 each 20 units   Occipitalis BL 5 U 3 each 30 units   Temporalis BL 5 U 4 each 40 units    BL 5 U 1 each 10 units   Procerus 5 U 1 5 units   Frontalis BL 5 U 2 each  20 units         Total units  Waste 35 units 155     Nominal bleeding at injection sites. Patient tolerated the procedure well. No reported pain following the procedure.      200units/vial  MANU: Allergen  SP: [facility]  LOT: [id number]  EXP:10/23

## 2021-04-19 ENCOUNTER — OFFICE VISIT (OUTPATIENT)
Dept: FAMILY MEDICINE CLINIC | Age: 46
End: 2021-04-19
Payer: MEDICARE

## 2021-04-19 VITALS
DIASTOLIC BLOOD PRESSURE: 102 MMHG | WEIGHT: 293 LBS | HEART RATE: 103 BPM | SYSTOLIC BLOOD PRESSURE: 158 MMHG | RESPIRATION RATE: 20 BRPM | OXYGEN SATURATION: 98 % | HEIGHT: 66 IN | BODY MASS INDEX: 47.09 KG/M2 | TEMPERATURE: 97.1 F

## 2021-04-19 DIAGNOSIS — R73.9 HYPERGLYCEMIA: ICD-10-CM

## 2021-04-19 DIAGNOSIS — E78.2 MIXED HYPERLIPIDEMIA: ICD-10-CM

## 2021-04-19 DIAGNOSIS — F41.1 GENERALIZED ANXIETY DISORDER: ICD-10-CM

## 2021-04-19 DIAGNOSIS — M54.9 ACUTE BILATERAL BACK PAIN, UNSPECIFIED BACK LOCATION: ICD-10-CM

## 2021-04-19 DIAGNOSIS — G89.4 CHRONIC PAIN SYNDROME: ICD-10-CM

## 2021-04-19 LAB
ALBUMIN SERPL-MCNC: 3.5 G/DL (ref 3.5–5)
ALBUMIN/GLOB SERPL: 0.9 {RATIO} (ref 1.1–2.2)
ALP SERPL-CCNC: 86 U/L (ref 45–117)
ALT SERPL-CCNC: 67 U/L (ref 12–78)
ANION GAP SERPL CALC-SCNC: 6 MMOL/L (ref 5–15)
AST SERPL-CCNC: 44 U/L (ref 15–37)
BASOPHILS # BLD: 0 K/UL (ref 0–0.1)
BASOPHILS NFR BLD: 0 % (ref 0–1)
BILIRUB SERPL-MCNC: 0.2 MG/DL (ref 0.2–1)
BUN SERPL-MCNC: 10 MG/DL (ref 6–20)
BUN/CREAT SERPL: 16 (ref 12–20)
CALCIUM SERPL-MCNC: 9.1 MG/DL (ref 8.5–10.1)
CHLORIDE SERPL-SCNC: 105 MMOL/L (ref 97–108)
CHOLEST SERPL-MCNC: 230 MG/DL
CO2 SERPL-SCNC: 27 MMOL/L (ref 21–32)
CREAT SERPL-MCNC: 0.63 MG/DL (ref 0.55–1.02)
DIFFERENTIAL METHOD BLD: ABNORMAL
EOSINOPHIL # BLD: 0 K/UL (ref 0–0.4)
EOSINOPHIL NFR BLD: 0 % (ref 0–7)
ERYTHROCYTE [DISTWIDTH] IN BLOOD BY AUTOMATED COUNT: 14.6 % (ref 11.5–14.5)
EST. AVERAGE GLUCOSE BLD GHB EST-MCNC: 140 MG/DL
GLOBULIN SER CALC-MCNC: 3.9 G/DL (ref 2–4)
GLUCOSE SERPL-MCNC: 129 MG/DL (ref 65–100)
HBA1C MFR BLD: 6.5 % (ref 4–5.6)
HCT VFR BLD AUTO: 39.5 % (ref 35–47)
HDLC SERPL-MCNC: 43 MG/DL
HDLC SERPL: 5.3 {RATIO} (ref 0–5)
HGB BLD-MCNC: 12.6 G/DL (ref 11.5–16)
IMM GRANULOCYTES # BLD AUTO: 0.1 K/UL (ref 0–0.04)
IMM GRANULOCYTES NFR BLD AUTO: 1 % (ref 0–0.5)
LDLC SERPL CALC-MCNC: 135.2 MG/DL (ref 0–100)
LIPID PROFILE,FLP: ABNORMAL
LYMPHOCYTES # BLD: 2.6 K/UL (ref 0.8–3.5)
LYMPHOCYTES NFR BLD: 29 % (ref 12–49)
MCH RBC QN AUTO: 29.6 PG (ref 26–34)
MCHC RBC AUTO-ENTMCNC: 31.9 G/DL (ref 30–36.5)
MCV RBC AUTO: 92.7 FL (ref 80–99)
MONOCYTES # BLD: 0.7 K/UL (ref 0–1)
MONOCYTES NFR BLD: 8 % (ref 5–13)
NEUTS SEG # BLD: 5.6 K/UL (ref 1.8–8)
NEUTS SEG NFR BLD: 62 % (ref 32–75)
NRBC # BLD: 0 K/UL (ref 0–0.01)
NRBC BLD-RTO: 0 PER 100 WBC
PLATELET # BLD AUTO: 496 K/UL (ref 150–400)
PMV BLD AUTO: 9.3 FL (ref 8.9–12.9)
POTASSIUM SERPL-SCNC: 4 MMOL/L (ref 3.5–5.1)
PROT SERPL-MCNC: 7.4 G/DL (ref 6.4–8.2)
RBC # BLD AUTO: 4.26 M/UL (ref 3.8–5.2)
SODIUM SERPL-SCNC: 138 MMOL/L (ref 136–145)
TRIGL SERPL-MCNC: 259 MG/DL (ref ?–150)
VLDLC SERPL CALC-MCNC: 51.8 MG/DL
WBC # BLD AUTO: 9 K/UL (ref 3.6–11)

## 2021-04-19 PROCEDURE — G8427 DOCREV CUR MEDS BY ELIG CLIN: HCPCS | Performed by: FAMILY MEDICINE

## 2021-04-19 PROCEDURE — G9717 DOC PT DX DEP/BP F/U NT REQ: HCPCS | Performed by: FAMILY MEDICINE

## 2021-04-19 PROCEDURE — 99213 OFFICE O/P EST LOW 20 MIN: CPT | Performed by: FAMILY MEDICINE

## 2021-04-19 PROCEDURE — G8417 CALC BMI ABV UP PARAM F/U: HCPCS | Performed by: FAMILY MEDICINE

## 2021-04-19 PROCEDURE — G8753 SYS BP > OR = 140: HCPCS | Performed by: FAMILY MEDICINE

## 2021-04-19 PROCEDURE — G8755 DIAS BP > OR = 90: HCPCS | Performed by: FAMILY MEDICINE

## 2021-04-19 RX ORDER — TIZANIDINE 4 MG/1
4 TABLET ORAL
Qty: 50 TAB | Refills: 1 | Status: SHIPPED | OUTPATIENT
Start: 2021-04-19 | End: 2021-05-19 | Stop reason: SDUPTHER

## 2021-04-19 RX ORDER — PREDNISONE 10 MG/1
10 TABLET ORAL 2 TIMES DAILY
Qty: 10 TAB | Refills: 0 | Status: SHIPPED | OUTPATIENT
Start: 2021-04-19 | End: 2021-05-19 | Stop reason: ALTCHOICE

## 2021-04-19 RX ORDER — ALPRAZOLAM 0.5 MG/1
0.5 TABLET ORAL
Qty: 90 TAB | Refills: 1 | Status: SHIPPED | OUTPATIENT
Start: 2021-04-19 | End: 2021-06-18 | Stop reason: SDUPTHER

## 2021-04-19 NOTE — PROGRESS NOTES
HISTORY OF PRESENT ILLNESS  Robbie Jay is a 48 y.o. female. 3 days of severe low mid and upper back pain and spasm,no apparent injury. Got Botox injection last week. Having increased stress from care of her debilitated father. Has chronic migraine,depression, igt  Hypertension   The history is provided by the patient. This is a new problem. The current episode started more than 1 week ago. Associated symptoms include malaise/fatigue and neck pain. Pertinent negatives include no chest pain, no orthopnea, no palpitations and no shortness of breath. Cholesterol Problem  Pertinent negatives include no chest pain and no shortness of breath. Back Pain   Pertinent negatives include no chest pain and no fever. Review of Systems   Constitutional: Positive for malaise/fatigue. Negative for fever. HENT: Negative for congestion. Respiratory: Negative for shortness of breath. Cardiovascular: Negative for chest pain, palpitations and orthopnea. Genitourinary: Negative for frequency. Musculoskeletal: Positive for back pain and neck pain. Psychiatric/Behavioral: Positive for depression. The patient is nervous/anxious and has insomnia. Physical Exam  Constitutional:       General: She is in acute distress. Appearance: She is obese. HENT:      Head: Normocephalic and atraumatic. Right Ear: Tympanic membrane normal.      Left Ear: Tympanic membrane normal.      Nose: Nose normal.      Mouth/Throat:      Mouth: Mucous membranes are moist.   Cardiovascular:      Rate and Rhythm: Normal rate and regular rhythm. Pulses: Normal pulses. Heart sounds: Normal heart sounds. Pulmonary:      Effort: Pulmonary effort is normal.      Breath sounds: Normal breath sounds. Abdominal:      General: Abdomen is flat. Palpations: Abdomen is soft. Musculoskeletal:      Cervical back: She exhibits decreased range of motion and tenderness.       Thoracic back: She exhibits decreased range of motion and tenderness. Lumbar back: She exhibits decreased range of motion and tenderness. Back:    Skin:     General: Skin is warm and dry. Neurological:      Mental Status: She is alert. ASSESSMENT and PLAN  Diagnoses and all orders for this visit:    1. Chronic migraine,s/p recent Botox tx    2. Generalized anxiety disorder,worsening due to fathers worsening condition and demands  -     ALPRAZolam (XANAX) 0.5 mg tablet; Take 1 Tab by mouth three (3) times daily as needed for Anxiety. Max Daily Amount: 1.5 mg.    3. Mixed hyperlipidemia  -     LIPID PANEL; Future    4. Hyperglycemia  -     HEMOGLOBIN A1C WITH EAG; Future    5. Chronic pain syndrome  -     METABOLIC PANEL, COMPREHENSIVE; Future    6. Acute bilateral back pain, unspecified back location,likely sprain aggravated by stress  -     CBC WITH AUTOMATED DIFF; Future  -     tiZANidine (ZANAFLEX) 4 mg tablet; Take 1 Tab by mouth every six (6) hours as needed for Muscle Spasm(s). -     predniSONE (DELTASONE) 10 mg tablet; Take 10 mg by mouth two (2) times a day. Follow-up and Dispositions    · Return in about 4 weeks (around 5/17/2021).

## 2021-04-23 RX ORDER — PROMETHAZINE HYDROCHLORIDE 25 MG/1
25 TABLET ORAL
Qty: 50 TAB | Refills: 0 | Status: SHIPPED | OUTPATIENT
Start: 2021-04-23 | End: 2021-05-03

## 2021-04-23 RX ORDER — HYDROCODONE BITARTRATE AND ACETAMINOPHEN 7.5; 325 MG/1; MG/1
1 TABLET ORAL
Qty: 50 TAB | Refills: 0 | Status: SHIPPED | OUTPATIENT
Start: 2021-04-23 | End: 2021-05-25 | Stop reason: SDUPTHER

## 2021-05-03 DIAGNOSIS — F41.1 GENERALIZED ANXIETY DISORDER: ICD-10-CM

## 2021-05-03 DIAGNOSIS — F32.A DEPRESSION, UNSPECIFIED DEPRESSION TYPE: ICD-10-CM

## 2021-05-03 DIAGNOSIS — R25.2 MUSCLE CRAMP: ICD-10-CM

## 2021-05-03 RX ORDER — PROMETHAZINE HYDROCHLORIDE 25 MG/1
TABLET ORAL
Qty: 50 TAB | Refills: 0 | Status: SHIPPED | OUTPATIENT
Start: 2021-05-03 | End: 2021-07-02 | Stop reason: SDUPTHER

## 2021-05-03 RX ORDER — CYCLOBENZAPRINE HCL 10 MG
TABLET ORAL
Qty: 50 TAB | Refills: 1 | Status: SHIPPED | OUTPATIENT
Start: 2021-05-03 | End: 2021-07-05

## 2021-05-03 RX ORDER — VENLAFAXINE HYDROCHLORIDE 150 MG/1
CAPSULE, EXTENDED RELEASE ORAL
Qty: 90 CAP | Refills: 1 | Status: SHIPPED | OUTPATIENT
Start: 2021-05-03 | End: 2021-08-27

## 2021-05-05 DIAGNOSIS — M79.7 FIBROMYALGIA: ICD-10-CM

## 2021-05-05 DIAGNOSIS — F32.A DEPRESSION, UNSPECIFIED DEPRESSION TYPE: ICD-10-CM

## 2021-05-06 RX ORDER — ZOLPIDEM TARTRATE 5 MG/1
5 TABLET ORAL
Qty: 30 TAB | Refills: 0 | Status: SHIPPED | OUTPATIENT
Start: 2021-05-06 | End: 2021-06-04 | Stop reason: SDUPTHER

## 2021-05-19 ENCOUNTER — OFFICE VISIT (OUTPATIENT)
Dept: FAMILY MEDICINE CLINIC | Age: 46
End: 2021-05-19
Payer: MEDICARE

## 2021-05-19 VITALS
BODY MASS INDEX: 47.09 KG/M2 | HEART RATE: 124 BPM | HEIGHT: 66 IN | RESPIRATION RATE: 20 BRPM | DIASTOLIC BLOOD PRESSURE: 92 MMHG | SYSTOLIC BLOOD PRESSURE: 170 MMHG | TEMPERATURE: 98 F | WEIGHT: 293 LBS | OXYGEN SATURATION: 98 %

## 2021-05-19 DIAGNOSIS — M54.9 ACUTE BILATERAL BACK PAIN, UNSPECIFIED BACK LOCATION: Primary | ICD-10-CM

## 2021-05-19 DIAGNOSIS — G89.4 CHRONIC PAIN SYNDROME: ICD-10-CM

## 2021-05-19 DIAGNOSIS — M79.7 FIBROMYALGIA: ICD-10-CM

## 2021-05-19 DIAGNOSIS — F32.A DEPRESSION, UNSPECIFIED DEPRESSION TYPE: ICD-10-CM

## 2021-05-19 PROCEDURE — G8753 SYS BP > OR = 140: HCPCS | Performed by: FAMILY MEDICINE

## 2021-05-19 PROCEDURE — G8427 DOCREV CUR MEDS BY ELIG CLIN: HCPCS | Performed by: FAMILY MEDICINE

## 2021-05-19 PROCEDURE — 99212 OFFICE O/P EST SF 10 MIN: CPT | Performed by: FAMILY MEDICINE

## 2021-05-19 PROCEDURE — G8417 CALC BMI ABV UP PARAM F/U: HCPCS | Performed by: FAMILY MEDICINE

## 2021-05-19 PROCEDURE — G9717 DOC PT DX DEP/BP F/U NT REQ: HCPCS | Performed by: FAMILY MEDICINE

## 2021-05-19 PROCEDURE — G8755 DIAS BP > OR = 90: HCPCS | Performed by: FAMILY MEDICINE

## 2021-05-19 RX ORDER — GABAPENTIN 600 MG/1
TABLET ORAL
Qty: 270 TABLET | Refills: 0 | Status: SHIPPED | OUTPATIENT
Start: 2021-05-19 | End: 2021-08-23 | Stop reason: SDUPTHER

## 2021-05-19 RX ORDER — TIZANIDINE 4 MG/1
4 TABLET ORAL
Qty: 50 TABLET | Refills: 1 | Status: SHIPPED | OUTPATIENT
Start: 2021-05-19 | End: 2021-05-29

## 2021-05-19 NOTE — PROGRESS NOTES
HISTORY OF PRESENT ILLNESS  Epi Orellana is a 48 y.o. female. 10days of severe low mid and upper back pain and spasm,no apparent injury. Pain slowly improving. Having increased stress from care of her debilitated father. Has chronic migraine,depression, igt  Back Pain   The history is provided by the patient. The current episode started more than 1 week ago. The problem has been gradually improving. The problem occurs daily. The pain is associated with a remote injury. The pain is present in the lower back and middle back. The quality of the pain is described as aching. The pain does not radiate. The pain is at a severity of 3/10. The symptoms are aggravated by twisting and bending. Pertinent negatives include no chest pain, no fever, no paresthesias, no paresis and no tingling. Review of Systems   Constitutional: Negative for fever. HENT: Negative for congestion. Cardiovascular: Negative for chest pain, palpitations and orthopnea. Gastrointestinal: Negative for heartburn and nausea. Genitourinary: Negative for frequency. Musculoskeletal: Positive for back pain. Neurological: Negative for tingling and paresthesias. Psychiatric/Behavioral: Positive for depression. The patient is nervous/anxious and has insomnia. Physical Exam  Constitutional:       General: She is in acute distress. Appearance: She is obese. HENT:      Head: Normocephalic and atraumatic. Right Ear: Tympanic membrane normal.      Left Ear: Tympanic membrane normal.      Nose: Nose normal.      Mouth/Throat:      Mouth: Mucous membranes are moist.   Cardiovascular:      Rate and Rhythm: Normal rate and regular rhythm. Pulses: Normal pulses. Heart sounds: Normal heart sounds. Pulmonary:      Effort: Pulmonary effort is normal.      Breath sounds: Normal breath sounds. Abdominal:      General: Abdomen is flat. Bowel sounds are normal.      Palpations: Abdomen is soft.    Musculoskeletal: Cervical back: Tenderness present. Thoracic back: Tenderness present. Decreased range of motion. Lumbar back: Tenderness present. Decreased range of motion. Back:    Skin:     General: Skin is warm and dry. Neurological:      Mental Status: She is alert. Diagnoses and all orders for this visit:    1. Acute bilateral back pain, unspecified back location,gradually improving; continue light activities  -     tiZANidine (ZANAFLEX) 4 mg tablet; Take 1 Tablet by mouth every six (6) hours as needed for Muscle Spasm(s). 2. Chronic migraine    3. Fibromyalgia    4. Depression, unspecified depression type    5. Chronic pain syndrome  -     MONITOR SCREEN 10-DRUG CLASS PROFILE; Future      Follow-up and Dispositions    · Return in about 3 months (around 8/19/2021). Follow-up and Dispositions    · Return in about 3 months (around 8/19/2021).

## 2021-05-22 LAB
AMPHETAMINES UR QL SCN: NEGATIVE NG/ML
BARBITURATES UR QL SCN: NEGATIVE NG/ML
BENZODIAZ UR QL SCN: POSITIVE NG/ML
BZE UR QL SCN: NEGATIVE NG/ML
CANNABINOIDS UR QL SCN: NEGATIVE NG/ML
CREAT UR-MCNC: 280.6 MG/DL (ref 20–300)
METHADONE UR QL SCN: NEGATIVE NG/ML
OPIATES UR QL SCN: POSITIVE NG/ML
OXYCODONE+OXYMORPHONE UR QL SCN: NEGATIVE NG/ML
PCP UR QL: NEGATIVE NG/ML
PH UR: 6.9 [PH] (ref 4.5–8.9)
PLEASE NOTE:, 733163: ABNORMAL
PROPOXYPH UR QL SCN: NEGATIVE NG/ML

## 2021-05-25 DIAGNOSIS — F11.90 OPIOID USE: Primary | ICD-10-CM

## 2021-05-25 RX ORDER — HYDROCODONE BITARTRATE AND ACETAMINOPHEN 7.5; 325 MG/1; MG/1
1 TABLET ORAL
Qty: 15 TABLET | Refills: 0 | Status: SHIPPED | OUTPATIENT
Start: 2021-05-25 | End: 2021-06-24

## 2021-05-25 RX ORDER — HYDROCODONE BITARTRATE AND ACETAMINOPHEN 7.5; 325 MG/1; MG/1
1 TABLET ORAL
Qty: 15 TABLET | Refills: 0 | Status: CANCELLED | OUTPATIENT
Start: 2021-05-25 | End: 2021-06-24

## 2021-05-25 RX ORDER — NALOXONE HYDROCHLORIDE 4 MG/.1ML
SPRAY NASAL
Qty: 2 EACH | Refills: 1 | Status: SHIPPED | OUTPATIENT
Start: 2021-05-25

## 2021-05-25 NOTE — TELEPHONE ENCOUNTER
Reviewed report generated by the [place] [employer].     Patient is receiving xanax, ambien, gabapentin and hydrocodone. Because the patient's current regimen places him/her at increased risk for possible overdose, a prescription for naloxone 4mg nasal spray is being provided per the [facility] recommendation to use in case of accidental overdose. Only provided with #15 tabs of hydrocodone. Can follow up with PCP - Dr. Shari Craft next week for additional refills. Orders Placed This Encounter    HYDROcodone-acetaminophen (NORCO) 7.5-325 mg per tablet     Sig: Take 1 Tablet by mouth every six (6) hours as needed for Pain for up to 30 days. Max Daily Amount: 4 Tablets. Dispense:  15 Tablet     Refill:  0    naloxone (Narcan) 4 mg/actuation nasal spray     Sig: Use 1 spray intranasally, then discard. Repeat with new spray every 2 min as needed for opioid overdose symptoms, alternating nostrils.      Dispense:  2 Each     Refill:  1

## 2021-05-29 DIAGNOSIS — M54.9 ACUTE BILATERAL BACK PAIN, UNSPECIFIED BACK LOCATION: ICD-10-CM

## 2021-05-29 RX ORDER — TIZANIDINE 4 MG/1
TABLET ORAL
Qty: 50 TABLET | Refills: 1 | Status: SHIPPED | OUTPATIENT
Start: 2021-05-29 | End: 2021-06-10

## 2021-05-30 DIAGNOSIS — F32.A DEPRESSION, UNSPECIFIED DEPRESSION TYPE: ICD-10-CM

## 2021-05-30 DIAGNOSIS — M79.7 FIBROMYALGIA: ICD-10-CM

## 2021-06-02 RX ORDER — ZOLPIDEM TARTRATE 5 MG/1
5 TABLET ORAL
Qty: 30 TABLET | Refills: 0 | OUTPATIENT
Start: 2021-06-02

## 2021-06-04 DIAGNOSIS — M79.7 FIBROMYALGIA: ICD-10-CM

## 2021-06-04 DIAGNOSIS — F32.A DEPRESSION, UNSPECIFIED DEPRESSION TYPE: ICD-10-CM

## 2021-06-04 RX ORDER — ZOLPIDEM TARTRATE 5 MG/1
5 TABLET ORAL
Qty: 30 TABLET | Refills: 0 | Status: SHIPPED | OUTPATIENT
Start: 2021-06-04 | End: 2021-07-02 | Stop reason: SDUPTHER

## 2021-06-10 DIAGNOSIS — M54.9 ACUTE BILATERAL BACK PAIN, UNSPECIFIED BACK LOCATION: ICD-10-CM

## 2021-06-10 RX ORDER — TIZANIDINE 4 MG/1
TABLET ORAL
Qty: 50 TABLET | Refills: 1 | Status: SHIPPED | OUTPATIENT
Start: 2021-06-10 | End: 2021-06-30

## 2021-06-18 DIAGNOSIS — F41.1 GENERALIZED ANXIETY DISORDER: ICD-10-CM

## 2021-06-19 DIAGNOSIS — F41.1 GENERALIZED ANXIETY DISORDER: ICD-10-CM

## 2021-06-19 RX ORDER — ALPRAZOLAM 0.5 MG/1
0.5 TABLET ORAL
Qty: 90 TABLET | Refills: 1 | Status: CANCELLED | OUTPATIENT
Start: 2021-06-19

## 2021-06-21 RX ORDER — ALPRAZOLAM 0.5 MG/1
0.5 TABLET ORAL
Qty: 90 TABLET | Refills: 1 | Status: SHIPPED | OUTPATIENT
Start: 2021-06-21 | End: 2021-07-22 | Stop reason: SDUPTHER

## 2021-06-21 NOTE — TELEPHONE ENCOUNTER
Last OV: 5/19/21  Next Appt: 8/20/21  Last Refill: 4/19/21 (90+R1)    Requested Prescriptions     Pending Prescriptions Disp Refills    ALPRAZolam (XANAX) 0.5 mg tablet 90 Tablet 1     Sig: Take 1 Tablet by mouth three (3) times daily as needed for Anxiety.  Max Daily Amount: 1.5 mg.

## 2021-06-30 DIAGNOSIS — M54.9 ACUTE BILATERAL BACK PAIN, UNSPECIFIED BACK LOCATION: ICD-10-CM

## 2021-06-30 RX ORDER — TIZANIDINE 4 MG/1
TABLET ORAL
Qty: 50 TABLET | Refills: 1 | Status: SHIPPED | OUTPATIENT
Start: 2021-06-30 | End: 2021-07-15

## 2021-07-02 DIAGNOSIS — M79.7 FIBROMYALGIA: ICD-10-CM

## 2021-07-02 DIAGNOSIS — F32.A DEPRESSION, UNSPECIFIED DEPRESSION TYPE: ICD-10-CM

## 2021-07-02 RX ORDER — PROMETHAZINE HYDROCHLORIDE 25 MG/1
TABLET ORAL
Qty: 50 TABLET | Refills: 0 | Status: SHIPPED | OUTPATIENT
Start: 2021-07-02 | End: 2021-07-21

## 2021-07-02 RX ORDER — HYDROCODONE BITARTRATE AND ACETAMINOPHEN 7.5; 325 MG/1; MG/1
1 TABLET ORAL
Qty: 50 TABLET | Refills: 0 | Status: SHIPPED | OUTPATIENT
Start: 2021-07-02 | End: 2021-08-23 | Stop reason: DRUGHIGH

## 2021-07-02 RX ORDER — ZOLPIDEM TARTRATE 5 MG/1
5 TABLET ORAL
Qty: 30 TABLET | Refills: 0 | Status: SHIPPED | OUTPATIENT
Start: 2021-07-02 | End: 2021-08-09 | Stop reason: SDUPTHER

## 2021-07-02 NOTE — TELEPHONE ENCOUNTER
----- Message from Jerrell Jean sent at 7/2/2021  8:56 AM EDT -----  Regarding: Dr. Dian Mas  Medication Refill    Caller (if not patient): pt      Relationship of caller (if not patient): self      Best contact number(s):  [phone]      Name of medication and dosage if known:  Hydrocodone 3.25 mg 50 tabs, Promethazine 25 mg 50 pills, Zolpidem 5 mg 30 tabs        Is patient out of this medication (yes/no): Yes      Pharmacy name: [facility]    Pharmacy listed in chart? (yes/no): yes  Pharmacy phone number: [phone]      Details to clarify the request:      Jerrell Jean

## 2021-07-05 DIAGNOSIS — R25.2 MUSCLE CRAMP: ICD-10-CM

## 2021-07-05 RX ORDER — CYCLOBENZAPRINE HCL 10 MG
TABLET ORAL
Qty: 50 TABLET | Refills: 1 | Status: SHIPPED | OUTPATIENT
Start: 2021-07-05 | End: 2021-08-04

## 2021-07-05 RX ORDER — OMEPRAZOLE 40 MG/1
CAPSULE, DELAYED RELEASE ORAL
Qty: 90 CAPSULE | Refills: 2 | Status: SHIPPED | OUTPATIENT
Start: 2021-07-05 | End: 2022-04-20

## 2021-07-05 RX ORDER — METFORMIN HYDROCHLORIDE 500 MG/1
TABLET, EXTENDED RELEASE ORAL
Qty: 90 TABLET | Refills: 4 | Status: SHIPPED | OUTPATIENT
Start: 2021-07-05 | End: 2022-08-10

## 2021-07-15 DIAGNOSIS — M54.9 ACUTE BILATERAL BACK PAIN, UNSPECIFIED BACK LOCATION: ICD-10-CM

## 2021-07-15 RX ORDER — TIZANIDINE 4 MG/1
TABLET ORAL
Qty: 50 TABLET | Refills: 1 | Status: SHIPPED | OUTPATIENT
Start: 2021-07-15 | End: 2021-07-28

## 2021-07-21 RX ORDER — PROMETHAZINE HYDROCHLORIDE 25 MG/1
TABLET ORAL
Qty: 50 TABLET | Refills: 0 | Status: SHIPPED | OUTPATIENT
Start: 2021-07-21 | End: 2021-08-04

## 2021-07-22 DIAGNOSIS — F41.1 GENERALIZED ANXIETY DISORDER: ICD-10-CM

## 2021-07-22 RX ORDER — ALPRAZOLAM 0.5 MG/1
0.5 TABLET ORAL
Qty: 90 TABLET | Refills: 1 | Status: SHIPPED | OUTPATIENT
Start: 2021-07-22 | End: 2021-09-20 | Stop reason: SDUPTHER

## 2021-07-26 ENCOUNTER — TELEPHONE (OUTPATIENT)
Dept: NEUROLOGY | Age: 46
End: 2021-07-26

## 2021-07-26 NOTE — TELEPHONE ENCOUNTER
Botox    Called in for renewal.     Approved -  PA [id number] 7/26/21 - 10/26/21 from [facility]   CPT 17199 No PA required. [facility] is [facility].    Appt is 8/5/21

## 2021-07-26 NOTE — TELEPHONE ENCOUNTER
Spoke with Colleen Mauricio at [facility] and ordered botox for patient's appointment on 8/5  Shipment is set for 7/29/2021

## 2021-07-28 DIAGNOSIS — M54.9 ACUTE BILATERAL BACK PAIN, UNSPECIFIED BACK LOCATION: ICD-10-CM

## 2021-07-28 RX ORDER — TIZANIDINE 4 MG/1
TABLET ORAL
Qty: 50 TABLET | Refills: 1 | Status: SHIPPED | OUTPATIENT
Start: 2021-07-28 | End: 2021-08-06

## 2021-07-29 ENCOUNTER — TELEPHONE (OUTPATIENT)
Dept: NEUROLOGY | Age: 46
End: 2021-07-29

## 2021-07-29 NOTE — TELEPHONE ENCOUNTER
botox arrived 7/29  200units/vial  SP: [facility]  MANU: ALLERGEN  LOT: [id number]  EXP: 3/24  RX: [id number]  APPT:8/5

## 2021-08-02 RX ORDER — HYDROCODONE BITARTRATE AND ACETAMINOPHEN 7.5; 325 MG/1; MG/1
1 TABLET ORAL
Qty: 50 TABLET | Refills: 0 | Status: SHIPPED | OUTPATIENT
Start: 2021-08-02 | End: 2021-09-01

## 2021-08-02 RX ORDER — TOPIRAMATE 100 MG/1
100 TABLET, FILM COATED ORAL 2 TIMES DAILY
Qty: 60 TABLET | Refills: 5 | Status: SHIPPED | OUTPATIENT
Start: 2021-08-02 | End: 2022-01-31

## 2021-08-02 NOTE — TELEPHONE ENCOUNTER
SS 1   Ronnie Longoria  Female, 48 y.o., 1975  Weight:   319 lb 9.6 oz (145 kg)  MRN:   [id number]  Phone:   [phone] Jeffrey Galeano  PCP:   Donn Lara MD  Primary Cvg:   [facility] Medicare Complete/[facility] Medicare Complete  Last Appt With Me  None  Next Appt With Me  None  Next Appt  08/05/2021 - Cristian Mello MD - [facility] [address]  Prescription Question    Crys Sweeney MD 8 minutes ago (7:27 AM)   SS  For some reason my Hydrocodone is not on my list of medications. I'm in the middle of a horrible migraine & am completely out. So I'm of no assistance to Dad right now who honestly should never have been let out of the hospital.  He can't stand up without urinating so he hasn't walked anywhere except the bathroom twice. One of those times he urinated the whole way there & I had to clean it up. If there is one thing I'm not it's a nurse & as you know my siblings don't help. All Miracle Hightower did was stop by for a minute today & bring him a pack of depends which he refuses to wear. Then he told Prema Hollins to go get him some cranberry juice & he ran in & poured him a glass & left. All I've been able to do is get him his meals & drinks w/ this migraine.      Encounter Messages    Read Composed From To  Subject   Y 8/2/2021  7:27 AM Lissy Rubi MD  Prescription Question

## 2021-08-04 DIAGNOSIS — R25.2 MUSCLE CRAMP: ICD-10-CM

## 2021-08-04 RX ORDER — PROMETHAZINE HYDROCHLORIDE 25 MG/1
TABLET ORAL
Qty: 50 TABLET | Refills: 0 | Status: SHIPPED | OUTPATIENT
Start: 2021-08-04 | End: 2021-08-27

## 2021-08-04 RX ORDER — CYCLOBENZAPRINE HCL 10 MG
TABLET ORAL
Qty: 50 TABLET | Refills: 1 | Status: SHIPPED | OUTPATIENT
Start: 2021-08-04 | End: 2021-09-29 | Stop reason: SDUPTHER

## 2021-08-05 ENCOUNTER — OFFICE VISIT (OUTPATIENT)
Dept: NEUROLOGY | Age: 46
End: 2021-08-05
Payer: MEDICARE

## 2021-08-05 VITALS
HEART RATE: 99 BPM | HEIGHT: 66 IN | SYSTOLIC BLOOD PRESSURE: 125 MMHG | OXYGEN SATURATION: 98 % | BODY MASS INDEX: 47.09 KG/M2 | DIASTOLIC BLOOD PRESSURE: 83 MMHG | WEIGHT: 293 LBS | RESPIRATION RATE: 16 BRPM | TEMPERATURE: 98 F

## 2021-08-05 PROCEDURE — 64615 CHEMODENERV MUSC MIGRAINE: CPT | Performed by: PSYCHIATRY & NEUROLOGY

## 2021-08-06 DIAGNOSIS — M54.9 ACUTE BILATERAL BACK PAIN, UNSPECIFIED BACK LOCATION: ICD-10-CM

## 2021-08-06 RX ORDER — TIZANIDINE 4 MG/1
TABLET ORAL
Qty: 50 TABLET | Refills: 1 | Status: SHIPPED | OUTPATIENT
Start: 2021-08-06 | End: 2021-08-16

## 2021-08-09 DIAGNOSIS — F32.A DEPRESSION, UNSPECIFIED DEPRESSION TYPE: ICD-10-CM

## 2021-08-09 DIAGNOSIS — M79.7 FIBROMYALGIA: ICD-10-CM

## 2021-08-09 NOTE — PROCEDURES
Procedure: Chemodenervation for Chronic Intractable Migraines    After consenting the patient and discussing the procedure and possible side effects. The chemodenervant was reconstituted as follows:  200 units of botox was mixed with 4ml of perservative free saline and withdrawn, using a into four sterile 1ml BD syringes. Sterile 30 gauge half inch needles were affixed to the syringes. Procedure   Chemodenervant was injected into the following muscles which were identified using their anatomical land marks. Each site was aspirated prior to injection to ensure that there was no vascular compromise. Muscle Units/inj No. Of injections Total   Trapezius BL 5 U 4 each 40 units   Cervical Paraspinals BL 5 U 2 each 20 units   Occipitalis BL 5 U 3 each 30 units   Temporalis BL 5 U 4 each 40 units    BL 5 U 1 each 10 units   Procerus 5 U 1 5 units   Frontalis BL 5 U 2 each  20 units         Total units  Waste 35 units 165     Nominal bleeding at injection sites. Patient tolerated the procedure well. No reported pain following the procedure.      MANUAlvena [employer]oked  LOT: [id number]  EXP: 3/24  RX: [id number]

## 2021-08-12 RX ORDER — ZOLPIDEM TARTRATE 5 MG/1
5 TABLET ORAL
Qty: 30 TABLET | Refills: 0 | Status: SHIPPED | OUTPATIENT
Start: 2021-08-12 | End: 2021-09-12 | Stop reason: SDUPTHER

## 2021-08-12 NOTE — TELEPHONE ENCOUNTER
PCP: Rose Puri MD    Last appt: 5/19/2021  Future Appointments   Date Time Provider Meeta Malik   8/20/2021  3:00 PM Rose Puri MD [facility] BS AMB   10/21/2021  3:20 PM Howie Nova MD [facility] BS AMB       Requested Prescriptions     Pending Prescriptions Disp Refills    zolpidem (AMBIEN) 5 mg tablet 30 Tablet 0     Sig: Take 1 Tablet by mouth nightly. Max Daily Amount: 5 mg.          Other Comments:

## 2021-08-16 DIAGNOSIS — M54.9 ACUTE BILATERAL BACK PAIN, UNSPECIFIED BACK LOCATION: ICD-10-CM

## 2021-08-16 RX ORDER — TIZANIDINE 4 MG/1
TABLET ORAL
Qty: 50 TABLET | Refills: 1 | Status: SHIPPED | OUTPATIENT
Start: 2021-08-16 | End: 2021-08-30

## 2021-08-23 ENCOUNTER — OFFICE VISIT (OUTPATIENT)
Dept: FAMILY MEDICINE CLINIC | Age: 46
End: 2021-08-23
Payer: MEDICARE

## 2021-08-23 VITALS
TEMPERATURE: 99.4 F | HEART RATE: 118 BPM | RESPIRATION RATE: 20 BRPM | WEIGHT: 293 LBS | HEIGHT: 66 IN | SYSTOLIC BLOOD PRESSURE: 142 MMHG | DIASTOLIC BLOOD PRESSURE: 88 MMHG | BODY MASS INDEX: 47.09 KG/M2 | OXYGEN SATURATION: 96 %

## 2021-08-23 DIAGNOSIS — G89.4 CHRONIC PAIN SYNDROME: ICD-10-CM

## 2021-08-23 DIAGNOSIS — F41.1 GENERALIZED ANXIETY DISORDER: ICD-10-CM

## 2021-08-23 DIAGNOSIS — M79.7 FIBROMYALGIA: ICD-10-CM

## 2021-08-23 DIAGNOSIS — E78.2 MIXED HYPERLIPIDEMIA: ICD-10-CM

## 2021-08-23 DIAGNOSIS — R73.9 HYPERGLYCEMIA: ICD-10-CM

## 2021-08-23 LAB
ALBUMIN SERPL-MCNC: 3.6 G/DL (ref 3.5–5)
ALBUMIN/GLOB SERPL: 0.9 {RATIO} (ref 1.1–2.2)
ALP SERPL-CCNC: 74 U/L (ref 45–117)
ALT SERPL-CCNC: 71 U/L (ref 12–78)
ANION GAP SERPL CALC-SCNC: 8 MMOL/L (ref 5–15)
AST SERPL-CCNC: 44 U/L (ref 15–37)
BILIRUB SERPL-MCNC: 0.2 MG/DL (ref 0.2–1)
BUN SERPL-MCNC: 15 MG/DL (ref 6–20)
BUN/CREAT SERPL: 20 (ref 12–20)
CALCIUM SERPL-MCNC: 8.9 MG/DL (ref 8.5–10.1)
CHLORIDE SERPL-SCNC: 103 MMOL/L (ref 97–108)
CHOLEST SERPL-MCNC: 241 MG/DL
CO2 SERPL-SCNC: 25 MMOL/L (ref 21–32)
CREAT SERPL-MCNC: 0.75 MG/DL (ref 0.55–1.02)
EST. AVERAGE GLUCOSE BLD GHB EST-MCNC: 154 MG/DL
GLOBULIN SER CALC-MCNC: 4.1 G/DL (ref 2–4)
GLUCOSE SERPL-MCNC: 204 MG/DL (ref 65–100)
HBA1C MFR BLD: 7 % (ref 4–5.6)
POTASSIUM SERPL-SCNC: 3.7 MMOL/L (ref 3.5–5.1)
PROT SERPL-MCNC: 7.7 G/DL (ref 6.4–8.2)
SODIUM SERPL-SCNC: 136 MMOL/L (ref 136–145)

## 2021-08-23 PROCEDURE — G8427 DOCREV CUR MEDS BY ELIG CLIN: HCPCS | Performed by: FAMILY MEDICINE

## 2021-08-23 PROCEDURE — 99213 OFFICE O/P EST LOW 20 MIN: CPT | Performed by: FAMILY MEDICINE

## 2021-08-23 PROCEDURE — G8417 CALC BMI ABV UP PARAM F/U: HCPCS | Performed by: FAMILY MEDICINE

## 2021-08-23 PROCEDURE — G9717 DOC PT DX DEP/BP F/U NT REQ: HCPCS | Performed by: FAMILY MEDICINE

## 2021-08-23 PROCEDURE — G8753 SYS BP > OR = 140: HCPCS | Performed by: FAMILY MEDICINE

## 2021-08-23 PROCEDURE — G8754 DIAS BP LESS 90: HCPCS | Performed by: FAMILY MEDICINE

## 2021-08-23 RX ORDER — HYDROCODONE BITARTRATE AND ACETAMINOPHEN 7.5; 325 MG/1; MG/1
1 TABLET ORAL
Qty: 75 TABLET | Refills: 0 | Status: SHIPPED | OUTPATIENT
Start: 2021-08-23 | End: 2021-11-23 | Stop reason: SDUPTHER

## 2021-08-23 RX ORDER — GABAPENTIN 600 MG/1
600 TABLET ORAL 3 TIMES DAILY
Qty: 270 TABLET | Refills: 0 | Status: SHIPPED | OUTPATIENT
Start: 2021-08-23 | End: 2021-11-23 | Stop reason: SDUPTHER

## 2021-08-23 NOTE — PROGRESS NOTES
Chief Complaint   Patient presents with    Migraine     F/U for migraine. 1. Have you been to the ER, urgent care clinic since your last visit? Hospitalized since your last visit? No    2. Have you seen or consulted any other health care providers outside of the [address] [place] [address] since your last visit? Include any pap smears or colon screening.  No

## 2021-08-23 NOTE — PROGRESS NOTES
HISTORY OF PRESENT ILLNESS  Lois Miles is a 48 y.o. female. C/O worsening daily migraines despite recent botox tx. Got Botox injection  3 weeks ago. Having increased stress from care of her debilitated father. Has chronic hbp,depression, igt  Hypertension   The history is provided by the patient. This is a new problem. The current episode started more than 1 week ago. Associated symptoms include malaise/fatigue and headaches. Pertinent negatives include no chest pain, no orthopnea, no palpitations, no neck pain and no shortness of breath. Cholesterol Problem  The history is provided by the patient. This is a chronic problem. The problem occurs daily. Associated symptoms include headaches. Pertinent negatives include no chest pain and no shortness of breath. Back Pain   This is a chronic problem. The problem has not changed since onset. The problem occurs daily. Associated symptoms include headaches. Pertinent negatives include no chest pain and no fever. Migraine   The history is provided by the patient. This is a chronic problem. The problem occurs constantly. The problem has been gradually worsening. The pain is at a severity of 7/10. Associated symptoms include malaise/fatigue. Pertinent negatives include no fever, no orthopnea, no palpitations and no shortness of breath. Review of Systems   Constitutional: Positive for malaise/fatigue. Negative for fever. HENT: Negative for congestion. Respiratory: Negative for shortness of breath. Cardiovascular: Negative for chest pain, palpitations and orthopnea. Genitourinary: Negative for frequency. Musculoskeletal: Positive for back pain. Negative for neck pain. Neurological: Positive for headaches. Psychiatric/Behavioral: Positive for depression. The patient has insomnia. The patient is not nervous/anxious. Physical Exam  Constitutional:       General: She is in acute distress. Appearance: She is obese.    HENT:      Head: Normocephalic and atraumatic. Right Ear: Tympanic membrane normal.      Left Ear: Tympanic membrane normal.      Nose: Nose normal.      Mouth/Throat:      Mouth: Mucous membranes are moist.   Cardiovascular:      Rate and Rhythm: Normal rate and regular rhythm. Pulses: Normal pulses. Heart sounds: Normal heart sounds. Pulmonary:      Effort: Pulmonary effort is normal.      Breath sounds: Normal breath sounds. Abdominal:      General: Abdomen is flat. Palpations: Abdomen is soft. Musculoskeletal:      Cervical back: Neck supple. Tenderness present. Thoracic back: Tenderness present. Decreased range of motion. Lumbar back: Tenderness present. Decreased range of motion. Back:    Skin:     General: Skin is warm and dry. Neurological:      Mental Status: She is alert and oriented to person, place, and time. Mental status is at baseline. Sensory: Sensory deficit present. Gait: Gait normal.         Diagnoses and all orders for this visit:    1. Chronic migraine,worsening frequency and severity ,likely due to fathers illness  -     HYDROcodone-acetaminophen (NORCO) 7.5-325 mg per tablet; Take 1 Tablet by mouth every six (6) hours as needed for Pain for up to 3 days. Max Daily Amount: 4 Tablets. 2. Fibromyalgia  -     METABOLIC PANEL, COMPREHENSIVE; Future    3. Generalized anxiety disorder    4. Mixed hyperlipidemia  -     CHOLESTEROL, TOTAL; Future    5. Hyperglycemia  -     HEMOGLOBIN A1C WITH EAG; Future    6. Chronic pain syndrome,opiod/sedative risks discussed      Follow-up and Dispositions    · Return in about 3 months (around 11/23/2021). Follow-up and Dispositions    · Return in about 3 months (around 11/23/2021).

## 2021-08-26 DIAGNOSIS — F32.A DEPRESSION, UNSPECIFIED DEPRESSION TYPE: ICD-10-CM

## 2021-08-26 DIAGNOSIS — F41.1 GENERALIZED ANXIETY DISORDER: ICD-10-CM

## 2021-08-27 RX ORDER — VENLAFAXINE HYDROCHLORIDE 150 MG/1
CAPSULE, EXTENDED RELEASE ORAL
Qty: 90 CAPSULE | Refills: 1 | Status: SHIPPED | OUTPATIENT
Start: 2021-08-27 | End: 2022-01-27

## 2021-08-27 RX ORDER — PROMETHAZINE HYDROCHLORIDE 25 MG/1
TABLET ORAL
Qty: 50 TABLET | Refills: 0 | Status: SHIPPED | OUTPATIENT
Start: 2021-08-27 | End: 2021-09-15 | Stop reason: SDUPTHER

## 2021-08-30 DIAGNOSIS — M54.9 ACUTE BILATERAL BACK PAIN, UNSPECIFIED BACK LOCATION: ICD-10-CM

## 2021-08-30 RX ORDER — TIZANIDINE 4 MG/1
TABLET ORAL
Qty: 50 TABLET | Refills: 1 | Status: SHIPPED | OUTPATIENT
Start: 2021-08-30 | End: 2021-09-08

## 2021-09-12 DIAGNOSIS — F32.A DEPRESSION, UNSPECIFIED DEPRESSION TYPE: ICD-10-CM

## 2021-09-12 DIAGNOSIS — M79.7 FIBROMYALGIA: ICD-10-CM

## 2021-09-17 RX ORDER — ZOLPIDEM TARTRATE 5 MG/1
5 TABLET ORAL
Qty: 30 TABLET | Refills: 0 | Status: SHIPPED | OUTPATIENT
Start: 2021-09-17 | End: 2021-10-25 | Stop reason: SDUPTHER

## 2021-09-17 RX ORDER — HYDROCHLOROTHIAZIDE 25 MG/1
25 TABLET ORAL DAILY
Qty: 90 TABLET | Refills: 3 | Status: SHIPPED | OUTPATIENT
Start: 2021-09-17 | End: 2021-09-29 | Stop reason: SDUPTHER

## 2021-09-20 DIAGNOSIS — F41.1 GENERALIZED ANXIETY DISORDER: ICD-10-CM

## 2021-09-21 DIAGNOSIS — M54.9 ACUTE BILATERAL BACK PAIN, UNSPECIFIED BACK LOCATION: ICD-10-CM

## 2021-09-21 RX ORDER — TIZANIDINE 4 MG/1
TABLET ORAL
Qty: 50 TABLET | Refills: 0 | Status: SHIPPED | OUTPATIENT
Start: 2021-09-21 | End: 2021-10-06

## 2021-09-21 RX ORDER — ALPRAZOLAM 0.5 MG/1
0.5 TABLET ORAL
Qty: 90 TABLET | Refills: 1 | Status: SHIPPED | OUTPATIENT
Start: 2021-09-21 | End: 2021-11-23 | Stop reason: SDUPTHER

## 2021-09-29 DIAGNOSIS — R25.2 MUSCLE CRAMP: ICD-10-CM

## 2021-09-29 RX ORDER — PROMETHAZINE HYDROCHLORIDE 25 MG/1
25 TABLET ORAL
Qty: 50 TABLET | Refills: 0 | Status: SHIPPED | OUTPATIENT
Start: 2021-09-29 | End: 2021-10-26

## 2021-09-29 RX ORDER — HYDROCHLOROTHIAZIDE 25 MG/1
25 TABLET ORAL DAILY
Qty: 90 TABLET | Refills: 3 | Status: SHIPPED | OUTPATIENT
Start: 2021-09-29 | End: 2022-10-18

## 2021-09-29 RX ORDER — CYCLOBENZAPRINE HCL 10 MG
10 TABLET ORAL
Qty: 50 TABLET | Refills: 1 | Status: SHIPPED | OUTPATIENT
Start: 2021-09-29 | End: 2021-11-21

## 2021-10-06 DIAGNOSIS — G43.909 MIGRAINE SYNDROME: Primary | ICD-10-CM

## 2021-10-06 DIAGNOSIS — M54.9 ACUTE BILATERAL BACK PAIN, UNSPECIFIED BACK LOCATION: ICD-10-CM

## 2021-10-06 RX ORDER — TIZANIDINE 4 MG/1
TABLET ORAL
Qty: 50 TABLET | Refills: 0 | Status: SHIPPED | OUTPATIENT
Start: 2021-10-06 | End: 2021-10-15

## 2021-10-07 RX ORDER — HYDROCODONE BITARTRATE AND ACETAMINOPHEN 7.5; 325 MG/1; MG/1
1 TABLET ORAL
Qty: 50 TABLET | Refills: 0 | Status: SHIPPED | OUTPATIENT
Start: 2021-10-07 | End: 2022-01-24 | Stop reason: SDUPTHER

## 2021-10-08 ENCOUNTER — TELEPHONE (OUTPATIENT)
Dept: NEUROLOGY | Age: 46
End: 2021-10-08

## 2021-10-08 NOTE — TELEPHONE ENCOUNTER
Confirmed patient id and advised patient that [facility] has been trying to contact the patient so that her botox can be filled. Gave number on the fax from [facility] for patient to call.

## 2021-10-14 ENCOUNTER — TELEPHONE (OUTPATIENT)
Dept: NEUROLOGY | Age: 46
End: 2021-10-14

## 2021-10-14 NOTE — TELEPHONE ENCOUNTER
Botox renewal sent to [facility] via [address] [place] [address] Reference Number: [id number]. BOTOX INJ 200UNIT is approved through 01/14/2022. Your patient may now fill this prescription and it will be covered. CPT 52002 No PA required. SPP is [facility] ph to order call [phone]. taryn

## 2021-10-15 ENCOUNTER — TELEPHONE (OUTPATIENT)
Dept: NEUROLOGY | Age: 46
End: 2021-10-15

## 2021-10-15 DIAGNOSIS — M54.9 ACUTE BILATERAL BACK PAIN, UNSPECIFIED BACK LOCATION: ICD-10-CM

## 2021-10-15 RX ORDER — TIZANIDINE 4 MG/1
TABLET ORAL
Qty: 50 TABLET | Refills: 0 | Status: SHIPPED | OUTPATIENT
Start: 2021-10-15 | End: 2021-10-26

## 2021-10-19 ENCOUNTER — DOCUMENTATION ONLY (OUTPATIENT)
Dept: NEUROLOGY | Age: 46
End: 2021-10-19

## 2021-10-19 NOTE — PROGRESS NOTES
Received botox delivery from [facility] for 10/21 appointment.      Allergan  Lot number [id number]  Expiration date 04/30/2021

## 2021-10-21 ENCOUNTER — OFFICE VISIT (OUTPATIENT)
Dept: NEUROLOGY | Age: 46
End: 2021-10-21
Payer: MEDICARE

## 2021-10-21 VITALS
HEART RATE: 67 BPM | RESPIRATION RATE: 16 BRPM | OXYGEN SATURATION: 98 % | WEIGHT: 293 LBS | BODY MASS INDEX: 47.09 KG/M2 | HEIGHT: 66 IN | SYSTOLIC BLOOD PRESSURE: 125 MMHG | TEMPERATURE: 97.7 F | DIASTOLIC BLOOD PRESSURE: 83 MMHG

## 2021-10-21 DIAGNOSIS — G43.719 INTRACTABLE CHRONIC MIGRAINE WITHOUT AURA AND WITHOUT STATUS MIGRAINOSUS: ICD-10-CM

## 2021-10-21 PROCEDURE — 64615 CHEMODENERV MUSC MIGRAINE: CPT | Performed by: PSYCHIATRY & NEUROLOGY

## 2021-10-21 NOTE — PROGRESS NOTES
Chief Complaint   Patient presents with    Procedure     patient present for botox treatment for migraines.

## 2021-10-22 NOTE — PROCEDURES
Procedure: Chemodenervation for Chronic Intractable Migraines    After consenting the patient and discussing the procedure and possible side effects. The chemodenervant was reconstituted as follows:  200 units of botox was mixed with 4ml of perservative free saline and withdrawn, using a into four sterile 1ml BD syringes. Sterile 30 gauge half inch needles were affixed to the syringes. Procedure   Chemodenervant was injected into the following muscles which were identified using their anatomical land marks. Each site was aspirated prior to injection to ensure that there was no vascular compromise. Muscle Units/inj No. Of injections Total   Trapezius BL 5 U 4 each 40 units   Cervical Paraspinals BL 5 U 2 each 20 units   Occipitalis BL 5 U 3 each 30 units   Temporalis BL 5 U 4 each 40 units    BL 5 U 1 each 10 units   Procerus 5 U 1 5 units   Frontalis BL 5 U 2 each  20 units         Total units  Waste 35 units 165     Nominal bleeding at injection sites. Patient tolerated the procedure well. No reported pain following the procedure. Received botox delivery from [facility] for 10/21 appointment.      [address]  Lot number [id number]  Expiration date 04/30/2021

## 2021-10-22 NOTE — PROGRESS NOTES
She had migraines since age 30. They are daily bitemporal headaches, throbbing in nature. Associated with photophobia and nausea. Occurring more than 15 days out of the month and lasting more than 3 hours. They have been reduced to less than 6 per month on account of botox. She is on topamax  Has tried elavil  Has tried cymbalata  SHe is on verapamil.    She has tried fioricet   She has tried imitex  She was on all these medicatrions more than two months and they did not help her migraines

## 2021-10-25 DIAGNOSIS — M79.7 FIBROMYALGIA: ICD-10-CM

## 2021-10-25 DIAGNOSIS — F32.A DEPRESSION, UNSPECIFIED DEPRESSION TYPE: ICD-10-CM

## 2021-10-26 DIAGNOSIS — M54.9 ACUTE BILATERAL BACK PAIN, UNSPECIFIED BACK LOCATION: ICD-10-CM

## 2021-10-26 DIAGNOSIS — G43.709 CHRONIC MIGRAINE WITHOUT AURA, NOT INTRACTABLE, WITHOUT STATUS MIGRAINOSUS: ICD-10-CM

## 2021-10-26 RX ORDER — TIZANIDINE 4 MG/1
TABLET ORAL
Qty: 50 TABLET | Refills: 0 | Status: SHIPPED | OUTPATIENT
Start: 2021-10-26 | End: 2021-11-11

## 2021-10-26 RX ORDER — ZOLPIDEM TARTRATE 5 MG/1
5 TABLET ORAL
Qty: 30 TABLET | Refills: 0 | Status: SHIPPED | OUTPATIENT
Start: 2021-10-26 | End: 2021-11-28

## 2021-10-26 RX ORDER — PROMETHAZINE HYDROCHLORIDE 25 MG/1
25 TABLET ORAL
Qty: 50 TABLET | Refills: 0 | Status: SHIPPED | OUTPATIENT
Start: 2021-10-26 | End: 2021-11-21

## 2021-11-11 DIAGNOSIS — M54.9 ACUTE BILATERAL BACK PAIN, UNSPECIFIED BACK LOCATION: ICD-10-CM

## 2021-11-11 RX ORDER — TIZANIDINE 4 MG/1
TABLET ORAL
Qty: 50 TABLET | Refills: 0 | Status: SHIPPED | OUTPATIENT
Start: 2021-11-11 | End: 2021-12-05

## 2021-11-20 DIAGNOSIS — R25.2 MUSCLE CRAMP: ICD-10-CM

## 2021-11-20 DIAGNOSIS — G43.709 CHRONIC MIGRAINE WITHOUT AURA, NOT INTRACTABLE, WITHOUT STATUS MIGRAINOSUS: ICD-10-CM

## 2021-11-21 RX ORDER — PROMETHAZINE HYDROCHLORIDE 25 MG/1
25 TABLET ORAL
Qty: 50 TABLET | Refills: 0 | Status: SHIPPED | OUTPATIENT
Start: 2021-11-21 | End: 2021-11-28

## 2021-11-21 RX ORDER — CYCLOBENZAPRINE HCL 10 MG
10 TABLET ORAL
Qty: 50 TABLET | Refills: 1 | Status: SHIPPED | OUTPATIENT
Start: 2021-11-21 | End: 2021-11-23 | Stop reason: ALTCHOICE

## 2021-11-23 ENCOUNTER — OFFICE VISIT (OUTPATIENT)
Dept: FAMILY MEDICINE CLINIC | Age: 46
End: 2021-11-23
Payer: MEDICARE

## 2021-11-23 VITALS
OXYGEN SATURATION: 99 % | WEIGHT: 293 LBS | BODY MASS INDEX: 47.09 KG/M2 | HEART RATE: 104 BPM | DIASTOLIC BLOOD PRESSURE: 88 MMHG | SYSTOLIC BLOOD PRESSURE: 136 MMHG | RESPIRATION RATE: 20 BRPM | HEIGHT: 66 IN

## 2021-11-23 DIAGNOSIS — G43.709 CHRONIC MIGRAINE WITHOUT AURA, NOT INTRACTABLE, WITHOUT STATUS MIGRAINOSUS: Primary | ICD-10-CM

## 2021-11-23 DIAGNOSIS — Z23 NEEDS FLU SHOT: ICD-10-CM

## 2021-11-23 DIAGNOSIS — F41.1 GENERALIZED ANXIETY DISORDER: ICD-10-CM

## 2021-11-23 DIAGNOSIS — F11.99 OPIOID USE, UNSPECIFIED WITH UNSPECIFIED OPIOID-INDUCED DISORDER (HCC): ICD-10-CM

## 2021-11-23 DIAGNOSIS — E78.2 MIXED HYPERLIPIDEMIA: ICD-10-CM

## 2021-11-23 DIAGNOSIS — E11.9 TYPE 2 DIABETES MELLITUS WITHOUT COMPLICATION, WITHOUT LONG-TERM CURRENT USE OF INSULIN (HCC): ICD-10-CM

## 2021-11-23 DIAGNOSIS — M79.7 FIBROMYALGIA: ICD-10-CM

## 2021-11-23 DIAGNOSIS — R52 PAIN: ICD-10-CM

## 2021-11-23 DIAGNOSIS — G43.909 MIGRAINE WITHOUT STATUS MIGRAINOSUS, NOT INTRACTABLE, UNSPECIFIED MIGRAINE TYPE: ICD-10-CM

## 2021-11-23 DIAGNOSIS — G89.4 CHRONIC PAIN SYNDROME: ICD-10-CM

## 2021-11-23 LAB
ALBUMIN SERPL-MCNC: 3.6 G/DL (ref 3.5–5)
ALBUMIN/GLOB SERPL: 0.9 {RATIO} (ref 1.1–2.2)
ALP SERPL-CCNC: 88 U/L (ref 45–117)
ALT SERPL-CCNC: 60 U/L (ref 12–78)
ANION GAP SERPL CALC-SCNC: 11 MMOL/L (ref 5–15)
AST SERPL-CCNC: 40 U/L (ref 15–37)
BASOPHILS # BLD: 0 K/UL (ref 0–0.1)
BASOPHILS NFR BLD: 0 % (ref 0–1)
BILIRUB SERPL-MCNC: 0.4 MG/DL (ref 0.2–1)
BUN SERPL-MCNC: 13 MG/DL (ref 6–20)
BUN/CREAT SERPL: 15 (ref 12–20)
CALCIUM SERPL-MCNC: 9.6 MG/DL (ref 8.5–10.1)
CHLORIDE SERPL-SCNC: 98 MMOL/L (ref 97–108)
CHOLEST SERPL-MCNC: 219 MG/DL
CO2 SERPL-SCNC: 26 MMOL/L (ref 21–32)
CREAT SERPL-MCNC: 0.88 MG/DL (ref 0.55–1.02)
DIFFERENTIAL METHOD BLD: ABNORMAL
EOSINOPHIL # BLD: 0 K/UL (ref 0–0.4)
EOSINOPHIL NFR BLD: 0 % (ref 0–7)
ERYTHROCYTE [DISTWIDTH] IN BLOOD BY AUTOMATED COUNT: 15.5 % (ref 11.5–14.5)
GLOBULIN SER CALC-MCNC: 4.2 G/DL (ref 2–4)
GLUCOSE SERPL-MCNC: 177 MG/DL (ref 65–100)
HCT VFR BLD AUTO: 39.7 % (ref 35–47)
HGB BLD-MCNC: 12.8 G/DL (ref 11.5–16)
IMM GRANULOCYTES # BLD AUTO: 0.1 K/UL (ref 0–0.04)
IMM GRANULOCYTES NFR BLD AUTO: 1 % (ref 0–0.5)
LYMPHOCYTES # BLD: 2.8 K/UL (ref 0.8–3.5)
LYMPHOCYTES NFR BLD: 21 % (ref 12–49)
MCH RBC QN AUTO: 29.4 PG (ref 26–34)
MCHC RBC AUTO-ENTMCNC: 32.2 G/DL (ref 30–36.5)
MCV RBC AUTO: 91.3 FL (ref 80–99)
MONOCYTES # BLD: 0.9 K/UL (ref 0–1)
MONOCYTES NFR BLD: 6 % (ref 5–13)
NEUTS SEG # BLD: 9.6 K/UL (ref 1.8–8)
NEUTS SEG NFR BLD: 72 % (ref 32–75)
NRBC # BLD: 0 K/UL (ref 0–0.01)
NRBC BLD-RTO: 0 PER 100 WBC
PLATELET # BLD AUTO: 598 K/UL (ref 150–400)
PMV BLD AUTO: 9.6 FL (ref 8.9–12.9)
POTASSIUM SERPL-SCNC: 3.3 MMOL/L (ref 3.5–5.1)
PROT SERPL-MCNC: 7.8 G/DL (ref 6.4–8.2)
RBC # BLD AUTO: 4.35 M/UL (ref 3.8–5.2)
SODIUM SERPL-SCNC: 135 MMOL/L (ref 136–145)
WBC # BLD AUTO: 13.5 K/UL (ref 3.6–11)

## 2021-11-23 PROCEDURE — G9717 DOC PT DX DEP/BP F/U NT REQ: HCPCS | Performed by: FAMILY MEDICINE

## 2021-11-23 PROCEDURE — 99213 OFFICE O/P EST LOW 20 MIN: CPT | Performed by: FAMILY MEDICINE

## 2021-11-23 PROCEDURE — G8754 DIAS BP LESS 90: HCPCS | Performed by: FAMILY MEDICINE

## 2021-11-23 PROCEDURE — G0008 ADMIN INFLUENZA VIRUS VAC: HCPCS | Performed by: FAMILY MEDICINE

## 2021-11-23 PROCEDURE — 2022F DILAT RTA XM EVC RTNOPTHY: CPT | Performed by: FAMILY MEDICINE

## 2021-11-23 PROCEDURE — 3051F HG A1C>EQUAL 7.0%<8.0%: CPT | Performed by: FAMILY MEDICINE

## 2021-11-23 PROCEDURE — G8417 CALC BMI ABV UP PARAM F/U: HCPCS | Performed by: FAMILY MEDICINE

## 2021-11-23 PROCEDURE — 90686 IIV4 VACC NO PRSV 0.5 ML IM: CPT | Performed by: FAMILY MEDICINE

## 2021-11-23 PROCEDURE — G8427 DOCREV CUR MEDS BY ELIG CLIN: HCPCS | Performed by: FAMILY MEDICINE

## 2021-11-23 PROCEDURE — G8752 SYS BP LESS 140: HCPCS | Performed by: FAMILY MEDICINE

## 2021-11-23 RX ORDER — HYDROCODONE BITARTRATE AND ACETAMINOPHEN 7.5; 325 MG/1; MG/1
1 TABLET ORAL
Qty: 75 TABLET | Refills: 0 | Status: SHIPPED | OUTPATIENT
Start: 2021-11-23 | End: 2021-12-22 | Stop reason: SDUPTHER

## 2021-11-23 RX ORDER — GABAPENTIN 600 MG/1
600 TABLET ORAL 3 TIMES DAILY
Qty: 270 TABLET | Refills: 0 | Status: SHIPPED | OUTPATIENT
Start: 2021-11-23 | End: 2022-02-13 | Stop reason: SDUPTHER

## 2021-11-23 RX ORDER — ALPRAZOLAM 0.5 MG/1
0.5 TABLET ORAL
Qty: 90 TABLET | Refills: 1 | Status: SHIPPED | OUTPATIENT
Start: 2021-11-23 | End: 2021-12-31 | Stop reason: SDUPTHER

## 2021-11-23 NOTE — PROGRESS NOTES
HISTORY OF PRESENT ILLNESS  Aurelio Aj is a 48 y.o. female. C/O daily migraines improved somewhat with  botox tx. Having afew less migraine days each mo. Having increased stress from care of her debilitated father. Has chronic hbp,depression, igt  Migraine   The history is provided by the patient. This is a chronic problem. The problem occurs constantly. The problem has been gradually improving. The headache is aggravated by photophobia. The pain is at a severity of 7/10. Associated symptoms include malaise/fatigue. Pertinent negatives include no fever, no orthopnea, no palpitations, no shortness of breath and no nausea. Hypertension   The history is provided by the patient. This is a new problem. The current episode started more than 1 week ago. Associated symptoms include malaise/fatigue and headaches. Pertinent negatives include no chest pain, no orthopnea, no palpitations, no neck pain, no shortness of breath and no nausea. Cholesterol Problem  The history is provided by the patient. This is a chronic problem. The problem occurs daily. Associated symptoms include headaches. Pertinent negatives include no chest pain and no shortness of breath. Back Pain   This is a chronic problem. The problem has not changed since onset. The problem occurs daily. Associated symptoms include headaches. Pertinent negatives include no chest pain and no fever. Review of Systems   Constitutional: Positive for malaise/fatigue. Negative for fever. HENT: Negative for congestion. Respiratory: Negative for shortness of breath. Cardiovascular: Negative for chest pain, palpitations and orthopnea. Gastrointestinal: Negative for heartburn and nausea. Genitourinary: Negative for frequency. Musculoskeletal: Positive for back pain. Negative for neck pain. Neurological: Positive for headaches. Psychiatric/Behavioral: Positive for depression. The patient has insomnia. The patient is not nervous/anxious. Physical Exam  Constitutional:       General: She is in acute distress. Appearance: She is obese. HENT:      Head: Normocephalic and atraumatic. Right Ear: Tympanic membrane normal.      Left Ear: Tympanic membrane normal.      Nose: Nose normal.      Mouth/Throat:      Mouth: Mucous membranes are moist.   Cardiovascular:      Rate and Rhythm: Normal rate and regular rhythm. Pulses: Normal pulses. Heart sounds: Normal heart sounds. Pulmonary:      Effort: Pulmonary effort is normal.      Breath sounds: Normal breath sounds. Abdominal:      General: Abdomen is flat. Palpations: Abdomen is soft. Musculoskeletal:      Cervical back: Tenderness present. Thoracic back: Tenderness present. Decreased range of motion. Lumbar back: Tenderness present. Decreased range of motion. Back:    Skin:     General: Skin is warm and dry. Neurological:      General: No focal deficit present. Mental Status: She is alert and oriented to person, place, and time. Mental status is at baseline. Sensory: Sensory deficit present. Gait: Gait normal.         Diagnoses and all orders for this visit:    1. Chronic migraine without aura, not intractable, without status migrainosus    2. Type 2 diabetes mellitus without complication, without long-term current use of insulin (HCC),improving  -     METABOLIC PANEL, COMPREHENSIVE; Future  -     AMB POC HEMOGLOBIN A1C    3. Fibromyalgia,stable  -     CBC WITH AUTOMATED DIFF; Future    4. Mixed hyperlipidemia  -     CHOLESTEROL, TOTAL; Future    5. Chronic pain syndrome    6. Generalized anxiety disorder  -     ALPRAZolam (XANAX) 0.5 mg tablet; Take 1 Tablet by mouth three (3) times daily as needed for Anxiety. Max Daily Amount: 1.5 mg.    7. Migraine without status migrainosus, not intractable, unspecified migraine type  -     gabapentin (NEURONTIN) 600 mg tablet; Take 1 Tablet by mouth three (3) times daily.     8. Pain  - HYDROcodone-acetaminophen (NORCO) 7.5-325 mg per tablet; Take 1 Tablet by mouth every six (6) hours as needed for Pain for up to 3 days. Max Daily Amount: 4 Tablets. 9. Opioid use, unspecified with unspecified opioid-induced disorder    10. Needs flu shot  -     INFLUENZA VIRUS VAC QUAD,SPLIT,PRESV FREE SYRINGE IM      Follow-up and Dispositions    · Return in about 3 months (around 2/23/2022). Follow-up and Dispositions    · Return in about 3 months (around 2/23/2022).

## 2021-11-23 NOTE — PATIENT INSTRUCTIONS
Vaccine Information Statement    Influenza (Flu) Vaccine (Live, Intranasal): What You Need to Know    Many vaccine information statements are available in [place] and other languages. See www.immunize.org/vis. Hojas de información sobre vacunas están disponibles en español y en muchos otros idiomas. Visite www.immunize.org/vis. 1. Why get vaccinated? Influenza vaccine can prevent influenza (flu). Flu is a contagious disease that spreads around the [facility] every year, usually between October and May. Anyone can get the flu, but it is more dangerous for some people. Infants and young children, people 72years of age and older, pregnant people, and people with certain health conditions or a weakened immune system are at greatest risk of flu complications. Pneumonia, bronchitis, sinus infections, and ear infections are examples of flu-related complications. If you have a medical condition, such as heart disease, cancer, or diabetes, flu can make it worse. Flu can cause fever and chills, sore throat, muscle aches, fatigue, cough, headache, and runny or stuffy nose. Some people may have vomiting and diarrhea, though this is more common in children than adults. In an average year, thousands of people in the [facility] die from flu, and many more are hospitalized. Flu vaccine prevents millions of illnesses and flu-related visits to the doctor each year. 2. Live, attenuated influenza vaccine     CDC recommends everyone 6 months and older get vaccinated every flu season. Children 6 months through 6years of age may need 2 doses during a single flu season. Everyone else needs only 1 dose each flu season. Live, attenuated influenza vaccine (called LAIV) is a nasal spray vaccine that may be given to non-pregnant people 2 through 52years of age. It takes about 2 weeks for protection to develop after vaccination. There are many flu viruses, and they are always changing.  Each year a new flu vaccine is made to protect against the influenza viruses believed to be likely to cause disease in the upcoming flu season. Even when the vaccine doesnt exactly match these viruses, it may still provide some protection. Influenza vaccine does not cause flu. Influenza vaccine may be given at the same time as other vaccines. 3. Talk with your health care provider    Tell your vaccination provider if the person getting the vaccine:   Is younger than 2 years or older than 52years of age  [facility] Is pregnant. Live, attenuated influenza vaccine is not recommended for pregnant people   Has had an allergic reaction after a previous dose of influenza vaccine, or has any severe, life-threatening allergies   Is a child or adolescent 2 through 16years of age who is receiving aspirin or aspirin- or salicylate-containing products   Has a weakened immune system   Is a child 3through 3years old who has asthma or a history of wheezing in the past 12 months   Is 5 years or older and has asthma   Has taken influenza antiviral medication in the last 3 weeks   Cares for severely immunocompromised people who require a protected environment   Has other underlying medical conditions that can put people at higher risk of serious flu complications (such as lung disease, heart disease, kidney disease like diabetes, kidney or liver disorders, neurologic or neuromuscular or metabolic disorders)   Does not have a spleen, or has a non-functioning spleen   Has a cochlear implant   Has a cerebrospinal fluid leak (a leak of the fluid that surrounds the brain to the nose, throat, ear, or some other location in the head)   Has had Guillain-Barré Syndrome within 6 weeks after a previous dose of influenza vaccine    In some cases, your health care provider may decide to postpone influenza vaccination until a future visit.       For some patients, a different type of influenza vaccine (inactivated or recombinant influenza vaccine) might be more appropriate than live, attenuated influenza vaccine. People with minor illnesses, such as a cold, may be vaccinated. People who are moderately or severely ill should usually wait until they recover before getting influenza vaccine. Your health care provider can give you more information. 4. Risks of a vaccine reaction     Runny nose or nasal congestion, wheezing, and headache can happen after LAIV vaccination.  Vomiting, muscle aches, fever, sore throat, and cough are other possible side effects. If these problems occur, they usually begin soon after vaccination and are mild and short-lived. As with any medicine, there is a very remote chance of a vaccine causing a severe allergic reaction, other serious injury, or death. 5. What if there is a serious problem? An allergic reaction could occur after the vaccinated person leaves the clinic. If you see signs of a severe allergic reaction (hives, swelling of the face and throat, difficulty breathing, a fast heartbeat, dizziness, or weakness), call 9-1-1 and get the person to the nearest hospital.    For other signs that concern you, call your health care provider. Adverse reactions should be reported to the Vaccine Adverse Event Reporting System (VAERS). Your health care provider will usually file this report, or you can do it yourself. Visit the VAERS website at www.vaers. hhs.gov or call 2-797.612.7498. VAERS is only for reporting reactions, and VAERS staff members do not give medical advice. 6. The National Vaccine Injury Compensation Program    The [facility] Vaccine Injury Compensation Program (VICP) is a federal program that was created to compensate people who may have been injured by certain vaccines. Claims regarding alleged injury or death due to vaccination have a time limit for filing, which may be as short as two years.  Visit the VICP website at www.hrsa.gov/vaccinecompensation or call 9-757.483.8971 to learn about the program and about filing a claim. 7. How can I learn more?  Ask your health care provider.  Call your local or state health department.  Visit the website of the Food and Drug Administration (FDA) for vaccine package inserts and additional information at www.fda.gov/vaccines-blood-biologics/vaccines.  Contact the Centers for Disease Control and Prevention (CDC):  - Call 7-858.476.3114 (1-800-CDC-INFO) or  - Visit CDCs influenza website at www.cdc.gov/flu. Vaccine Information Statement   Live, Attenuated Influenza Vaccine   8/6/2021  42 UAmmy Pham [id number]   Department of Health and Human Services  Centers for Disease Control and Prevention    Office Use Only

## 2021-11-24 RX ORDER — VERAPAMIL HYDROCHLORIDE 240 MG/1
TABLET, FILM COATED, EXTENDED RELEASE ORAL
Qty: 90 TABLET | Refills: 3 | Status: SHIPPED | OUTPATIENT
Start: 2021-11-24

## 2021-11-27 DIAGNOSIS — F32.A DEPRESSION, UNSPECIFIED DEPRESSION TYPE: ICD-10-CM

## 2021-11-27 DIAGNOSIS — M79.7 FIBROMYALGIA: ICD-10-CM

## 2021-11-27 DIAGNOSIS — G43.709 CHRONIC MIGRAINE WITHOUT AURA, NOT INTRACTABLE, WITHOUT STATUS MIGRAINOSUS: ICD-10-CM

## 2021-11-28 RX ORDER — PROMETHAZINE HYDROCHLORIDE 25 MG/1
25 TABLET ORAL
Qty: 50 TABLET | Refills: 0 | Status: SHIPPED | OUTPATIENT
Start: 2021-11-28 | End: 2021-12-21

## 2021-11-28 RX ORDER — ZOLPIDEM TARTRATE 5 MG/1
5 TABLET ORAL
Qty: 30 TABLET | Refills: 2 | Status: SHIPPED | OUTPATIENT
Start: 2021-11-28 | End: 2021-12-31 | Stop reason: SDUPTHER

## 2021-12-05 DIAGNOSIS — M54.9 ACUTE BILATERAL BACK PAIN, UNSPECIFIED BACK LOCATION: ICD-10-CM

## 2021-12-05 RX ORDER — TIZANIDINE 4 MG/1
TABLET ORAL
Qty: 50 TABLET | Refills: 0 | Status: SHIPPED | OUTPATIENT
Start: 2021-12-05 | End: 2021-12-31 | Stop reason: SDUPTHER

## 2021-12-21 DIAGNOSIS — R25.2 MUSCLE CRAMP: ICD-10-CM

## 2021-12-21 DIAGNOSIS — G43.709 CHRONIC MIGRAINE WITHOUT AURA, NOT INTRACTABLE, WITHOUT STATUS MIGRAINOSUS: ICD-10-CM

## 2021-12-21 RX ORDER — PROMETHAZINE HYDROCHLORIDE 25 MG/1
25 TABLET ORAL
Qty: 50 TABLET | Refills: 0 | Status: SHIPPED | OUTPATIENT
Start: 2021-12-21 | End: 2022-01-05

## 2021-12-21 RX ORDER — CYCLOBENZAPRINE HCL 10 MG
10 TABLET ORAL
Qty: 50 TABLET | Refills: 1 | Status: SHIPPED | OUTPATIENT
Start: 2021-12-21 | End: 2022-01-27

## 2021-12-22 DIAGNOSIS — R52 PAIN: ICD-10-CM

## 2021-12-22 NOTE — TELEPHONE ENCOUNTER
Last visit: 11/23/21  Next visit: 2/23/22  Previous refill 11/23/21    Requested Prescriptions     Pending Prescriptions Disp Refills    HYDROcodone-acetaminophen (NORCO) 7.5-325 mg per tablet 75 Tablet 0     Sig: Take 1 Tablet by mouth every six (6) hours as needed for Pain for up to 3 days. Max Daily Amount: 4 Tablets. Please review  before prescribing new script for this medication.      Thanks,  Jannette Schaefer

## 2021-12-23 RX ORDER — HYDROCODONE BITARTRATE AND ACETAMINOPHEN 7.5; 325 MG/1; MG/1
1 TABLET ORAL
Qty: 75 TABLET | Refills: 0 | Status: SHIPPED | OUTPATIENT
Start: 2021-12-23 | End: 2021-12-26

## 2021-12-29 ENCOUNTER — TELEPHONE (OUTPATIENT)
Dept: NEUROLOGY | Age: 46
End: 2021-12-29

## 2021-12-31 DIAGNOSIS — F32.A DEPRESSION, UNSPECIFIED DEPRESSION TYPE: ICD-10-CM

## 2021-12-31 DIAGNOSIS — M79.7 FIBROMYALGIA: ICD-10-CM

## 2021-12-31 DIAGNOSIS — F41.1 GENERALIZED ANXIETY DISORDER: ICD-10-CM

## 2021-12-31 DIAGNOSIS — M54.9 ACUTE BILATERAL BACK PAIN, UNSPECIFIED BACK LOCATION: ICD-10-CM

## 2022-01-03 RX ORDER — TIZANIDINE 4 MG/1
4 TABLET ORAL
Qty: 50 TABLET | Refills: 0 | Status: SHIPPED | OUTPATIENT
Start: 2022-01-03 | End: 2022-01-27

## 2022-01-03 RX ORDER — ALPRAZOLAM 0.5 MG/1
0.5 TABLET ORAL
Qty: 90 TABLET | Refills: 0 | Status: SHIPPED | OUTPATIENT
Start: 2022-01-03 | End: 2022-03-01 | Stop reason: SDUPTHER

## 2022-01-03 RX ORDER — ZOLPIDEM TARTRATE 5 MG/1
5 TABLET ORAL
Qty: 30 TABLET | Refills: 0 | Status: SHIPPED | OUTPATIENT
Start: 2022-01-03 | End: 2022-05-03 | Stop reason: SDUPTHER

## 2022-01-04 ENCOUNTER — TELEPHONE (OUTPATIENT)
Dept: NEUROLOGY | Age: 47
End: 2022-01-04

## 2022-01-04 NOTE — TELEPHONE ENCOUNTER
Called and spoke to [facility]. They transferred me over to the pharmacist to give verbal Rx. Gave verbal and they stated they will get this processed and reach out to the patient to discuss copay and obtain consent to get the medication sent to our office.

## 2022-01-05 ENCOUNTER — TELEPHONE (OUTPATIENT)
Dept: NEUROLOGY | Age: 47
End: 2022-01-05

## 2022-01-05 DIAGNOSIS — G43.709 CHRONIC MIGRAINE WITHOUT AURA, NOT INTRACTABLE, WITHOUT STATUS MIGRAINOSUS: ICD-10-CM

## 2022-01-05 RX ORDER — PROMETHAZINE HYDROCHLORIDE 25 MG/1
TABLET ORAL
Qty: 50 TABLET | Refills: 0 | Status: SHIPPED | OUTPATIENT
Start: 2022-01-05 | End: 2022-01-27

## 2022-01-05 NOTE — TELEPHONE ENCOUNTER
Patient's [place] Bristle is valid to 1/14 and her Botox is scheduled for 1/13/22. (Botox [id number]  PA Case ID: [id number]  auth valid to 1/14/22).    Message sent via [facility] to make her aware a new auth will need to be done if appt needs to be cancelled. Medication needs to arrive no later than 1/12, preferably earlier to allow for any issues.

## 2022-01-06 ENCOUNTER — TELEPHONE (OUTPATIENT)
Dept: NEUROLOGY | Age: 47
End: 2022-01-06

## 2022-01-06 NOTE — TELEPHONE ENCOUNTER
Botox arrived on 1/6/2022.  200 units/vial  Allergen  [facility]  Lot: [id number]  Exp: 10/2024  Appt: 1/13/2022

## 2022-01-13 ENCOUNTER — TELEPHONE (OUTPATIENT)
Dept: NEUROLOGY | Age: 47
End: 2022-01-13

## 2022-01-13 NOTE — TELEPHONE ENCOUNTER
VOICEMAIL    Pt is cx'ing her 2:20 botox appt today due to being sick. Please call to reschedule procedure.  [phone]

## 2022-01-13 NOTE — TELEPHONE ENCOUNTER
Wherever you can reasonably put her on my schedule and may be several weeks before I can get her in but do the best she can based on what is going on on my schedule I trust your judgment

## 2022-01-24 DIAGNOSIS — G43.709 CHRONIC MIGRAINE WITHOUT AURA, NOT INTRACTABLE, WITHOUT STATUS MIGRAINOSUS: Primary | ICD-10-CM

## 2022-01-24 DIAGNOSIS — G43.909 MIGRAINE SYNDROME: ICD-10-CM

## 2022-01-24 RX ORDER — HYDROCODONE BITARTRATE AND ACETAMINOPHEN 7.5; 325 MG/1; MG/1
1 TABLET ORAL
Qty: 50 TABLET | Refills: 0 | Status: SHIPPED | OUTPATIENT
Start: 2022-01-24 | End: 2022-03-03 | Stop reason: SDUPTHER

## 2022-01-27 DIAGNOSIS — M54.9 ACUTE BILATERAL BACK PAIN, UNSPECIFIED BACK LOCATION: ICD-10-CM

## 2022-01-27 DIAGNOSIS — F32.A DEPRESSION, UNSPECIFIED DEPRESSION TYPE: ICD-10-CM

## 2022-01-27 DIAGNOSIS — G43.709 CHRONIC MIGRAINE WITHOUT AURA, NOT INTRACTABLE, WITHOUT STATUS MIGRAINOSUS: ICD-10-CM

## 2022-01-27 DIAGNOSIS — R25.2 MUSCLE CRAMP: ICD-10-CM

## 2022-01-27 DIAGNOSIS — F41.1 GENERALIZED ANXIETY DISORDER: ICD-10-CM

## 2022-01-27 RX ORDER — TIZANIDINE 4 MG/1
TABLET ORAL
Qty: 50 TABLET | Refills: 0 | Status: SHIPPED | OUTPATIENT
Start: 2022-01-27 | End: 2022-02-25

## 2022-01-27 RX ORDER — VENLAFAXINE HYDROCHLORIDE 150 MG/1
CAPSULE, EXTENDED RELEASE ORAL
Qty: 90 CAPSULE | Refills: 1 | Status: SHIPPED | OUTPATIENT
Start: 2022-01-27 | End: 2022-09-14

## 2022-01-27 RX ORDER — CYCLOBENZAPRINE HCL 10 MG
10 TABLET ORAL
Qty: 50 TABLET | Refills: 1 | Status: SHIPPED | OUTPATIENT
Start: 2022-01-27 | End: 2022-03-01 | Stop reason: ALTCHOICE

## 2022-01-27 RX ORDER — PROMETHAZINE HYDROCHLORIDE 25 MG/1
TABLET ORAL
Qty: 50 TABLET | Refills: 0 | Status: SHIPPED | OUTPATIENT
Start: 2022-01-27 | End: 2022-02-25

## 2022-01-28 ENCOUNTER — TELEPHONE (OUTPATIENT)
Dept: NEUROLOGY | Age: 47
End: 2022-01-28

## 2022-01-28 NOTE — TELEPHONE ENCOUNTER
Patient had last botox treatment 10/21/2021. Patient missed 1/13/2022 appointment and now missed today. Would like to reschedule.

## 2022-01-28 NOTE — TELEPHONE ENCOUNTER
At my next available it is okay to double book on a procedure day as long as I am not already double or triple booked on that day multiple times

## 2022-01-28 NOTE — TELEPHONE ENCOUNTER
Patient states that she is having a very bad migraine and would not be able to drive to her apptmt today.  Please call back to rs

## 2022-01-31 RX ORDER — TOPIRAMATE 100 MG/1
100 TABLET, FILM COATED ORAL 2 TIMES DAILY
Qty: 180 TABLET | Refills: 1 | Status: SHIPPED | OUTPATIENT
Start: 2022-01-31 | End: 2022-07-29 | Stop reason: SDUPTHER

## 2022-02-02 DIAGNOSIS — G43.909 MIGRAINE WITHOUT STATUS MIGRAINOSUS, NOT INTRACTABLE, UNSPECIFIED MIGRAINE TYPE: ICD-10-CM

## 2022-02-03 RX ORDER — GABAPENTIN 600 MG/1
600 TABLET ORAL 3 TIMES DAILY
Qty: 270 TABLET | Refills: 0 | OUTPATIENT
Start: 2022-02-03

## 2022-02-13 DIAGNOSIS — G43.909 MIGRAINE WITHOUT STATUS MIGRAINOSUS, NOT INTRACTABLE, UNSPECIFIED MIGRAINE TYPE: ICD-10-CM

## 2022-02-15 ENCOUNTER — TELEPHONE (OUTPATIENT)
Dept: PHARMACY | Age: 47
End: 2022-02-15

## 2022-02-15 NOTE — TELEPHONE ENCOUNTER
Eugenie Mcmahan MD, from below, appt with you 2/23  - Chart reviewed d/t insurer-identified gap: diabetes rx claims and no statin rx claim - would patient benefit from statin therapy, such as rosuvastatin 5mg daily? · 10-yr ASCVD risk of @5% with DM, last ; no noted statin trial/hx in this EMR    Please let me know if I can further assist, or if you would like me to try to reach patient prior to (or after) appt to discuss any further.     Thank you,  Alicia Louis, PharmD, [address] [facility], toll free: [phone], option 2      ==============================================================  POPULATION [facility]ICAL [facility]ARM[facility]: STATIN THERAPY REVIEW  Identified statin use in persons with diabetes care gap per [address]  Per Insurance Records from Dec 2021 (2021 [facility] = 99%):     Lab Results   Component Value Date/Time    Hemoglobin A1c 7.0 (H) 08/23/2021 11:30 AM    Hemoglobin A1c 6.5 (H) 04/19/2021 02:20 AM    Hemoglobin A1c 6.1 (H) 12/17/2020 01:37 PM     STATIN GAP IDENTIFIED    Lab Results   Component Value Date/Time    Cholesterol, total 219 (H) 11/23/2021 04:17 PM    HDL Cholesterol 43 04/19/2021 02:20 AM    LDL, calculated 135.2 (H) 04/19/2021 02:20 AM    VLDL, calculated 51.8 04/19/2021 02:20 AM    Triglyceride 259 (H) 04/19/2021 02:20 AM    CHOL/HDL Ratio 5.3 (H) 04/19/2021 02:20 AM     ALT (SGPT)   Date Value Ref Range Status   11/23/2021 60 12 - 78 U/L Final     AST (SGOT)   Date Value Ref Range Status   11/23/2021 40 (H) 15 - 37 U/L Final     The 10-year ASCVD risk score (Sidney Kessler., et al., 2013) is: 4.9%    Values used to calculate the score:      Age: 48 years      Sex: Female      Is Non- : No      Diabetic: Yes      Tobacco smoker: No      Systolic Blood Pressure: 534 mmHg      Is BP treated: Yes      HDL Cholesterol: 43 MG/DL      Total Cholesterol: 219 MG/DL Hyperlipidemia Goal: Patient has a 10-yr ASCVD risk of @5% with DM, last ; may be a candidate for statin therapy.   · No noted statin trial/hx in this EMR  · Taking verapamil - if statin started, would avoid atorvastatin d/t potential interaction    PLAN  - Consider starting rosuvastatin 5mg daily  - Labs/follow-up per provider discretion    Future Appointments   Date Time Provider Meeta Malik   2/23/2022  2:40 PM Jaquan Niño MD [facility] BS AMB   4/29/2022  8:00 AM CATIE Finn BS AMB

## 2022-02-16 RX ORDER — GABAPENTIN 600 MG/1
600 TABLET ORAL 3 TIMES DAILY
Qty: 270 TABLET | Refills: 0 | Status: SHIPPED | OUTPATIENT
Start: 2022-02-16 | End: 2022-05-18 | Stop reason: SDUPTHER

## 2022-02-23 NOTE — TELEPHONE ENCOUNTER
Rodriguez Dawson MD, from below, appt with you 3/1/22  - Chart reviewed d/t insurer-identified gap: diabetes rx claims and no statin rx claim - would patient benefit from statin therapy, such as rosuvastatin 5mg daily? ? 10-yr ASCVD risk of @5% with DM, last ; no noted statin trial/hx in this EMR     Please let me know if I can further assist, or if you would like me to try to reach patient prior to (or after) appt to discuss any further.     Thank you,  Mitch Louis, PharmD, [address] [facility]  Department, toll free: [phone], option 2      ==============================================================  Noted appt rescheduled to 3/1/22; appt note remains re start statin?

## 2022-02-24 DIAGNOSIS — G43.709 CHRONIC MIGRAINE WITHOUT AURA, NOT INTRACTABLE, WITHOUT STATUS MIGRAINOSUS: ICD-10-CM

## 2022-02-24 DIAGNOSIS — M54.9 ACUTE BILATERAL BACK PAIN, UNSPECIFIED BACK LOCATION: ICD-10-CM

## 2022-02-25 RX ORDER — PROMETHAZINE HYDROCHLORIDE 25 MG/1
TABLET ORAL
Qty: 50 TABLET | Refills: 0 | Status: SHIPPED | OUTPATIENT
Start: 2022-02-25 | End: 2022-03-27

## 2022-02-25 RX ORDER — TIZANIDINE 4 MG/1
TABLET ORAL
Qty: 50 TABLET | Refills: 0 | Status: SHIPPED | OUTPATIENT
Start: 2022-02-25 | End: 2022-03-28 | Stop reason: SDUPTHER

## 2022-03-01 ENCOUNTER — OFFICE VISIT (OUTPATIENT)
Dept: FAMILY MEDICINE CLINIC | Age: 47
End: 2022-03-01
Payer: MEDICARE

## 2022-03-01 VITALS
WEIGHT: 293 LBS | DIASTOLIC BLOOD PRESSURE: 88 MMHG | HEIGHT: 66 IN | OXYGEN SATURATION: 99 % | RESPIRATION RATE: 18 BRPM | SYSTOLIC BLOOD PRESSURE: 118 MMHG | BODY MASS INDEX: 47.09 KG/M2 | TEMPERATURE: 99.5 F | HEART RATE: 96 BPM

## 2022-03-01 DIAGNOSIS — M54.50 CHRONIC BILATERAL LOW BACK PAIN WITHOUT SCIATICA: ICD-10-CM

## 2022-03-01 DIAGNOSIS — R52 PAIN: ICD-10-CM

## 2022-03-01 DIAGNOSIS — M79.7 FIBROMYALGIA: ICD-10-CM

## 2022-03-01 DIAGNOSIS — Z00.00 MEDICARE ANNUAL WELLNESS VISIT, INITIAL: ICD-10-CM

## 2022-03-01 DIAGNOSIS — G89.29 CHRONIC BILATERAL LOW BACK PAIN WITHOUT SCIATICA: ICD-10-CM

## 2022-03-01 DIAGNOSIS — M54.2 CERVICALGIA: ICD-10-CM

## 2022-03-01 DIAGNOSIS — E66.01 MORBID OBESITY WITH BMI OF 45.0-49.9, ADULT (HCC): ICD-10-CM

## 2022-03-01 DIAGNOSIS — E11.9 TYPE 2 DIABETES MELLITUS WITHOUT COMPLICATION, WITHOUT LONG-TERM CURRENT USE OF INSULIN (HCC): Primary | ICD-10-CM

## 2022-03-01 DIAGNOSIS — Z12.31 ENCOUNTER FOR SCREENING MAMMOGRAM FOR BREAST CANCER: ICD-10-CM

## 2022-03-01 DIAGNOSIS — G43.709 CHRONIC MIGRAINE WITHOUT AURA, NOT INTRACTABLE, WITHOUT STATUS MIGRAINOSUS: ICD-10-CM

## 2022-03-01 DIAGNOSIS — F41.1 GENERALIZED ANXIETY DISORDER: ICD-10-CM

## 2022-03-01 DIAGNOSIS — E78.2 MIXED HYPERLIPIDEMIA: ICD-10-CM

## 2022-03-01 LAB — HBA1C MFR BLD HPLC: 6.3 %

## 2022-03-01 PROCEDURE — 3044F HG A1C LEVEL LT 7.0%: CPT | Performed by: FAMILY MEDICINE

## 2022-03-01 PROCEDURE — G0439 PPPS, SUBSEQ VISIT: HCPCS | Performed by: FAMILY MEDICINE

## 2022-03-01 PROCEDURE — G8427 DOCREV CUR MEDS BY ELIG CLIN: HCPCS | Performed by: FAMILY MEDICINE

## 2022-03-01 PROCEDURE — 2022F DILAT RTA XM EVC RTNOPTHY: CPT | Performed by: FAMILY MEDICINE

## 2022-03-01 PROCEDURE — 83036 HEMOGLOBIN GLYCOSYLATED A1C: CPT | Performed by: FAMILY MEDICINE

## 2022-03-01 PROCEDURE — G9717 DOC PT DX DEP/BP F/U NT REQ: HCPCS | Performed by: FAMILY MEDICINE

## 2022-03-01 PROCEDURE — 99213 OFFICE O/P EST LOW 20 MIN: CPT | Performed by: FAMILY MEDICINE

## 2022-03-01 PROCEDURE — G8417 CALC BMI ABV UP PARAM F/U: HCPCS | Performed by: FAMILY MEDICINE

## 2022-03-01 RX ORDER — ALPRAZOLAM 0.5 MG/1
0.5 TABLET ORAL
Qty: 90 TABLET | Refills: 0 | Status: SHIPPED | OUTPATIENT
Start: 2022-03-01 | End: 2022-04-03 | Stop reason: SDUPTHER

## 2022-03-01 RX ORDER — HYDROCODONE BITARTRATE AND ACETAMINOPHEN 7.5; 325 MG/1; MG/1
1 TABLET ORAL
Qty: 75 TABLET | Refills: 0 | Status: CANCELLED | OUTPATIENT
Start: 2022-03-01 | End: 2022-03-04

## 2022-03-01 NOTE — PROGRESS NOTES
This is the Subsequent Medicare Annual Wellness Exam, performed 12 months or more after the Initial AWV or the last Subsequent AWV    I have reviewed the patient's medical history in detail and updated the computerized patient record. Assessment/Plan   Education and counseling provided:  Are appropriate based on today's review and evaluation    1. Type 2 diabetes mellitus without complication, without long-term current use of insulin (HCC)  -     METABOLIC PANEL, COMPREHENSIVE; Future  -     AMB POC HEMOGLOBIN A1C  2. Medicare annual wellness visit, initial  3. Chronic bilateral low back pain without sciatica  -     REFERRAL TO PHYSICAL THERAPY  4. Cervicalgia  -     REFERRAL TO PHYSICAL THERAPY  5. Fibromyalgia  -     CBC WITH AUTOMATED DIFF; Future  -     REFERRAL TO PHYSICAL THERAPY  6. Mixed hyperlipidemia  7. Chronic migraine without aura, not intractable, without status migrainosus  8. Generalized anxiety disorder  -     ALPRAZolam (XANAX) 0.5 mg tablet; Take 1 Tablet by mouth three (3) times daily as needed for Anxiety. Max Daily Amount: 1.5 mg., Normal, Disp-90 Tablet, R-0  9. Pain  10. Morbid obesity with BMI of 45.0-49.9, adult ([facility] [address]       Depression Risk Factor Screening     3 most recent PHQ Screens 3/1/2022   Little interest or pleasure in doing things Not at all   Feeling down, depressed, irritable, or hopeless Not at all   Total Score PHQ 2 0       Alcohol & Drug Abuse Risk Screen    Do you average more than 1 drink per night or more than 7 drinks a week:  No    On any one occasion in the past three months have you have had more than 3 drinks containing alcohol:  No          Functional Ability and Level of Safety    Hearing: Hearing is good. Activities of Daily Living: The home contains: handrails and grab bars  Patient does total self care      Ambulation: with no difficulty     Fall Risk:  Fall Risk Assessment, last 12 mths 3/1/2022   Able to walk?  Yes   Fall in past 12 months? 0   Do you feel unsteady?  0   Are you worried about falling 0      Abuse Screen:  Patient is not abused       Cognitive Screening    Has your family/caregiver stated any concerns about your memory: no     Cognitive Screenin    Health Maintenance Due     Health Maintenance Due   Topic Date Due    Hepatitis C Screening  Never done    COVID-19 Vaccine (1) Never done    Pneumococcal 0-64 years (1 of 2 - PPSV23) Never done    Foot Exam Q1  Never done    MICROALBUMIN Q1  Never done    Eye Exam Retinal or Dilated  Never done    Cervical cancer screen  Never done    Colorectal Cancer Screening Combo  2018    Medicare Yearly Exam  2022       Patient Care Team   Patient Care Team:  Shakeel Rust MD as PCP - General (Family Medicine)  Shakeel Rust MD as PCP - [facility] Empaneled Provider    History     Patient Active Problem List   Diagnosis Code    Migraine syndrome G43.909    Hepatic hemangioma D18.03    Depressive disorder, not elsewhere classified F32.9    IBS (irritable bowel syndrome) K58.9    Obesity E66.9    Cervical spondylarthritis M47.0    Encounter for long-term (current) use of other medications Z79.899    Carpal tunnel syndrome on left G56.02    Psychological factors affecting morbid obesity ([address] E66.01, F54    Obesity, morbid (more than 100 lbs over ideal weight or BMI > 40) ([facility]) E66.01    Chronic migraine [id number]    Esophageal reflux K21.9    Hyperglycemia R73.9    Fibromyalgia M79.7    Mixed hyperlipidemia E78.2    Type 2 diabetes mellitus E11.9    Opioid use, unspecified with unspecified opioid-induced disorder F11.99     Past Medical History:   Diagnosis Date    Arthritis     DDD    Carpal tunnel syndrome on left 2011    Cervical spondylarthritis 2011    Depressive disorder, not elsewhere classified 2011    Encounter for long-term (current) use of other medications 2011    GERD (gastroesophageal reflux disease)     Headache(784.0)     Migraines    Hepatic hemangioma 2/17/2011    Hypertension     IBS (irritable bowel syndrome) 2/20/2011    Migraine syndrome 2/17/2011    Obesity 2/20/2011    Other ill-defined conditions(799.89)     hx.of syncope    Psychiatric disorder     depression      Past Surgical History:   Procedure Laterality Date    HX HEENT      tonsillectomy    HX ORTHOPAEDIC      carpal tunnel and tigger finger release rt     Current Outpatient Medications   Medication Sig Dispense Refill    ALPRAZolam (XANAX) 0.5 mg tablet Take 1 Tablet by mouth three (3) times daily as needed for Anxiety. Max Daily Amount: 1.5 mg. 90 Tablet 0    tiZANidine (ZANAFLEX) 4 mg tablet TAKE 1 TABLET BY MOUTH EVERY SIX (6) HOURS AS NEEDED FOR MUSCLE SPASMS 50 Tablet 0    promethazine (PHENERGAN) 25 mg tablet TAKE 1 TABLET BY MOUTH EVERY 8 HOURS AS NEEDED FOR NAUSEA 50 Tablet 0    gabapentin (NEURONTIN) 600 mg tablet Take 1 Tablet by mouth three (3) times daily. 270 Tablet 0    topiramate (TOPAMAX) 100 mg tablet TAKE 1 TABLET BY MOUTH TWO (2) TIMES A DAY. 180 Tablet 1    venlafaxine-SR (EFFEXOR-XR) 150 mg capsule TAKE 1 CAPSULE BY MOUTH EVERY DAY 90 Capsule 1    zolpidem (AMBIEN) 5 mg tablet Take 1 Tablet by mouth nightly. Max Daily Amount: 5 mg. 30 Tablet 0    verapamil ER (CALAN-SR) 240 mg CR tablet TAKE 1 TABLET BY MOUTH EVERYDAY AT BEDTIME 90 Tablet 3    hydroCHLOROthiazide (HYDRODIURIL) 25 mg tablet Take 1 Tablet by mouth daily. 90 Tablet 3    metFORMIN ER (GLUCOPHAGE XR) 500 mg tablet TAKE 1 TABLET BY MOUTH EVERY DAY WITH DINNER 90 Tablet 4    omeprazole (PRILOSEC) 40 mg capsule TAKE 1 CAPSULE BY MOUTH EVERY DAY 90 Capsule 2    furosemide (LASIX) 20 mg tablet Take 1 Tab by mouth daily as needed (edema). 90 Tab 0    onabotulinumtoxinA (Botox) 200 unit injection 200 units by IM route once every 12 weeks. Inject IM neck/face, 31 FDA approved sites. Indication: Migraine prevention.  DX code: G43.71 1 Vial 3    biotin 10,000 mcg cap Take  by mouth.  cyanocobalamin 1,000 mcg tablet Take 2,000 mcg by mouth daily.  docusate sodium (STOOL SOFTENER) 100 mg capsule 100 mg two (2) times a day. Once daily       naloxone (Narcan) 4 mg/actuation nasal spray Use 1 spray intranasally, then discard. Repeat with new spray every 2 min as needed for opioid overdose symptoms, alternating nostrils.  (Patient not taking: Reported on 11/23/2021) 2 Each 1     Allergies   Allergen Reactions    Atacand [Candesartan] Other (comments)     Patient B/P increase    Compazine [Prochlorperazine] Other (comments) and Unable to Obtain    Ciprofloxacin Other (comments)    Codeine Nausea and Vomiting    Zonisamide Nausea and Vomiting       Family History   Problem Relation Age of Onset    Heart Disease Mother     Hypertension Mother     Diabetes Mother     Heart Disease Father     Lung Disease Father     Hypertension Father      Social History     Tobacco Use    Smoking status: Never Smoker    Smokeless tobacco: Never Used    Tobacco comment: SECOND HAND SMOKE/PARENTS   Substance Use Topics    Alcohol use: Not Currently         Nel Saavedra MD

## 2022-03-01 NOTE — PROGRESS NOTES
Chief Complaint   Patient presents with    Annual Wellness Visit     Pt getting annual medicare wellness exam.     1. Have you been to the ER, urgent care clinic since your last visit? Hospitalized since your last visit? No    2. Have you seen or consulted any other health care providers outside of the [address] [place] [address] since your last visit? Include any pap smears or colon screening.  No

## 2022-03-01 NOTE — PROGRESS NOTES
HISTORY OF PRESENT ILLNESS  Amy Martinez is a 48 y.o. female. F/U daily migraines improved somewhat with  botox tx. Having less migraine days each mo. Having increased stress from care of her debilitated father. Has chronic hbp,depression, dm2,worsening low back and neck pain radiating to hips  Migraine   The history is provided by the patient. This is a chronic problem. The problem occurs constantly. The problem has not changed since onset. The headache is aggravated by photophobia. The pain is at a severity of 7/10. Associated symptoms include malaise/fatigue. Pertinent negatives include no fever, no orthopnea, no palpitations, no dizziness and no nausea. Hypertension   The history is provided by the patient. This is a new problem. The current episode started more than 1 week ago. Associated symptoms include malaise/fatigue and neck pain. Pertinent negatives include no orthopnea, no palpitations, no blurred vision, no peripheral edema, no dizziness and no nausea. Back Pain   The history is provided by the patient. This is a chronic problem. The problem has not changed since onset. The problem occurs daily. The pain is associated with no known injury. The pain is present in the upper back and lower back. The quality of the pain is described as aching. The pain radiates to the left thigh. The pain is at a severity of 5/10. The pain is moderate. Pertinent negatives include no fever. Myalgia  The history is provided by the patient. This is a chronic problem. The problem occurs daily. The problem has not changed since onset. Review of Systems   Constitutional: Positive for malaise/fatigue. Negative for fever. HENT: Negative for congestion. Eyes: Negative for blurred vision. Respiratory: Negative for cough. Cardiovascular: Negative for palpitations and orthopnea. Gastrointestinal: Negative for heartburn and nausea. Genitourinary: Negative for frequency.    Musculoskeletal: Positive for back pain, myalgias and neck pain. Neurological: Negative for dizziness. Psychiatric/Behavioral: Positive for depression. The patient has insomnia. The patient is not nervous/anxious. Physical Exam  Constitutional:       General: She is in acute distress. Appearance: She is obese. HENT:      Head: Normocephalic and atraumatic. Right Ear: Tympanic membrane normal.      Left Ear: Tympanic membrane normal.      Nose: Nose normal.      Mouth/Throat:      Mouth: Mucous membranes are moist.   Cardiovascular:      Rate and Rhythm: Normal rate and regular rhythm. Pulses: Normal pulses. Heart sounds: Normal heart sounds. Pulmonary:      Effort: Pulmonary effort is normal.      Breath sounds: Normal breath sounds. Abdominal:      General: Abdomen is flat. Palpations: Abdomen is soft. Musculoskeletal:      Cervical back: Tenderness present. Thoracic back: Tenderness present. Decreased range of motion. Lumbar back: Tenderness present. Decreased range of motion. Back:    Skin:     General: Skin is warm and dry. Neurological:      General: No focal deficit present. Mental Status: She is alert and oriented to person, place, and time. Mental status is at baseline. Sensory: Sensory deficit present. Gait: Gait normal.       Diagnoses and all orders for this visit:    1. Type 2 diabetes mellitus without complication, without long-term current use of insulin (HCC)  -     METABOLIC PANEL, COMPREHENSIVE; Future  -     AMB POC HEMOGLOBIN A1C    2. Medicare annual wellness visit, initial    3. Chronic bilateral low back pain without sciatica  -     REFERRAL TO PHYSICAL THERAPY    4. Cervicalgia  -     REFERRAL TO PHYSICAL THERAPY    5. Fibromyalgia  -     CBC WITH AUTOMATED DIFF; Future  -     REFERRAL TO PHYSICAL THERAPY    6. Mixed hyperlipidemia    7. Chronic migraine without aura, not intractable, without status migrainosus    8.  Generalized anxiety disorder  - ALPRAZolam (XANAX) 0.5 mg tablet; Take 1 Tablet by mouth three (3) times daily as needed for Anxiety. Max Daily Amount: 1.5 mg.    9. Pain    10. Morbid obesity with BMI of 45.0-49.9, adult ([facility] [address]    11. Encounter for screening mammogram for breast cancer  -     VELVET MAMMO BI SCREENING INCL CAD; Future      Follow-up and Dispositions    · Return in about 4 weeks (around 3/29/2022). Follow-up and Dispositions    · Return in about 4 weeks (around 3/29/2022).

## 2022-03-02 LAB
ALBUMIN SERPL-MCNC: 4 G/DL (ref 3.5–5)
ALBUMIN/GLOB SERPL: 1 {RATIO} (ref 1.1–2.2)
ALP SERPL-CCNC: 80 U/L (ref 45–117)
ALT SERPL-CCNC: 47 U/L (ref 12–78)
ANION GAP SERPL CALC-SCNC: 8 MMOL/L (ref 5–15)
AST SERPL-CCNC: 33 U/L (ref 15–37)
BASOPHILS # BLD: 0.1 K/UL (ref 0–0.1)
BASOPHILS NFR BLD: 0 % (ref 0–1)
BILIRUB SERPL-MCNC: 0.4 MG/DL (ref 0.2–1)
BUN SERPL-MCNC: 13 MG/DL (ref 6–20)
BUN/CREAT SERPL: 16 (ref 12–20)
CALCIUM SERPL-MCNC: 9.6 MG/DL (ref 8.5–10.1)
CHLORIDE SERPL-SCNC: 98 MMOL/L (ref 97–108)
CO2 SERPL-SCNC: 26 MMOL/L (ref 21–32)
CREAT SERPL-MCNC: 0.79 MG/DL (ref 0.55–1.02)
DIFFERENTIAL METHOD BLD: ABNORMAL
EOSINOPHIL # BLD: 0 K/UL (ref 0–0.4)
EOSINOPHIL NFR BLD: 0 % (ref 0–7)
ERYTHROCYTE [DISTWIDTH] IN BLOOD BY AUTOMATED COUNT: 15.6 % (ref 11.5–14.5)
GLOBULIN SER CALC-MCNC: 4.2 G/DL (ref 2–4)
GLUCOSE SERPL-MCNC: 114 MG/DL (ref 65–100)
HCT VFR BLD AUTO: 41.6 % (ref 35–47)
HGB BLD-MCNC: 13.1 G/DL (ref 11.5–16)
IMM GRANULOCYTES # BLD AUTO: 0.2 K/UL (ref 0–0.04)
IMM GRANULOCYTES NFR BLD AUTO: 1 % (ref 0–0.5)
LYMPHOCYTES # BLD: 4.4 K/UL (ref 0.8–3.5)
LYMPHOCYTES NFR BLD: 28 % (ref 12–49)
MCH RBC QN AUTO: 29.6 PG (ref 26–34)
MCHC RBC AUTO-ENTMCNC: 31.5 G/DL (ref 30–36.5)
MCV RBC AUTO: 93.9 FL (ref 80–99)
MONOCYTES # BLD: 1 K/UL (ref 0–1)
MONOCYTES NFR BLD: 6 % (ref 5–13)
NEUTS SEG # BLD: 9.9 K/UL (ref 1.8–8)
NEUTS SEG NFR BLD: 65 % (ref 32–75)
NRBC # BLD: 0 K/UL (ref 0–0.01)
NRBC BLD-RTO: 0 PER 100 WBC
PLATELET # BLD AUTO: 559 K/UL (ref 150–400)
PMV BLD AUTO: 9.3 FL (ref 8.9–12.9)
POTASSIUM SERPL-SCNC: 3.3 MMOL/L (ref 3.5–5.1)
PROT SERPL-MCNC: 8.2 G/DL (ref 6.4–8.2)
RBC # BLD AUTO: 4.43 M/UL (ref 3.8–5.2)
SODIUM SERPL-SCNC: 132 MMOL/L (ref 136–145)
WBC # BLD AUTO: 15.6 K/UL (ref 3.6–11)

## 2022-03-03 DIAGNOSIS — G43.709 CHRONIC MIGRAINE WITHOUT AURA, NOT INTRACTABLE, WITHOUT STATUS MIGRAINOSUS: ICD-10-CM

## 2022-03-03 DIAGNOSIS — G43.909 MIGRAINE SYNDROME: ICD-10-CM

## 2022-03-03 RX ORDER — HYDROCODONE BITARTRATE AND ACETAMINOPHEN 7.5; 325 MG/1; MG/1
1 TABLET ORAL
Qty: 50 TABLET | Refills: 0 | Status: SHIPPED | OUTPATIENT
Start: 2022-03-03 | End: 2022-04-02

## 2022-03-03 NOTE — TELEPHONE ENCOUNTER
Last visit:3/01/22  Next visit: 4/01/22  Previous refill 1/24/22(50+0R)    Requested Prescriptions     Pending Prescriptions Disp Refills    HYDROcodone-acetaminophen (NORCO) 7.5-325 mg per tablet 50 Tablet 0     Sig: Take 1 Tablet by mouth every six (6) hours as needed for Pain for up to 30 days. Max Daily Amount: 4 Tablets. Please review  before prescribing new script for this medication.      Thanks,  Jeri Turk

## 2022-03-03 NOTE — TELEPHONE ENCOUNTER
Noted provider reviewed/doned encounter; patient attended 3/1 PCP OV - appears no statin at this time.    =========================================================   For Luca Linares in place: No   Recommendation Provided To: Provider: 1 via Note to Provider    Gap Closed?: No   Intervention Accepted By: Provider: 0   Time Spent (min): 15

## 2022-03-07 DIAGNOSIS — E87.6 HYPOKALEMIA: Primary | ICD-10-CM

## 2022-03-07 RX ORDER — POTASSIUM CHLORIDE 750 MG/1
20 TABLET, EXTENDED RELEASE ORAL DAILY
Qty: 180 TABLET | Refills: 3 | Status: SHIPPED | OUTPATIENT
Start: 2022-03-07

## 2022-03-18 DIAGNOSIS — R25.2 MUSCLE CRAMP: ICD-10-CM

## 2022-03-19 PROBLEM — E78.2 MIXED HYPERLIPIDEMIA: Status: ACTIVE | Noted: 2017-10-16

## 2022-03-19 PROBLEM — E11.9 TYPE 2 DIABETES MELLITUS (HCC): Status: ACTIVE | Noted: 2021-11-23

## 2022-03-19 PROBLEM — F11.99 OPIOID USE, UNSPECIFIED WITH UNSPECIFIED OPIOID-INDUCED DISORDER (HCC): Status: ACTIVE | Noted: 2021-11-23

## 2022-03-19 PROBLEM — M79.7 FIBROMYALGIA: Status: ACTIVE | Noted: 2017-03-24

## 2022-03-21 RX ORDER — CYCLOBENZAPRINE HCL 10 MG
10 TABLET ORAL
Qty: 50 TABLET | Refills: 1 | Status: SHIPPED | OUTPATIENT
Start: 2022-03-21 | End: 2022-04-27

## 2022-03-22 ENCOUNTER — OFFICE VISIT (OUTPATIENT)
Dept: FAMILY MEDICINE CLINIC | Age: 47
End: 2022-03-22
Payer: MEDICARE

## 2022-03-22 VITALS
SYSTOLIC BLOOD PRESSURE: 132 MMHG | BODY MASS INDEX: 47.09 KG/M2 | DIASTOLIC BLOOD PRESSURE: 88 MMHG | OXYGEN SATURATION: 98 % | HEART RATE: 116 BPM | TEMPERATURE: 98.9 F | HEIGHT: 66 IN | WEIGHT: 293 LBS | RESPIRATION RATE: 20 BRPM

## 2022-03-22 DIAGNOSIS — M54.9 ACUTE BILATERAL BACK PAIN, UNSPECIFIED BACK LOCATION: ICD-10-CM

## 2022-03-22 DIAGNOSIS — S83.92XA SPRAIN OF LEFT KNEE, UNSPECIFIED LIGAMENT, INITIAL ENCOUNTER: Primary | ICD-10-CM

## 2022-03-22 PROCEDURE — G8417 CALC BMI ABV UP PARAM F/U: HCPCS | Performed by: FAMILY MEDICINE

## 2022-03-22 PROCEDURE — 99213 OFFICE O/P EST LOW 20 MIN: CPT | Performed by: FAMILY MEDICINE

## 2022-03-22 PROCEDURE — G9717 DOC PT DX DEP/BP F/U NT REQ: HCPCS | Performed by: FAMILY MEDICINE

## 2022-03-22 PROCEDURE — G8428 CUR MEDS NOT DOCUMENT: HCPCS | Performed by: FAMILY MEDICINE

## 2022-03-22 RX ORDER — PREDNISONE 10 MG/1
TABLET ORAL
Qty: 21 TABLET | Refills: 0 | Status: SHIPPED | OUTPATIENT
Start: 2022-03-22 | End: 2022-04-18 | Stop reason: ALTCHOICE

## 2022-03-22 NOTE — PROGRESS NOTES
Chief Complaint   Patient presents with    Leg Pain     Pt state she has pain behind L knee. 1. \"Have you been to the ER, urgent care clinic since your last visit? Hospitalized since your last visit? \" No    2. \"Have you seen or consulted any other health care providers outside of the [address] [place] [address] since your last visit? \" No     3. For patients aged 39-70: Has the patient had a colonoscopy / FIT/ Cologuard? Yes - Care Gap present. Most recent result on file      If the patient is female:    4. For patients aged 41-77: Has the patient had a mammogram within the past 2 years? No      5. For patients aged 21-65: Has the patient had a pap smear?  No    Health Maintenance Due   Topic Date Due    Hepatitis C Screening  Never done    COVID-19 Vaccine (1) Never done    Pneumococcal 0-64 years (1 of 2 - PPSV23) Never done    Foot Exam Q1  Never done    MICROALBUMIN Q1  Never done    Eye Exam Retinal or Dilated  Never done    Cervical cancer screen  Never done    Colorectal Cancer Screening Combo  05/08/2018

## 2022-03-23 NOTE — PROGRESS NOTES
HISTORY OF PRESENT ILLNESS  Argenis Cook is a 50 y.o. female. injured l knee 3 days ago in a fall,having diffis=culty ambulating. Pain is lateral posterior  Knee Injury   The history is provided by the patient. This is a new problem. The current episode started more than 2 days ago. The problem has not changed since onset. The pain is present in the left knee. The pain is at a severity of 5/10. The pain is moderate. Associated symptoms include limited range of motion, stiffness and back pain. Review of Systems   Constitutional: Negative for fever and malaise/fatigue. Musculoskeletal: Positive for back pain, joint pain and stiffness. Physical Exam  Constitutional:       Appearance: Normal appearance. She is obese. Cardiovascular:      Rate and Rhythm: Normal rate and regular rhythm. Heart sounds: Normal heart sounds. Musculoskeletal:      Left knee: Swelling and effusion present. Decreased range of motion. Tenderness present over the lateral joint line. Neurological:      Mental Status: She is alert. ASSESSMENT and PLAN  Diagnoses and all orders for this visit:    1. Sprain of left knee, unspecified ligament, initial encounter,ice ,rest ,prednisone,call prn  -     XR KNEE LT 3 V; Future  -     predniSONE (STERAPRED DS) 10 mg dose pack; See administration instruction per 10mg dose pack    2.  Acute bilateral back pain, unspecified back location  -     XR SPINE LUMB 2 OR 3 V; Future

## 2022-03-27 DIAGNOSIS — G43.709 CHRONIC MIGRAINE WITHOUT AURA, NOT INTRACTABLE, WITHOUT STATUS MIGRAINOSUS: ICD-10-CM

## 2022-03-27 DIAGNOSIS — M54.9 ACUTE BILATERAL BACK PAIN, UNSPECIFIED BACK LOCATION: ICD-10-CM

## 2022-03-27 RX ORDER — PROMETHAZINE HYDROCHLORIDE 25 MG/1
TABLET ORAL
Qty: 50 TABLET | Refills: 0 | Status: SHIPPED | OUTPATIENT
Start: 2022-03-27 | End: 2022-04-08

## 2022-03-28 DIAGNOSIS — M54.9 ACUTE BILATERAL BACK PAIN, UNSPECIFIED BACK LOCATION: ICD-10-CM

## 2022-03-28 RX ORDER — TIZANIDINE 4 MG/1
TABLET ORAL
Qty: 50 TABLET | Refills: 0 | Status: SHIPPED | OUTPATIENT
Start: 2022-03-28 | End: 2022-04-08

## 2022-03-28 RX ORDER — TIZANIDINE 4 MG/1
TABLET ORAL
Qty: 50 TABLET | Refills: 0 | Status: SHIPPED | OUTPATIENT
Start: 2022-03-28 | End: 2022-07-06 | Stop reason: SDUPTHER

## 2022-04-01 ENCOUNTER — NURSE TRIAGE (OUTPATIENT)
Dept: OTHER | Facility: CLINIC | Age: 47
End: 2022-04-01

## 2022-04-01 NOTE — TELEPHONE ENCOUNTER
Received call from [address] at [facility] with Red Flag Complaint. Subjective: Caller states \"Trying to cancel appt for today and reschedule. Having a migraine that she has frequently. \"     Current Symptoms: Reports migraine, light sensitivity, similar to ones she has had in the past, confusion, blurred vision, slurred words,   Denies CP, SOB, unilateral weakness, stiff neck, inability to walk, vomiting,     Hx of migraines and this is just like everyone she has had since 2014. No worse. Onset: chronic - not any worse than normal    Associated Symptoms: NA    Pain Severity: 10/10; aching; constant    Temperature: denies    What has been tried: Hydrocodone, promethazine, resting,     LMP: NA Pregnant: No    Recommended disposition: Home Care    Care advice provided, patient verbalizes understanding; denies any other questions or concerns; instructed to call back for any new or worsening symptoms. Kate at [facility] calling patient to schedule    Attention Provider: Thank you for allowing me to participate in the care of your patient. The patient was connected to triage in response to information provided to the [facility]. Please do not respond through this encounter as the response is not directed to a shared pool.       Reason for Disposition   Similar to previously diagnosed migraine headaches    Protocols used: HEADACHE-ADULT-OH

## 2022-04-03 DIAGNOSIS — F41.1 GENERALIZED ANXIETY DISORDER: ICD-10-CM

## 2022-04-04 RX ORDER — ALPRAZOLAM 0.5 MG/1
0.5 TABLET ORAL
Qty: 90 TABLET | Refills: 0 | Status: SHIPPED | OUTPATIENT
Start: 2022-04-04 | End: 2022-05-03 | Stop reason: SDUPTHER

## 2022-04-05 DIAGNOSIS — G43.909 MIGRAINE SYNDROME: Primary | ICD-10-CM

## 2022-04-05 RX ORDER — HYDROCODONE BITARTRATE AND ACETAMINOPHEN 7.5; 325 MG/1; MG/1
1 TABLET ORAL
Qty: 50 TABLET | Refills: 0 | Status: SHIPPED | OUTPATIENT
Start: 2022-04-05 | End: 2022-05-03 | Stop reason: SDUPTHER

## 2022-04-05 NOTE — TELEPHONE ENCOUNTER
Patient needs a refill on her:HYDROcodone-acetaminophen (NORCO) 7.5-325 mg per tablet. Please call @[phone].

## 2022-04-06 ENCOUNTER — TELEPHONE (OUTPATIENT)
Dept: NEUROLOGY | Age: 47
End: 2022-04-06

## 2022-04-06 NOTE — TELEPHONE ENCOUNTER
Botox - needs new Rx      sent to [facility] via [facility]   Key: [id number] - PA Case ID: [id number]     CPT no pa required - Medicare

## 2022-04-06 NOTE — TELEPHONE ENCOUNTER
Botox [id number] approved  PA [id number] from [facility]   Letter scanned to chart. Date range 4/6/22 - 7/6/22    CPT 08502 No PA required.    SPP is [facility]    Ph [phone]

## 2022-04-08 DIAGNOSIS — G43.709 CHRONIC MIGRAINE WITHOUT AURA, NOT INTRACTABLE, WITHOUT STATUS MIGRAINOSUS: ICD-10-CM

## 2022-04-08 DIAGNOSIS — M54.9 ACUTE BILATERAL BACK PAIN, UNSPECIFIED BACK LOCATION: ICD-10-CM

## 2022-04-08 RX ORDER — TIZANIDINE 4 MG/1
TABLET ORAL
Qty: 50 TABLET | Refills: 0 | Status: SHIPPED | OUTPATIENT
Start: 2022-04-08 | End: 2022-05-03 | Stop reason: SDUPTHER

## 2022-04-08 RX ORDER — PROMETHAZINE HYDROCHLORIDE 25 MG/1
TABLET ORAL
Qty: 50 TABLET | Refills: 0 | Status: SHIPPED | OUTPATIENT
Start: 2022-04-08 | End: 2022-05-20

## 2022-04-14 ENCOUNTER — TELEPHONE (OUTPATIENT)
Dept: NEUROLOGY | Age: 47
End: 2022-04-14

## 2022-04-14 NOTE — TELEPHONE ENCOUNTER
Re: Botox order for 4/29 appt. Called [facility] back to confirm if this order must be a buy and bill. Interestingly, the rep ran the test claim using my pharmacy approval and it went through with no problem, confirmed Auth # and date range. She placed me on hold, s/w the pt, and the pt stated to [facility] that she is going to call [facility] to set up reimbursement. I told the rep she cannot get reimbursed on a Medicare plan. Rep states pt says she has a commercial plan which we have no knowledge of. I told rep if she has commercial, I will need to get authorization on that plan first, and then she could the digital savings card from [employer] and not need to be reimbursed. Rep understood and I gave my dd number for rep to give to pt. [facility] rep set up delivery for 4/19 but in light of the new information, I am not confident about this delivery.

## 2022-04-14 NOTE — TELEPHONE ENCOUNTER
Sherrie from [facility] called me back - s/w pt again and said pt was confused - thought she had a commercial plan. Rep had her pull her card to confirm and the card we have on file is correct - it is a Medicare Advantage plan w/ pharmacy benefits w/ [facility] - and that's where the auth originated from. In summary, all is well and delivery is reconfirmed for Tuesday, 4/19. Disregard my previous note today.

## 2022-04-18 ENCOUNTER — OFFICE VISIT (OUTPATIENT)
Dept: FAMILY MEDICINE CLINIC | Age: 47
End: 2022-04-18
Payer: MEDICARE

## 2022-04-18 VITALS
BODY MASS INDEX: 47.09 KG/M2 | HEIGHT: 66 IN | RESPIRATION RATE: 20 BRPM | SYSTOLIC BLOOD PRESSURE: 138 MMHG | OXYGEN SATURATION: 98 % | DIASTOLIC BLOOD PRESSURE: 88 MMHG | TEMPERATURE: 98.7 F | WEIGHT: 293 LBS | HEART RATE: 116 BPM

## 2022-04-18 DIAGNOSIS — G43.709 CHRONIC MIGRAINE WITHOUT AURA, NOT INTRACTABLE, WITHOUT STATUS MIGRAINOSUS: ICD-10-CM

## 2022-04-18 DIAGNOSIS — K63.5 POLYP OF COLON, UNSPECIFIED PART OF COLON, UNSPECIFIED TYPE: ICD-10-CM

## 2022-04-18 DIAGNOSIS — M79.7 FIBROMYALGIA: ICD-10-CM

## 2022-04-18 DIAGNOSIS — S83.92XA SPRAIN OF LEFT KNEE, UNSPECIFIED LIGAMENT, INITIAL ENCOUNTER: Primary | ICD-10-CM

## 2022-04-18 PROCEDURE — 99213 OFFICE O/P EST LOW 20 MIN: CPT | Performed by: FAMILY MEDICINE

## 2022-04-18 PROCEDURE — 96372 THER/PROPH/DIAG INJ SC/IM: CPT | Performed by: FAMILY MEDICINE

## 2022-04-18 PROCEDURE — G9717 DOC PT DX DEP/BP F/U NT REQ: HCPCS | Performed by: FAMILY MEDICINE

## 2022-04-18 PROCEDURE — G8417 CALC BMI ABV UP PARAM F/U: HCPCS | Performed by: FAMILY MEDICINE

## 2022-04-18 PROCEDURE — G8427 DOCREV CUR MEDS BY ELIG CLIN: HCPCS | Performed by: FAMILY MEDICINE

## 2022-04-18 RX ORDER — KETOROLAC TROMETHAMINE 30 MG/ML
30 INJECTION, SOLUTION INTRAMUSCULAR; INTRAVENOUS ONCE
Qty: 1 ML | Refills: 0
Start: 2022-04-18 | End: 2022-04-18

## 2022-04-18 NOTE — LETTER
CONTROLLED SUBSTANCE MEDICATION AGREEMENT     Patient Name: Ebony Juárez  Patient YOB: 1975     I understand, that controlled substance medications may be used to help better manage my symptoms and to improve my ability to function at home, work and in social settings. However, I also understand that these medications do have risks, which have been discussed with me, including possible development of physical or psychological dependence. I understand that successful treatment requires mutual trust and honesty between me and my provider. I understand and agree that following this Medication Agreement is necessary in continuing my provider-patient relationship and the success of my treatment plan. Explanation from my Provider: Benefits and Goals of Controlled Substance Medications: There are two potential goals for your treatment: (1) decreased pain and suffering (2) improved daily life functions. There are many possible treatments for your chronic condition(s). Alternatives such as physical therapy, yoga, massage, home daily exercise, meditation, relaxation techniques, injections, chiropractic manipulations, surgery, cognitive therapy, hypnosis and many medications that are not habit-forming may be used. Use of controlled substance medications may be helpful, but they are unlikely to resolve all symptoms or restore all function. Explanation from my Provider: Risks of Controlled Substance Medications:  Opioid pain medications: These medications can lead to problems such as addiction/dependence, sedation, lightheadedness/dizziness, memory issues, falls, constipation, nausea, or vomiting. They may also impair the ability to drive or operate machinery. Additionally, these medications may lower testosterone levels, leading to loss of bone strength, stamina and sex drive.   They may cause problems with breathing, sleep apnea and reduced coughing, which is especially dangerous for patients with lung disease. Overdose or dangerous interactions with alcohol and other medications may occur, leading to death. Hyperalgesia may develop, which means patients receiving opioids for the treatment of pain may become more sensitive to certain painful stimuli, and in some cases, experience pain from ordinarily non-painful stimuli. Women between the ages of 14-53 who could become pregnant should carefully weigh the risks and benefits of opioids with their physicians, as these medications increase the risk of pregnancy complications, including miscarriage,  delivery and stillbirth. It is also possible for babies to be born addicted to opioids. Opioid dependence withdrawal symptoms may include; feelings of uneasiness, increased pain, irritability, belly pain, diarrhea, sweats and goose-flesh. Benzodiazepines and non-benzodiazepine sleep medications: These medications can lead to problems such as addiction/dependence, sedation, fatigue, lightheadedness, dizziness, incoordination, falls, depression, hallucinations, and impaired judgment, memory and concentration. The ability to drive and operate machinery may also be affected. Abnormal sleep-related behaviors have been reported, including sleepwalking, driving, making telephone calls, eating, or having sex while not fully awake. These medications can suppress breathing and worsen sleep apnea, particularly when combined with alcohol or other sedating medications, potentially leading to death. Dependence withdrawal symptoms may include tremors, anxiety, hallucinations and seizures. Initials:_______  Stimulants:  Common adverse effects include addiction/dependence, increased blood  pressure and heart rate, decreased appetite, nausea, involuntary weight loss, insomnia,  irritability, and headaches.   These risks may increase when these medications are combined with other stimulants, such as caffeine pills or energy drinks, certain weight loss supplements and oral decongestants. Dependence withdrawal symptoms may include depressed mood, loss of interest, suicidal thoughts, anxiety, fatigue, appetite changes and agitation. Testosterone replacement therapy:  Potential side effects include increased risk of stroke and heart attack, blood clots, increased blood pressure, increased cholesterol, enlarged prostate, sleep apnea, irritability/aggression and other mood disorders, and decreased fertility. I agree and understand that I and my prescriber have the following rights and responsibilities regarding my treatment plan:     1. MY RIGHTS:  To be informed of my treatment and medication plan. To be an active participant in my health and wellbeing. 2. MY RESPONSIBILITY AND UNDERSTANDING FOR USE OF MEDICATIONS   I will take medications at the dose and frequency as directed. For my safety, I will not increase or change how I take my medications without the recommendation of my healthcare provider.  I will actively participate in any program recommended by my provider which may improve function, including social, physical, psychological programs.  I will not take my medications with alcohol or other drugs not prescribed to me. I understand that drinking alcohol with my medications increases the chances of side effects, including reduced breathing rate and could lead to personal injury when operating machinery.  I understand that if I have a history of substance use disorders, including alcohol or other illicit drugs, that I may be at increased risk of addiction to my medications.  I agree to notify my provider immediately if I should become pregnant so that my treatment plan can be adjusted.    I agree and understand that I shall only receive controlled substance medications from the prescriber that signed this agreement unless there is written agreement among other prescribers of controlled substances outlining the responsibility of the medications being prescribed.  I understand that the if the controlled medication is not helping to achieve goals, the dosage may be tapered and no longer prescribed. 3. MY RESPONSIBILITY FOR COMMUNICATION / PRESCRIPTION RENEWALS   I agree that all controlled substance medications that I take will be prescribed only by my provider. If another healthcare provider prescribes me medication in an emergency, I will notify my provider within seventy-two (72) hours.  I will arrange for refills at the prescribed interval ONLY during regular office hours. I will not ask for refills earlier than agreed, after-hours, on holidays or weekends. Refills may take up to 72 hours for processing and prescriptions to reach the pharmacy.  I will inform my other health care providers that I am taking these medications and of the existence of this [id number]. In the event of an emergency, I will provide the same information to the emergency department prescribers. Initials:_______  Eddie Dugan I will keep my provider updated on the pharmacy I am using for controlled medication prescription filling. 4. MY RESPONSIBILITY FOR PROTECTING MEDICATIONS   I will protect my prescriptions and medications. I understand that lost or misplaced prescriptions will not be replaced.  I will keep medications only for my own use and will not share them with others. I will keep all medications away from children.  I agree that if my medications are adjusted or discontinued, I will properly dispose of any remaining medications. I understand that I will be required to dispose of any remaining controlled medications as, directed by my prescriber, prior to being provided with any prescriptions for other controlled medications. Medication drop box locations can be found at: [employer]    5.  MY RESPONSIBILITY WITH ILLEGAL DRUGS    I will not use illegal or street drugs or another person's prescription medications not prescribed to me.  If there are identified addiction type symptoms, then referral to a program may be provided by my provider and I agree to follow through with this recommendation. 6. MY RESPONSIBILITY FOR COOPERATION WITH INVESTIGATIONS   I understand that my provider will comply with any applicable law and may discuss my use and/or possible misuse/abuse of controlled substances and alcohol, as appropriate, with any health care provider involved in my care, pharmacist, or legal authority.  I authorize my provider and pharmacy to cooperate fully with law enforcement agencies (as permitted by law) in the investigation of any possible misuse, sale, or other diversion of my controlled substances.  I agree to waive any applicable privilege or right of privacy or confidentiality with respect to these authorizations. 7. PROVIDERS RIGHT TO MONITOR FOR SAFETY: PRESCRIPTION MONITORING / DRUG TESTING   I consent to drug/toxicology screening and will submit to a drug screen upon my providers request to assure I am only taking the prescribed drugs for my safety monitoring. I understand that a drug screen is a laboratory test in which a sample of my urine, blood or saliva is checked to see what drugs I have been taking. This may entail an observed urine specimen, which means that a nurse or other health care provider may watch me provide urine, and I will cooperate if I am asked to provide an observed specimen.  I understand that my provider will check a copy of my State Prescription Monitoring Program () Report in order to safely prescribe medications.  Pill Counts: I consent to pill counts when requested. I may be asked to bring all my prescribed controlled substance medications, in their original bottles, to all of my scheduled appointments.   In addition, my provider may ask me to come to the practice at any time for a random pill count. Initials:_______    8. TERMINATION OF THIS AGREEMENT  For my safety, my prescriber has the right to stop prescribing controlled substance medications and may end this agreement.  Conditions that may result in termination of this agreement:  a. I do not show any improvement in pain, or my activity has not improved. b. I develop rapid tolerance or loss of improvement, as described in my treatment plan.  c. I develop significant side effects from the medication. d. My behavior is not consistent with the responsibilities outlined above, thereby causing safety concerns to continue prescribing controlled substance medications. e. I fail to follow the terms of this agreement. f. Other:____________________________       UNDERSTANDING THIS MEDICATION AGREEMENT:    I have read the above and have had all my questions answered. For chronic disease management, I know that my symptoms can be managed with many types of treatments. A chronic medication trial may be part of my treatment, but I must be an active participant in my care. Medication therapy is only one part of my symptom management plan. In some cases, there may be limited scientific evidence to support the chronic use of certain medications to improve symptoms and daily function. Furthermore, in certain circumstances, there may be scientific information that suggests that the use of chronic controlled substances may worsen my symptoms and increase my risk of unintentional death directly related to this medication therapy. I know that if my provider feels my risk from controlled medications is greater than my benefit, I will have my controlled substance medication(s) compassionately lowered or removed altogether. I further agree to allow this office to contact my HIPAA contact if there are concerns about my safety and use of the controlled medications.    I have agreed to use the prescribed controlled substance medications to me as instructed by my provider and as stated in this Medication Agreement. My initial on each page and my signature below shows that I have read each page and I have had the opportunity to ask questions with answers provided by my provider.     Patient Name (Printed): _____________________________________  Patient Signature:  ______________________   Date: _____________    Prescriber Name (Printed): ___________________________________  Prescriber Signature: _____________________  Date: _____________

## 2022-04-18 NOTE — PROGRESS NOTES
HISTORY OF PRESENT ILLNESS  Nunu Lewis is a 50 y.o. female. injured l knee 3 days ago in a fall,having diffis=culty ambulating. Pain is lateral posterior  Knee Pain   The history is provided by the patient. This is a new problem. The current episode started more than 1 week ago. The problem occurs daily. The problem has been gradually improving. The pain is present in the left knee. The quality of the pain is described as aching. The pain is at a severity of 5/10. The pain is moderate. Migraine   The history is provided by the patient. This is a recurrent problem. The current episode started more than 2 days ago. The problem occurs constantly. The problem has been gradually worsening. The headache is aggravated by nausea. Associated symptoms include dizziness and nausea. Pertinent negatives include no fever and no malaise/fatigue. Review of Systems   Constitutional: Negative for fever and malaise/fatigue. HENT: Negative for hearing loss and tinnitus. Eyes: Negative for blurred vision. Gastrointestinal: Positive for nausea. Musculoskeletal: Positive for joint pain. Neurological: Positive for dizziness and headaches. Physical Exam  Constitutional:       Appearance: Normal appearance. She is obese. She is ill-appearing. HENT:      Head: Normocephalic and atraumatic. Right Ear: Tympanic membrane normal.      Left Ear: Tympanic membrane normal.      Nose: Nose normal.      Mouth/Throat:      Mouth: Mucous membranes are moist.   Eyes:      Extraocular Movements: Extraocular movements intact. Pupils: Pupils are equal, round, and reactive to light. Cardiovascular:      Rate and Rhythm: Normal rate and regular rhythm. Heart sounds: Normal heart sounds. Pulmonary:      Effort: Pulmonary effort is normal.      Breath sounds: Normal breath sounds. Abdominal:      General: Abdomen is flat. Palpations: Abdomen is soft.    Musculoskeletal:      Cervical back: Normal range of motion and neck supple. Left knee: Swelling and effusion present. No deformity or erythema. Decreased range of motion. Tenderness present over the lateral joint line. Skin:     General: Skin is warm and dry. Neurological:      Mental Status: She is alert. Diagnoses and all orders for this visit:    1. Sprain of left knee, unspecified ligament, initial encounter,minimally improved,will refer to ortho  -     REFERRAL TO ORTHOPEDICS    2. Chronic migraine without aura, not intractable, without status migrainosus  -     ketorolac (TORADOL) 30 mg/mL (1 mL) injection; 1 mL by IntraVENous route once for 1 dose. -     KETOROLAC TROMETHAMINE INJ  -     MT THER/PROPH/DIAG INJECTION, SUBCUT/IM    3. Fibromyalgia    4. Polyp of colon, unspecified part of colon, unspecified type      Follow-up and Dispositions    · Return in about 4 weeks (around 5/16/2022). Follow-up and Dispositions    · Return in about 4 weeks (around 5/16/2022).

## 2022-04-18 NOTE — PROGRESS NOTES
Chief Complaint   Patient presents with    Knee Pain     F/U for L knee pain.  Migraine     Pt state she has a migraine. 1. \"Have you been to the ER, urgent care clinic since your last visit? Hospitalized since your last visit? \" No    2. \"Have you seen or consulted any other health care providers outside of the [address] [place] [address] since your last visit? \" No     3. For patients aged 39-70: Has the patient had a colonoscopy / FIT/ Cologuard? Yes - Care Gap present. Most recent result on file      If the patient is female:    4. For patients aged 41-77: Has the patient had a mammogram within the past 2 years? No      5. For patients aged 21-65: Has the patient had a pap smear?  No    Health Maintenance Due   Topic Date Due    Hepatitis C Screening  Never done    COVID-19 Vaccine (1) Never done    Pneumococcal 0-64 years (1 - PCV) Never done    Foot Exam Q1  Never done    MICROALBUMIN Q1  Never done    Eye Exam Retinal or Dilated  Never done    Cervical cancer screen  Never done    Colorectal Cancer Screening Combo  05/08/2018

## 2022-04-19 ENCOUNTER — DOCUMENTATION ONLY (OUTPATIENT)
Dept: NEUROLOGY | Age: 47
End: 2022-04-19

## 2022-04-19 NOTE — PROGRESS NOTES
botox came in the office: 4/19/22  MFR: claudia  LOT:  [id number]  Exp: 12/2024  Appointment: 4/29/22  Provider: Carolann Cullen  Specialty pharmacy: [address]

## 2022-04-20 RX ORDER — OMEPRAZOLE 40 MG/1
CAPSULE, DELAYED RELEASE ORAL
Qty: 90 CAPSULE | Refills: 2 | Status: SHIPPED | OUTPATIENT
Start: 2022-04-20

## 2022-04-27 DIAGNOSIS — R25.2 MUSCLE CRAMP: ICD-10-CM

## 2022-04-27 RX ORDER — CYCLOBENZAPRINE HCL 10 MG
10 TABLET ORAL
Qty: 50 TABLET | Refills: 1 | Status: SHIPPED | OUTPATIENT
Start: 2022-04-27 | End: 2022-06-03

## 2022-04-29 ENCOUNTER — OFFICE VISIT (OUTPATIENT)
Dept: NEUROLOGY | Age: 47
End: 2022-04-29
Payer: MEDICARE

## 2022-04-29 VITALS
SYSTOLIC BLOOD PRESSURE: 138 MMHG | BODY MASS INDEX: 47.09 KG/M2 | TEMPERATURE: 98 F | HEART RATE: 88 BPM | WEIGHT: 293 LBS | DIASTOLIC BLOOD PRESSURE: 88 MMHG | HEIGHT: 66 IN | RESPIRATION RATE: 15 BRPM

## 2022-04-29 DIAGNOSIS — Z92.29 S/P BOTOX INJECTION: Primary | ICD-10-CM

## 2022-04-29 DIAGNOSIS — G43.719 INTRACTABLE CHRONIC MIGRAINE WITHOUT AURA AND WITHOUT STATUS MIGRAINOSUS: ICD-10-CM

## 2022-04-29 PROCEDURE — 64615 CHEMODENERV MUSC MIGRAINE: CPT | Performed by: PSYCHIATRY & NEUROLOGY

## 2022-04-29 NOTE — PROCEDURES
Botox Injection Note       Indication: patient has chronic recurrent migraine. Procedure:   Botox concentration: 200 units in 4 ml of preservative-free normal saline. Jake Moran 47: [id number]  Lot number:  [id number]  Expiration date: 12/2024      31 sites injections, distribution as follow      Units/site  Sites Sides Subtotal    Procerus 5 1 1 5    5 1 2 10   Frontalis 5 2 2 20   Temporalis 5 4 2 40   Occipitalis 5 3 2 30   Upper cervical paraspinalis 5 2 2 20   Trapezius 5 3 2 30         200 units Botox were reconstituted, 155 units injected as above and the remainder was unavoidably wasted.      Patient tolerated procedure well.       _____________________________   Ruth Lane NP

## 2022-04-29 NOTE — PROGRESS NOTES
She had migraines since age 30. They are daily bitemporal headaches, throbbing in nature. Associated with photophobia and nausea. Occurring more than 15 days out of the month and lasting more than 3 hours. They have been reduced to less than 6 per month on account of botox. She is on topamax  Has tried elavil  Tried on emgality  Has tried cymbalata  SHe is on verapamil.    She has tried fioricet   She has tried imitex  She was on all these medicatrions more than two months and they did not help her migraines    When she does get a migraine patient has less than adequate response from rescue medication we will prescribe Ubrelvy 100 mg #16 to use 1 daily as needed for breakthrough headache and migraine

## 2022-04-29 NOTE — PATIENT INSTRUCTIONS
Office Policies    o Phone calls/patient messages:  Please allow up to 24 hours for someone in the office to contact you about your call or message. Be mindful your provider may be out of the office or your message may require further review. We encourage you to use fintonic for your messages as this is a faster, more efficient way to communicate with our office    o Medication Refills:  Prescription medications require up to 48 business hours to process. We encourage you to use f[employer]onic for your refills. For controlled medications: Please allow up to 72 business hours to process. Certain medications may require you to  a written prescription at our office. NO narcotic/controlled medications will be prescribed after 4pm Monday through Friday or on weekends    o Form/Paperwork Completion:  We ask that you allow 7-14 business days. You may also download your forms to f[employer]onic to have your doctor print off.

## 2022-05-03 ENCOUNTER — OFFICE VISIT (OUTPATIENT)
Dept: ORTHOPEDIC SURGERY | Age: 47
End: 2022-05-03
Payer: MEDICARE

## 2022-05-03 VITALS
TEMPERATURE: 97.6 F | HEART RATE: 95 BPM | WEIGHT: 293 LBS | SYSTOLIC BLOOD PRESSURE: 113 MMHG | OXYGEN SATURATION: 98 % | DIASTOLIC BLOOD PRESSURE: 80 MMHG | HEIGHT: 66 IN | BODY MASS INDEX: 47.09 KG/M2

## 2022-05-03 DIAGNOSIS — M25.562 ACUTE PAIN OF LEFT KNEE: Primary | ICD-10-CM

## 2022-05-03 DIAGNOSIS — F32.A DEPRESSION, UNSPECIFIED DEPRESSION TYPE: ICD-10-CM

## 2022-05-03 DIAGNOSIS — G43.909 MIGRAINE SYNDROME: ICD-10-CM

## 2022-05-03 DIAGNOSIS — M79.7 FIBROMYALGIA: ICD-10-CM

## 2022-05-03 DIAGNOSIS — F41.1 GENERALIZED ANXIETY DISORDER: ICD-10-CM

## 2022-05-03 DIAGNOSIS — M54.9 ACUTE BILATERAL BACK PAIN, UNSPECIFIED BACK LOCATION: ICD-10-CM

## 2022-05-03 PROCEDURE — G9717 DOC PT DX DEP/BP F/U NT REQ: HCPCS | Performed by: ORTHOPAEDIC SURGERY

## 2022-05-03 PROCEDURE — G8427 DOCREV CUR MEDS BY ELIG CLIN: HCPCS | Performed by: ORTHOPAEDIC SURGERY

## 2022-05-03 PROCEDURE — 99203 OFFICE O/P NEW LOW 30 MIN: CPT | Performed by: ORTHOPAEDIC SURGERY

## 2022-05-03 PROCEDURE — G8417 CALC BMI ABV UP PARAM F/U: HCPCS | Performed by: ORTHOPAEDIC SURGERY

## 2022-05-03 PROCEDURE — 20610 DRAIN/INJ JOINT/BURSA W/O US: CPT | Performed by: ORTHOPAEDIC SURGERY

## 2022-05-03 RX ORDER — TIZANIDINE 4 MG/1
4 TABLET ORAL
Qty: 50 TABLET | Refills: 0 | Status: SHIPPED | OUTPATIENT
Start: 2022-05-03 | End: 2022-05-12

## 2022-05-03 RX ORDER — HYDROCODONE BITARTRATE AND ACETAMINOPHEN 7.5; 325 MG/1; MG/1
1 TABLET ORAL
Qty: 50 TABLET | Refills: 0 | Status: SHIPPED | OUTPATIENT
Start: 2022-05-03 | End: 2022-06-02

## 2022-05-03 RX ORDER — BETAMETHASONE SODIUM PHOSPHATE AND BETAMETHASONE ACETATE 3; 3 MG/ML; MG/ML
6 INJECTION, SUSPENSION INTRA-ARTICULAR; INTRALESIONAL; INTRAMUSCULAR; SOFT TISSUE ONCE
Status: COMPLETED | OUTPATIENT
Start: 2022-05-03 | End: 2022-05-03

## 2022-05-03 RX ORDER — ALPRAZOLAM 0.5 MG/1
0.5 TABLET ORAL
Qty: 90 TABLET | Refills: 0 | Status: SHIPPED | OUTPATIENT
Start: 2022-05-03 | End: 2022-06-06 | Stop reason: SDUPTHER

## 2022-05-03 RX ADMIN — BETAMETHASONE SODIUM PHOSPHATE AND BETAMETHASONE ACETATE 6 MG: 3; 3 INJECTION, SUSPENSION INTRA-ARTICULAR; INTRALESIONAL; INTRAMUSCULAR; SOFT TISSUE at 16:30

## 2022-05-03 NOTE — PROGRESS NOTES
5/3/2022      CC: Left knee pain    HPI:      This is a 50y.o. year old patient who complains of left knee pain, this is on the medial aspect of the knee, it has been present for one week. Onset was sudden while going down stairs, she twisted the knee and felt a pop. This pain is activity dependent, it causes a significant amount of difficulty with athletic activities and stairs. The patient complains of mechanical symptoms and clicking within the knee. PMH:  Past Medical History:   Diagnosis Date    Arthritis     DDD    Carpal tunnel syndrome on left 2/22/2011    Cervical spondylarthritis 2/20/2011    Depressive disorder, not elsewhere classified 2/17/2011    Encounter for long-term (current) use of other medications 2/20/2011    GERD (gastroesophageal reflux disease)     Headache(784.0)     Migraines    Hepatic hemangioma 2/17/2011    Hypertension     IBS (irritable bowel syndrome) 2/20/2011    Migraine syndrome 2/17/2011    Obesity 2/20/2011    Other ill-defined conditions(799.89)     hx.of syncope    Psychiatric disorder     depression       PSxHx:  Past Surgical History:   Procedure Laterality Date    HX HEENT      tonsillectomy    HX ORTHOPAEDIC      carpal tunnel and tigger finger release rt       Meds:    Current Outpatient Medications:     cyclobenzaprine (FLEXERIL) 10 mg tablet, TAKE 1 TABLET BY MOUTH THREE (3) TIMES DAILY (WITH MEALS). , Disp: 50 Tablet, Rfl: 1    omeprazole (PRILOSEC) 40 mg capsule, TAKE 1 CAPSULE BY MOUTH EVERY DAY, Disp: 90 Capsule, Rfl: 2    promethazine (PHENERGAN) 25 mg tablet, TAKE 1 TABLET BY MOUTH EVERY 8 HOURS AS NEEDED FOR NAUSEA, Disp: 50 Tablet, Rfl: 0    tiZANidine (ZANAFLEX) 4 mg tablet, TAKE 1 TABLET BY MOUTH EVERY SIX (6) HOURS AS NEEDED FOR MUSCLE SPASMS, Disp: 50 Tablet, Rfl: 0    gabapentin (NEURONTIN) 600 mg tablet, Take 1 Tablet by mouth three (3) times daily. , Disp: 270 Tablet, Rfl: 0    topiramate (TOPAMAX) 100 mg tablet, TAKE 1 TABLET BY MOUTH TWO (2) TIMES A DAY., Disp: 180 Tablet, Rfl: 1    venlafaxine-SR (EFFEXOR-XR) 150 mg capsule, TAKE 1 CAPSULE BY MOUTH EVERY DAY, Disp: 90 Capsule, Rfl: 1    verapamil ER (CALAN-SR) 240 mg CR tablet, TAKE 1 TABLET BY MOUTH EVERYDAY AT BEDTIME, Disp: 90 Tablet, Rfl: 3    hydroCHLOROthiazide (HYDRODIURIL) 25 mg tablet, Take 1 Tablet by mouth daily. , Disp: 90 Tablet, Rfl: 3    metFORMIN ER (GLUCOPHAGE XR) 500 mg tablet, TAKE 1 TABLET BY MOUTH EVERY DAY WITH DINNER, Disp: 90 Tablet, Rfl: 4    furosemide (LASIX) 20 mg tablet, Take 1 Tab by mouth daily as needed (edema). , Disp: 90 Tab, Rfl: 0    biotin 10,000 mcg cap, Take  by mouth., Disp: , Rfl:     cyanocobalamin 1,000 mcg tablet, Take 2,000 mcg by mouth daily. , Disp: , Rfl:     docusate sodium (STOOL SOFTENER) 100 mg capsule, 100 mg two (2) times a day. Once daily , Disp: , Rfl:     ALPRAZolam (XANAX) 0.5 mg tablet, Take 1 Tablet by mouth three (3) times daily as needed for Anxiety. Max Daily Amount: 1.5 mg., Disp: 90 Tablet, Rfl: 0    HYDROcodone-acetaminophen (NORCO) 7.5-325 mg per tablet, Take 1 Tablet by mouth every six (6) hours as needed for Pain for up to 30 days. Max Daily Amount: 4 Tablets. , Disp: 50 Tablet, Rfl: 0    tiZANidine (ZANAFLEX) 4 mg tablet, Take 1 Tablet by mouth every six (6) hours as needed for Muscle Spasm(s). , Disp: 50 Tablet, Rfl: 0    ubrogepant (UBRELVY) 100 mg tablet, Take 1 Tablet by mouth daily as needed for Migraine. Indications: a migraine headache (Patient not taking: Reported on 5/3/2022), Disp: 16 Tablet, Rfl: 12    potassium chloride (KLOR-CON M10) 10 mEq tablet, Take 2 Tablets by mouth daily. (Patient not taking: Reported on 5/3/2022), Disp: 180 Tablet, Rfl: 3    zolpidem (AMBIEN) 5 mg tablet, Take 1 Tablet by mouth nightly. Max Daily Amount: 5 mg.  (Patient not taking: Reported on 5/3/2022), Disp: 30 Tablet, Rfl: 0    naloxone (Narcan) 4 mg/actuation nasal spray, Use 1 spray intranasally, then discard. Repeat with new spray every 2 min as needed for opioid overdose symptoms, alternating nostrils. (Patient not taking: Reported on 11/23/2021), Disp: 2 Each, Rfl: 1    onabotulinumtoxinA (Botox) 200 unit injection, 200 units by IM route once every 12 weeks. Inject IM neck/face, 31 FDA approved sites. Indication: Migraine prevention. DX code: G37.69 (Patient not taking: Reported on 5/3/2022), Disp: 1 Vial, Rfl: 3  No current facility-administered medications for this visit.     All:  Allergies   Allergen Reactions    Atacand [Candesartan] Other (comments)     Patient B/P increase    Compazine [Prochlorperazine] Other (comments) and Unable to Obtain    Ciprofloxacin Other (comments)    Codeine Nausea and Vomiting    Zonisamide Nausea and Vomiting       Social Hx:  Social History     Socioeconomic History    Marital status: SINGLE   Tobacco Use    Smoking status: Never Smoker    Smokeless tobacco: Never Used    Tobacco comment: SECOND HAND SMOKE/PARENTS   Vaping Use    Vaping Use: Never used   Substance and Sexual Activity    Alcohol use: Not Currently    Drug use: Yes     Types: Prescription, OTC       Family Hx:  Family History   Problem Relation Age of Onset    Heart Disease Mother     Hypertension Mother     Diabetes Mother     Heart Disease Father     Lung Disease Father     Hypertension Father          Review of Systems:       General: Denies headache, lethargy, fever, weight loss  Ears/Nose/Throat: Denies ear discharge, drainage, nosebleeds, hoarse voice, dental problems  Cardiovascular: Denies chest pain, shortness of breath  Lungs: Denies chest pain, breathing problems, wheezing, pneumonia  Stomach: Denies stomach pain, heartburn, constipation, irritable bowel  Skin: Denies rash, sores, open wounds  Musculoskeletal: left knee pain  Genitourinary: Denies dysuria, hematuria, polyuria  Gastrointestinal: Denies constipation, obstipation, diarrhea  Neurological: Denies changes in sight, smell, hearing, taste, seizures. Denies loss of consciousness. Psychiatric: Denies depression, sleep pattern changes, anxiety, change in personality  Endocrine: Denies mood swings, heat or cold intolerance  Hematologic/Lymphatic: Denies anemia, purpura, petechia  Allergic/Immunologic: Denies swelling of throat, pain or swelling at lymph nodes      Physical Examination:    Visit Vitals  /80 (BP 1 Location: Right arm, BP Patient Position: Sitting, BP Cuff Size: Large adult)   Pulse 95   Temp 97.6 °F (36.4 °C) (Tympanic)   Ht 5' 6\" (1.676 m)   Wt 313 lb (142 kg)   SpO2 98%   BMI 50.52 kg/m²        General: AOX3, no apparent distress  Psychiatric: mood and affect appropriate  Lungs: breathing is symmetric and unlabored bilaterally  Heart: regular rate and rhythm  Abdomen: no guarding  Head: normocephalic, atraumatic  Skin: No significant abnormalities, good turgor  Sensation intact to light touch: C5-T1 dermatomes  Muscular exam: 5/5 strength in all major muscle groups unless noted in specialty exam.    Extremities      Right upper extremity: Extremity is examined, no evidence of gross deformity, no gross motor or sensory deficit. Full active and passive range of motion is noted. Muscle tone and bulk is within normal expected limits. Capillary refill is less than 2 seconds distally. Left upper extremity: Extremity is examined, no evidence of gross deformity, no gross motor or sensory deficit. Full active and passive range of motion is noted. Muscle tone and bulk is within normal expected limits. Capillary refill is less than 2 seconds distally. Right lower extremity: Extremity is examined, no evidence of gross deformity, no gross motor or sensory deficit. Full active and passive range of motion is noted. Muscle tone and bulk is within normal expected limits. Capillary refill is less than 2 seconds distally. Left lower extremity: No gross deformity. Knee indicates small effusion.   Significant joint line tenderness to palpation medially, not laterally. Positive Mark's medially, not laterally. ACL, PCL, MCL, LCL are all intact to ligamentous testing. Capillary refill is less than 2 seconds distally. Knee flexion and extension is 5 out of 5 strength. Patella tracks centrally, no patellar grind. Diagnostics:    Pertinent Diagnostics: Xrays of the left knee indicate no fractures, osseus lesions, abnormalities, cartilage space is well maintained. Overall alignment is within normal limits, no effusion or other soft tissue abnormality. Assessment: Left knee pain, likely secondary to medial meniscal tear    Plan: This patient has the above-mentioned issue, I have had a long discussion with them regarding the treatment options which include nonoperative and operative. Due to the length of symptoms, as well as the clinical scenario, we have agreed to proceed with nonoperative management. I have advised the patient that should they have worsening symptoms, mechanical blockage to motion, or locked knee that they should call on an emergent basis. Otherwise, I did discuss the treatment options for this particular issue which include bracing, physical therapy, anti-inflammatories and pain medications as well as activity modification. I have also discussed the long-term effects of this particular issue which include progression of osteoarthritis faster than would normally have occurred. The patient will have an injection    Follow-up will be in one week, the patient does not need x-rays on follow-up. Ms. Chhaya Loera has a reminder for a \"due or due soon\" health maintenance. I have asked that she contact her primary care provider for follow-up on this health maintenance. Date of Procedure: 5/3/2022  PROCEDURE NOTE: Left knee injection of Celestone    Consent was obtained from the patient. The correct site was identified after confirmation with the patient.   Following identification and confirmation of the correct site with the patient, the superolateral left knee was prepped in the normal sterile fashion. A local anesthetic of 1% lidocaine without epinephrine was then administered to the local tissues. Following, an injection of a mixture of  6 mg Celestone and 1% lidocaine without epinephrine was administered to the left knee. The needle was then withdrawn and the injection site dressed with a sterile bandage at the conclusion. The procedure was well tolerated by the patient. No immediate adverse reactions were noted. Post injection instructions were given.

## 2022-05-03 NOTE — PROGRESS NOTES
Identified pt with two pt identifiers (name and ). Reviewed chart in preparation for visit and have obtained necessary documentation. Raquel Aguirre is a 50 y.o. female  Chief Complaint   Patient presents with    Knee Pain     LT knee     Visit Vitals  /80 (BP 1 Location: Right arm, BP Patient Position: Sitting, BP Cuff Size: Large adult)   Pulse 95   Temp 97.6 °F (36.4 °C) (Tympanic)   Ht 5' 6\" (1.676 m)   Wt 313 lb (142 kg)   LMP  (LMP Unknown)   SpO2 98%   BMI 50.52 kg/m²     1. Have you been to the ER, urgent care clinic since your last visit? Hospitalized since your last visit? No    2. Have you seen or consulted any other health care providers outside of the [address] [place] [address] since your last visit? Include any pap smears or colon screening.  No

## 2022-05-03 NOTE — LETTER
5/3/2022    Patient: Vasquez Bryan   YOB: 1975   Date of Visit: 5/3/2022     Osiris Sosa, [address] [address]  [address] [facility] [address]    Dear Osiris Sosa MD,      Thank you for referring Ms. Debra Yeager to [facility] for evaluation. My notes for this consultation are attached. If you have questions, please do not hesitate to call me. I look forward to following your patient along with you.       Sincerely,    Denys Torres, DO

## 2022-05-04 ENCOUNTER — TELEPHONE (OUTPATIENT)
Dept: NEUROLOGY | Age: 47
End: 2022-05-04

## 2022-05-04 RX ORDER — ZOLPIDEM TARTRATE 5 MG/1
5 TABLET ORAL
Qty: 30 TABLET | Refills: 0 | Status: SHIPPED | OUTPATIENT
Start: 2022-05-04 | End: 2022-06-06 | Stop reason: SDUPTHER

## 2022-05-04 NOTE — TELEPHONE ENCOUNTER
Received fax from [facility]. [facility]. Verified patient with two patient identifiers. Bunny Bryson requesting alternative medication, not on formulary. Pls advise.

## 2022-05-10 ENCOUNTER — TELEPHONE (OUTPATIENT)
Dept: NEUROLOGY | Age: 47
End: 2022-05-10

## 2022-05-11 ENCOUNTER — TELEPHONE (OUTPATIENT)
Dept: NEUROLOGY | Age: 47
End: 2022-05-11

## 2022-05-11 NOTE — TELEPHONE ENCOUNTER
uBRELVY 100 MG   ID [id number]  BIN [id number]  [facility]  9999    Key: [id number]  Ubrelvy 100MG tablets      Status is pending.

## 2022-05-11 NOTE — TELEPHONE ENCOUNTER
Ubrelvy denied. Needed to have tried Rizatriptan or Amerge.      I found a note from Dr. Dalton Cantu from April 2013 showed trial of Rizatriptan     I faxed this note to [facility] on appeal.

## 2022-05-12 ENCOUNTER — VIRTUAL VISIT (OUTPATIENT)
Dept: ORTHOPEDIC SURGERY | Age: 47
End: 2022-05-12
Payer: MEDICARE

## 2022-05-12 DIAGNOSIS — M54.9 ACUTE BILATERAL BACK PAIN, UNSPECIFIED BACK LOCATION: ICD-10-CM

## 2022-05-12 DIAGNOSIS — M25.562 ACUTE PAIN OF LEFT KNEE: Primary | ICD-10-CM

## 2022-05-12 PROCEDURE — 99441 PR PHYS/QHP TELEPHONE EVALUATION 5-10 MIN: CPT | Performed by: ORTHOPAEDIC SURGERY

## 2022-05-12 RX ORDER — TIZANIDINE 4 MG/1
4 TABLET ORAL
Qty: 50 TABLET | Refills: 0 | Status: SHIPPED | OUTPATIENT
Start: 2022-05-12 | End: 2022-06-03

## 2022-05-12 NOTE — PROGRESS NOTES
5/12/2022      CC: left knee pain    HPI:      This is a 50y.o. year old female who presents for follow up of their left knee injection. The patient states that they have had very good relief of their pain. The time since injection has been approximately a week. PMH:  Past Medical History:   Diagnosis Date    Arthritis     DDD    Carpal tunnel syndrome on left 2/22/2011    Cervical spondylarthritis 2/20/2011    Depressive disorder, not elsewhere classified 2/17/2011    Encounter for long-term (current) use of other medications 2/20/2011    GERD (gastroesophageal reflux disease)     Headache(784.0)     Migraines    Hepatic hemangioma 2/17/2011    Hypertension     IBS (irritable bowel syndrome) 2/20/2011    Migraine syndrome 2/17/2011    Obesity 2/20/2011    Other ill-defined conditions(799.89)     hx.of syncope    Psychiatric disorder     depression       PSxHx:  Past Surgical History:   Procedure Laterality Date    HX HEENT      tonsillectomy    HX ORTHOPAEDIC      carpal tunnel and tigger finger release rt       Meds:    Current Outpatient Medications:     tiZANidine (ZANAFLEX) 4 mg tablet, TAKE 1 TABLET BY MOUTH EVERY SIX (6) HOURS AS NEEDED FOR MUSCLE SPASM(S)., Disp: 50 Tablet, Rfl: 0    zolpidem (AMBIEN) 5 mg tablet, Take 1 Tablet by mouth nightly. Max Daily Amount: 5 mg., Disp: 30 Tablet, Rfl: 0    ALPRAZolam (XANAX) 0.5 mg tablet, Take 1 Tablet by mouth three (3) times daily as needed for Anxiety. Max Daily Amount: 1.5 mg., Disp: 90 Tablet, Rfl: 0    HYDROcodone-acetaminophen (NORCO) 7.5-325 mg per tablet, Take 1 Tablet by mouth every six (6) hours as needed for Pain for up to 30 days. Max Daily Amount: 4 Tablets. , Disp: 50 Tablet, Rfl: 0    ubrogepant (UBRELVY) 100 mg tablet, Take 1 Tablet by mouth daily as needed for Migraine.  Indications: a migraine headache (Patient not taking: Reported on 5/3/2022), Disp: 16 Tablet, Rfl: 12    cyclobenzaprine (FLEXERIL) 10 mg tablet, TAKE 1 TABLET BY MOUTH THREE (3) TIMES DAILY (WITH MEALS). , Disp: 50 Tablet, Rfl: 1    omeprazole (PRILOSEC) 40 mg capsule, TAKE 1 CAPSULE BY MOUTH EVERY DAY, Disp: 90 Capsule, Rfl: 2    promethazine (PHENERGAN) 25 mg tablet, TAKE 1 TABLET BY MOUTH EVERY 8 HOURS AS NEEDED FOR NAUSEA, Disp: 50 Tablet, Rfl: 0    tiZANidine (ZANAFLEX) 4 mg tablet, TAKE 1 TABLET BY MOUTH EVERY SIX (6) HOURS AS NEEDED FOR MUSCLE SPASMS, Disp: 50 Tablet, Rfl: 0    potassium chloride (KLOR-CON M10) 10 mEq tablet, Take 2 Tablets by mouth daily. (Patient not taking: Reported on 5/3/2022), Disp: 180 Tablet, Rfl: 3    gabapentin (NEURONTIN) 600 mg tablet, Take 1 Tablet by mouth three (3) times daily. , Disp: 270 Tablet, Rfl: 0    topiramate (TOPAMAX) 100 mg tablet, TAKE 1 TABLET BY MOUTH TWO (2) TIMES A DAY., Disp: 180 Tablet, Rfl: 1    venlafaxine-SR (EFFEXOR-XR) 150 mg capsule, TAKE 1 CAPSULE BY MOUTH EVERY DAY, Disp: 90 Capsule, Rfl: 1    verapamil ER (CALAN-SR) 240 mg CR tablet, TAKE 1 TABLET BY MOUTH EVERYDAY AT BEDTIME, Disp: 90 Tablet, Rfl: 3    hydroCHLOROthiazide (HYDRODIURIL) 25 mg tablet, Take 1 Tablet by mouth daily. , Disp: 90 Tablet, Rfl: 3    metFORMIN ER (GLUCOPHAGE XR) 500 mg tablet, TAKE 1 TABLET BY MOUTH EVERY DAY WITH DINNER, Disp: 90 Tablet, Rfl: 4    naloxone (Narcan) 4 mg/actuation nasal spray, Use 1 spray intranasally, then discard. Repeat with new spray every 2 min as needed for opioid overdose symptoms, alternating nostrils. (Patient not taking: Reported on 11/23/2021), Disp: 2 Each, Rfl: 1    furosemide (LASIX) 20 mg tablet, Take 1 Tab by mouth daily as needed (edema). , Disp: 90 Tab, Rfl: 0    onabotulinumtoxinA (Botox) 200 unit injection, 200 units by IM route once every 12 weeks. Inject IM neck/face, 31 FDA approved sites. Indication: Migraine prevention.  DX code: G37.69 (Patient not taking: Reported on 5/3/2022), Disp: 1 Vial, Rfl: 3    biotin 10,000 mcg cap, Take  by mouth., Disp: , Rfl:    cyanocobalamin 1,000 mcg tablet, Take 2,000 mcg by mouth daily. , Disp: , Rfl:     docusate sodium (STOOL SOFTENER) 100 mg capsule, 100 mg two (2) times a day. Once daily , Disp: , Rfl:     All:  Allergies   Allergen Reactions    Atacand [Candesartan] Other (comments)     Patient B/P increase    Compazine [Prochlorperazine] Other (comments) and Unable to Obtain    Ciprofloxacin Other (comments)    Codeine Nausea and Vomiting    Zonisamide Nausea and Vomiting       Social Hx:  Social History     Socioeconomic History    Marital status: SINGLE   Tobacco Use    Smoking status: Never Smoker    Smokeless tobacco: Never Used    Tobacco comment: SECOND HAND SMOKE/PARENTS   Vaping Use    Vaping Use: Never used   Substance and Sexual Activity    Alcohol use: Not Currently    Drug use: Yes     Types: Prescription, OTC       Family Hx:  Family History   Problem Relation Age of Onset    Heart Disease Mother     Hypertension Mother     Diabetes Mother     Heart Disease Father     Lung Disease Father     Hypertension Father          Review of Systems:       General: Denies headache, lethargy, fever, weight loss  Ears/Nose/Throat: Denies ear discharge, drainage, nosebleeds, hoarse voice, dental problems  Cardiovascular: Denies chest pain, shortness of breath  Lungs: Denies chest pain, breathing problems, wheezing, pneumonia  Stomach: Denies stomach pain, heartburn, constipation, irritable bowel  Skin: Denies rash, sores, open wounds  Musculoskeletal: left knee pain - resolved  Genitourinary: Denies dysuria, hematuria, polyuria  Gastrointestinal: Denies constipation, obstipation, diarrhea  Neurological: Denies changes in sight, smell, hearing, taste, seizures. Denies loss of consciousness.   Psychiatric: Denies depression, sleep pattern changes, anxiety, change in personality  Endocrine: Denies mood swings, heat or cold intolerance  Hematologic/Lymphatic: Denies anemia, purpura, petechia  Allergic/Immunologic: Denies swelling of throat, pain or swelling at lymph nodes      Physical Examination:    There were no vitals taken for this visit. General: AOX3, no apparent distress  Psychiatric: mood and affect appropriate        Diagnostics:    Pertinent Diagnostics: none    Assessment: Status post left knee injection  Plan: This patient and I discussed the normal course for injections, we discussed that pain relief will likely be temporary to some degree, but I cannot predict the longevity of relief. We also discussed the limitations of injections, and that I cannot inject the same area any more often than every three months. We will proceed with continued observation. Patient was in [place] at the time of consultation. I was in the office while conducting this encounter. Consent:  She and/or her healthcare decision maker is aware that this patient-initiated Telehealth encounter is a billable service, with coverage as determined by her insurance carrier. She is aware that she may receive a bill and has provided verbal consent to proceed: Yes    This virtual visit was conducted telephone encounter only. -  I affirm this is a Patient Initiated Episode with an Established Patient who has not had a related appointment within my department in the past 7 days or scheduled within the next 24 hours. Note: this encounter is not billable if this call serves to triage the patient into an appointment for the relevant concern. Total Time: minutes: 5-10 minutes. Ms. Jack Gilliam has a reminder for a \"due or due soon\" health maintenance. I have asked that she contact her primary care provider for follow-up on this health maintenance.

## 2022-05-18 DIAGNOSIS — G43.909 MIGRAINE WITHOUT STATUS MIGRAINOSUS, NOT INTRACTABLE, UNSPECIFIED MIGRAINE TYPE: ICD-10-CM

## 2022-05-18 RX ORDER — GABAPENTIN 600 MG/1
600 TABLET ORAL 3 TIMES DAILY
Qty: 270 TABLET | Refills: 0 | Status: SHIPPED | OUTPATIENT
Start: 2022-05-18 | End: 2022-09-01

## 2022-05-19 DIAGNOSIS — G43.709 CHRONIC MIGRAINE WITHOUT AURA, NOT INTRACTABLE, WITHOUT STATUS MIGRAINOSUS: ICD-10-CM

## 2022-05-20 RX ORDER — PROMETHAZINE HYDROCHLORIDE 25 MG/1
TABLET ORAL
Qty: 50 TABLET | Refills: 0 | Status: SHIPPED | OUTPATIENT
Start: 2022-05-20 | End: 2022-06-03

## 2022-06-03 ENCOUNTER — TELEPHONE (OUTPATIENT)
Dept: NEUROLOGY | Age: 47
End: 2022-06-03

## 2022-06-03 DIAGNOSIS — M54.9 ACUTE BILATERAL BACK PAIN, UNSPECIFIED BACK LOCATION: ICD-10-CM

## 2022-06-03 DIAGNOSIS — G43.709 CHRONIC MIGRAINE WITHOUT AURA, NOT INTRACTABLE, WITHOUT STATUS MIGRAINOSUS: ICD-10-CM

## 2022-06-03 DIAGNOSIS — R25.2 MUSCLE CRAMP: ICD-10-CM

## 2022-06-03 RX ORDER — TIZANIDINE 4 MG/1
4 TABLET ORAL
Qty: 50 TABLET | Refills: 0 | Status: SHIPPED | OUTPATIENT
Start: 2022-06-03 | End: 2022-06-21 | Stop reason: SDUPTHER

## 2022-06-03 RX ORDER — CYCLOBENZAPRINE HCL 10 MG
10 TABLET ORAL
Qty: 50 TABLET | Refills: 1 | Status: SHIPPED | OUTPATIENT
Start: 2022-06-03 | End: 2022-06-21 | Stop reason: ALTCHOICE

## 2022-06-03 RX ORDER — PROMETHAZINE HYDROCHLORIDE 25 MG/1
TABLET ORAL
Qty: 50 TABLET | Refills: 0 | Status: SHIPPED | OUTPATIENT
Start: 2022-06-03 | End: 2022-06-29

## 2022-06-03 NOTE — TELEPHONE ENCOUNTER
Message sent to pt via Mophie asking if she was able to fill the Ubrelvy from the appeal I sent to [employer] on 5/11/22.

## 2022-06-06 DIAGNOSIS — M54.9 ACUTE BILATERAL BACK PAIN, UNSPECIFIED BACK LOCATION: Primary | ICD-10-CM

## 2022-06-06 RX ORDER — HYDROCODONE BITARTRATE AND ACETAMINOPHEN 7.5; 325 MG/1; MG/1
1 TABLET ORAL
Qty: 50 TABLET | Refills: 0 | Status: SHIPPED | OUTPATIENT
Start: 2022-06-06 | End: 2022-07-06 | Stop reason: SDUPTHER

## 2022-06-06 NOTE — TELEPHONE ENCOUNTER
Spoke with Anamaria Contreras and Anamaria Contreras states that since the medication appeal is denied then there isnt anything we can do about Ubrelvy at this time.

## 2022-06-06 NOTE — TELEPHONE ENCOUNTER
Patient wants to get the medication zolpidem (AMBIEN) 5 mg tablet / ALPRAZolam (XANAX) 0.5 mg tablet / HYDROcodone-acetaminophen (NORCO) 7.5-325 mg per tablet.   If any questions please give her a call @

## 2022-06-08 ENCOUNTER — APPOINTMENT (OUTPATIENT)
Dept: ULTRASOUND IMAGING | Age: 47
DRG: 871 | End: 2022-06-08
Attending: EMERGENCY MEDICINE
Payer: MEDICARE

## 2022-06-08 ENCOUNTER — APPOINTMENT (OUTPATIENT)
Dept: CT IMAGING | Age: 47
DRG: 871 | End: 2022-06-08
Attending: EMERGENCY MEDICINE
Payer: MEDICARE

## 2022-06-08 ENCOUNTER — HOSPITAL ENCOUNTER (INPATIENT)
Age: 47
LOS: 3 days | Discharge: HOME | DRG: 871 | End: 2022-06-11
Attending: EMERGENCY MEDICINE | Admitting: HOSPITALIST
Payer: MEDICARE

## 2022-06-08 ENCOUNTER — TELEPHONE (OUTPATIENT)
Dept: FAMILY MEDICINE CLINIC | Age: 47
End: 2022-06-08

## 2022-06-08 DIAGNOSIS — E66.01 OBESITY, MORBID (MORE THAN 100 LBS OVER IDEAL WEIGHT OR BMI > 40) (HCC): ICD-10-CM

## 2022-06-08 DIAGNOSIS — N83.202 LEFT OVARIAN CYST: ICD-10-CM

## 2022-06-08 DIAGNOSIS — G93.40 ENCEPHALOPATHY ACUTE: ICD-10-CM

## 2022-06-08 DIAGNOSIS — Z79.899 POLYPHARMACY: ICD-10-CM

## 2022-06-08 DIAGNOSIS — K80.20 GALLSTONES: ICD-10-CM

## 2022-06-08 DIAGNOSIS — I63.9 CEREBROVASCULAR ACCIDENT (CVA), UNSPECIFIED MECHANISM (HCC): Primary | ICD-10-CM

## 2022-06-08 DIAGNOSIS — G43.709 CHRONIC MIGRAINE WITHOUT AURA WITHOUT STATUS MIGRAINOSUS, NOT INTRACTABLE: ICD-10-CM

## 2022-06-08 LAB
ALBUMIN SERPL-MCNC: 3 G/DL (ref 3.5–5)
ALBUMIN/GLOB SERPL: 0.8 {RATIO} (ref 1.1–2.2)
ALP SERPL-CCNC: 72 U/L (ref 45–117)
ALT SERPL-CCNC: 27 U/L (ref 12–78)
ANION GAP SERPL CALC-SCNC: 5 MMOL/L (ref 5–15)
APPEARANCE UR: ABNORMAL
AST SERPL-CCNC: 21 U/L (ref 15–37)
BACTERIA URNS QL MICRO: ABNORMAL /HPF
BASOPHILS # BLD: 0 K/UL (ref 0–0.1)
BASOPHILS NFR BLD: 0 % (ref 0–1)
BILIRUB SERPL-MCNC: 0.1 MG/DL (ref 0.2–1)
BILIRUB UR QL: NEGATIVE
BNP SERPL-MCNC: 524 PG/ML
BUN SERPL-MCNC: 13 MG/DL (ref 6–20)
BUN/CREAT SERPL: 17 (ref 12–20)
CALCIUM SERPL-MCNC: 8.8 MG/DL (ref 8.5–10.1)
CHLORIDE SERPL-SCNC: 104 MMOL/L (ref 97–108)
CO2 SERPL-SCNC: 29 MMOL/L (ref 21–32)
COLOR UR: ABNORMAL
CREAT SERPL-MCNC: 0.76 MG/DL (ref 0.55–1.02)
DIFFERENTIAL METHOD BLD: ABNORMAL
EOSINOPHIL # BLD: 0 K/UL (ref 0–0.4)
EOSINOPHIL NFR BLD: 0 % (ref 0–7)
EPITH CASTS URNS QL MICRO: ABNORMAL /LPF
ERYTHROCYTE [DISTWIDTH] IN BLOOD BY AUTOMATED COUNT: 15.6 % (ref 11.5–14.5)
GLOBULIN SER CALC-MCNC: 3.9 G/DL (ref 2–4)
GLUCOSE BLD STRIP.AUTO-MCNC: 112 MG/DL (ref 65–117)
GLUCOSE BLD STRIP.AUTO-MCNC: 184 MG/DL (ref 65–117)
GLUCOSE SERPL-MCNC: 184 MG/DL (ref 65–100)
GLUCOSE UR STRIP.AUTO-MCNC: NEGATIVE MG/DL
HCG UR QL: NEGATIVE
HCT VFR BLD AUTO: 31.6 % (ref 35–47)
HGB BLD-MCNC: 10.1 G/DL (ref 11.5–16)
HGB UR QL STRIP: NEGATIVE
IMM GRANULOCYTES # BLD AUTO: 0.1 K/UL (ref 0–0.04)
IMM GRANULOCYTES NFR BLD AUTO: 1 % (ref 0–0.5)
KETONES UR QL STRIP.AUTO: NEGATIVE MG/DL
LEUKOCYTE ESTERASE UR QL STRIP.AUTO: ABNORMAL
LIPASE SERPL-CCNC: 83 U/L (ref 73–393)
LYMPHOCYTES # BLD: 3.2 K/UL (ref 0.8–3.5)
LYMPHOCYTES NFR BLD: 29 % (ref 12–49)
MAGNESIUM SERPL-MCNC: 2 MG/DL (ref 1.6–2.4)
MCH RBC QN AUTO: 29 PG (ref 26–34)
MCHC RBC AUTO-ENTMCNC: 32 G/DL (ref 30–36.5)
MCV RBC AUTO: 90.8 FL (ref 80–99)
MONOCYTES # BLD: 0.8 K/UL (ref 0–1)
MONOCYTES NFR BLD: 8 % (ref 5–13)
NEUTS SEG # BLD: 6.9 K/UL (ref 1.8–8)
NEUTS SEG NFR BLD: 62 % (ref 32–75)
NITRITE UR QL STRIP.AUTO: NEGATIVE
NRBC # BLD: 0 K/UL (ref 0–0.01)
NRBC BLD-RTO: 0 PER 100 WBC
PH UR STRIP: 5 [PH] (ref 5–8)
PLATELET # BLD AUTO: 445 K/UL (ref 150–400)
PMV BLD AUTO: 9.1 FL (ref 8.9–12.9)
POTASSIUM SERPL-SCNC: 3 MMOL/L (ref 3.5–5.1)
PROT SERPL-MCNC: 6.9 G/DL (ref 6.4–8.2)
PROT UR STRIP-MCNC: NEGATIVE MG/DL
RBC # BLD AUTO: 3.48 M/UL (ref 3.8–5.2)
RBC #/AREA URNS HPF: ABNORMAL /HPF (ref 0–5)
SERVICE CMNT-IMP: ABNORMAL
SERVICE CMNT-IMP: NORMAL
SODIUM SERPL-SCNC: 138 MMOL/L (ref 136–145)
SP GR UR REFRACTOMETRY: 1.01 (ref 1–1.03)
TROPONIN-HIGH SENSITIVITY: 12 NG/L (ref 0–51)
UA: UC IF INDICATED,UAUC: ABNORMAL
UROBILINOGEN UR QL STRIP.AUTO: 0.2 EU/DL (ref 0.2–1)
WBC # BLD AUTO: 11 K/UL (ref 3.6–11)
WBC URNS QL MICRO: ABNORMAL /HPF (ref 0–4)

## 2022-06-08 PROCEDURE — 96361 HYDRATE IV INFUSION ADD-ON: CPT

## 2022-06-08 PROCEDURE — 70496 CT ANGIOGRAPHY HEAD: CPT

## 2022-06-08 PROCEDURE — 93005 ELECTROCARDIOGRAM TRACING: CPT

## 2022-06-08 PROCEDURE — 74011250637 HC RX REV CODE- 250/637: Performed by: NURSE PRACTITIONER

## 2022-06-08 PROCEDURE — 74177 CT ABD & PELVIS W/CONTRAST: CPT

## 2022-06-08 PROCEDURE — 74011250636 HC RX REV CODE- 250/636: Performed by: EMERGENCY MEDICINE

## 2022-06-08 PROCEDURE — 83690 ASSAY OF LIPASE: CPT

## 2022-06-08 PROCEDURE — 65270000046 HC RM TELEMETRY

## 2022-06-08 PROCEDURE — 74011000636 HC RX REV CODE- 636: Performed by: EMERGENCY MEDICINE

## 2022-06-08 PROCEDURE — 84484 ASSAY OF TROPONIN QUANT: CPT

## 2022-06-08 PROCEDURE — 74011250636 HC RX REV CODE- 250/636: Performed by: HOSPITALIST

## 2022-06-08 PROCEDURE — 36415 COLL VENOUS BLD VENIPUNCTURE: CPT

## 2022-06-08 PROCEDURE — 81025 URINE PREGNANCY TEST: CPT

## 2022-06-08 PROCEDURE — 80053 COMPREHEN METABOLIC PANEL: CPT

## 2022-06-08 PROCEDURE — 83880 ASSAY OF NATRIURETIC PEPTIDE: CPT

## 2022-06-08 PROCEDURE — 81001 URINALYSIS AUTO W/SCOPE: CPT

## 2022-06-08 PROCEDURE — 83735 ASSAY OF MAGNESIUM: CPT

## 2022-06-08 PROCEDURE — 95816 EEG AWAKE AND DROWSY: CPT | Performed by: HOSPITALIST

## 2022-06-08 PROCEDURE — 96375 TX/PRO/DX INJ NEW DRUG ADDON: CPT

## 2022-06-08 PROCEDURE — 70450 CT HEAD/BRAIN W/O DYE: CPT

## 2022-06-08 PROCEDURE — 99285 EMERGENCY DEPT VISIT HI MDM: CPT

## 2022-06-08 PROCEDURE — 85025 COMPLETE CBC W/AUTO DIFF WBC: CPT

## 2022-06-08 PROCEDURE — 74011250637 HC RX REV CODE- 250/637: Performed by: HOSPITALIST

## 2022-06-08 PROCEDURE — 82962 GLUCOSE BLOOD TEST: CPT

## 2022-06-08 PROCEDURE — 96365 THER/PROPH/DIAG IV INF INIT: CPT

## 2022-06-08 PROCEDURE — 76705 ECHO EXAM OF ABDOMEN: CPT

## 2022-06-08 RX ORDER — AMOXICILLIN 250 MG
2 CAPSULE ORAL
Status: DISCONTINUED | OUTPATIENT
Start: 2022-06-08 | End: 2022-06-11 | Stop reason: HOSPADM

## 2022-06-08 RX ORDER — FACIAL-BODY WIPES
10 EACH TOPICAL
Status: COMPLETED | OUTPATIENT
Start: 2022-06-08 | End: 2022-06-08

## 2022-06-08 RX ORDER — MAGNESIUM SULFATE 100 %
4 CRYSTALS MISCELLANEOUS AS NEEDED
Status: DISCONTINUED | OUTPATIENT
Start: 2022-06-08 | End: 2022-06-11 | Stop reason: HOSPADM

## 2022-06-08 RX ORDER — ALPRAZOLAM 0.5 MG/1
0.5 TABLET ORAL ONCE
Status: COMPLETED | OUTPATIENT
Start: 2022-06-09 | End: 2022-06-08

## 2022-06-08 RX ORDER — ACETAMINOPHEN 325 MG/1
650 TABLET ORAL
Status: DISCONTINUED | OUTPATIENT
Start: 2022-06-08 | End: 2022-06-11 | Stop reason: HOSPADM

## 2022-06-08 RX ORDER — ENOXAPARIN SODIUM 100 MG/ML
30 INJECTION SUBCUTANEOUS 2 TIMES DAILY
Status: DISCONTINUED | OUTPATIENT
Start: 2022-06-09 | End: 2022-06-11 | Stop reason: HOSPADM

## 2022-06-08 RX ORDER — FENTANYL CITRATE 50 UG/ML
25 INJECTION, SOLUTION INTRAMUSCULAR; INTRAVENOUS
Status: DISCONTINUED | OUTPATIENT
Start: 2022-06-08 | End: 2022-06-11 | Stop reason: HOSPADM

## 2022-06-08 RX ORDER — INSULIN LISPRO 100 [IU]/ML
INJECTION, SOLUTION INTRAVENOUS; SUBCUTANEOUS
Status: DISCONTINUED | OUTPATIENT
Start: 2022-06-08 | End: 2022-06-11 | Stop reason: HOSPADM

## 2022-06-08 RX ORDER — POTASSIUM CHLORIDE 750 MG/1
40 TABLET, FILM COATED, EXTENDED RELEASE ORAL
Status: COMPLETED | OUTPATIENT
Start: 2022-06-08 | End: 2022-06-08

## 2022-06-08 RX ORDER — ONDANSETRON 2 MG/ML
4 INJECTION INTRAMUSCULAR; INTRAVENOUS
Status: COMPLETED | OUTPATIENT
Start: 2022-06-08 | End: 2022-06-08

## 2022-06-08 RX ORDER — ONDANSETRON 2 MG/ML
4 INJECTION INTRAMUSCULAR; INTRAVENOUS
Status: DISCONTINUED | OUTPATIENT
Start: 2022-06-08 | End: 2022-06-11 | Stop reason: HOSPADM

## 2022-06-08 RX ORDER — POLYETHYLENE GLYCOL 3350 17 G/17G
17 POWDER, FOR SOLUTION ORAL DAILY
Status: DISCONTINUED | OUTPATIENT
Start: 2022-06-09 | End: 2022-06-11 | Stop reason: HOSPADM

## 2022-06-08 RX ORDER — POTASSIUM CHLORIDE 7.45 MG/ML
10 INJECTION INTRAVENOUS
Status: COMPLETED | OUTPATIENT
Start: 2022-06-08 | End: 2022-06-08

## 2022-06-08 RX ORDER — ACETAMINOPHEN 325 MG/1
650 TABLET ORAL
Status: DISCONTINUED | OUTPATIENT
Start: 2022-06-08 | End: 2022-06-09 | Stop reason: SDUPTHER

## 2022-06-08 RX ORDER — FENTANYL CITRATE 50 UG/ML
25 INJECTION, SOLUTION INTRAMUSCULAR; INTRAVENOUS
Status: COMPLETED | OUTPATIENT
Start: 2022-06-08 | End: 2022-06-08

## 2022-06-08 RX ADMIN — ALPRAZOLAM 0.5 MG: 0.5 TABLET ORAL at 23:57

## 2022-06-08 RX ADMIN — SENNOSIDES AND DOCUSATE SODIUM 2 TABLET: 50; 8.6 TABLET ORAL at 23:02

## 2022-06-08 RX ADMIN — IOPAMIDOL 100 ML: 755 INJECTION, SOLUTION INTRAVENOUS at 14:07

## 2022-06-08 RX ADMIN — ONDANSETRON 4 MG: 2 INJECTION INTRAMUSCULAR; INTRAVENOUS at 14:42

## 2022-06-08 RX ADMIN — FENTANYL CITRATE 25 MCG: 50 INJECTION, SOLUTION INTRAMUSCULAR; INTRAVENOUS at 23:01

## 2022-06-08 RX ADMIN — FENTANYL CITRATE 25 MCG: 50 INJECTION, SOLUTION INTRAMUSCULAR; INTRAVENOUS at 14:43

## 2022-06-08 RX ADMIN — POTASSIUM CHLORIDE 40 MEQ: 750 TABLET, FILM COATED, EXTENDED RELEASE ORAL at 23:02

## 2022-06-08 RX ADMIN — SODIUM CHLORIDE 500 ML: 9 INJECTION, SOLUTION INTRAVENOUS at 14:43

## 2022-06-08 RX ADMIN — ONDANSETRON 4 MG: 2 INJECTION INTRAMUSCULAR; INTRAVENOUS at 23:02

## 2022-06-08 RX ADMIN — POTASSIUM CHLORIDE 10 MEQ: 7.46 INJECTION, SOLUTION INTRAVENOUS at 15:55

## 2022-06-08 RX ADMIN — BISACODYL 10 MG: 10 SUPPOSITORY RECTAL at 23:03

## 2022-06-08 NOTE — TELEPHONE ENCOUNTER
[address]    Female, 50 y.o., 1975  Weight:   325 lb (147.4 kg)  Phone:   [phone] Sybil Hair  PCP:   MD SERGO Blackburn  Controlled Substance Contract on File    MRN:   [id number]  MyChart: Active  Next Appt:   06/21/2022                  Message  Received: Today  Carly Hurtado [facility] Nurse Pool  Subject: Message to Provider     QUESTIONS   Information for Provider? URGENT::? Patient's father called and is very   concerned about Magalys's behavior. He stated she is having \"cognitive   impariments and delusions. \" He stated this is an on/off situation. He is   concerned for her safety and his own. PLEASE CALL HIM ASA to discuss   this. Thank you   ---------------------------------------------------------------------------   --------------   CALL BACK INFO   What is the best way for the office to contact you? OK to leave message on   voicemail   Preferred Call Back Phone Number? [phone]   ---------------------------------------------------------------------------   --------------   SCRIPT ANSWERS   Relationship to Patient? Parent   Representative Name? Melquiades father   Patient is under 25 and the Parent has custody? No   Is the Representative on the appropriate HIPAA document in Epic?  Yes

## 2022-06-08 NOTE — ROUTINE PROCESS

## 2022-06-08 NOTE — H&P
Hospitalist Admission Note    NAME: Talia Howe   :  1975   MRN:  [id number]     Date/Time:  2022 5:37 PM    Patient PCP: Ana Maria Hannah MD  ______________________________________________________________________  Given the patient's current clinical presentation, I have a high level of concern for decompensation if discharged from the emergency department. Complex decision making was performed, which includes reviewing the patient's available past medical records, laboratory results, and x-ray films. My assessment of this patient's clinical condition and my plan of care is as follows. Assessment / Plan:  Dizziness/off balance/confusion/slurred speech --- r/o MS   Hx chronic migraine Headaches   Sx: multiple neurological complaints; unclear etiology at present    Head CT/ CTA head and neck: negative   Admit to tele   MRI w wo contrast / EEG   FU TSH, B12 , folate   Neurology consult  PT/OT     Abdominal pain   No signs of underlying infection, wbc normal   + mostly tender RLQ   Hx recent constipation   Will give supp for possible constipation  If pain persist , consider GI consult   Diet: CLD   CT: No acute findings or findings correlate with abdominal pain. By 0.2 x 8.3 x 6.3 cm left ovarian cyst for which ultrasound follow-up is recommended. US abd: Cholelithiasis and gallbladder sludge with gallbladder contraction and no  ultrasonographic signs of acute cholecystitis. Echogenic liver with difficult penetration and previously seen hemangiomata  not evident likely reflective of steatosis. Hypokalemia  -supplement as needed  -follow in am with Mg     Hyperglycemia 184   Fu hba1c   SS as needed     L ovary cyst  Can be [facility] OP, refer to GYN OP     Depression   Morbid Obesity. Body mass index is 52.46 kg/m².           Code Status: full code   Surrogate Decision Maker: mother     DVT Prophylaxis: lovenox   GI Prophylaxis: not indicated    Baseline: independent Subjective:   CHIEF COMPLAINT: dizziness     HISTORY OF PRESENT ILLNESS:     Campbell Dugan is a 50 y.o.  female who presents with above complaints. Patient presented to ED with multiple complaints. She reports that her symptoms started around a week ago. She reports feeling lightheadedness, confusion. She reports that she had near syncopal episode 5 days ago and almost passed out again 2 days ago. Her family was telling her that she appears to be confused and also noticed with slurred speech. Symptoms is intermittent. Patient denies any focal weakness , no visual changes, no numbness or tingling. No history of TIA or stroke. She has history of chronic migraines. She admits to headache which worsens then normally. She also complain of right-sided abdominal pain intermittent, associated with nausea and vomiting. She reports vomiting x 2 days last week but then resolved. Able to eat. No fever or chills. She cannot specify if all her Sx started at the same time or not. Normally she has regular BMs. Recently was constipated and took miralax to have BM 2 days ago. We were asked to admit for work up and evaluation of the above problems.      Past Medical History:   Diagnosis Date    Arthritis     DDD    Carpal tunnel syndrome on left 2/22/2011    Cervical spondylarthritis 2/20/2011    Depressive disorder, not elsewhere classified 2/17/2011    Encounter for long-term (current) use of other medications 2/20/2011    GERD (gastroesophageal reflux disease)     Headache(784.0)     Migraines    Hepatic hemangioma 2/17/2011    Hypertension     IBS (irritable bowel syndrome) 2/20/2011    Migraine syndrome 2/17/2011    Obesity 2/20/2011    Other ill-defined conditions(799.89)     hx.of syncope    Psychiatric disorder     depression        Past Surgical History:   Procedure Laterality Date    HX HEENT      tonsillectomy    HX ORTHOPAEDIC      carpal tunnel and tigger finger release rt       Social History     Tobacco Use    Smoking status: Never Smoker    Smokeless tobacco: Never Used    Tobacco comment: SECOND HAND SMOKE/PARENTS   Substance Use Topics    Alcohol use: Not Currently        Family History   Problem Relation Age of Onset    Heart Disease Mother     Hypertension Mother     Diabetes Mother     Heart Disease Father     Lung Disease Father     Hypertension Father      Allergies   Allergen Reactions    Atacand [Candesartan] Other (comments)     Patient B/P increase    Compazine [Prochlorperazine] Other (comments) and Unable to Obtain    Ciprofloxacin Other (comments)    Codeine Nausea and Vomiting    Zonisamide Nausea and Vomiting        Prior to Admission medications    Medication Sig Start Date End Date Taking? Authorizing Provider   ALPRAZolam Hermes Wright) 0.5 mg tablet Take 1 Tablet by mouth three (3) times daily as needed for Anxiety. Max Daily Amount: 1.5 mg. 6/6/22   Hortensia Warren MD   zolpidem (AMBIEN) 5 mg tablet Take 1 Tablet by mouth nightly. Max Daily Amount: 5 mg. 6/6/22   Hortensia Warren MD   HYDROcodone-acetaminophen [facility]) 7.5-325 mg per tablet Take 1 Tablet by mouth every six (6) hours as needed for Pain for up to 30 days. Max Daily Amount: 4 Tablets. 6/6/22 7/6/22  Hortensia Warren MD   cyclobenzaprine (FLEXERIL) 10 mg tablet TAKE 1 TABLET BY MOUTH THREE (3) TIMES DAILY (WITH MEALS). 6/3/22   Hortensia Warren MD   tiZANidine (ZANAFLEX) 4 mg tablet TAKE 1 TABLET BY MOUTH EVERY SIX (6) HOURS AS NEEDED FOR MUSCLE SPASM(S). 6/3/22   Hortensia Warren MD   promethazine (PHENERGAN) 25 mg tablet TAKE 1 TABLET BY MOUTH EVERY 8 HOURS AS NEEDED FOR NAUSEA 6/3/22   Hortensia Warren MD   gabapentin (NEURONTIN) 600 mg tablet Take 1 Tablet by mouth three (3) times daily. 5/18/22   Hortensia Warren MD   ubrogepant (UBRELVY) 100 mg tablet Take 1 Tablet by mouth daily as needed for Migraine.  Indications: a migraine headache  Patient not taking: Reported on 5/3/2022 4/29/22   Lashawn Colón NP   omeprazole (PRILOSEC) 40 mg capsule TAKE 1 CAPSULE BY MOUTH EVERY DAY 4/20/22   Maribel Katz MD   tiZANidine (ZANAFLEX) 4 mg tablet TAKE 1 TABLET BY MOUTH EVERY SIX (6) HOURS AS NEEDED FOR MUSCLE SPASMS 3/28/22   Sherwin Valladares MD   potassium chloride (KLOR-CON M10) 10 mEq tablet Take 2 Tablets by mouth daily. Patient not taking: Reported on 5/3/2022 3/7/22   Maribel Katz MD   topiramate (TOPAMAX) 100 mg tablet TAKE 1 TABLET BY MOUTH TWO (2) TIMES A DAY. 1/31/22   Maribel Katz MD   venlafaxine-SR [facility] 150 mg capsule TAKE 1 CAPSULE BY MOUTH EVERY DAY 1/27/22   Maribel Katz MD   verapamil ER (CALAN-SR) 240 mg CR tablet TAKE 1 TABLET BY MOUTH EVERYDAY AT BEDTIME 11/24/21   Maribel Katz MD   hydroCHLOROthiazide (HYDRODIURIL) 25 mg tablet Take 1 Tablet by mouth daily. 9/29/21   Maribel Katz MD   metFORMIN ER (GLUCOPHAGE XR) 500 mg tablet TAKE 1 TABLET BY MOUTH EVERY DAY WITH DINNER 7/5/21   Maribel Katz MD   naloxone (Narcan) 4 mg/actuation nasal spray Use 1 spray intranasally, then discard. Repeat with new spray every 2 min as needed for opioid overdose symptoms, alternating nostrils. Patient not taking: Reported on 11/23/2021 5/25/21   Leonid Umana NP   furosemide (LASIX) 20 mg tablet Take 1 Tab by mouth daily as needed (edema). 4/2/21   Maribel Katz MD   onabotulinumtoxinA (Botox) 200 unit injection 200 units by IM route once every 12 weeks. Inject IM neck/face, 31 FDA approved sites. Indication: Migraine prevention. DX code: G43.71  Patient not taking: Reported on 5/3/2022 3/9/21   Mela Juan MD   biotin 10,000 mcg cap Take  by mouth. Provider, Historical   cyanocobalamin 1,000 mcg tablet Take 2,000 mcg by mouth daily. Provider, Historical   docusate sodium (STOOL SOFTENER) 100 mg capsule 100 mg two (2) times a day. Once daily     Provider, Historical       REVIEW OF SYSTEMS:     I am not able to complete the review of systems because: The patient is intubated and sedated    The patient has altered mental status due to his acute medical problems    The patient has baseline aphasia from prior stroke(s)    The patient has baseline dementia and is not reliable historian    The patient is in acute medical distress and unable to provide information           Total of 12 systems reviewed as follows:       POSITIVE= underlined text  Negative = text not underlined  General:  fever, chills, sweats, generalized weakness, weight loss/gain,      loss of appetite   Eyes:    blurred vision, eye pain, loss of vision, double vision  ENT:    rhinorrhea, pharyngitis   Respiratory:   cough, sputum production, SOB, ARGUELLO, wheezing, pleuritic pain   Cardiology:   chest pain, palpitations, orthopnea, PND, edema, syncope   Gastrointestinal:  abdominal pain , N/V, diarrhea, dysphagia, constipation, bleeding   Genitourinary:  frequency, urgency, dysuria, hematuria, incontinence   Muskuloskeletal :  arthralgia, myalgia, back pain  Hematology:  easy bruising, nose or gum bleeding, lymphadenopathy   Dermatological: rash, ulceration, pruritis, color change / jaundice  Endocrine:   hot flashes or polydipsia   Neurological:  headache, dizziness, confusion, focal weakness, paresthesia,     Speech difficulties, memory loss, gait difficulty  Psychological: Feelings of anxiety, depression, agitation    Objective:   VITALS:    Visit Vitals  /79   Pulse 85   Temp 98.2 °F (36.8 °C)   Resp 11   Ht 5' 6\" (1.676 m)   Wt 147.4 kg (325 lb)   SpO2 94%   BMI 52.46 kg/m²       PHYSICAL EXAM:    General:    Alert, cooperative, no distress, appears stated age.      HEENT: Atraumatic, anicteric sclerae, pink conjunctivae     No oral ulcers, mucosa moist, throat clear, dentition fair  Neck:  Supple, symmetrical,  thyroid: non tender  Lungs:   Clear to auscultation bilaterally. No Wheezing or Rhonchi. No rales. Chest wall:  No tenderness  No Accessory muscle use. Heart:   Regular  rhythm,  No  murmur   No edema  Abdomen:   Soft, + RLQ moderate tender with some guardian. Mild RUQ tender. Not distended. Bowel sounds normal  Extremities: No cyanosis. No clubbing,      Skin turgor normal, Capillary refill normal, Radial dial pulse 2+  Skin:     Not pale. Not Jaundiced  No rashes   Psych:  fair insight. Not depressed. Not anxious or agitated. Neurologic: EOMs intact. No facial asymmetry. No aphasia or slurred speech. Symmetrical strength, Sensation grossly intact. Alert and oriented X 2-3    _______________________________________________________________________  Care Plan discussed with:    Comments   Patient y    Family  y Brother bedside    RN y    Care Manager                    Consultant:  rita ED provider    _______________________________________________________________________  Expected  Disposition:   Home with Family y   HH/PT/OT/RN y   SNF/LTC    THUAN    ________________________________________________________________________  TOTAL TIME:  72 Minutes    Critical Care Provided     Minutes non procedure based      Comments    y Reviewed previous records   >50% of visit spent in counseling and coordination of care y Discussion with patient and/or family and questions answered       ________________________________________________________________________  Signed: Mireya Betts MD    Procedures: see electronic medical records for all procedures/Xrays and details which were not copied into this note but were reviewed prior to creation of Plan.     LAB DATA REVIEWED:    Recent Results (from the past 24 hour(s))   GLUCOSE, POC    Collection Time: 06/08/22 12:06 PM   Result Value Ref Range    Glucose (POC) 184 (H) 65 - 117 mg/dL    Performed by Mark Lindsey \"Judy\"    EKG, 12 LEAD, INITIAL    Collection Time: 06/08/22 12:14 PM   Result Value Ref Range    Ventricular Rate 84 BPM    Atrial Rate 84 BPM    P-R Interval 164 ms    QRS Duration 84 ms    Q-T Interval 380 ms    QTC Calculation (Bezet) 449 ms    Calculated P Axis 49 degrees    Calculated R Axis 43 degrees    Calculated T Axis 28 degrees    Diagnosis       Normal sinus rhythm  Normal ECG  When compared with ECG of 13-NOV-2009 13:16,  No significant change was found     CBC WITH AUTOMATED DIFF    Collection Time: 06/08/22 12:22 PM   Result Value Ref Range    WBC 11.0 3.6 - 11.0 K/uL    RBC 3.48 (L) 3.80 - 5.20 M/uL    HGB 10.1 (L) 11.5 - 16.0 g/dL    HCT 31.6 (L) 35.0 - 47.0 %    MCV 90.8 80.0 - 99.0 FL    MCH 29.0 26.0 - 34.0 PG    MCHC 32.0 30.0 - 36.5 g/dL    RDW 15.6 (H) 11.5 - 14.5 %    PLATELET 443 (H) 196 - 400 K/uL    MPV 9.1 8.9 - 12.9 FL    NRBC 0.0 0  WBC    ABSOLUTE NRBC 0.00 0.00 - 0.01 K/uL    NEUTROPHILS 62 32 - 75 %    LYMPHOCYTES 29 12 - 49 %    MONOCYTES 8 5 - 13 %    EOSINOPHILS 0 0 - 7 %    BASOPHILS 0 0 - 1 %    IMMATURE GRANULOCYTES 1 (H) 0.0 - 0.5 %    ABS. NEUTROPHILS 6.9 1.8 - 8.0 K/UL    ABS. LYMPHOCYTES 3.2 0.8 - 3.5 K/UL    ABS. MONOCYTES 0.8 0.0 - 1.0 K/UL    ABS. EOSINOPHILS 0.0 0.0 - 0.4 K/UL    ABS. BASOPHILS 0.0 0.0 - 0.1 K/UL    ABS. IMM. GRANS. 0.1 (H) 0.00 - 0.04 K/UL    DF AUTOMATED     METABOLIC PANEL, COMPREHENSIVE    Collection Time: 06/08/22 12:22 PM   Result Value Ref Range    Sodium 138 136 - 145 mmol/L    Potassium 3.0 (L) 3.5 - 5.1 mmol/L    Chloride 104 97 - 108 mmol/L    CO2 29 21 - 32 mmol/L    Anion gap 5 5 - 15 mmol/L    Glucose 184 (H) 65 - 100 mg/dL    BUN 13 6 - 20 MG/DL    Creatinine 0.76 0.55 - 1.02 MG/DL    BUN/Creatinine ratio 17 12 - 20      GFR est AA >60 >60 ml/min/1.73m2    GFR est non-AA >60 >60 ml/min/1.73m2    Calcium 8.8 8.5 - 10.1 MG/DL    Bilirubin, total 0.1 (L) 0.2 - 1.0 MG/DL    ALT (SGPT) 27 12 - 78 U/L    AST (SGOT) 21 15 - 37 U/L    Alk.  phosphatase 72 45 - 117 U/L    Protein, total 6.9 6.4 - 8.2 g/dL    Albumin 3.0 (L) 3.5 - 5.0 g/dL Globulin 3.9 2.0 - 4.0 g/dL    A-G Ratio 0.8 (L) 1.1 - 2.2     TROPONIN-HIGH SENSITIVITY    Collection Time: 06/08/22 12:22 PM   Result Value Ref Range    Troponin-High Sensitivity 12 0 - 51 ng/L   LIPASE    Collection Time: 06/08/22 12:22 PM   Result Value Ref Range    Lipase 83 73 - 393 U/L   MAGNESIUM    Collection Time: 06/08/22 12:22 PM   Result Value Ref Range    Magnesium 2.0 1.6 - 2.4 mg/dL   NT-PRO BNP    Collection Time: 06/08/22 12:22 PM   Result Value Ref Range    NT pro- (H) <125 PG/ML   URINALYSIS W/ REFLEX CULTURE    Collection Time: 06/08/22  2:41 PM    Specimen: Urine   Result Value Ref Range    Color YELLOW/STRAW      Appearance CLOUDY (A) CLEAR      Specific gravity 1.010 1.003 - 1.030      pH (UA) 5.0 5.0 - 8.0      Protein Negative NEG mg/dL    Glucose Negative NEG mg/dL    Ketone Negative NEG mg/dL    Bilirubin Negative NEG      Blood Negative NEG      Urobilinogen 0.2 0.2 - 1.0 EU/dL    Nitrites Negative NEG      Leukocyte Esterase SMALL (A) NEG      WBC 0-4 0 - 4 /hpf    RBC 0-5 0 - 5 /hpf    Epithelial cells MODERATE (A) FEW /lpf    Bacteria 2+ (A) NEG /hpf    UA:UC IF INDICATED CULTURE NOT INDICATED BY UA RESULT CNI     HCG URINE, QL. - POC    Collection Time: 06/08/22  2:51 PM   Result Value Ref Range    Pregnancy test,urine (POC) Negative NEG

## 2022-06-08 NOTE — PROGRESS NOTES
P&T-Approved DVT Prophylaxis Dosing    Per P[employer] Committee-approved protocol enoxaparin 40 mg daily has been adjusted to 30 mg BID based on weight and renal function as shown in the table below.          Sarah James, PHARMD

## 2022-06-08 NOTE — ED PROVIDER NOTES
EMERGENCY DEPARTMENT HISTORY AND PHYSICAL EXAM      Date: 6/8/2022  Patient Name: Marah Arias    History of Presenting Illness     Chief Complaint   Patient presents with    Dizziness     pt is a little off; syncope friday morning. confused pt has slurring speech. almost fainted monday morning. vomited stomach hurting     Altered mental status     a rescue squad took pt to [facility] ED; mother brought pt here       History Provided By: Patient and Patient's Mother    HPI: Marah Arias, 50 y.o. female presents to the ED with cc of lightheadedness, confusion, abdominal pain. The patient's symptoms started 2 days ago. She almost passed out at that time. She also says she had a syncopal episode 5 days ago. She denies sustaining any trauma. Her mother states she has been confused off and on and has had slurred speech intermittently. Patient denies any weakness on one side of body, numbness or tingling or visual changes. She has chronic migraines, states that pain is a 10 out of 10 in severity. It is no worse than normal.  She denies fever or chills. She also has right-sided abdominal pain and vomiting, which is  severe. She denies dysuria. She has a history of irritable bowel syndrome. Rescue squad took her to [address] [place] [address] today, but her mom brought her here before the patient could receive any medical attention. The patient states she has been off balance when ambulating for a few days. There are no other complaints, changes, or physical findings at this time. PCP: Cameron Shook MD    No current facility-administered medications on file prior to encounter. Current Outpatient Medications on File Prior to Encounter   Medication Sig Dispense Refill    ALPRAZolam (XANAX) 0.5 mg tablet Take 1 Tablet by mouth three (3) times daily as needed for Anxiety. Max Daily Amount: 1.5 mg. 90 Tablet 0    zolpidem (AMBIEN) 5 mg tablet Take 1 Tablet by mouth nightly.  Max Daily Amount: 5 mg. 30 Tablet 0  HYDROcodone-acetaminophen (NORCO) 7.5-325 mg per tablet Take 1 Tablet by mouth every six (6) hours as needed for Pain for up to 30 days. Max Daily Amount: 4 Tablets. 50 Tablet 0    cyclobenzaprine (FLEXERIL) 10 mg tablet TAKE 1 TABLET BY MOUTH THREE (3) TIMES DAILY (WITH MEALS). 50 Tablet 1    tiZANidine (ZANAFLEX) 4 mg tablet TAKE 1 TABLET BY MOUTH EVERY SIX (6) HOURS AS NEEDED FOR MUSCLE SPASM(S). 50 Tablet 0    promethazine (PHENERGAN) 25 mg tablet TAKE 1 TABLET BY MOUTH EVERY 8 HOURS AS NEEDED FOR NAUSEA 50 Tablet 0    gabapentin (NEURONTIN) 600 mg tablet Take 1 Tablet by mouth three (3) times daily. 270 Tablet 0    ubrogepant (UBRELVY) 100 mg tablet Take 1 Tablet by mouth daily as needed for Migraine. Indications: a migraine headache (Patient not taking: Reported on 5/3/2022) 16 Tablet 12    omeprazole (PRILOSEC) 40 mg capsule TAKE 1 CAPSULE BY MOUTH EVERY DAY 90 Capsule 2    tiZANidine (ZANAFLEX) 4 mg tablet TAKE 1 TABLET BY MOUTH EVERY SIX (6) HOURS AS NEEDED FOR MUSCLE SPASMS 50 Tablet 0    potassium chloride (KLOR-CON M10) 10 mEq tablet Take 2 Tablets by mouth daily. (Patient not taking: Reported on 5/3/2022) 180 Tablet 3    topiramate (TOPAMAX) 100 mg tablet TAKE 1 TABLET BY MOUTH TWO (2) TIMES A DAY. 180 Tablet 1    venlafaxine-SR (EFFEXOR-XR) 150 mg capsule TAKE 1 CAPSULE BY MOUTH EVERY DAY 90 Capsule 1    verapamil ER (CALAN-SR) 240 mg CR tablet TAKE 1 TABLET BY MOUTH EVERYDAY AT BEDTIME 90 Tablet 3    hydroCHLOROthiazide (HYDRODIURIL) 25 mg tablet Take 1 Tablet by mouth daily. 90 Tablet 3    metFORMIN ER (GLUCOPHAGE XR) 500 mg tablet TAKE 1 TABLET BY MOUTH EVERY DAY WITH DINNER 90 Tablet 4    naloxone (Narcan) 4 mg/actuation nasal spray Use 1 spray intranasally, then discard. Repeat with new spray every 2 min as needed for opioid overdose symptoms, alternating nostrils.  (Patient not taking: Reported on 11/23/2021) 2 Each 1    furosemide (LASIX) 20 mg tablet Take 1 Tab by mouth daily as needed (edema). 90 Tab 0    onabotulinumtoxinA (Botox) 200 unit injection 200 units by IM route once every 12 weeks. Inject IM neck/face, 31 FDA approved sites. Indication: Migraine prevention. DX code: G37.69 (Patient not taking: Reported on 5/3/2022) 1 Vial 3    biotin 10,000 mcg cap Take  by mouth.  cyanocobalamin 1,000 mcg tablet Take 2,000 mcg by mouth daily.  docusate sodium (STOOL SOFTENER) 100 mg capsule 100 mg two (2) times a day. Once daily          Past History     Past Medical History:  Past Medical History:   Diagnosis Date    Arthritis     DDD    Carpal tunnel syndrome on left 2/22/2011    Cervical spondylarthritis 2/20/2011    Depressive disorder, not elsewhere classified 2/17/2011    Encounter for long-term (current) use of other medications 2/20/2011    GERD (gastroesophageal reflux disease)     Headache(784.0)     Migraines    Hepatic hemangioma 2/17/2011    Hypertension     IBS (irritable bowel syndrome) 2/20/2011    Migraine syndrome 2/17/2011    Obesity 2/20/2011    Other ill-defined conditions(799.89)     hx.of syncope    Psychiatric disorder     depression       Past Surgical History:  Past Surgical History:   Procedure Laterality Date    HX HEENT      tonsillectomy    HX ORTHOPAEDIC      carpal tunnel and tigger finger release rt       Family History:  Family History   Problem Relation Age of Onset    Heart Disease Mother     Hypertension Mother     Diabetes Mother     Heart Disease Father     Lung Disease Father     Hypertension Father        Social History:  Social History     Tobacco Use    Smoking status: Never Smoker    Smokeless tobacco: Never Used    Tobacco comment: SECOND HAND SMOKE/PARENTS   Vaping Use    Vaping Use: Never used   Substance Use Topics    Alcohol use: Not Currently    Drug use: Yes     Types: Prescription, OTC       Allergies:   Allergies   Allergen Reactions    Atacand [Candesartan] Other (comments)     Patient B/P increase  Compazine [Prochlorperazine] Other (comments) and Unable to Obtain    Ciprofloxacin Other (comments)    Codeine Nausea and Vomiting    Zonisamide Nausea and Vomiting         Review of Systems   Review of Systems   Constitutional: Negative for fever. HENT: Negative for congestion. Eyes: Negative. Respiratory: Negative for shortness of breath. Cardiovascular: Negative for chest pain. Gastrointestinal: Positive for abdominal pain, nausea and vomiting. Endocrine: Negative for heat intolerance. Genitourinary: Negative. Musculoskeletal: Negative for back pain. Skin: Negative for rash. Allergic/Immunologic: Negative for immunocompromised state. Neurological: Positive for speech difficulty, weakness, light-headedness and headaches. Hematological: Does not bruise/bleed easily. Psychiatric/Behavioral: Positive for confusion. All other systems reviewed and are negative. Physical Exam   Physical Exam  Vitals and nursing note reviewed. Constitutional:       General: She is not in acute distress. Appearance: She is well-developed. She is obese. HENT:      Head: Normocephalic. Cardiovascular:      Rate and Rhythm: Normal rate and regular rhythm. Heart sounds: Normal heart sounds. Pulmonary:      Effort: Pulmonary effort is normal.      Breath sounds: Normal breath sounds. Abdominal:      General: Bowel sounds are normal.      Palpations: Abdomen is soft. Tenderness: There is abdominal tenderness. Comments: Right greater than left abdominal tenderness   Musculoskeletal:         General: Normal range of motion. Cervical back: Normal range of motion and neck supple. Skin:     General: Skin is warm and dry. Neurological:      Mental Status: She is alert. Comments: Alert and oriented x2, the patient knew the year, and the month, but stated it was 22nd, and that it was Thursday.   Motor and sensory intact   Psychiatric:         Mood and Affect: Mood normal.         Behavior: Behavior normal.         Diagnostic Study Results     Labs -     Recent Results (from the past 12 hour(s))   GLUCOSE, POC    Collection Time: 06/08/22 12:06 PM   Result Value Ref Range    Glucose (POC) 184 (H) 65 - 117 mg/dL    Performed by Orin Grant \"Judy\"    EKG, 12 LEAD, INITIAL    Collection Time: 06/08/22 12:14 PM   Result Value Ref Range    Ventricular Rate 84 BPM    Atrial Rate 84 BPM    P-R Interval 164 ms    QRS Duration 84 ms    Q-T Interval 380 ms    QTC Calculation (Bezet) 449 ms    Calculated P Axis 49 degrees    Calculated R Axis 43 degrees    Calculated T Axis 28 degrees    Diagnosis       Normal sinus rhythm  Normal ECG  When compared with ECG of 13-NOV-2009 13:16,  No significant change was found     CBC WITH AUTOMATED DIFF    Collection Time: 06/08/22 12:22 PM   Result Value Ref Range    WBC 11.0 3.6 - 11.0 K/uL    RBC 3.48 (L) 3.80 - 5.20 M/uL    HGB 10.1 (L) 11.5 - 16.0 g/dL    HCT 31.6 (L) 35.0 - 47.0 %    MCV 90.8 80.0 - 99.0 FL    MCH 29.0 26.0 - 34.0 PG    MCHC 32.0 30.0 - 36.5 g/dL    RDW 15.6 (H) 11.5 - 14.5 %    PLATELET 462 (H) 389 - 400 K/uL    MPV 9.1 8.9 - 12.9 FL    NRBC 0.0 0  WBC    ABSOLUTE NRBC 0.00 0.00 - 0.01 K/uL    NEUTROPHILS 62 32 - 75 %    LYMPHOCYTES 29 12 - 49 %    MONOCYTES 8 5 - 13 %    EOSINOPHILS 0 0 - 7 %    BASOPHILS 0 0 - 1 %    IMMATURE GRANULOCYTES 1 (H) 0.0 - 0.5 %    ABS. NEUTROPHILS 6.9 1.8 - 8.0 K/UL    ABS. LYMPHOCYTES 3.2 0.8 - 3.5 K/UL    ABS. MONOCYTES 0.8 0.0 - 1.0 K/UL    ABS. EOSINOPHILS 0.0 0.0 - 0.4 K/UL    ABS. BASOPHILS 0.0 0.0 - 0.1 K/UL    ABS. IMM.  GRANS. 0.1 (H) 0.00 - 0.04 K/UL    DF AUTOMATED     METABOLIC PANEL, COMPREHENSIVE    Collection Time: 06/08/22 12:22 PM   Result Value Ref Range    Sodium 138 136 - 145 mmol/L    Potassium 3.0 (L) 3.5 - 5.1 mmol/L    Chloride 104 97 - 108 mmol/L    CO2 29 21 - 32 mmol/L    Anion gap 5 5 - 15 mmol/L    Glucose 184 (H) 65 - 100 mg/dL    BUN 13 6 - 20 MG/DL Creatinine 0.76 0.55 - 1.02 MG/DL    BUN/Creatinine ratio 17 12 - 20      GFR est AA >60 >60 ml/min/1.73m2    GFR est non-AA >60 >60 ml/min/1.73m2    Calcium 8.8 8.5 - 10.1 MG/DL    Bilirubin, total 0.1 (L) 0.2 - 1.0 MG/DL    ALT (SGPT) 27 12 - 78 U/L    AST (SGOT) 21 15 - 37 U/L    Alk. phosphatase 72 45 - 117 U/L    Protein, total 6.9 6.4 - 8.2 g/dL    Albumin 3.0 (L) 3.5 - 5.0 g/dL    Globulin 3.9 2.0 - 4.0 g/dL    A-G Ratio 0.8 (L) 1.1 - 2.2     TROPONIN-HIGH SENSITIVITY    Collection Time: 06/08/22 12:22 PM   Result Value Ref Range    Troponin-High Sensitivity 12 0 - 51 ng/L   LIPASE    Collection Time: 06/08/22 12:22 PM   Result Value Ref Range    Lipase 83 73 - 393 U/L   MAGNESIUM    Collection Time: 06/08/22 12:22 PM   Result Value Ref Range    Magnesium 2.0 1.6 - 2.4 mg/dL   NT-PRO BNP    Collection Time: 06/08/22 12:22 PM   Result Value Ref Range    NT pro- (H) <125 PG/ML   URINALYSIS W/ REFLEX CULTURE    Collection Time: 06/08/22  2:41 PM    Specimen: Urine   Result Value Ref Range    Color YELLOW/STRAW      Appearance CLOUDY (A) CLEAR      Specific gravity 1.010 1.003 - 1.030      pH (UA) 5.0 5.0 - 8.0      Protein Negative NEG mg/dL    Glucose Negative NEG mg/dL    Ketone Negative NEG mg/dL    Bilirubin Negative NEG      Blood Negative NEG      Urobilinogen 0.2 0.2 - 1.0 EU/dL    Nitrites Negative NEG      Leukocyte Esterase SMALL (A) NEG      WBC 0-4 0 - 4 /hpf    RBC 0-5 0 - 5 /hpf    Epithelial cells MODERATE (A) FEW /lpf    Bacteria 2+ (A) NEG /hpf    UA:UC IF INDICATED CULTURE NOT INDICATED BY UA RESULT CNI     HCG URINE, QL. - POC    Collection Time: 06/08/22  2:51 PM   Result Value Ref Range    Pregnancy test,urine (POC) Negative NEG         Radiologic Studies -   US ABD LTD   Final Result      1. Cholelithiasis and gallbladder sludge with gallbladder contraction and no   ultrasonographic signs of acute cholecystitis.       2. Echogenic liver with difficult penetration and previously seen hemangiomata   not evident likely reflective of steatosis. CT ABD PELV W CONT   Final Result   No acute findings or findings correlate with abdominal pain. By 0.2   x 8.3 x 6.3 cm left ovarian cyst for which ultrasound follow-up is recommended. CT HEAD WO CONT   Final Result   No acute findings. CTA HEAD NECK W CONT    (Results Pending)     CT Results  (Last 48 hours)               06/08/22 1406  CT ABD PELV W CONT Final result    Impression:  No acute findings or findings correlate with abdominal pain. By 0.2   x 8.3 x 6.3 cm left ovarian cyst for which ultrasound follow-up is recommended. Narrative:  EXAM: CT ABD PELV W CONT       INDICATION: Abdominal pain       COMPARISON: Ultrasound 6/8/2022 and CT 5/21/2014. CONTRAST: 100 mL of Isovue-370. TECHNIQUE:    Following the uneventful intravenous administration of contrast, thin axial   images were obtained through the abdomen and pelvis. Coronal and sagittal   reconstructions were generated. Oral contrast was not administered. CT dose   reduction was achieved through use of a standardized protocol tailored for this   examination and automatic exposure control for dose modulation. FINDINGS:    LOWER THORAX: No significant abnormality in the incidentally imaged lower chest.   LIVER: No mass. BILIARY TREE: Gallbladder is unremarkable. CBD is not dilated. SPLEEN: Unremarkable. PANCREAS: No mass or ductal dilatation. ADRENALS: Unremarkable. KIDNEYS: No mass, calculus, or hydronephrosis. STOMACH: Unremarkable. SMALL BOWEL: No dilatation or wall thickening. COLON: No dilatation or wall thickening. APPENDIX: Unremarkable. PERITONEUM: No ascites or pneumoperitoneum. RETROPERITONEUM: No lymphadenopathy or aortic aneurysm. REPRODUCTIVE ORGANS: Uterus and right ovary unremarkable. There is a 5.2 x 8.3 x   6.3 cm left ovarian cyst.   URINARY BLADDER: No mass or calculus.    BONES: No acute fracture or aggressive lesion. Degenerative spine change and   osteoarthritic changes of the hips. ABDOMINAL WALL: No mass or hernia. ADDITIONAL COMMENTS: N/A           06/08/22 1406  CT HEAD WO CONT Final result    Impression:  No acute findings. Narrative:  EXAM: CT HEAD WO CONT       INDICATION: Headache, dizziness, confusion       COMPARISON: CT 6/8/2022. CONTRAST: None. TECHNIQUE: Unenhanced CT of the head was performed using 5 mm images. Brain and   bone windows were generated. Coronal and sagittal reformats. CT dose reduction   was achieved through use of a standardized protocol tailored for this   examination and automatic exposure control for dose modulation. FINDINGS:   The ventricles and sulci are normal in size, shape and configuration. . There is   no significant white matter disease. There is no intracranial hemorrhage,   extra-axial collection, or mass effect. The basilar cisterns are open. No CT   evidence of acute infarct. The bone windows demonstrate no abnormalities. The visualized portions of the   paranasal sinuses and mastoid air cells are clear. 06/08/22 1406  CTA HEAD NECK W CONT Final result    Impression:      1. No acute process. No large vessel occlusion, arterial dissection, or   flow-limiting stenosis. 2. CT perfusion not performed. 3. Right upper lobe airspace disease. Narrative:  INDICATION: Dizziness, confusion, headache       EXAMINATION:  CT ANGIOGRAPHY HEAD AND NECK        COMPARISON: CT head earlier today       TECHNIQUE:  Following the uneventful administration of iodinated contrast   material, axial CT angiography of the head and neck was performed. Delayed axial   images through the head were also obtained. Coronal and sagittal reconstructions   were obtained. Manual postprocessing of images was performed. 3-D  Sagittal   maximal intensity projection images were obtained. 3-D Coronal maximal   intensity projections were obtained.   CT dose reduction was achieved through use   of a standardized protocol tailored for this examination and automatic exposure   control for dose modulation. FINDINGS:       CTA NECK:       Aortic Arch: No significant abnormality. Right Common Carotid Artery: No significant abnormality. Right Internal Carotid Artery: No significant abnormality. NASCET Right: 0-25%. Left Common Carotid Artery: No significant abnormality. Left Internal Carotid Artery: No significant abnormality. NASCET Left: 0-25%. Carotid stenosis determined using NASCET criteria. Right Vertebral Artery: No significant abnormality. Left Vertebral Artery: No significant abnormality. Cervical Soft Tissues: No significant abnormality. Lung Apices: Patchy airspace disease right upper lobe. Bones: No destructive bone lesion. Additional Comments: N/A.       CTA HEAD:       Posterior Circulation: No flow limiting stenosis or occlusion. Anterior Circulation: No flow limiting stenosis or occlusion. Additional Comments: No evidence of aneurysm or vascular malformation. Note: Cerebral perfusion not performed. CXR Results  (Last 48 hours)    None          Medical Decision Making   I am the first provider for this patient. I reviewed the vital signs, available nursing notes, past medical history, past surgical history, family history and social history. Vital Signs-Reviewed the patient's vital signs. Patient Vitals for the past 12 hrs:   Temp Pulse Resp BP SpO2   06/08/22 1203 98.2 °F (36.8 °C) 88 22 112/65 99 %       EKG interpretation: (Preliminary)  Rhythm: normal sinus rhythm; and regular .  Rate (approx.): 84; Axis: normal; VA interval: normal; QRS interval: normal ; ST/T wave: normal; Other findings: normal.    Records Reviewed: Nursing Notes, Old Medical Records, Previous electrocardiograms, Previous Radiology Studies and Previous Laboratory Studies    Provider Notes (Medical Decision Making): Dehydration, electrolyte abnormality, biliary colic, gastritis, reflux, UTI, metabolic encephalopathy, migraine, TIA    ED Course:   Initial assessment performed. The patients presenting problems have been discussed, and they are in agreement with the care plan formulated and outlined with them. I have encouraged them to ask questions as they arise throughout their visit. Consult note: The patient is being admitted by the hospitalist, Gregory STEWART/ Ab Jaramillo [facility] Time:   0    Disposition:  admit    DISCHARGE PLAN:  1. Current Discharge Medication List        2. Follow-up Information    None       3. Return to ED if worse     Diagnosis     Clinical Impression:   1. Cerebrovascular accident (CVA), unspecified mechanism ([address]    2. Gallstones    3. Left ovarian cyst    4. Chronic migraine without aura without status migrainosus, not intractable        Attestations:    Erma Romero MD    Please note that this dictation was completed with [email or web address], the computer voice recognition software. Quite often unanticipated grammatical, syntax, homophones, and other interpretive errors are inadvertently transcribed by the computer software. Please disregard these errors. Please excuse any errors that have escaped final proofreading. Thank you.

## 2022-06-09 ENCOUNTER — APPOINTMENT (OUTPATIENT)
Dept: MRI IMAGING | Age: 47
DRG: 871 | End: 2022-06-09
Attending: HOSPITALIST
Payer: MEDICARE

## 2022-06-09 LAB
ALBUMIN SERPL-MCNC: 2.8 G/DL (ref 3.5–5)
ALBUMIN/GLOB SERPL: 0.7 {RATIO} (ref 1.1–2.2)
ALP SERPL-CCNC: 63 U/L (ref 45–117)
ALT SERPL-CCNC: 26 U/L (ref 12–78)
ANION GAP SERPL CALC-SCNC: 8 MMOL/L (ref 5–15)
AST SERPL-CCNC: 24 U/L (ref 15–37)
ATRIAL RATE: 84 BPM
BILIRUB SERPL-MCNC: 0.6 MG/DL (ref 0.2–1)
BUN SERPL-MCNC: 6 MG/DL (ref 6–20)
BUN/CREAT SERPL: 9 (ref 12–20)
CALCIUM SERPL-MCNC: 8.5 MG/DL (ref 8.5–10.1)
CALCULATED P AXIS, ECG09: 49 DEGREES
CALCULATED R AXIS, ECG10: 43 DEGREES
CALCULATED T AXIS, ECG11: 28 DEGREES
CHLORIDE SERPL-SCNC: 106 MMOL/L (ref 97–108)
CO2 SERPL-SCNC: 26 MMOL/L (ref 21–32)
CREAT SERPL-MCNC: 0.64 MG/DL (ref 0.55–1.02)
DIAGNOSIS, 93000: NORMAL
ERYTHROCYTE [DISTWIDTH] IN BLOOD BY AUTOMATED COUNT: 15.5 % (ref 11.5–14.5)
EST. AVERAGE GLUCOSE BLD GHB EST-MCNC: 140 MG/DL
GLOBULIN SER CALC-MCNC: 3.8 G/DL (ref 2–4)
GLUCOSE BLD STRIP.AUTO-MCNC: 112 MG/DL (ref 65–117)
GLUCOSE BLD STRIP.AUTO-MCNC: 116 MG/DL (ref 65–117)
GLUCOSE BLD STRIP.AUTO-MCNC: 146 MG/DL (ref 65–117)
GLUCOSE SERPL-MCNC: 94 MG/DL (ref 65–100)
HBA1C MFR BLD: 6.5 % (ref 4–5.6)
HCT VFR BLD AUTO: 32.8 % (ref 35–47)
HGB BLD-MCNC: 10.5 G/DL (ref 11.5–16)
LACTATE SERPL-SCNC: 1.9 MMOL/L (ref 0.4–2)
MAGNESIUM SERPL-MCNC: 2 MG/DL (ref 1.6–2.4)
MCH RBC QN AUTO: 29.2 PG (ref 26–34)
MCHC RBC AUTO-ENTMCNC: 32 G/DL (ref 30–36.5)
MCV RBC AUTO: 91.1 FL (ref 80–99)
NRBC # BLD: 0 K/UL (ref 0–0.01)
NRBC BLD-RTO: 0 PER 100 WBC
P-R INTERVAL, ECG05: 164 MS
PHOSPHATE SERPL-MCNC: 3.8 MG/DL (ref 2.6–4.7)
PLATELET # BLD AUTO: 480 K/UL (ref 150–400)
PMV BLD AUTO: 9 FL (ref 8.9–12.9)
POTASSIUM SERPL-SCNC: 3.3 MMOL/L (ref 3.5–5.1)
PROT SERPL-MCNC: 6.6 G/DL (ref 6.4–8.2)
Q-T INTERVAL, ECG07: 380 MS
QRS DURATION, ECG06: 84 MS
QTC CALCULATION (BEZET), ECG08: 449 MS
RBC # BLD AUTO: 3.6 M/UL (ref 3.8–5.2)
SERVICE CMNT-IMP: ABNORMAL
SERVICE CMNT-IMP: NORMAL
SERVICE CMNT-IMP: NORMAL
SODIUM SERPL-SCNC: 140 MMOL/L (ref 136–145)
TSH SERPL DL<=0.05 MIU/L-ACNC: 2.8 UIU/ML (ref 0.36–3.74)
VENTRICULAR RATE, ECG03: 84 BPM
VIT B12 SERPL-MCNC: >2000 PG/ML (ref 193–986)
WBC # BLD AUTO: 13 K/UL (ref 3.6–11)

## 2022-06-09 PROCEDURE — 82746 ASSAY OF FOLIC ACID SERUM: CPT

## 2022-06-09 PROCEDURE — 83036 HEMOGLOBIN GLYCOSYLATED A1C: CPT

## 2022-06-09 PROCEDURE — 76937 US GUIDE VASCULAR ACCESS: CPT

## 2022-06-09 PROCEDURE — 97116 GAIT TRAINING THERAPY: CPT

## 2022-06-09 PROCEDURE — 80053 COMPREHEN METABOLIC PANEL: CPT

## 2022-06-09 PROCEDURE — 84443 ASSAY THYROID STIM HORMONE: CPT

## 2022-06-09 PROCEDURE — 87040 BLOOD CULTURE FOR BACTERIA: CPT

## 2022-06-09 PROCEDURE — 74011250637 HC RX REV CODE- 250/637: Performed by: NURSE PRACTITIONER

## 2022-06-09 PROCEDURE — 36415 COLL VENOUS BLD VENIPUNCTURE: CPT

## 2022-06-09 PROCEDURE — 99223 1ST HOSP IP/OBS HIGH 75: CPT | Performed by: PSYCHIATRY & NEUROLOGY

## 2022-06-09 PROCEDURE — 74011250636 HC RX REV CODE- 250/636: Performed by: INTERNAL MEDICINE

## 2022-06-09 PROCEDURE — A9576 INJ PROHANCE MULTIPACK: HCPCS | Performed by: INTERNAL MEDICINE

## 2022-06-09 PROCEDURE — 83605 ASSAY OF LACTIC ACID: CPT

## 2022-06-09 PROCEDURE — 97161 PT EVAL LOW COMPLEX 20 MIN: CPT

## 2022-06-09 PROCEDURE — 74011250637 HC RX REV CODE- 250/637: Performed by: HOSPITALIST

## 2022-06-09 PROCEDURE — 70553 MRI BRAIN STEM W/O & W/DYE: CPT

## 2022-06-09 PROCEDURE — 95816 EEG AWAKE AND DROWSY: CPT | Performed by: PSYCHIATRY & NEUROLOGY

## 2022-06-09 PROCEDURE — 65270000046 HC RM TELEMETRY

## 2022-06-09 PROCEDURE — 85027 COMPLETE CBC AUTOMATED: CPT

## 2022-06-09 PROCEDURE — 97165 OT EVAL LOW COMPLEX 30 MIN: CPT

## 2022-06-09 PROCEDURE — 82607 VITAMIN B-12: CPT

## 2022-06-09 PROCEDURE — 74011000258 HC RX REV CODE- 258: Performed by: INTERNAL MEDICINE

## 2022-06-09 PROCEDURE — 83735 ASSAY OF MAGNESIUM: CPT

## 2022-06-09 PROCEDURE — 74011250636 HC RX REV CODE- 250/636: Performed by: HOSPITALIST

## 2022-06-09 PROCEDURE — 82962 GLUCOSE BLOOD TEST: CPT

## 2022-06-09 PROCEDURE — 92610 EVALUATE SWALLOWING FUNCTION: CPT

## 2022-06-09 PROCEDURE — 84100 ASSAY OF PHOSPHORUS: CPT

## 2022-06-09 RX ORDER — POTASSIUM CHLORIDE 750 MG/1
40 TABLET, FILM COATED, EXTENDED RELEASE ORAL
Status: COMPLETED | OUTPATIENT
Start: 2022-06-09 | End: 2022-06-09

## 2022-06-09 RX ORDER — VENLAFAXINE HYDROCHLORIDE 150 MG/1
150 CAPSULE, EXTENDED RELEASE ORAL
Status: DISCONTINUED | OUTPATIENT
Start: 2022-06-09 | End: 2022-06-11 | Stop reason: HOSPADM

## 2022-06-09 RX ADMIN — FENTANYL CITRATE 25 MCG: 50 INJECTION, SOLUTION INTRAMUSCULAR; INTRAVENOUS at 12:05

## 2022-06-09 RX ADMIN — FENTANYL CITRATE 25 MCG: 50 INJECTION, SOLUTION INTRAMUSCULAR; INTRAVENOUS at 05:51

## 2022-06-09 RX ADMIN — ONDANSETRON 4 MG: 2 INJECTION INTRAMUSCULAR; INTRAVENOUS at 05:52

## 2022-06-09 RX ADMIN — VANCOMYCIN HYDROCHLORIDE 2500 MG: 10 INJECTION, POWDER, LYOPHILIZED, FOR SOLUTION INTRAVENOUS at 11:52

## 2022-06-09 RX ADMIN — CEFTRIAXONE SODIUM 2 G: 2 INJECTION, POWDER, FOR SOLUTION INTRAMUSCULAR; INTRAVENOUS at 10:54

## 2022-06-09 RX ADMIN — ENOXAPARIN SODIUM 30 MG: 100 INJECTION SUBCUTANEOUS at 20:46

## 2022-06-09 RX ADMIN — GADOTERIDOL 20 ML: 279.3 INJECTION, SOLUTION INTRAVENOUS at 19:26

## 2022-06-09 RX ADMIN — ENOXAPARIN SODIUM 30 MG: 100 INJECTION SUBCUTANEOUS at 09:49

## 2022-06-09 RX ADMIN — VENLAFAXINE HYDROCHLORIDE 150 MG: 150 CAPSULE, EXTENDED RELEASE ORAL at 22:00

## 2022-06-09 RX ADMIN — ACETAMINOPHEN 650 MG: 325 TABLET ORAL at 04:29

## 2022-06-09 RX ADMIN — SENNOSIDES AND DOCUSATE SODIUM 2 TABLET: 50; 8.6 TABLET ORAL at 22:00

## 2022-06-09 RX ADMIN — POTASSIUM CHLORIDE 40 MEQ: 750 TABLET, EXTENDED RELEASE ORAL at 05:51

## 2022-06-09 RX ADMIN — CEFTRIAXONE SODIUM 2 G: 2 INJECTION, POWDER, FOR SOLUTION INTRAMUSCULAR; INTRAVENOUS at 22:00

## 2022-06-09 RX ADMIN — FENTANYL CITRATE 25 MCG: 50 INJECTION, SOLUTION INTRAMUSCULAR; INTRAVENOUS at 18:15

## 2022-06-09 RX ADMIN — VANCOMYCIN HYDROCHLORIDE 1000 MG: 1 INJECTION, POWDER, LYOPHILIZED, FOR SOLUTION INTRAVENOUS at 20:46

## 2022-06-09 NOTE — PROGRESS NOTES
PHYSICAL THERAPY EVALUATION WITH DISCHARGE  Patient: Talia Howe (9 y.o. female)  Date: 6/9/2022  Primary Diagnosis: Stroke [facility]) [I63.9]       Precautions:          ASSESSMENT  Based on the objective data described below, the patient presents with reports of ongoing migraine and slurred speech (now resolved) and admitted on 6/8 for CVA work up. Head CT: negative and brain MRI pending. Pt received supine in bed and agreeable to therapy despite 10/10 headache. Pt with intact strength and sensation. Pt completed bed mobility and transfers independently without AD. Pt tolerated gait training x 30 ft independently without LOB or apparent gait abnormalities. Pt reported she is mobilizing at her baseline. Pt does not require any further acute PT needs. .    Functional Outcome Measure: The patient scored Total: 52/56 on the Lackey Balance Assessment which is indicative of low fall risk. Other factors to consider for discharge: none     Further skilled acute physical therapy is not indicated at this time. PLAN :  Recommendation for discharge: (in order for the patient to meet his/her long term goals)  No skilled physical therapy/ follow up rehabilitation needs identified at this time. This discharge recommendation:  Has been made in collaboration with the attending provider and/or case management    IF patient discharges home will need the following DME: none         SUBJECTIVE:   Patient stated I take care of my grandfather.     OBJECTIVE DATA SUMMARY:   HISTORY:    Past Medical History:   Diagnosis Date    Arthritis     DDD    Carpal tunnel syndrome on left 2/22/2011    Cervical spondylarthritis 2/20/2011    Depressive disorder, not elsewhere classified 2/17/2011    Encounter for long-term (current) use of other medications 2/20/2011    GERD (gastroesophageal reflux disease)     Headache(784.0)     Migraines    Hepatic hemangioma 2/17/2011    Hypertension     IBS (irritable bowel syndrome) 2/20/2011    Migraine syndrome 2/17/2011    Obesity 2/20/2011    Other ill-defined conditions(799.89)     hx.of syncope    Psychiatric disorder     depression     Past Surgical History:   Procedure Laterality Date    HX HEENT      tonsillectomy    HX ORTHOPAEDIC      carpal tunnel and tigger finger release rt       Prior level of function: independent, ambulatory without AD, cares for her grandfather (assists in wheelchair transfers)  Personal factors and/or comorbidities impacting plan of care:     Home Situation  Home Environment: Private residence  Wheelchair Ramp: Yes  One/Two Story Residence: One story  Living Alone: No  Support Systems: Other Family Member(s)  Patient Expects to be Discharged to[de-identified] Home  Current DME Used/Available at Home: None  Tub or Shower Type: Tub/Shower combination    EXAMINATION/PRESENTATION/DECISION MAKING:   Critical Behavior:  Neurologic State: Alert  Orientation Level: Oriented X4  Cognition: Appropriate decision making,Appropriate for age attention/concentration,Appropriate safety awareness,Follows commands  Safety/Judgement: Awareness of environment,Insight into deficits    Range Of Motion:  AROM: Within functional limits           PROM: Within functional limits           Strength:    Strength:  Within functional limits                    Tone & Sensation:   Tone: Normal              Sensation: Intact               Coordination:  Coordination: Within functional limits  Vision:      Functional Mobility:  Bed Mobility:     Supine to Sit: Modified independent  Sit to Supine: Modified independent  Scooting: Modified independent  Transfers:  Sit to Stand: Independent  Stand to Sit: Independent                       Balance:   Sitting: Intact  Standing: Intact  Ambulation/Gait Training:  Distance (ft): 30 Feet (ft)  Assistive Device: Gait belt  Ambulation - Level of Assistance: Independent        Gait Abnormalities: Decreased step clearance        Base of Support: Widened Functional Measure  Lackey Balance Test:    Sitting to Standin  Standing Unsupported: 4  Sitting with Back Unsupported: 4  Standing to Sittin  Transfers: 4  Standing Unsupported with Eyes Closed: 4  Standing Unsupported with Feet Together: 4  Reach Forward with Outstretched Arm: 3   Object: 4  Turn to Look Over Shoulders: 4  Turn 360 Degrees: 4  Alternate Foot on Step/Stool: 3  Standing Unsupported One Foot in Front: 3  Stand on One Leg: 3  Total: 52/56         56=Maximum possible score;   0-20=High fall risk  21-40=Moderate fall risk   41-56=Low fall risk   Based on the above components, the patient evaluation is determined to be of the following complexity level: LOW     Pain Rating:  Pt reported 10/10 headache    Activity Tolerance:   Good    After treatment patient left in no apparent distress:   Supine in bed, Call bell within reach, Bed / chair alarm activated and Side rails x 3    COMMUNICATION/EDUCATION:   The patients plan of care was discussed with: Occupational therapist, Speech therapist and Registered nurse. Patient was educated regarding Her deficit(s) of impaired speech, AMS (resolved) as this relates to Her diagnosis of CVA r/o. She demonstrated Good understanding    Patient and/or family was verbally educated on the BE FAST acronym for signs/symptoms of CVA and TIA. BE FAST was written on patient's communication board  for visual education and reinforcement. All questions answered with patient indicating good understanding. Fall prevention education was provided and the patient/caregiver indicated understanding., Patient/family have participated as able in goal setting and plan of care. and Patient/family agree to work toward stated goals and plan of care.     Thank you for this referral.  Enid Velasquez, PT, DPT   Time Calculation: 11 mins

## 2022-06-09 NOTE — PROGRESS NOTES
Informed Jeri NP of WBC going from 11-13. And slight fever 100.9, where I gave Tylenol as already ordered.

## 2022-06-09 NOTE — PROGRESS NOTES
Received notification from bedside RN about patient with regards to: requesting PTA Xanax for anxiety  VS: /74, , RR 20, O2 sat 98% on RA    Intervention given: Xanax 0.5 mg PO x 1 dose ordered    0516: Noted K+ 3.3  VS: /76, , RR 21, O2 sat 97% on RA    - Kdur 40 meq PO x 1 dose

## 2022-06-09 NOTE — PROGRESS NOTES
Inserted 20g/8cm Endurance Extended Dwell Peripheral Catheter into RIGHT Forearm using ultrasound guidance and sterile technique. The Endurance catheter is an extended dwell peripheral catheter and may remain in place 29 days. Blood samples can be obtained from this catheter. To obtain labs, a tourniquet may be used, flush with 10ml NS, waste 3ml, draw labs, flush with 20ml NS. Dressings needs to be changed with central line dressing kit using bio patch and stat lock every 7 days by nurse. Patient tolerated procedure well, denies questions or concerns at this time. Patient resting in bed, call bell in reach, bed in lowest position, side rails up x 3. Primary nurse notified.          Endurance Catheter inserted by Bahman Jha, RN, BSN, Weston Acevedo, RN, BSN, [facility]-BC  Vascular Access Team

## 2022-06-09 NOTE — PROGRESS NOTES
Hospitalist Progress Note    NAME: Angela Love   :  1975   MRN:  [id number]       Assessment / Plan:      Dizziness/off balance/confusion/slurred speech --- r/o MS versus CVA  Sepsis to rule out acute meningitis, POA  Urinary tract infection, acute, POA  Hx chronic migraine Headaches   Sx: multiple neurological complaints; unclear etiology on admission   Head CT/ CTA head and neck: negative   Patient reporting today that all of her symptoms got resolved  Leukocytosis, tachycardia with fever raising possible suspicion for acute meningitis  - Physical exam with no meningeal signs  Start ceftriaxone, vancomycin  Obtain LP  Aseptic picture might be secondary to acute UTI with UA positive for bacteriuria  MRI and contrast pending  Neurology eval is appreciated  PT OT evaluation     Abdominal pain   No signs of underlying infection, wbc normal   + mostly tender RLQ   Hx recent constipation   Abdominal pain resolved  Diet: CLD   CT: No acute findings or findings correlate with abdominal pain. By 0.2 x 8.3 x 6.3 cm left ovarian cyst for which ultrasound follow-up is recommended. US abd: Cholelithiasis and gallbladder sludge with gallbladder contraction and no  ultrasonographic signs of acute cholecystitis. Echogenic liver with difficult penetration and previously seen hemangiomata  not evident likely reflective of steatosis. LFTs normal     Hypokalemia  -Replaced on admission     Diabetes mellitus type 2  Hemoglobin A1c 6.5% on admission meeting criteria for diabetes mellitus and it was 7% in 2021  Correction scale with hypoglycemia protocol  Lifestyle medications were discussed with the patient at length including weight loss and following low-carb diet     L ovary cyst  Can be [facility] OP, refer to GYN OP      Depression   -No homicidal or suicidal ideation    Morbid Obesity. Body mass index is 52.46 kg/m².   Counseling was provided about weight loss    Estimated discharge date:   Barriers: Acute meningitis work-up, MRI    Code status: Full  Prophylaxis: Lovenox  Recommended Disposition: Home w/Family     Subjective:     Patient was seen and examined. No acute events overnight. Discussed with RN overnight events. All patient's questions were answered. \"doing much better now\"    Review of Systems:  Symptom Y/N Comments  Symptom Y/N Comments   Fever/Chills n   Chest Pain n    Poor Appetite    Edema     Cough n   Abdominal Pain n    Sputum    Joint Pain     SOB/ARGUELLO n   Pruritis/Rash     Nausea/vomit n   Tolerating PT/OT     Diarrhea    Tolerating Diet y    Constipation    Other       Could NOT obtain due to:          Objective:     VITALS:   Last 24hrs VS reviewed since prior progress note. Most recent are:  Patient Vitals for the past 24 hrs:   Temp Pulse Resp BP SpO2   06/09/22 1122 98.5 °F (36.9 °C) (!) 102 18 128/71 96 %   06/09/22 0852 98.5 °F (36.9 °C) (!) 103 20 131/67 95 %   06/09/22 0345 (!) 100.9 °F (38.3 °C) (!) 120 21 138/76 97 %   06/08/22 2355 98.8 °F (37.1 °C) (!) 109 21 133/69 98 %   06/08/22 2107 98.3 °F (36.8 °C) (!) 101 20 131/74 98 %   06/08/22 1937 -- 100 21 (!) 131/95 94 %   06/08/22 1845 98 °F (36.7 °C) 99 13 (!) 143/96 98 %   06/08/22 1547 -- 85 11 121/79 94 %   06/08/22 1527 -- 91 18 131/81 97 %   06/08/22 1517 -- 85 12 123/80 96 %   06/08/22 1450 -- (!) 109 18 130/67 97 %   06/08/22 1435 -- 86 14 130/78 97 %       Intake/Output Summary (Last 24 hours) at 6/9/2022 1337  Last data filed at 6/8/2022 1655  Gross per 24 hour   Intake 600 ml   Output --   Net 600 ml        I had a face to face encounter and independently examined this patient on 6/9/2022, as outlined below:  PHYSICAL EXAM:  General: WD, WN. Alert, cooperative, no acute distress    EENT:  EOMI. Anicteric sclerae. MMM  Resp:  CTA bilaterally, no wheezing or rales. No accessory muscle use  CV:  Regular  rhythm,  No edema  GI:  Soft, Non distended, Non tender. +Bowel sounds  Neurologic:  EOMs intact.  No facial asymmetry. No aphasia or slurred speech. Symmetrical strength, Sensation grossly intact. Alert and oriented X 4. No neck rigidity. Kernig's sign, and Brudzinski's sign are negative    Psych:   Good insight. Not anxious nor agitated  Skin:  No rashes. No jaundice    Reviewed most current lab test results and cultures  YES  Reviewed most current radiology test results   YES  Review and summation of old records today    NO  Reviewed patient's current orders and MAR    YES  PMH/SH reviewed - no change compared to H&P  ________________________________________________________________________  Care Plan discussed with:    Comments   Patient x    Family      RN x    Care Manager     Consultant                        Multidiciplinary team rounds were held today with , nursing, pharmacist and clinical coordinator. Patient's plan of care was discussed; medications were reviewed and discharge planning was addressed. ________________________________________________________________________        Comments   >50% of visit spent in counseling and coordination of care     ________________________________________________________________________  Clif Lopes MD     Procedures: see electronic medical records for all procedures/Xrays and details which were not copied into this note but were reviewed prior to creation of Plan. LABS:  I reviewed today's most current labs and imaging studies.   Pertinent labs include:  Recent Labs     06/09/22  0400 06/08/22  1222   WBC 13.0* 11.0   HGB 10.5* 10.1*   HCT 32.8* 31.6*   * 445*     Recent Labs     06/09/22  0400 06/08/22  1222    138   K 3.3* 3.0*    104   CO2 26 29   GLU 94 184*   BUN 6 13   CREA 0.64 0.76   CA 8.5 8.8   MG 2.0 2.0   PHOS 3.8  --    ALB 2.8* 3.0*   TBILI 0.6 0.1*   ALT 26 27       Signed: Clif Lopes MD

## 2022-06-09 NOTE — ROUTINE PROCESS
End of Shift Note    Bedside shift change report given to Alayna STEWART Farida Segovia (oncoming nurse) by Bertha Carty, JYOTI (offgoing nurse).   Report included the following information SBAR, Kardex, Intake/Output and MAR    Shift worked:  7p-7a   Shift summary and any significant changes:     new admit       Concerns for physician to address:  new admit   Zone phone for oncoming shift:   [phone]     Patient Information  Callie Albert  50 y.o.  6/8/2022 12:18 PM by Mayra Storey MD. Callie Albert was admitted from Home    Problem List  Patient Active Problem List    Diagnosis Date Noted    Stroke [facility]) 06/08/2022    Type 2 diabetes mellitus 11/23/2021    Opioid use, unspecified with unspecified opioid-induced disorder 11/23/2021    Mixed hyperlipidemia 10/16/2017    Fibromyalgia 03/24/2017    Hyperglycemia 03/01/2015    Esophageal reflux 10/31/2014    Chronic migraine 07/08/2013    Psychological factors affecting morbid obesity ([facility] [address] 05/20/2013    Obesity, morbid (more than 100 lbs over ideal weight or BMI > 40) ([facility] [address] 05/20/2013    Carpal tunnel syndrome on left 02/22/2011    IBS (irritable bowel syndrome) 02/20/2011    Obesity 02/20/2011    Cervical spondylarthritis 02/20/2011    Encounter for long-term (current) use of other medications 02/20/2011    Migraine syndrome 02/17/2011    Hepatic hemangioma 02/17/2011    Depressive disorder, not elsewhere classified 02/17/2011     Past Medical History:   Diagnosis Date    Arthritis     DDD    Carpal tunnel syndrome on left 2/22/2011    Cervical spondylarthritis 2/20/2011    Depressive disorder, not elsewhere classified 2/17/2011    Encounter for long-term (current) use of other medications 2/20/2011    GERD (gastroesophageal reflux disease)     Headache(784.0)     Migraines    Hepatic hemangioma 2/17/2011    Hypertension     IBS (irritable bowel syndrome) 2/20/2011    Migraine syndrome 2/17/2011    Obesity 2/20/2011    Other ill-defined conditions(799.89)     hx.of syncope    Psychiatric disorder     depression       Core Measures:  CVA: Yes Yes      Activity:  Activity Level: Up with Assistance    Cardiac:   Cardiac Monitoring: Yes      Cardiac Rhythm: Sinus Rhythm    Access:   Current line(s): PIV     Genitourinary:   Urinary status: voiding    Respiratory:   O2 Device: None (Room air)       GI:  Last Bowel Movement Date: 06/08/22  Current diet:  ADULT DIET Clear Liquid    Pain Management:   Patient states pain is manageable on current regimen: YES    Skin:  Jayden Score: 21  Interventions: increase time out of bed and PT/OT consult    Patient Safety:  Fall Score:  Total Score: 3  Interventions: bed/chair alarm, gripper socks and pt to call before getting OOB  High Fall Risk: Yes  @Rollbelt  @dexterity to release roll belt  Yes/No ( must document dexterity  here by stating Yes or No here, otherwise this is a restraint and must follow restraint documentation policy.)    DVT prophylaxis:  DVT prophylaxis Med- Yes  DVT prophylaxis SCD or NEELIMA- No     Wounds: (If Applicable)  Wounds- No  Location     Active Consults:  IP CONSULT TO NEUROLOGY    Length of Stay:  Expected LOS: - - -  Actual LOS: 1  Discharge Plan: Yes home when ready      Smiley Raza RN

## 2022-06-09 NOTE — CONSULTS
Neurology Note    Patient ID:  Cindi Ken  [id number]  50 y.o.  1975      Date of Consultation:  June 9, 2022    Referring Physician: Dr. Radha Chance    Reason for Consultation:  dizziness      Assessment and Plan:    The patient is a 69-year-old female who presents to the hospital after having had a 12 to 14-hour episode of confusional state. Her neurological examination this morning is normal.    Acute confusional state-encephalopathy:    Differential includes medication induced given her polypharmacy, systemic impact of infection, TIA, stroke with resolution of symptoms, systemic impact of increasing stressors, seizures. I will obtain an urgent EEG this morning. The patient should receive a brain MRI. Head CT with no evidence of acute hemorrhagic stroke or other abnormalities. CTA with no large vessel flow-limiting stenosis. Correct underlying metabolic abnormalities  Evaluation ongoing for underlying possible infectious etiology. Abnormal UA      Abdominal pain  Left ovarian cyst:  Chronic migraine: She is followed closely in the neurology clinic. Continue current home medications    Neurology will follow along           Subjective: I am not sure what happened yesterday       History of Present Illness:   Cindi Ken is a 50 y.o. female who presents to the hospital after a 1 week history of lightheadedness, dizziness, and confusion. She did have a near syncopal event 5 days prior to admission and then another episode 2 days ago. The patient states that she does not remember approximately a 12 to 14-hour period from yesterday. 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 Her cognition seems to be waxing and waning. [facility] but then her sister met them there and decided to bring her to [facility]. She does not remember being at [facility].   Sometime last evening, her recollection and memory seem to improve. During this episode, there was no focal weakness, numbness or tingling. She was able to ambulate. It is unclear if there was true dysarthria or just confusion and decreased comprehension. This morning, the patient does complain of a headache. She states she has had a chronic daily headache since 2014 and is followed by neurology. She also did have abdominal pain and that is being worked up by the primary team.  It appears she did have a bout of anxiety overnight requiring a Xanax. The patient does states she has been under a lot of stress recently. Her father with whom she takes care of is getting more ill. Her and her siblings have been having multiple arguments in regards to his wellbeing which has been difficult for her. In a review of the medical records, the patient is seen in the neurology clinic and was most recently seen by Ruth Lane on April 29, 2022. She is seen for her migraine headaches. It was noted that she was currently taking Topamax and had tried Elavil, Emgality, Fioricet and Imitrex. She had also tried Cymbalta. She also was currently taking verapamil. She was on botulinum toxin injections.   A brain MRI performed in 2014 was normal.    Past Medical History:   Diagnosis Date    Arthritis     DDD    Carpal tunnel syndrome on left 2/22/2011    Cervical spondylarthritis 2/20/2011    Depressive disorder, not elsewhere classified 2/17/2011    Encounter for long-term (current) use of other medications 2/20/2011    GERD (gastroesophageal reflux disease)     Headache(784.0)     Migraines    Hepatic hemangioma 2/17/2011    Hypertension     IBS (irritable bowel syndrome) 2/20/2011    Migraine syndrome 2/17/2011    Obesity 2/20/2011    Other ill-defined conditions(799.89)     hx.of syncope    Psychiatric disorder     depression        Past Surgical History:   Procedure Laterality Date    HX HEENT      tonsillectomy    HX ORTHOPAEDIC      carpal tunnel and tigger finger release rt        Family History   Problem Relation Age of Onset    Heart Disease Mother     Hypertension Mother     Diabetes Mother     Heart Disease Father     Lung Disease Father     Hypertension Father         Social History     Tobacco Use    Smoking status: Never Smoker    Smokeless tobacco: Never Used    Tobacco comment: SECOND HAND SMOKE/PARENTS   Substance Use Topics    Alcohol use: Not Currently        Allergies   Allergen Reactions    Atacand [Candesartan] Other (comments)     Patient B/P increase    Compazine [Prochlorperazine] Other (comments) and Unable to Obtain    Ciprofloxacin Other (comments)    Codeine Nausea and Vomiting    Zonisamide Nausea and Vomiting        Prior to Admission medications    Medication Sig Start Date End Date Taking? Authorizing Provider   ALPRAZolam Carmelina Hunt) 0.5 mg tablet Take 1 Tablet by mouth three (3) times daily as needed for Anxiety. Max Daily Amount: 1.5 mg. 6/6/22   Bonney Severs, MD   zolpidem (AMBIEN) 5 mg tablet Take 1 Tablet by mouth nightly. Max Daily Amount: 5 mg. 6/6/22   Bonney Severs, MD   HYDROcodone-acetaminophen [facility]) 7.5-325 mg per tablet Take 1 Tablet by mouth every six (6) hours as needed for Pain for up to 30 days. Max Daily Amount: 4 Tablets. 6/6/22 7/6/22  Bonney Severs, MD   cyclobenzaprine (FLEXERIL) 10 mg tablet TAKE 1 TABLET BY MOUTH THREE (3) TIMES DAILY (WITH MEALS). 6/3/22   Bonney Severs, MD   tiZANidine (ZANAFLEX) 4 mg tablet TAKE 1 TABLET BY MOUTH EVERY SIX (6) HOURS AS NEEDED FOR MUSCLE SPASM(S). 6/3/22   Bonney Severs, MD   promethazine (PHENERGAN) 25 mg tablet TAKE 1 TABLET BY MOUTH EVERY 8 HOURS AS NEEDED FOR NAUSEA 6/3/22   Bonney Severs, MD   gabapentin (NEURONTIN) 600 mg tablet Take 1 Tablet by mouth three (3) times daily.  5/18/22   Bonney Severs, MD   ubrogepant (UBRELVY) 100 mg tablet Take 1 Tablet by mouth daily as needed for Migraine. Indications: a migraine headache  Patient not taking: Reported on 5/3/2022 4/29/22   Rigo Mora NP   omeprazole (PRILOSEC) 40 mg capsule TAKE 1 CAPSULE BY MOUTH EVERY DAY 4/20/22   Дмитрий Paula MD   tiZANidine (ZANAFLEX) 4 mg tablet TAKE 1 TABLET BY MOUTH EVERY SIX (6) HOURS AS NEEDED FOR MUSCLE SPASMS 3/28/22   Fito Hammer MD   potassium chloride (KLOR-CON M10) 10 mEq tablet Take 2 Tablets by mouth daily. Patient not taking: Reported on 5/3/2022 3/7/22   Дмитрий Paula MD   topiramate (TOPAMAX) 100 mg tablet TAKE 1 TABLET BY MOUTH TWO (2) TIMES A DAY. 1/31/22   Дмитрий Paula MD   venlafaxine-SR [facility] 150 mg capsule TAKE 1 CAPSULE BY MOUTH EVERY DAY 1/27/22   Дмитрий Paula MD   verapamil ER (CALAN-SR) 240 mg CR tablet TAKE 1 TABLET BY MOUTH EVERYDAY AT BEDTIME 11/24/21   Дмитрий Paula MD   hydroCHLOROthiazide (HYDRODIURIL) 25 mg tablet Take 1 Tablet by mouth daily. 9/29/21   Дмитрий Paula MD   metFORMIN ER (GLUCOPHAGE XR) 500 mg tablet TAKE 1 TABLET BY MOUTH EVERY DAY WITH DINNER 7/5/21   Дмитрий Paula MD   naloxone (Narcan) 4 mg/actuation nasal spray Use 1 spray intranasally, then discard. Repeat with new spray every 2 min as needed for opioid overdose symptoms, alternating nostrils. Patient not taking: Reported on 11/23/2021 5/25/21   John Soto NP   furosemide (LASIX) 20 mg tablet Take 1 Tab by mouth daily as needed (edema). 4/2/21   Дмитрий Paula MD   onabotulinumtoxinA (Botox) 200 unit injection 200 units by IM route once every 12 weeks. Inject IM neck/face, 31 FDA approved sites. Indication: Migraine prevention. DX code: G43.71  Patient not taking: Reported on 5/3/2022 3/9/21   Katarina Leiva MD   biotin 10,000 mcg cap Take  by mouth. Provider, Historical   cyanocobalamin 1,000 mcg tablet Take 2,000 mcg by mouth daily.     Provider, Historical   docusate sodium (STOOL SOFTENER) 100 mg capsule 100 mg two (2) times a day.  Once daily     Provider, Historical     Current Facility-Administered Medications   Medication Dose Route Frequency    cefTRIAXone (ROCEPHIN) 2 g in 0.9% sodium chloride (MBP/ADV) 50 mL MBP  2 g IntraVENous Q12H    vancomycin (VANCOCIN) 1,000 mg in 0.9% sodium chloride 250 mL (VIAL-MATE)  1,000 mg IntraVENous Q8H    vancomycin (VANCOCIN) 2500 mg in  mL infusion  2,500 mg IntraVENous NOW    acetaminophen (TYLENOL) tablet 650 mg  650 mg Oral Q6H PRN    ondansetron (ZOFRAN) injection 4 mg  4 mg IntraVENous Q4H PRN    polyethylene glycol (MIRALAX) packet 17 g  17 g Oral DAILY    senna-docusate (PERICOLACE) 8.6-50 mg per tablet 2 Tablet  2 Tablet Oral QHS    insulin lispro (HUMALOG) injection   SubCUTAneous AC&HS    glucose chewable tablet 16 g  4 Tablet Oral PRN    glucagon (GLUCAGEN) injection 1 mg  1 mg IntraMUSCular PRN    dextrose 10 % infusion 0-250 mL  0-250 mL IntraVENous PRN    enoxaparin (LOVENOX) injection 30 mg  30 mg SubCUTAneous BID    acetaminophen (TYLENOL) tablet 650 mg  650 mg Oral Q6H PRN    ondansetron (ZOFRAN) injection 4 mg  4 mg IntraVENous Q4H PRN    fentaNYL citrate (PF) injection 25 mcg  25 mcg IntraVENous Q6H PRN       Review of Systems:    General, constitutional: stress  Eyes, vision: negative  Ears, nose, throat: negative  Cardiovascular, heart: negative  Respiratory: negative  Gastrointestinal: negative  Genitourinary: negative  Musculoskeletal: negative  Skin and integumentary: negative  Psychiatric: negative  Endocrine: negative  Neurological: negative, except for HPI  Hematologic/lymphatic: negative  Allergy/immunology: negative    Objective:     Visit Vitals  /76 (BP 1 Location: Right upper arm, BP Patient Position: At rest)   Pulse (!) 120   Temp (!) 100.9 °F (38.3 °C)   Resp 21   Ht 5' 6\" (1.676 m)   Wt 325 lb (147.4 kg)   SpO2 97%   BMI 52.46 kg/m²       Physical Exam:      General:  appears well nourished in no acute distress. Morbidly obese  Neck: no carotid bruits  Lungs: clear to auscultation  Heart:  no murmurs, regular rate  Lower extremity: peripheral pulses palpable and no edema  Skin: intact    Neurological exam:    Awake, alert, oriented to person, place and time  Recent and remote memory were normal  Attention and concentration were intact  Language was intact. There was no aphasia  Speech: no dysarthria  Fund of knowledge was preserved    Cranial nerves:   II-XII were tested    Perrrla. She needs to wear glasses due to her chronic migraines. Fundus examination not performed due to her light sensitivity  Visual fields were full  Eomi, no evidence of nystagmus  Facial sensation:  normal and symmetric  Facial motor: normal and symmetric  Hearing intact  SCM strength intact  Tongue: midline without fasciculations    Motor: Tone normal  Pronator drift was absent    No evidence of fasciculations    Strength testing:   deltoid triceps biceps Wrist ext. Wrist flex. intrinsics Hip flex. Hip ext. Knee ext. Knee flex Dorsi flex Plantar flex   Right 5 5 5 5 5 5 5 5 5 5 5 5   Left 5 5 5 5 5 5 5 5 5 5 5 5         Sensory:  Upper extremity: intact to pp, light touch, and vibration > 10 seconds  Lower extremity: intact to pp, light touch, and vibration > 10 seconds    Reflexes:    Right Left  Biceps  2 2  Triceps 2 2  Brachiorad. 2 2  Patella  2 2  Achilles 2 2    Plantar response:  flexor bilaterally    Cerebellar testing:  no tremor apparent, finger/nose and danish were intact    Gait: steady.      Labs:     Lab Results   Component Value Date/Time    Hemoglobin A1c 6.5 (H) 06/09/2022 04:00 AM    Sodium 140 06/09/2022 04:00 AM    Potassium 3.3 (L) 06/09/2022 04:00 AM    Chloride 106 06/09/2022 04:00 AM    Glucose 94 06/09/2022 04:00 AM    BUN 6 06/09/2022 04:00 AM    Creatinine 0.64 06/09/2022 04:00 AM    Calcium 8.5 06/09/2022 04:00 AM    WBC 13.0 (H) 06/09/2022 04:00 AM    HCT 32.8 (L) 06/09/2022 04:00 AM    HGB 10.5 (L) 06/09/2022 04:00 AM    PLATELET 428 (H) 35/09/5057 04:00 AM       Imaging:    Results from [place] encounter on 07/11/14    MRI BRAIN W WO CONT    Narrative  **Final Report**      ICD Codes / Adm. Diagnosis: 346.73  787.02 / Chronic migraine without aura,  Examination:  MRI BRAIN W AND WO CON  - [id number] - Jul 11 2014 11:04AM  Accession No:  [id number]  Reason:  Chronic migraine without aura, with intractable migraine, so  stated, with status      REPORT:  EXAM:  MRI BRAIN W AND WO CON    INDICATION:  Chronic migraine without aura, with intractable migraine,,  right optic nerve pallor    COMPARISON STUDY: CT head of 1/17/2014    STUDY PARAMETERS:    Sagittal and axial T1-weighted; axial FLAIR, T2-weighted, diffusion  weighted, and gradient echo, precontrast; coronal and axial postcontrast  T1-weighted images of the brain; thin section coronal STIR, fat-suppressed  T1-weighted images pre- and postgadolinium and axial fat-suppressed  postcontrast T1-weighted images of the orbits following the IV injection of  20 cc Magnevist. The study was performed on the low-field open MRI unit  because of the patient's body habitus. FINDINGS:    The ventricles are normal in size and are midline. There is no significant  white matter disease. No focal intracranial lesions are identified. There  are no areas of abnormal parenchymal or meningeal enhancement. There is no  evidence of acute infarction or hemorrhage. There is a small left choroidal  fissure cyst which is unchanged since prior CT studies dating back to 2006. Normal flow voids are seen in the vessels at the skull base. No orbital abnormalities are identified. The optic nerves demonstrate no  signal abnormality or enhancement. The cavernous sinus has a normal  appearance. The paranasal sinuses are clear. Impression  :  No significant abnormalities on MRI of the brain and optic pathways.  Stable  small left choroidal fissure cyst.          Signing/Reading Doctor: Twila Aguilera ([id number])  Approved: Twila Aguilera ([id number])  Jul 11 2014 12:14PM      Results from Hospital Encounter encounter on 06/08/22    CTA HEAD NECK W CONT    Narrative  INDICATION: Dizziness, confusion, headache    EXAMINATION:  CT ANGIOGRAPHY HEAD AND NECK    COMPARISON: CT head earlier today    TECHNIQUE:  Following the uneventful administration of iodinated contrast  material, axial CT angiography of the head and neck was performed. Delayed axial  images through the head were also obtained. Coronal and sagittal reconstructions  were obtained. Manual postprocessing of images was performed. 3-D  Sagittal  maximal intensity projection images were obtained. 3-D Coronal maximal  intensity projections were obtained. CT dose reduction was achieved through use  of a standardized protocol tailored for this examination and automatic exposure  control for dose modulation. FINDINGS:    CTA NECK:    Aortic Arch: No significant abnormality. Right Common Carotid Artery: No significant abnormality. Right Internal Carotid Artery: No significant abnormality. NASCET Right: 0-25%. Left Common Carotid Artery: No significant abnormality. Left Internal Carotid Artery: No significant abnormality. NASCET Left: 0-25%. Carotid stenosis determined using NASCET criteria. Right Vertebral Artery: No significant abnormality. Left Vertebral Artery: No significant abnormality. Cervical Soft Tissues: No significant abnormality. Lung Apices: Patchy airspace disease right upper lobe. Bones: No destructive bone lesion. Additional Comments: N/A.    CTA HEAD:    Posterior Circulation: No flow limiting stenosis or occlusion. Anterior Circulation: No flow limiting stenosis or occlusion. Additional Comments: No evidence of aneurysm or vascular malformation. Note: Cerebral perfusion not performed. Impression  1. No acute process. No large vessel occlusion, arterial dissection, or  flow-limiting stenosis.   2. CT perfusion not performed. 3. Right upper lobe airspace disease. I did independently review the head CT from June 8, 2022 which was normal.    CTA from June 8, 2022 revealed no large vessel flow-limiting stenosis. There was a concern for right upper lobe airspace disease    I spent   70  minutes providing care to this  acutely ill inpatient with > 50% of the time counseling and assisting in the coordination of care of the patient on the patient's hospital floor/unit.         Active Problems:    Stroke [facility]) (6/8/2022)                   Signed By:  Christoph Marcos DO FAAN    June 9, 2022

## 2022-06-09 NOTE — PROGRESS NOTES
OCCUPATIONAL THERAPY EVALUATION/DISCHARGE  Patient: Richard English (64 y.o. female)  Date: 6/9/2022  Primary Diagnosis: Stroke [facility]) [I63.9]       Precautions:        ASSESSMENT  Based on the objective data described below, the patient presents with baseline ability to complete ADLs. Patient report experiencing migraines since 2014. Able to complete bedroom/bathroom mobility without AD, LB dressing, and toileting without assistance or LOB. 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 During session, pt reporting balance, sensation, and vision intact, as well as no occupational deficits observed. Does report speech has also returned to normal. CT and EEG with normal/negative. Patient educated on BEFAST and home safety briefly. Receptive. No further OT needs indicated at this time or at time of DC. Will sign off. Current Level of Function (ADLs/self-care): independent     Functional Outcome Measure: The patient scored 90/100 on the barthel index outcome measure which is indicative of independence. Other factors to consider for discharge: caregiver to father, supportive sister     PLAN :    Recommendation for discharge: (in order for the patient to meet his/her long term goals)  No skilled occupational therapy/ follow up rehabilitation needs identified at this time. This discharge recommendation:  Has been made in collaboration with the attending provider and/or case management    IF patient discharges home will need the following DME: none       SUBJECTIVE:   Patient stated I have had headaches for years.     OBJECTIVE DATA SUMMARY:   HISTORY:   Past Medical History:   Diagnosis Date    Arthritis     DDD    Carpal tunnel syndrome on left 2/22/2011    Cervical spondylarthritis 2/20/2011    Depressive disorder, not elsewhere classified 2/17/2011    Encounter for long-term (current) use of other medications 2/20/2011    GERD (gastroesophageal reflux disease)     Headache(784.0)     Migraines    Hepatic hemangioma 2/17/2011    Hypertension     IBS (irritable bowel syndrome) 2/20/2011    Migraine syndrome 2/17/2011    Obesity 2/20/2011    Other ill-defined conditions(799.89)     hx.of syncope    Psychiatric disorder     depression     Past Surgical History:   Procedure Laterality Date    HX HEENT      tonsillectomy    HX ORTHOPAEDIC      carpal tunnel and tigger finger release rt       Prior Level of Function/Environment/Context: patient was independent, driving, 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  Expanded or extensive additional review of patient history:   Home Situation  Home Environment: Private residence  Wheelchair Ramp: Yes  One/Two Story Residence: One story  Living Alone: No  Support Systems: Other Family Member(s)  Patient Expects to be Discharged to[de-identified] Home  Current DME Used/Available at Home: None  Tub or Shower Type: Tub/Shower combination    Hand dominance: Right    EXAMINATION OF PERFORMANCE DEFICITS:  Cognitive/Behavioral Status:  Neurologic State: Alert  Orientation Level: Oriented X4  Cognition: Appropriate decision making  Perception: Appears intact  Perseveration: No perseveration noted  Safety/Judgement: Awareness of environment    Skin: intact    Edema: no edema noted    Hearing: Auditory  Auditory Impairment: None    Vision/Perceptual:                           Acuity: Within Defined Limits    Corrective Lenses: Glasses    Range of Motion:    AROM: Within functional limits  PROM: Within functional limits                      Strength:    Strength: Within functional limits                Coordination:  Coordination: Within functional limits  Fine Motor Skills-Upper: Left Intact; Right Intact    Gross Motor Skills-Upper: Left Intact; Right Intact    Tone & Sensation:    Tone: Normal  Sensation: Intact                      Balance:  Sitting: Intact  Standing: Intact    Functional Mobility and Transfers for ADLs:  Bed Mobility:  Supine to Sit: Modified independent  Sit to Supine: Modified independent  Scooting: Modified independent    Transfers:  Sit to Stand: Independent  Stand to Sit: Independent  Bathroom Mobility: Independent  Toilet Transfer : Independent    ADL Assessment:  Feeding: Independent    Oral Facial Hygiene/Grooming: Independent    Bathing: Independent    Upper Body Dressing: Independent    Lower Body Dressing: Independent    Toileting: Independent                ADL Intervention and task modifications:         Lower Body Dressing Assistance  Socks: Independent         Cognitive Retraining  Safety/Judgement: Awareness of environment     Functional Measure:    Barthel Index:  Bathin  Bladder: 10  Bowels: 10  Groomin  Dressing: 10  Feeding: 10  Mobility: 10  Stairs: 5  Toilet Use: 10  Transfer (Bed to Chair and Back): 15  Total: 90/100      The Barthel ADL Index: Guidelines  1. The index should be used as a record of what a patient does, not as a record of what a patient could do. 2. The main aim is to establish degree of independence from any help, physical or verbal, however minor and for whatever reason. 3. The need for supervision renders the patient not independent. 4. A patient's performance should be established using the best available evidence. Asking the patient, friends/relatives and nurses are the usual sources, but direct observation and common sense are also important. However direct testing is not needed. 5. Usually the patient's performance over the preceding 24-48 hours is important, but occasionally longer periods will be relevant. 6. Middle categories imply that the patient supplies over 50 per cent of the effort. 7. Use of aids to be independent is allowed. Score Interpretation (from [address] [place] [address])    Independent   60-79 Minimally independent   40-59 Partially dependent   20-39 Very dependent   <20 Totally dependent     -Esa Ann., Barthel, D.W. (1965).  Functional evaluation: the Barthel Index. [facility] ([address] (1997). The Barthel activities of daily living index: self-reporting versus actual performance in the old (> or = 75 years). Journal of [address] [place] [address] 45(7), [facility], J.SARAH BETH, Corrine Burgos., Muna Davila. (1999). Measuring the change in disability after inpatient rehabilitation; comparison of the responsiveness of the Barthel Index and Functional [place] Measure. Journal of Neurology, Neurosurgery, and Psychiatry, 66(4), 848-388. Tino Hamman, N.[place].RAFAEL, LONG Pablo, & Chapo Omer M.A. (2004) Assessment of post-stroke quality of life in cost-effectiveness studies: The usefulness of the Barthel Index and the EuroQoL-5D. Quality of Life Research, 15, 225-73       Occupational Therapy Evaluation Charge Determination   History Examination Decision-Making   LOW Complexity : Brief history review  LOW Complexity : 1-3 performance deficits relating to physical, cognitive , or psychosocial skils that result in activity limitations and / or participation restrictions  LOW Complexity : No comorbidities that affect functional and no verbal or physical assistance needed to complete eval tasks       Based on the above components, the patient evaluation is determined to be of the following complexity level: LOW   Pain Rating:  Patient endorsing 10/10 pain from HA. Activity Tolerance: WNL    After treatment patient left in no apparent distress:    Sitting in chair and Call bell within reach    COMMUNICATION/EDUCATION:   The patients plan of care was discussed with: Physical therapist, Occupational therapist and Registered nurse.      Thank you for this referral.  Ronny Vanessa OT  Time Calculation: 8 mins

## 2022-06-09 NOTE — PROGRESS NOTES
Pharmacy Antimicrobial Kinetic Dosing    Indication for Antimicrobials: CNS infection     Current Regimen of Each Antimicrobial:  Vancomycin pharmacy to dose (Start Date ; Day # 1)  Ceftriaxone 2g q 12h (start: . Day 1)    Previous Antimicrobial Therapy:    Goal Level: AUC: 400-600 mg/hr/Liter/day and VANCOMYCIN TROUGH GOAL RANGE    Vancomycin Trough: 15 - 20 mcg/mL    Date Dose & Interval Measured (mcg/mL) Predicted AUC/ALBINO                       Date & time of next level: 6/10 (no doses given at this time, so no trough levels entered)    Dosing calculator used: [employer] calculator    Significant Positive Cultures:     Conditions for Dosing Consideration: None    Labs:  Recent Labs     22  0400 22  1222   CREA 0.64 0.76   BUN 6 13     Recent Labs     22  0400 22  1222   WBC 13.0* 11.0     Temp (24hrs), Av.7 °F (37.1 °C), Min:98 °F (36.7 °C), Max:100.9 °F (38.3 °C)        Creatinine Clearance (mL/min):   CrCl (Ideal Body Weight): 91.0   If actual weight < IBW: CrCl (Actual Body Weight) 226.2    Impression/Plan:   Vancomycin 2500mg  load, then 1000mg q 8h for auc 511 and trough of ~15mcg/mL  Continue ceftriaxone as above (q12h dosing)  Cbc and bmp ordered for 6/10  Antimicrobial stop date to be determined     Pharmacy will follow daily and adjust medications as appropriate for renal function and/or serum levels.     Thank you,  Polarolo Flores PHARMD    Vancomycin Dosing Document    Documents located on pharmacy Teams site: Clinical Practice -> Antimicrobial Stewardship -> Antibiotics_Vancomycin     Aminoglycoside Dosing Document    Documents located on pharmacy Teams site: Clinical Practice -> Antimicrobial Stewardship -> Antibiotics_Aminoglycosides

## 2022-06-09 NOTE — REMOTE MONITORING
Attempted to contact primary RN regarding this patient. Left message with Bedside RN regarding 3 hour Sepsis bundle.     Ernestina Juarez RN, [address]   Callback # [phone]

## 2022-06-09 NOTE — PROGRESS NOTES
SPEECH PATHOLOGY BEDSIDE SWALLOW EVALUATION/DISCHARGE  Patient: Ashok Turk (41 y.o. female)  Date: 6/9/2022  Primary Diagnosis: Stroke [facility]) [I63.9]       Precautions:        ASSESSMENT :  Based on the objective data described below, the patient presents with a functional oropharyngeal swallow with all consistencies tried. Patient admitted with dizziness and slurring speech with concern for stroke. CT showed \"no acute findings\"; MRI pending. Patient tolerated all consistencies tried without difficulty or without signs of aspiration. Patient reported concerns with motor speech yesterday, but reports speech has returned to baseline. Patient observed to be intelligible at the conversation level on this date. Given patient's functional oral/pharyngeal swallow, recommend Regular/Thin and modify per patient preference given acute nausea and vomiting. Skilled acute therapy provided by a speech-language pathologist is not indicated at this time. PLAN :  Recommendations:  -- Regular/Thin Liquid  -- Medication as Tolerated  -- Sitting Upright with all PO  -- Small Bites/Sips    Discharge Recommendations: None from SLP perspective     SUBJECTIVE:   Patient stated I typically feel nauseous when I have a headache of this grade.     OBJECTIVE:     Past Medical History:   Diagnosis Date    Arthritis     DDD    Carpal tunnel syndrome on left 2/22/2011    Cervical spondylarthritis 2/20/2011    Depressive disorder, not elsewhere classified 2/17/2011    Encounter for long-term (current) use of other medications 2/20/2011    GERD (gastroesophageal reflux disease)     Headache(784.0)     Migraines    Hepatic hemangioma 2/17/2011    Hypertension     IBS (irritable bowel syndrome) 2/20/2011    Migraine syndrome 2/17/2011    Obesity 2/20/2011    Other ill-defined conditions(799.89)     hx.of syncope    Psychiatric disorder     depression     Past Surgical History:   Procedure Laterality Date    HX HEENT tonsillectomy    HX ORTHOPAEDIC      carpal tunnel and tigger finger release rt     Prior Level of Function/Home Situation:   Home Situation  Home Environment: Private residence  Wheelchair Ramp: Yes  One/Two Story Residence: One story  Living Alone: No  Support Systems: Other Family Member(s)  Patient Expects to be Discharged to[de-identified] Home  Current DME Used/Available at Home: None  Tub or Shower Type: Tub/Shower combination    Diet prior to admission: Regular/Thin Liquid  Current Diet: Clear Liquid    Cognitive and Communication Status:  Neurologic State: Alert  Orientation Level: Oriented X4  Cognition: Appropriate decision making,Appropriate for age attention/concentration,Appropriate safety awareness,Follows commands  Perception: Appears intact  Perseveration: No perseveration noted  Safety/Judgement: Awareness of environment,Insight into deficits    Oral Assessment:  Oral Assessment  Labial: No impairment  Dentition: Full;Natural  Oral Hygiene: Moist oral mucosa  Lingual: No impairment  Velum: No impairment  Mandible: No impairment    P.O. Trials:  Patient Position: Upright in bed  Vocal quality prior to P.O.: No impairment  Consistency Presented: Thin liquid;Puree; Solid  How Presented: Self-fed/presented;Straw;Successive swallows;Spoon     Bolus Acceptance: No impairment  Bolus Formation/Control: No impairment     Propulsion: No impairment  Oral Residue: None  Initiation of Swallow: No impairment  Laryngeal Elevation: Functional  Aspiration Signs/Symptoms: None  Pharyngeal Phase Characteristics: No impairment, issues, or problems              Oral Phase Severity: No impairment  Pharyngeal Phase Severity : No impairment    NOMS:   The NOMS functional outcome measure was used to quantify this patient's level of swallowing impairment. Based on the NOMS, the patient was determined to be at level 7 for swallow function.      NOMS Swallowing Levels:  Level 1 (CN): NPO  Level 2 (CM): NPO but takes consistency in therapy  Level 3 (CL): Takes less than 50% of nutrition p.o. and continues with nonoral feedings; and/or safe with mod cues; and/or max diet restriction  Level 4 (CK): Safe swallow but needs mod cues; and/or mod diet restriction; and/or still requires some nonoral feeding/supplements  Level 5 (CJ): Safe swallow with min diet restriction; and/or needs min cues  Level 6 (CI): Independent with p.o.; rare cues; usually self cues; may need to avoid some foods or needs extra time  Level 7 ([address] [place] [address]): Independent for all p.o.  PIPER. (2003). National Outcomes Measurement System (NOMS): Adult Speech-Language Pathology User's Guide. Pain:  Pain Scale 1: Visual  Pain Intensity 1: 0  Pain Location 1: Head    After treatment:   Patient left in no apparent distress in bed, Call bell within reach and Nursing notified    COMMUNICATION/EDUCATION:   Patient was educated regarding her deficit(s) of WFL swallow. She demonstrated good understanding as evidenced by verbalizing understanding. The patient's plan of care including recommendations, planned interventions, and recommended diet changes were discussed with: Registered nurse.      Thank you for this referral.  Jackie Meyer, ANIBAL  Time Calculation: 20 mins

## 2022-06-09 NOTE — PROCEDURES
EEG REPORT    Patient Name: Gerson Mcwilliams  : 1975  Age: 50 y.o. Ordering physician: Dr. Vinh Tom  Date of EE2022  8:23-8:44  Diagnosis: encephalopathy  Interpreting physician: Anand Gonsalez D.O. FAAN    Procedure: EEG    CLINICAL INDICATION: The patient is a 50 y.o. female who is being evaluated for baseline electro cerebral activities and to rule out seizure focus. Current Facility-Administered Medications   Medication Dose Route Frequency    cefTRIAXone (ROCEPHIN) 2 g in 0.9% sodium chloride (MBP/ADV) 50 mL MBP  2 g IntraVENous Q12H    vancomycin (VANCOCIN) 1,000 mg in 0.9% sodium chloride 250 mL (VIAL-MATE)  1,000 mg IntraVENous Q8H    vancomycin (VANCOCIN) 2500 mg in  mL infusion  2,500 mg IntraVENous NOW    acetaminophen (TYLENOL) tablet 650 mg  650 mg Oral Q6H PRN    ondansetron (ZOFRAN) injection 4 mg  4 mg IntraVENous Q4H PRN    polyethylene glycol (MIRALAX) packet 17 g  17 g Oral DAILY    senna-docusate (PERICOLACE) 8.6-50 mg per tablet 2 Tablet  2 Tablet Oral QHS    insulin lispro (HUMALOG) injection   SubCUTAneous AC&HS    glucose chewable tablet 16 g  4 Tablet Oral PRN    glucagon (GLUCAGEN) injection 1 mg  1 mg IntraMUSCular PRN    dextrose 10 % infusion 0-250 mL  0-250 mL IntraVENous PRN    enoxaparin (LOVENOX) injection 30 mg  30 mg SubCUTAneous BID    acetaminophen (TYLENOL) tablet 650 mg  650 mg Oral Q6H PRN    ondansetron (ZOFRAN) injection 4 mg  4 mg IntraVENous Q4H PRN    fentaNYL citrate (PF) injection 25 mcg  25 mcg IntraVENous Q6H PRN           DESCRIPTION OF PROCEDURE:     This is a digitally recorded electroencephalogram  Electrodes were applied in accordance with the international 10-20 system of electrode placement.     18 channels of scalp EEG are recorded  A channel was used for EoG  Another channel was used for ECG   The data is stored digitally and reviewed in reformatted montages for optimal display  EEG  was reviewed in both bipolar and referential montages    Description of Activity: The background of this recording contains a posteriorly-located occipital alpha rhythm of 9-10 hz that attenuates with eye opening. This was seen maximally over the posterior head region and was symmetric. There was a small amount of beta activity seen. Throughout the recording, there were no clear areas of focal slowing nor spike or spike-and-wave discharges seen. Hyperventilation was not performed. Photic stimulation produced minimal driving response in the posterior head regions at mid frequencies. During the recording, the patient did not achieve stage II sleep      Clinical Interpretation: This EEG, performed during wakefulness, is normal.  There was no clear focal abnormalities or epileptiform activity. A normal EEG doesn't not rule out seizures. Clinical correlation recommended. Rojelio Gonsalez D.O.   Vern Gilman

## 2022-06-10 LAB
ANION GAP SERPL CALC-SCNC: 7 MMOL/L (ref 5–15)
BUN SERPL-MCNC: 7 MG/DL (ref 6–20)
BUN/CREAT SERPL: 11 (ref 12–20)
CALCIUM SERPL-MCNC: 8.9 MG/DL (ref 8.5–10.1)
CHLORIDE SERPL-SCNC: 109 MMOL/L (ref 97–108)
CO2 SERPL-SCNC: 22 MMOL/L (ref 21–32)
CREAT SERPL-MCNC: 0.66 MG/DL (ref 0.55–1.02)
DATE LAST DOSE: ABNORMAL
ERYTHROCYTE [DISTWIDTH] IN BLOOD BY AUTOMATED COUNT: 15.6 % (ref 11.5–14.5)
FOLATE SERPL-MCNC: 11 NG/ML
GLUCOSE BLD STRIP.AUTO-MCNC: 125 MG/DL (ref 65–117)
GLUCOSE BLD STRIP.AUTO-MCNC: 139 MG/DL (ref 65–117)
GLUCOSE BLD STRIP.AUTO-MCNC: 140 MG/DL (ref 65–117)
GLUCOSE SERPL-MCNC: 112 MG/DL (ref 65–100)
HCT VFR BLD AUTO: 35.6 % (ref 35–47)
HGB BLD-MCNC: 11.3 G/DL (ref 11.5–16)
MCH RBC QN AUTO: 29.7 PG (ref 26–34)
MCHC RBC AUTO-ENTMCNC: 31.7 G/DL (ref 30–36.5)
MCV RBC AUTO: 93.4 FL (ref 80–99)
NRBC # BLD: 0 K/UL (ref 0–0.01)
NRBC BLD-RTO: 0 PER 100 WBC
PLATELET # BLD AUTO: 545 K/UL (ref 150–400)
PMV BLD AUTO: 8.8 FL (ref 8.9–12.9)
POTASSIUM SERPL-SCNC: 3.6 MMOL/L (ref 3.5–5.1)
RBC # BLD AUTO: 3.81 M/UL (ref 3.8–5.2)
REPORTED DOSE,DOSE: ABNORMAL UNITS
REPORTED DOSE/TIME,TMG: 1147
SERVICE CMNT-IMP: ABNORMAL
SODIUM SERPL-SCNC: 138 MMOL/L (ref 136–145)
VANCOMYCIN TROUGH SERPL-MCNC: 16 UG/ML (ref 5–10)
WBC # BLD AUTO: 12.4 K/UL (ref 3.6–11)

## 2022-06-10 PROCEDURE — 74011250636 HC RX REV CODE- 250/636: Performed by: HOSPITALIST

## 2022-06-10 PROCEDURE — 99233 SBSQ HOSP IP/OBS HIGH 50: CPT | Performed by: PSYCHIATRY & NEUROLOGY

## 2022-06-10 PROCEDURE — 85027 COMPLETE CBC AUTOMATED: CPT

## 2022-06-10 PROCEDURE — 36415 COLL VENOUS BLD VENIPUNCTURE: CPT

## 2022-06-10 PROCEDURE — 80048 BASIC METABOLIC PNL TOTAL CA: CPT

## 2022-06-10 PROCEDURE — 74011250636 HC RX REV CODE- 250/636: Performed by: INTERNAL MEDICINE

## 2022-06-10 PROCEDURE — 82962 GLUCOSE BLOOD TEST: CPT

## 2022-06-10 PROCEDURE — 74011000258 HC RX REV CODE- 258: Performed by: INTERNAL MEDICINE

## 2022-06-10 PROCEDURE — 74011250637 HC RX REV CODE- 250/637: Performed by: HOSPITALIST

## 2022-06-10 PROCEDURE — 74011250637 HC RX REV CODE- 250/637: Performed by: NURSE PRACTITIONER

## 2022-06-10 PROCEDURE — 65270000046 HC RM TELEMETRY

## 2022-06-10 PROCEDURE — 80202 ASSAY OF VANCOMYCIN: CPT

## 2022-06-10 RX ADMIN — FENTANYL CITRATE 25 MCG: 50 INJECTION, SOLUTION INTRAMUSCULAR; INTRAVENOUS at 02:21

## 2022-06-10 RX ADMIN — VANCOMYCIN HYDROCHLORIDE 1000 MG: 1 INJECTION, POWDER, LYOPHILIZED, FOR SOLUTION INTRAVENOUS at 11:47

## 2022-06-10 RX ADMIN — CEFTRIAXONE SODIUM 2 G: 2 INJECTION, POWDER, FOR SOLUTION INTRAMUSCULAR; INTRAVENOUS at 09:48

## 2022-06-10 RX ADMIN — CEFTRIAXONE SODIUM 2 G: 2 INJECTION, POWDER, FOR SOLUTION INTRAMUSCULAR; INTRAVENOUS at 20:44

## 2022-06-10 RX ADMIN — ACETAMINOPHEN 650 MG: 325 TABLET ORAL at 20:45

## 2022-06-10 RX ADMIN — ENOXAPARIN SODIUM 30 MG: 100 INJECTION SUBCUTANEOUS at 09:00

## 2022-06-10 RX ADMIN — ONDANSETRON 4 MG: 2 INJECTION INTRAMUSCULAR; INTRAVENOUS at 09:56

## 2022-06-10 RX ADMIN — FENTANYL CITRATE 25 MCG: 50 INJECTION, SOLUTION INTRAMUSCULAR; INTRAVENOUS at 11:47

## 2022-06-10 RX ADMIN — ACETAMINOPHEN 650 MG: 325 TABLET ORAL at 09:56

## 2022-06-10 RX ADMIN — SENNOSIDES AND DOCUSATE SODIUM 2 TABLET: 50; 8.6 TABLET ORAL at 20:44

## 2022-06-10 RX ADMIN — VANCOMYCIN HYDROCHLORIDE 1000 MG: 1 INJECTION, POWDER, LYOPHILIZED, FOR SOLUTION INTRAVENOUS at 20:44

## 2022-06-10 RX ADMIN — VENLAFAXINE HYDROCHLORIDE 150 MG: 150 CAPSULE, EXTENDED RELEASE ORAL at 20:44

## 2022-06-10 RX ADMIN — VANCOMYCIN HYDROCHLORIDE 1000 MG: 1 INJECTION, POWDER, LYOPHILIZED, FOR SOLUTION INTRAVENOUS at 03:25

## 2022-06-10 NOTE — PROGRESS NOTES
Pharmacy Antimicrobial Kinetic Dosing    Indication for Antimicrobials: r/o CNS infection     Current Regimen of Each Antimicrobial:  Vancomycin 1000 mg iv q8h (Start Date ; Day 2)  Ceftriaxone 2g q 12h (start: . Day 2)    Previous Antimicrobial Therapy:    Goal Level: AUC: 400-600 mg/hr/Liter/day and VANCOMYCIN TROUGH GOAL RANGE    Vancomycin Trough: 15 - 20 mcg/mL    Date Dose & Interval Measured (mcg/mL) Steady State AUC/ALBINO, Trough   6/10 1000 mg Q8H  16 420, 13.8                 Date & time of next level:     Dosing calculator used: [employer] calculator    Significant Positive Cultures:    blood: NG. Pending    Conditions for Dosing Consideration: None    Labs:  Recent Labs     06/10/22  0508 22  0400 22  1222   CREA 0.66 0.64 0.76   BUN 7 6 13     Recent Labs     06/10/22  0508 22  0400 22  1222   WBC 12.4* 13.0* 11.0     Temp (24hrs), Av.4 °F (36.9 °C), Min:97.7 °F (36.5 °C), Max:99.1 °F (37.3 °C)        Creatinine Clearance (mL/min):   CrCl (Ideal Body Weight): 91.0   If actual weight < IBW: CrCl (Actual Body Weight) 226.2    Impression/Plan:   Vancomycin level therapeutic, continue 1000 mg Q8H. Continue ceftriaxone as above (q12h dosing)  Cbc and bmp ordered for 6/10  Antimicrobial stop date to be determined     Pharmacy will follow daily and adjust medications as appropriate for renal function and/or serum levels.     Thank you,  [facility], [facility]    Vancomycin Dosing Document    Documents located on pharmacy Teams site: Clinical Practice -> Antimicrobial Stewardship -> Antibiotics_Vancomycin     Aminoglycoside Dosing Document    Documents located on pharmacy Teams site: Clinical Practice -> Antimicrobial Stewardship -> Antibiotics_Aminoglycosides

## 2022-06-10 NOTE — PROGRESS NOTES
Problem: Falls - Risk of  Goal: *Absence of Falls  Description: Document Korey Geronimo Fall Risk and appropriate interventions in the flowsheet.   Note: Fall Risk Interventions:  Mobility Interventions: Bed/chair exit alarm,Patient to call before getting OOB         Medication Interventions: Bed/chair exit alarm,Patient to call before getting OOB         History of Falls Interventions: Bed/chair exit alarm,Consult care management for discharge planning,Door open when patient unattended,Evaluate medications/consider consulting pharmacy

## 2022-06-10 NOTE — PROGRESS NOTES
End of Shift Note     Bedside shift change report given to Kim Gale (oncoming nurse) by Brina Ríos RN (offgoing nurse).   Report included the following information SBAR, Kardex, Intake/Output and MAR     Shift worked:  7a-7pm   Shift summary and any significant changes:      MRI in progress         Concerns for physician to address:  none   Zone phone for oncoming shift:   [phone]      Patient Information  Monda Holter  50 y.o.  6/8/2022 12:18 PM by Coralee Sever, MD. Monda Holter was admitted from Home     Problem List       Patient Active Problem List     Diagnosis Date Noted    Stroke [facility]) 06/08/2022    Type 2 diabetes mellitus 11/23/2021    Opioid use, unspecified with unspecified opioid-induced disorder 11/23/2021    Mixed hyperlipidemia 10/16/2017    Fibromyalgia 03/24/2017    Hyperglycemia 03/01/2015    Esophageal reflux 10/31/2014    Chronic migraine 07/08/2013    Psychological factors affecting morbid obesity ([facility] [address] 05/20/2013    Obesity, morbid (more than 100 lbs over ideal weight or BMI > 40) ([facility] [address] 05/20/2013    Carpal tunnel syndrome on left 02/22/2011    IBS (irritable bowel syndrome) 02/20/2011    Obesity 02/20/2011    Cervical spondylarthritis 02/20/2011    Encounter for long-term (current) use of other medications 02/20/2011    Migraine syndrome 02/17/2011    Hepatic hemangioma 02/17/2011    Depressive disorder, not elsewhere classified 02/17/2011           Past Medical History:   Diagnosis Date    Arthritis       DDD    Carpal tunnel syndrome on left 2/22/2011    Cervical spondylarthritis 2/20/2011    Depressive disorder, not elsewhere classified 2/17/2011    Encounter for long-term (current) use of other medications 2/20/2011    GERD (gastroesophageal reflux disease)      Headache(784.0)       Migraines    Hepatic hemangioma 2/17/2011    Hypertension      IBS (irritable bowel syndrome) 2/20/2011    Migraine syndrome 2/17/2011    Obesity 2/20/2011  Other ill-defined conditions(799.89)       hx.of syncope    Psychiatric disorder       depression         Core Measures:  CVA: Yes Yes        Activity:  Activity Level: Up with Assistance     Cardiac:   Cardiac Monitoring: Yes      Cardiac Rhythm: Sinus Rhythm     Access:   Current line(s): PIV      Genitourinary:   Urinary status: voiding     Respiratory:   O2 Device: None (Room air)     GI:  Last Bowel Movement Date: 06/08/22  Current diet:  ADULT DIET Clear Liquid     Pain Management:   Patient states pain is manageable on current regimen: YES     Skin:  Jayden Score: 21  Interventions: increase time out of bed and PT/OT consult    Patient Safety:  Fall Score:  Total Score: 3  Interventions: bed/chair alarm, gripper socks and pt to call before getting OOB  High Fall Risk: Yes  @Rollbelt  @dexterity to release roll belt  Yes/No ( must document dexterity  here by stating Yes or No here, otherwise this is a restraint and must follow restraint documentation policy.)     DVT prophylaxis:  DVT prophylaxis Med- Yes  DVT prophylaxis SCD or NEELIMA- No      Wounds: (If Applicable)  Wounds- No  Location      Active Consults:  IP CONSULT TO NEUROLOGY     Length of Stay:  Expected LOS: - - -  Actual LOS: 1  Discharge Plan: Yes home when ready        Jatinder Graf RN

## 2022-06-10 NOTE — PROGRESS NOTES
Received notification from bedside RN about patient with regards to: requesting her PTA Effexor that she takes at HS  VS: /66, , RR 18, O2 sat 97% on RA    Intervention given: Resumed Effexor q hs

## 2022-06-10 NOTE — PROGRESS NOTES
Physician Progress Note      Jovi Espinoza  CSN #:                  [id number]  :                       1975  ADMIT DATE:       2022 12:18 PM  [address] [place] DATE:  RESPONDING  PROVIDER #:        Juaquin Meigs MD Debborah Hoard MD          QUERY TEXT:    Pt admitted with sepsis and UTI. Noted documentation of encephalopathy by neuro consultant. Please document in progress notes and discharge summary:    The medical record reflects the following:    Risk Factors: Sepsis, UTI, possible suspicion for acute meningitis    Clinical Indicators:  ED PN:  -Her mother states she has been confused off and on  - A&O x2, patient knew the year, and the month, but stated it was , and that it was Thursday.  Head CT Neg   EEG Neg   Dr. Leora Grimm PN: Pt A&Ox4. Patient reporting today that all of her symptoms got resolved    Treatment: Head CT, Neuro consult. EEG    Thank Flex Navarrete, [facility], [address] [place] [address]  Options provided:  -- Encephalopathy confirmed present on admission  -- Encephalopathy confirmed not present on admission  -- Encephalopathy ruled out  -- Other - I will add my own diagnosis  -- Disagree - Not applicable / Not valid  -- Disagree - Clinically unable to determine / Unknown  -- Refer to Clinical Documentation Reviewer    PROVIDER RESPONSE TEXT:    The diagnosis of encephalopathy was confirmed as present on admission.     Query created by: Evelyn Busch on 6/10/2022 8:07 AM      Electronically signed by:  Juaquin Meigs MD Debborah Hoard MD 6/10/2022 9:14 AM

## 2022-06-10 NOTE — PROGRESS NOTES
Hospitalist Progress Note    NAME: Greg Perez   :  1975   MRN:  [id number]       Assessment / Plan:      Dizziness/off balance/confusion/slurred speech --- r/o MS versus CVA  Sepsis to rule out acute meningitis, POA  Urinary tract infection, acute, POA  Hx chronic migraine Headaches  Incidental posterior fossa meningioma  Sx: multiple neurological complaints; unclear etiology on admission   Head CT/ CTA head and neck: negative   Patient reporting today that all of her symptoms got resolved  Leukocytosis, tachycardia with fever raising possible suspicion for acute meningitis  - Physical exam continue to be with no meningeal signs  -Continue ceftriaxone, vancomycin to rule out meningitis  - LP was ordered 129, still pending  - Septic picture might be secondary to acute UTI with UA positive for bacteriuria  -MRI with no acute processes. Incidental 13 mm posterior fossa meningioma  -Neurology eval is appreciated  -PT OT evaluation     Abdominal pain   No signs of underlying infection, wbc normal   + mostly tender RLQ   Hx recent constipation   Abdominal pain resolved  Diet: CLD   CT: No acute findings or findings correlate with abdominal pain. By 0.2 x 8.3 x 6.3 cm left ovarian cyst for which ultrasound follow-up is recommended. US abd: Cholelithiasis and gallbladder sludge with gallbladder contraction and no  ultrasonographic signs of acute cholecystitis. Echogenic liver with difficult penetration and previously seen hemangiomata  not evident likely reflective of steatosis.   LFTs normal  Continue following CMP very closely     Hypokalemia  -Replaced on admission     Diabetes mellitus type 2  Hemoglobin A1c 6.5% on admission meeting criteria for diabetes mellitus and it was 7% in 2021  Correction scale with hypoglycemia protocol  Lifestyle medications were discussed with the patient at length including weight loss and following low-carb diet     L ovary cyst  Can be [facility] OP, refer to GYN OP    Depression   -No homicidal or suicidal ideation    Morbid Obesity. Body mass index is 52.46 kg/m². Counseling was provided about weight loss    Estimated discharge date: June 11  Barriers: Acute meningitis work-up    Code status: Full  Prophylaxis: Lovenox  Recommended Disposition: Home w/Family     Subjective:     Patient was seen and examined. No acute events overnight. Discussed with RN overnight events. All patient's questions were answered. \"Feeling fine and I did not have any issues overnight\"    Review of Systems:  Symptom Y/N Comments  Symptom Y/N Comments   Fever/Chills n   Chest Pain n    Poor Appetite    Edema     Cough n   Abdominal Pain n    Sputum    Joint Pain     SOB/ARGUELLO n   Pruritis/Rash     Nausea/vomit n   Tolerating PT/OT     Diarrhea    Tolerating Diet y    Constipation    Other       Could NOT obtain due to:              Objective:     VITALS:   Last 24hrs VS reviewed since prior progress note. Most recent are:  Patient Vitals for the past 24 hrs:   Temp Pulse Resp BP SpO2   06/10/22 1143 98.5 °F (36.9 °C) (!) 102 16 (!) 153/75 99 %   06/10/22 0755 97.7 °F (36.5 °C) (!) 106 17 (!) 152/97 97 %   06/10/22 0506 98.3 °F (36.8 °C) (!) 104 18 (!) 142/89 99 %   06/10/22 0020 99.1 °F (37.3 °C) 100 16 (!) 147/83 100 %   06/09/22 2022 98.9 °F (37.2 °C) (!) 108 18 127/66 97 %   06/09/22 1557 99.4 °F (37.4 °C) (!) 112 18 135/89 97 %       Intake/Output Summary (Last 24 hours) at 6/10/2022 1321  Last data filed at 6/10/2022 0325  Gross per 24 hour   Intake 550 ml   Output --   Net 550 ml        I had a face to face encounter and independently examined this patient on 6/10/2022, as outlined below:  PHYSICAL EXAM:  General: WD, WN. Alert, cooperative, no acute distress    EENT:  EOMI. Anicteric sclerae. MMM  Resp:  CTA bilaterally, no wheezing or rales. No accessory muscle use  CV:  Regular  rhythm,  No edema  GI:  Soft, Non distended, Non tender. +Bowel sounds  Neurologic:  EOMs intact.  No facial asymmetry. No aphasia or slurred speech. Symmetrical strength, Sensation grossly intact. Alert and oriented X 4. No neck rigidity. Kernig's sign, and Brudzinski's sign are negative    Psych:   Good insight. Not anxious nor agitated  Skin:  No rashes. No jaundice    Reviewed most current lab test results and cultures  YES  Reviewed most current radiology test results   YES  Review and summation of old records today    NO  Reviewed patient's current orders and MAR    YES  PMH/SH reviewed - no change compared to H&P  ________________________________________________________________________  Care Plan discussed with:    Comments   Patient x    Family      RN x    Care Manager     Consultant                        Multidiciplinary team rounds were held today with , nursing, pharmacist and clinical coordinator. Patient's plan of care was discussed; medications were reviewed and discharge planning was addressed. ________________________________________________________________________        Comments   >50% of visit spent in counseling and coordination of care     ________________________________________________________________________  Lisa Petersen MD     Procedures: see electronic medical records for all procedures/Xrays and details which were not copied into this note but were reviewed prior to creation of Plan. LABS:  I reviewed today's most current labs and imaging studies.   Pertinent labs include:  Recent Labs     06/10/22  0508 06/09/22  0400 06/08/22  1222   WBC 12.4* 13.0* 11.0   HGB 11.3* 10.5* 10.1*   HCT 35.6 32.8* 31.6*   * 480* 445*     Recent Labs     06/10/22  0508 06/09/22  0400 06/08/22  1222    140 138   K 3.6 3.3* 3.0*   * 106 104   CO2 22 26 29   * 94 184*   BUN 7 6 13   CREA 0.66 0.64 0.76   CA 8.9 8.5 8.8   MG  --  2.0 2.0   PHOS  --  3.8  --    ALB  --  2.8* 3.0*   TBILI  --  0.6 0.1*   ALT  --  26 27       Signed: Lisa Petersen MD

## 2022-06-10 NOTE — PROGRESS NOTES
Neurology Note    Patient ID:  Ebony Juárez  [id number]  50 y.o.  1975      Date of Consultation:  Haily 10, 2022      Assessment and Plan:    The patient is a 38-year-old female who presents to the hospital after having had a 12 to 14-hour episode of confusional state. Her neurological examination is normal      Acute confusional state-encephalopathy- improved. Differential included medication induced given her polypharmacy, systemic impact of infection and/or increasing stressors  EEG normal  Brain mri with incidental finding of meningioma. No signs of MS, stroke. Head CT with no evidence of acute hemorrhagic stroke or other abnormalities. CTA with no large vessel flow-limiting stenosis. Correct underlying metabolic abnormalities    Incidental finding of a meningioma: Will need to follow-up in the neurology clinic and have follow up imaging in 6 months - 1 year and have a neurosurgical routine consult. UTI/possible pneumonia - on antibiotics  Diabetes  Depression - improving  Abdominal pain  Left ovarian cyst:  Chronic migraine: She is followed closely in the neurology clinic. Continue current home medications      There is no other additional neurology recommendations at this time. If questions arise, please not hesitate to contact me and I will return to see the patient. The patient should follow-up in clinic in 3 to 4 weeks time after discharge with her primary neurology provider, Sonny Roth             Subjective: I feel better       History of Present Illness:   Ebony Juárez is a 50 y.o. female who presents to the hospital after a 1 week history of lightheadedness, dizziness, and confusion. Her mental status has improved since admission. There has been no new findings of numbness, tingling, or weakness. She is being aggressively treated for a urinary tract infection. She does continue to struggle with her chronic migraines.         As noted previously, the patient is seen in the neurology clinic and was most recently seen by Anuj Esquivel on April 29, 2022. She is seen for her migraine headaches. It was noted that she was currently taking Topamax and had tried Elavil, Emgality, Fioricet and Imitrex. She had also tried Cymbalta. She also was currently taking verapamil. She was on botulinum toxin injections.   A brain MRI performed in 2014 was normal.    Past Medical History:   Diagnosis Date    Arthritis     DDD    Carpal tunnel syndrome on left 2/22/2011    Cervical spondylarthritis 2/20/2011    Depressive disorder, not elsewhere classified 2/17/2011    Encounter for long-term (current) use of other medications 2/20/2011    GERD (gastroesophageal reflux disease)     Headache(784.0)     Migraines    Hepatic hemangioma 2/17/2011    Hypertension     IBS (irritable bowel syndrome) 2/20/2011    Migraine syndrome 2/17/2011    Obesity 2/20/2011    Other ill-defined conditions(799.89)     hx.of syncope    Psychiatric disorder     depression        Past Surgical History:   Procedure Laterality Date    HX HEENT      tonsillectomy    HX ORTHOPAEDIC      carpal tunnel and tigger finger release rt        Family History   Problem Relation Age of Onset    Heart Disease Mother     Hypertension Mother     Diabetes Mother     Heart Disease Father     Lung Disease Father     Hypertension Father         Social History     Tobacco Use    Smoking status: Never Smoker    Smokeless tobacco: Never Used    Tobacco comment: SECOND HAND SMOKE/PARENTS   Substance Use Topics    Alcohol use: Not Currently        Allergies   Allergen Reactions    Atacand [Candesartan] Other (comments)     Patient B/P increase    Compazine [Prochlorperazine] Other (comments) and Unable to Obtain    Ciprofloxacin Other (comments)    Codeine Nausea and Vomiting    Zonisamide Nausea and Vomiting          Current Facility-Administered Medications   Medication Dose Route Frequency    Vancomycin trough on 6/10 prior to the 1900 dose. thanks   Other ONCE    cefTRIAXone (ROCEPHIN) 2 g in 0.9% sodium chloride (MBP/ADV) 50 mL MBP  2 g IntraVENous Q12H    vancomycin (VANCOCIN) 1,000 mg in 0.9% sodium chloride 250 mL (VIAL-MATE)  1,000 mg IntraVENous Q8H    venlafaxine-SR (EFFEXOR-XR) capsule 150 mg  150 mg Oral QHS    ondansetron (ZOFRAN) injection 4 mg  4 mg IntraVENous Q4H PRN    polyethylene glycol (MIRALAX) packet 17 g  17 g Oral DAILY    senna-docusate (PERICOLACE) 8.6-50 mg per tablet 2 Tablet  2 Tablet Oral QHS    insulin lispro (HUMALOG) injection   SubCUTAneous AC&HS    glucose chewable tablet 16 g  4 Tablet Oral PRN    glucagon (GLUCAGEN) injection 1 mg  1 mg IntraMUSCular PRN    dextrose 10 % infusion 0-250 mL  0-250 mL IntraVENous PRN    [Held by provider] enoxaparin (LOVENOX) injection 30 mg  30 mg SubCUTAneous BID    acetaminophen (TYLENOL) tablet 650 mg  650 mg Oral Q6H PRN    ondansetron (ZOFRAN) injection 4 mg  4 mg IntraVENous Q4H PRN    fentaNYL citrate (PF) injection 25 mcg  25 mcg IntraVENous Q6H PRN       Review of Systems:    General, constitutional: stress  Eyes, vision: negative  Ears, nose, throat: negative  Cardiovascular, heart: negative  Respiratory: negative  Gastrointestinal: negative  Genitourinary: negative  Musculoskeletal: negative  Skin and integumentary: negative  Psychiatric: negative  Endocrine: negative  Neurological: negative, except for HPI  Hematologic/lymphatic: negative  Allergy/immunology: negative    Objective:     Visit Vitals  /81 (BP 1 Location: Left lower arm, BP Patient Position: At rest)   Pulse (!) 109   Temp 98.1 °F (36.7 °C)   Resp 17   Ht 5' 6\" (1.676 m)   Wt 325 lb (147.4 kg)   SpO2 97%   BMI 52.46 kg/m²       Physical Exam:    General:  appears well nourished in no acute distress.  Morbidly obese  Neck: no carotid bruits  Lungs: clear to auscultation  Heart:  no murmurs, regular rate  Lower extremity: peripheral pulses palpable and no edema  Skin: intact    Neurological exam:    Awake, alert, oriented to person, place and time  Recent and remote memory were normal  Attention and concentration were intact  Language was intact. There was no aphasia  Speech: no dysarthria  Fund of knowledge was preserved    Cranial nerves:   II-XII were tested    Perrrla. She needs to wear glasses due to her chronic migraines. Fundus examination not performed due to her light sensitivity  Visual fields were full  Eomi, no evidence of nystagmus  Facial sensation:  normal and symmetric  Facial motor: normal and symmetric  Hearing intact  SCM strength intact  Tongue: midline without fasciculations    Motor: Tone normal  Pronator drift was absent    No evidence of fasciculations    Strength testing:   deltoid triceps biceps Wrist ext. Wrist flex. intrinsics Hip flex. Hip ext. Knee ext. Knee flex Dorsi flex Plantar flex   Right 5 5 5 5 5 5 5 5 5 5 5 5   Left 5 5 5 5 5 5 5 5 5 5 5 5         Sensory:  Upper extremity: intact to pp, light touch, and vibration > 10 seconds  Lower extremity: intact to pp, light touch, and vibration > 10 seconds    Reflexes:    Right Left  Biceps  2 2  Triceps 2 2  Brachiorad.  2 2  Patella  2 2  Achilles 2 2    Plantar response:  flexor bilaterally    Cerebellar testing:  no tremor apparent, finger/nose and danish were intact      Labs:     Lab Results   Component Value Date/Time    Hemoglobin A1c 6.5 (H) 06/09/2022 04:00 AM    Sodium 138 06/10/2022 05:08 AM    Potassium 3.6 06/10/2022 05:08 AM    Chloride 109 (H) 06/10/2022 05:08 AM    Glucose 112 (H) 06/10/2022 05:08 AM    BUN 7 06/10/2022 05:08 AM    Creatinine 0.66 06/10/2022 05:08 AM    Calcium 8.9 06/10/2022 05:08 AM    WBC 12.4 (H) 06/10/2022 05:08 AM    HCT 35.6 06/10/2022 05:08 AM    HGB 11.3 (L) 06/10/2022 05:08 AM    PLATELET 938 (H) 99/30/6929 05:08 AM       Imaging:    Results from Hospital Encounter encounter on 06/08/22    MRI BRAIN W WO CONT    Narrative      FINAL REPORT:    INDICATION:  dizziness, off balance, HA    COMPARISON:  CT June 8, 2022    TECHNIQUE:  Multiplanar multisequence acquisition without and with IV contrast  of the brain. FINDINGS:    Ventricles:  Midline, no hydrocephalus. Brain Parenchyma/Brainstem:  Normal for age. No acute infarction. Intracranial Hemorrhage:  None. Basal Cisterns:  Normal.  Flow Voids:  Normal.  Post Contrast:  Incidental 13 mm extra-axial enhancing mass in the midline at  the undersurface of the posterior tentorium cerebellum (series 11 image 5). Additional Comments:  N/A. Impression  1. No acute process. 2. Incidental 13 mm posterior fossa meningioma. Results from [place] encounter on 06/08/22    CTA HEAD NECK W CONT    Narrative  INDICATION: Dizziness, confusion, headache    EXAMINATION:  CT ANGIOGRAPHY HEAD AND NECK    COMPARISON: CT head earlier today    TECHNIQUE:  Following the uneventful administration of iodinated contrast  material, axial CT angiography of the head and neck was performed. Delayed axial  images through the head were also obtained. Coronal and sagittal reconstructions  were obtained. Manual postprocessing of images was performed. 3-D  Sagittal  maximal intensity projection images were obtained. 3-D Coronal maximal  intensity projections were obtained. CT dose reduction was achieved through use  of a standardized protocol tailored for this examination and automatic exposure  control for dose modulation. FINDINGS:    CTA NECK:    Aortic Arch: No significant abnormality. Right Common Carotid Artery: No significant abnormality. Right Internal Carotid Artery: No significant abnormality. NASCET Right: 0-25%. Left Common Carotid Artery: No significant abnormality. Left Internal Carotid Artery: No significant abnormality. NASCET Left: 0-25%. Carotid stenosis determined using NASCET criteria. Right Vertebral Artery: No significant abnormality.   Left Vertebral Artery: No significant abnormality. Cervical Soft Tissues: No significant abnormality. Lung Apices: Patchy airspace disease right upper lobe. Bones: No destructive bone lesion. Additional Comments: N/A.    CTA HEAD:    Posterior Circulation: No flow limiting stenosis or occlusion. Anterior Circulation: No flow limiting stenosis or occlusion. Additional Comments: No evidence of aneurysm or vascular malformation. Note: Cerebral perfusion not performed. Impression  1. No acute process. No large vessel occlusion, arterial dissection, or  flow-limiting stenosis. 2. CT perfusion not performed. 3. Right upper lobe airspace disease. head CT from June 8, 2022 which was normal.    CTA from June 8, 2022 revealed no large vessel flow-limiting stenosis. There was a concern for right upper lobe airspace disease    Did independently review the brain MRI from June 9, 2022. There is no acute abnormalities. There is an incidental finding of a 13 mm posterior fossa meningioma. I spent   35  minutes providing care to this  acutely ill inpatient with > 50% of the time counseling and assisting in the coordination of care of the patient on the patient's hospital floor/unit.         Active Problems:    Stroke [facility]) (6/8/2022)                   Signed By:  Dottie Chan DO FAAN    Haily 10, 2022

## 2022-06-10 NOTE — PROGRESS NOTES
Hospital follow-up PCP transitional care appointment has been scheduled with Dr. Lety Lopez on 6/21/22 at 1440. This is the first available appt due to limited provider availability. [facility] does not offer alternate provider option for hospital follow up. This is a previously scheduled appt. Pending patient discharge.   Donna Moore, Care Management Assistant

## 2022-06-11 VITALS
OXYGEN SATURATION: 97 % | DIASTOLIC BLOOD PRESSURE: 96 MMHG | BODY MASS INDEX: 47.09 KG/M2 | TEMPERATURE: 98.3 F | SYSTOLIC BLOOD PRESSURE: 139 MMHG | WEIGHT: 293 LBS | HEIGHT: 66 IN | HEART RATE: 99 BPM | RESPIRATION RATE: 18 BRPM

## 2022-06-11 PROBLEM — I63.9 STROKE (HCC): Status: RESOLVED | Noted: 2022-06-08 | Resolved: 2022-06-11

## 2022-06-11 LAB
ALBUMIN SERPL-MCNC: 2.7 G/DL (ref 3.5–5)
ALBUMIN/GLOB SERPL: 0.7 {RATIO} (ref 1.1–2.2)
ALP SERPL-CCNC: 57 U/L (ref 45–117)
ALT SERPL-CCNC: 23 U/L (ref 12–78)
ANION GAP SERPL CALC-SCNC: 7 MMOL/L (ref 5–15)
AST SERPL-CCNC: 18 U/L (ref 15–37)
BILIRUB SERPL-MCNC: 0.5 MG/DL (ref 0.2–1)
BUN SERPL-MCNC: 6 MG/DL (ref 6–20)
BUN/CREAT SERPL: 11 (ref 12–20)
CALCIUM SERPL-MCNC: 8.5 MG/DL (ref 8.5–10.1)
CHLORIDE SERPL-SCNC: 112 MMOL/L (ref 97–108)
CO2 SERPL-SCNC: 22 MMOL/L (ref 21–32)
CREAT SERPL-MCNC: 0.53 MG/DL (ref 0.55–1.02)
ERYTHROCYTE [DISTWIDTH] IN BLOOD BY AUTOMATED COUNT: 15.7 % (ref 11.5–14.5)
GLOBULIN SER CALC-MCNC: 3.8 G/DL (ref 2–4)
GLUCOSE BLD STRIP.AUTO-MCNC: 100 MG/DL (ref 65–117)
GLUCOSE BLD STRIP.AUTO-MCNC: 100 MG/DL (ref 65–117)
GLUCOSE BLD STRIP.AUTO-MCNC: 114 MG/DL (ref 65–117)
GLUCOSE SERPL-MCNC: 89 MG/DL (ref 65–100)
HCT VFR BLD AUTO: 31.3 % (ref 35–47)
HGB BLD-MCNC: 9.8 G/DL (ref 11.5–16)
MCH RBC QN AUTO: 29.3 PG (ref 26–34)
MCHC RBC AUTO-ENTMCNC: 31.3 G/DL (ref 30–36.5)
MCV RBC AUTO: 93.7 FL (ref 80–99)
NRBC # BLD: 0 K/UL (ref 0–0.01)
NRBC BLD-RTO: 0 PER 100 WBC
PLATELET # BLD AUTO: 478 K/UL (ref 150–400)
PMV BLD AUTO: 9 FL (ref 8.9–12.9)
POTASSIUM SERPL-SCNC: 3.2 MMOL/L (ref 3.5–5.1)
PROT SERPL-MCNC: 6.5 G/DL (ref 6.4–8.2)
RBC # BLD AUTO: 3.34 M/UL (ref 3.8–5.2)
SERVICE CMNT-IMP: NORMAL
SODIUM SERPL-SCNC: 141 MMOL/L (ref 136–145)
WBC # BLD AUTO: 9.1 K/UL (ref 3.6–11)

## 2022-06-11 PROCEDURE — 74011250636 HC RX REV CODE- 250/636: Performed by: INTERNAL MEDICINE

## 2022-06-11 PROCEDURE — 74011000258 HC RX REV CODE- 258: Performed by: INTERNAL MEDICINE

## 2022-06-11 PROCEDURE — 82962 GLUCOSE BLOOD TEST: CPT

## 2022-06-11 PROCEDURE — 74011250636 HC RX REV CODE- 250/636: Performed by: HOSPITALIST

## 2022-06-11 PROCEDURE — 85027 COMPLETE CBC AUTOMATED: CPT

## 2022-06-11 PROCEDURE — 80053 COMPREHEN METABOLIC PANEL: CPT

## 2022-06-11 PROCEDURE — 74011250637 HC RX REV CODE- 250/637: Performed by: INTERNAL MEDICINE

## 2022-06-11 PROCEDURE — 74011250637 HC RX REV CODE- 250/637: Performed by: HOSPITALIST

## 2022-06-11 PROCEDURE — 36415 COLL VENOUS BLD VENIPUNCTURE: CPT

## 2022-06-11 RX ORDER — CEPHALEXIN 500 MG/1
500 CAPSULE ORAL 4 TIMES DAILY
Qty: 12 CAPSULE | Refills: 0 | Status: SHIPPED | OUTPATIENT
Start: 2022-06-11 | End: 2022-06-14

## 2022-06-11 RX ORDER — POTASSIUM CHLORIDE 20 MEQ/1
20 TABLET, EXTENDED RELEASE ORAL
Status: COMPLETED | OUTPATIENT
Start: 2022-06-11 | End: 2022-06-11

## 2022-06-11 RX ADMIN — FENTANYL CITRATE 25 MCG: 50 INJECTION, SOLUTION INTRAMUSCULAR; INTRAVENOUS at 10:38

## 2022-06-11 RX ADMIN — CEFTRIAXONE SODIUM 2 G: 2 INJECTION, POWDER, FOR SOLUTION INTRAMUSCULAR; INTRAVENOUS at 10:47

## 2022-06-11 RX ADMIN — FENTANYL CITRATE 25 MCG: 50 INJECTION, SOLUTION INTRAMUSCULAR; INTRAVENOUS at 00:59

## 2022-06-11 RX ADMIN — ONDANSETRON 4 MG: 2 INJECTION INTRAMUSCULAR; INTRAVENOUS at 10:44

## 2022-06-11 RX ADMIN — POTASSIUM CHLORIDE 20 MEQ: 20 TABLET, EXTENDED RELEASE ORAL at 13:42

## 2022-06-11 NOTE — PROGRESS NOTES
Discharge instructions reviewed with patient including follow up appointments and not taking pain meds until after 1830 tonight since she had IV fentanyl. She verbalized understanding.  Discharged via wheelchair to car with family

## 2022-06-11 NOTE — PROGRESS NOTES
Patient cleared for discharge home self care. Requires Medicare 2nd Medicare pt has received, reviewed, and signed 2nd IM letter informing them of their right to appeal the discharge. Signed copy has been placed on pt bedside chart.         ALBERTO Carter

## 2022-06-11 NOTE — DISCHARGE SUMMARY
Hospitalist Discharge Summary     Patient ID:  Vasquez Bryan  [id number]  50 y.o.  1975  6/8/2022    PCP on record: Shawn Vizcaino MD    Admit date: 6/8/2022  Discharge date and time: 6/11/2022    DISCHARGE DIAGNOSIS:    Dizziness/off balance/confusion/slurred speech --- r/o MS versus CVA  Sepsis 2/2 UTI, POA  Urinary tract infection, acute, POA  Hx chronic migraine Headaches    Abdominal pain       Hypokalemia     Diabetes mellitus type 2     L ovary cyst      Depression      Morbid Obesity    CONSULTATIONS:  IP CONSULT TO NEUROLOGY    Excerpted HPI from H&P of Emi Coto MD:  Debra Yeager is a 50 y.o.  female who presents with above complaints. Patient presented to ED with multiple complaints. She reports that her symptoms started around a week ago. She reports feeling lightheadedness, confusion. She reports that she had near syncopal episode 5 days ago and almost passed out again 2 days ago. Her family was telling her that she appears to be confused and also noticed with slurred speech. Symptoms is intermittent. Patient denies any focal weakness , no visual changes, no numbness or tingling. No history of TIA or stroke. She has history of chronic migraines. She admits to headache which worsens then normally. She also complain of right-sided abdominal pain intermittent, associated with nausea and vomiting. She reports vomiting x 2 days last week but then resolved. Able to eat. No fever or chills. She cannot specify if all her Sx started at the same time or not. Normally she has regular BMs. Recently was constipated and took miralax to have BM 2 days ago.        ______________________________________________________________________  DISCHARGE SUMMARY/HOSPITAL COURSE:  for full details see H&P, daily progress notes, labs, consult notes.        Dizziness/off balance/confusion/slurred speech --- r/o MS versus CVA  Sepsis 2/2 UTI, POA  Urinary tract infection, acute, POA  Hx chronic migraine Headaches  Incidental posterior fossa meningioma  Sx: multiple neurological complaints; unclear etiology on admission   Head CT/ CTA head and neck: negative   Patient reporting today that all of her symptoms got resolved since yesterday  Leukocytosis, tachycardia with fever raising possible suspicion for acute meningitis  - Physical exam continue to be with no meningeal signs  -Started on ceftriaxone, vancomycin  due to suspected meningitis however no clinical evidence. Neurology recommending to stop antibiotics and discontinue LP.  - I ordered LP however neurology recommending not to do it. Most likely her symptoms is not related to meningitis. - Septic picture might be secondary to acute UTI with UA positive for bacteriuria. Received ceftriaxone for 2 days, will discharge on Keflex for 3 more days  -MRI with no acute processes. Incidental 13 mm posterior fossa meningioma. Will need follow-up as an outpatient with neurology  -Neurology eval is appreciated  -PT OT evaluation noted     Abdominal pain   No signs of underlying infection, wbc normal   + mostly tender RLQ   Hx recent constipation   Abdominal pain resolved  Diet: CLD   CT: No acute findings or findings correlate with abdominal pain. By 0.2 x 8.3 x 6.3 cm left ovarian cyst for which ultrasound follow-up is recommended. US abd: Cholelithiasis and gallbladder sludge with gallbladder contraction and no  ultrasonographic signs of acute cholecystitis. Echogenic liver with difficult penetration and previously seen hemangiomata  not evident likely reflective of steatosis.   LFTs normal     Hypokalemia  -Replaced on admission     Diabetes mellitus type 2  Hemoglobin A1c 6.5% on admission meeting criteria for diabetes mellitus and it was 7% in August 2021  Resume home meds     L ovary cyst  Can be [facility] OP, refer to GYN OP      Depression   -No homicidal or suicidal ideation     Morbid Obesity. Body mass index is 52.46 kg/m². Counseling was provided about weight loss      _______________________________________________________________________  Patient seen and examined by me on discharge day. Pertinent Findings:  Gen:    Not in distress  Chest: Clear lungs  CVS:   Regular rhythm. No edema  Abd:  Soft, not distended, not tender  Neuro:  EOMs intact. No facial asymmetry. No aphasia or slurred speech. Symmetrical strength, Sensation grossly intact. Alert and oriented X 4.     _______________________________________________________________________  DISCHARGE MEDICATIONS:   Current Discharge Medication List      CONTINUE these medications which have NOT CHANGED    Details   ALPRAZolam (XANAX) 0.5 mg tablet Take 1 Tablet by mouth three (3) times daily as needed for Anxiety. Max Daily Amount: 1.5 mg.  Qty: 90 Tablet, Refills: 0    Associated Diagnoses: Generalized anxiety disorder      zolpidem (AMBIEN) 5 mg tablet Take 1 Tablet by mouth nightly. Max Daily Amount: 5 mg. Qty: 30 Tablet, Refills: 0    Comments: Not to exceed 5 additional fills before 04/24/2022  Associated Diagnoses: Depression, unspecified depression type; Fibromyalgia      HYDROcodone-acetaminophen (NORCO) 7.5-325 mg per tablet Take 1 Tablet by mouth every six (6) hours as needed for Pain for up to 30 days. Max Daily Amount: 4 Tablets. Qty: 50 Tablet, Refills: 0    Associated Diagnoses: Acute bilateral back pain, unspecified back location      cyclobenzaprine (FLEXERIL) 10 mg tablet TAKE 1 TABLET BY MOUTH THREE (3) TIMES DAILY (WITH MEALS). Qty: 50 Tablet, Refills: 1    Associated Diagnoses: Muscle cramp      !! tiZANidine (ZANAFLEX) 4 mg tablet TAKE 1 TABLET BY MOUTH EVERY SIX (6) HOURS AS NEEDED FOR MUSCLE SPASM(S).   Qty: 50 Tablet, Refills: 0    Associated Diagnoses: Acute bilateral back pain, unspecified back location      promethazine (PHENERGAN) 25 mg tablet TAKE 1 TABLET BY MOUTH EVERY 8 HOURS AS NEEDED FOR NAUSEA  Qty: 50 Tablet, Refills: 0 Associated Diagnoses: Chronic migraine without aura, not intractable, without status migrainosus      gabapentin (NEURONTIN) 600 mg tablet Take 1 Tablet by mouth three (3) times daily. Qty: 270 Tablet, Refills: 0    Comments: Not to exceed 5 additional fills before 08/20/2021  Associated Diagnoses: Migraine without status migrainosus, not intractable, unspecified migraine type      ubrogepant (UBRELVY) 100 mg tablet Take 1 Tablet by mouth daily as needed for Migraine. Indications: a migraine headache  Qty: 16 Tablet, Refills: 12    Associated Diagnoses: Intractable chronic migraine without aura and without status migrainosus      omeprazole (PRILOSEC) 40 mg capsule TAKE 1 CAPSULE BY MOUTH EVERY DAY  Qty: 90 Capsule, Refills: 2      !! tiZANidine (ZANAFLEX) 4 mg tablet TAKE 1 TABLET BY MOUTH EVERY SIX (6) HOURS AS NEEDED FOR MUSCLE SPASMS  Qty: 50 Tablet, Refills: 0    Associated Diagnoses: Acute bilateral back pain, unspecified back location      potassium chloride (KLOR-CON M10) 10 mEq tablet Take 2 Tablets by mouth daily. Qty: 180 Tablet, Refills: 3      topiramate (TOPAMAX) 100 mg tablet TAKE 1 TABLET BY MOUTH TWO (2) TIMES A DAY. Qty: 180 Tablet, Refills: 1      venlafaxine-SR (EFFEXOR-XR) 150 mg capsule TAKE 1 CAPSULE BY MOUTH EVERY DAY  Qty: 90 Capsule, Refills: 1    Associated Diagnoses: Generalized anxiety disorder; Depression, unspecified depression type      verapamil ER (CALAN-SR) 240 mg CR tablet TAKE 1 TABLET BY MOUTH EVERYDAY AT BEDTIME  Qty: 90 Tablet, Refills: 3      hydroCHLOROthiazide (HYDRODIURIL) 25 mg tablet Take 1 Tablet by mouth daily. Qty: 90 Tablet, Refills: 3      metFORMIN ER (GLUCOPHAGE XR) 500 mg tablet TAKE 1 TABLET BY MOUTH EVERY DAY WITH DINNER  Qty: 90 Tablet, Refills: 4      naloxone (Narcan) 4 mg/actuation nasal spray Use 1 spray intranasally, then discard. Repeat with new spray every 2 min as needed for opioid overdose symptoms, alternating nostrils.   Qty: 2 Each, Refills: 1 Associated Diagnoses: Opioid use      furosemide (LASIX) 20 mg tablet Take 1 Tab by mouth daily as needed (edema). Qty: 90 Tab, Refills: 0      onabotulinumtoxinA (Botox) 200 unit injection 200 units by IM route once every 12 weeks. Inject IM neck/face, 31 FDA approved sites. Indication: Migraine prevention. DX code: G43.71  Qty: 1 Vial, Refills: 3    Associated Diagnoses: Intractable chronic migraine without aura with status migrainosus      biotin 10,000 mcg cap Take  by mouth.      cyanocobalamin 1,000 mcg tablet Take 2,000 mcg by mouth daily. docusate sodium (STOOL SOFTENER) 100 mg capsule 100 mg two (2) times a day. Once daily        ! ! - Potential duplicate medications found. Please discuss with provider. Patient Follow Up Instructions: Activity: Activity as tolerated  Diet: Resume previous diet      Follow-up Information     Follow up With Specialties Details Why Contact Info    Fay Infante MD Family Medicine Go on 6/21/2022 at 2:40pm for your PCP hospital follow up as previously scheduled.   [address] [place] [id number] [facility] Neurology Schedule an appointment as soon as possible for a visit in 3 weeks Contact the office directly to schedule f/u apt within 3-4 weeks of d/c [id number]  [phone]        ________________________________________________________________    Risk of deterioration: Low    Condition at Discharge:  Stable  __________________________________________________________________    Disposition  Home with family, no needs    ____________________________________________________________________    Code Status: Full Code  ___________________________________________________________________      Total time in minutes spent coordinating this discharge (includes going over instructions, follow-up, prescriptions, and preparing report for sign off to her PCP) :  >30 minutes    Signed:  Ilana Moctezuma MD

## 2022-06-11 NOTE — PROGRESS NOTES
Pharmacy Antimicrobial Kinetic Dosing    Indication for Antimicrobials: r/o CNS infection     Current Regimen of Each Antimicrobial:  Vancomycin 1000 mg iv q8h (Start Date ; Day 3)  Ceftriaxone 2g q 12h (start: . Day 3)    Previous Antimicrobial Therapy:    Goal Level: AUC: 400-600 mg/hr/Liter/day and VANCOMYCIN TROUGH GOAL RANGE    Vancomycin Trough: 15 - 20 mcg/mL    Date Dose & Interval Measured (mcg/mL) Steady State AUC/ALBINO, Trough   6/10 1000 mg Q8H  16 420, 13.8                 Date & time of next level:      Dosing calculator used: [employer] calculator    Significant Positive Cultures:    blood: NG. Pending    Conditions for Dosing Consideration: None    Labs:  Recent Labs     22  0349 06/10/22  0508 22  0400   CREA 0.53* 0.66 0.64   BUN 6 7 6     Recent Labs     22  0349 06/10/22  0508 22  0400 22  1222   WBC 9.1 12.4* 13.0* 11.0     Temp (24hrs), Av.4 °F (36.9 °C), Min:98.1 °F (36.7 °C), Max:98.7 °F (37.1 °C)    Creatinine Clearance (mL/min):   CrCl (Ideal Body Weight): 91.0   If actual weight < IBW: CrCl (Actual Body Weight) 226.2    Impression/Plan:   Vancomycin level therapeutic, continue 1000 mg Q8H. Continue ceftriaxone as above (q12h dosing)  Cbc and bmp ordered for 6/10  Antimicrobial stop date to be determined     Pharmacy will follow daily and adjust medications as appropriate for renal function and/or serum levels.     Thank you,  Polarolo Seymour, MARKD    Vancomycin Dosing Document    Documents located on pharmacy Teams site: Clinical Practice -> Antimicrobial Stewardship -> Antibiotics_Vancomycin     Aminoglycoside Dosing Document    Documents located on pharmacy Teams site: Clinical Practice -> Antimicrobial Stewardship -> Antibiotics_Aminoglycosides

## 2022-06-13 ENCOUNTER — PATIENT OUTREACH (OUTPATIENT)
Dept: CASE MANAGEMENT | Age: 47
End: 2022-06-13

## 2022-06-13 NOTE — PROGRESS NOTES
Care Transitions Initial Call    Call within 2 business days of discharge: Yes     Patient: Sarah Echevarria Patient : 1975 MRN: [id number]    Last Discharge [address]       Complaint Diagnosis Description Type Department Provider    22 Dizziness; Altered mental status Cerebrovascular accident (CVA), unspecified mechanism ([address] . .. ED to Hosp-Admission (Discharged) (ADMIT) Lincoln Gayle MD; Latricia Lozano. .. Was this an external facility discharge? No     Challenges to be reviewed by the provider   Additional needs identified to be addressed with provider: no       Method of communication with provider : none    Discussed COVID-19 related testing which was not done at this time. Advance Care Planning:   Does patient have an Advance Directive: not on file. Inpatient Readmission Risk score: Unplanned Readmit Risk Score: 10.6 ( )    Was this a readmission? no   Patient stated reason for the admission: dizziness    Patients top risk factors for readmission: medical condition-UTI and medication management   Interventions to address risk factors: Scheduled appointment with PCP-22, Obtained and reviewed discharge summary and/or continuity of care documents and Assessment and support for treatment adherence and medication management-Keflex x 3 days    Care Transition Nurse (CTN) contacted the patient by telephone to perform post hospital discharge assessment. Verified name and  with patient as identifiers. Provided introduction to self, and explanation of the CTN role. CTN reviewed discharge instructions, medical action plan and red flags with patient who verbalized understanding. Were discharge instructions available to patient? yes. Reviewed appropriate site of care based on symptoms and resources available to patient including: PCP, Specialist and Condition related references.  Patient given an opportunity to ask questions and does not have any further questions or concerns at this time. The patient agrees to contact the PCP office for questions related to their healthcare. Medication reconciliation was performed with patient, who verbalizes understanding of administration of home medications. Advised obtaining a 90-day supply of all daily and as-needed medications. Referral to Pharm D needed: no     Home Health/Outpatient orders at discharge: none    Durable Medical Equipment ordered at discharge: None    Was patient discharged with a pulse oximeter? no    Discussed follow-up appointments. If no appointment was previously scheduled, appointment scheduling offered: no. Is follow up appointment scheduled within 7 days of discharge? no.   [facility] follow up appointment(s):   Future Appointments   Date Time Provider Meeta Malik   6/21/2022  2:40 PM Viola Heck MD [facility] BS AMB   8/5/2022  1:00 PM CATIE Delacruz BS AMB   10/28/2022  8:30 AM CATIE Delacruz BS AMB   1/20/2023  8:30 AM CATIE Delacruz BS AMB       Plan for follow-up call in 5-7 days based on severity of symptoms and risk factors. Plan for next call: symptom management-migraine better?, follow up appointment-pcp and medication management-keflex X 3 days  CTN provided contact information for future needs. Goals Addressed                 This Visit's Progress     Prevent complications post hospitalization. 6/13/22   Activity- activity intolerance/energy conservation/frequent rest, exercise to increase mobility and prevent falls.  Medication compliance- Keflex x 3 days   Attend MINDI appt- PCP 6/21/22   Using teach back, reviewed red flags associated with sepsis- fever/chills, difficulty breathing, low blood pressure, fast heart rate, and mental confusion, N/V-dehydration.  CHAYITO

## 2022-06-14 LAB
BACTERIA SPEC CULT: NORMAL
SERVICE CMNT-IMP: NORMAL

## 2022-06-15 NOTE — PROGRESS NOTES
Physician Progress Note      PATIENT:               Harmeet Tavarez  CSN #:                  [id number]  :                       1975  ADMIT DATE:       2022 12:18 PM  Armen Robles DATE:        2022 3:31 PM  RESPONDING  PROVIDER #:        Sedrick Buerger MD Cristie Codding MD          QUERY TEXT:    Pt admitted with sepsis and UTI. Noted documentation of encephalopathy. If possible, please document in progress notes and discharge summary further specificity regarding the type of encephalopathy:    The medical record reflects the following:    Risk Factors: Sepsis, UTI, possible suspicion for acute meningitis    Clinical Indicators:  ED PN:  -Her mother states she has been confused off and on  - A&O x2, patient knew the year, and the month, but stated it was , and that it was Thursday.  Head CT Neg   EEG Neg   Dr. Lencho Hall PN: Pt A&Ox4. Patient reporting today that all of her symptoms got resolved  -MRI with no acute processes. Incidental 13 mm posterior fossa meningioma  6/10 Neuro: no sign of MS or stroke  6/10 Brain MRI: Incidental 13 mm posterior fossa meningioma.  d/c summary: Dizziness/off balance/confusion/slurred speech --- r/o MS versus CVA  (of note Nuro doc no signs of MS or CVA)    Treatment: Head CT, Neuro consult. EEG, Brain MRI,  per d/c summary:  Received ceftriaxone for 2 days, will discharge on Keflex for 3 more days. Thank Tanmay Daniels RN, [facility]  [phone]  Options provided:  -- Septic encephalopathy  -- Metabolic encephalopathy  -- Other - I will add my own diagnosis  -- Disagree - Not applicable / Not valid  -- Disagree - Clinically unable to determine / Unknown  -- Refer to Clinical Documentation Reviewer    PROVIDER RESPONSE TEXT:    This patient has septic encephalopathy.     Query created by: Sina Hardy on 6/10/2022 3:20 PM      Electronically signed by:  Sedrick Buerger MD Cristie Codding MD 6/15/2022 4:40 PM

## 2022-06-21 ENCOUNTER — OFFICE VISIT (OUTPATIENT)
Dept: FAMILY MEDICINE CLINIC | Age: 47
End: 2022-06-21
Payer: MEDICARE

## 2022-06-21 VITALS
OXYGEN SATURATION: 98 % | HEIGHT: 66 IN | HEART RATE: 113 BPM | BODY MASS INDEX: 47.09 KG/M2 | SYSTOLIC BLOOD PRESSURE: 158 MMHG | DIASTOLIC BLOOD PRESSURE: 102 MMHG | RESPIRATION RATE: 20 BRPM | TEMPERATURE: 99.2 F | WEIGHT: 293 LBS

## 2022-06-21 DIAGNOSIS — G43.709 CHRONIC MIGRAINE WITHOUT AURA, NOT INTRACTABLE, WITHOUT STATUS MIGRAINOSUS: ICD-10-CM

## 2022-06-21 DIAGNOSIS — N83.202 CYST OF LEFT OVARY: ICD-10-CM

## 2022-06-21 DIAGNOSIS — M79.7 FIBROMYALGIA: ICD-10-CM

## 2022-06-21 DIAGNOSIS — R42 DIZZINESS: ICD-10-CM

## 2022-06-21 DIAGNOSIS — Z79.899 POLYPHARMACY: ICD-10-CM

## 2022-06-21 DIAGNOSIS — G43.711 INTRACTABLE CHRONIC MIGRAINE WITHOUT AURA WITH STATUS MIGRAINOSUS: ICD-10-CM

## 2022-06-21 DIAGNOSIS — N39.0 URINARY TRACT INFECTION WITHOUT HEMATURIA, SITE UNSPECIFIED: Primary | ICD-10-CM

## 2022-06-21 DIAGNOSIS — G89.4 CHRONIC PAIN SYNDROME: ICD-10-CM

## 2022-06-21 LAB
BILIRUB UR QL STRIP: NEGATIVE
GLUCOSE UR-MCNC: NEGATIVE MG/DL
KETONES P FAST UR STRIP-MCNC: NEGATIVE MG/DL
PH UR STRIP: 7.5 [PH] (ref 4.6–8)
PROT UR QL STRIP: NEGATIVE
SP GR UR STRIP: 1.02 (ref 1–1.03)
UA UROBILINOGEN AMB POC: NORMAL (ref 0.2–1)
URINALYSIS CLARITY POC: NORMAL
URINALYSIS COLOR POC: NORMAL
URINE BLOOD POC: NEGATIVE
URINE LEUKOCYTES POC: NEGATIVE
URINE NITRITES POC: NEGATIVE

## 2022-06-21 PROCEDURE — G8427 DOCREV CUR MEDS BY ELIG CLIN: HCPCS | Performed by: FAMILY MEDICINE

## 2022-06-21 PROCEDURE — G8417 CALC BMI ABV UP PARAM F/U: HCPCS | Performed by: FAMILY MEDICINE

## 2022-06-21 PROCEDURE — 99214 OFFICE O/P EST MOD 30 MIN: CPT | Performed by: FAMILY MEDICINE

## 2022-06-21 PROCEDURE — 1111F DSCHRG MED/CURRENT MED MERGE: CPT | Performed by: FAMILY MEDICINE

## 2022-06-21 PROCEDURE — 81003 URINALYSIS AUTO W/O SCOPE: CPT | Performed by: FAMILY MEDICINE

## 2022-06-21 PROCEDURE — G9717 DOC PT DX DEP/BP F/U NT REQ: HCPCS | Performed by: FAMILY MEDICINE

## 2022-06-21 NOTE — PROGRESS NOTES
Chief Complaint   Patient presents with   [facility] Follow Up     F/U from [facility].       1. \"Have you been to the ER, urgent care clinic since your last visit? Hospitalized since your last visit? \" Yes When: [facility] on 6/8/22    2. \"Have you seen or consulted any other health care providers outside of the [address] [place] [address] since your last visit? \" No     3. For patients aged 39-70: Has the patient had a colonoscopy / FIT/ Cologuard? Yes - Care Gap present. Most recent result on file      If the patient is female:    4. For patients aged 41-77: Has the patient had a mammogram within the past 2 years? No      5. For patients aged 21-65: Has the patient had a pap smear?  No    Health Maintenance Due   Topic Date Due    Hepatitis C Screening  Never done    COVID-19 Vaccine (1) Never done    Pneumococcal 0-64 years (1 - PCV) Never done    Foot Exam Q1  Never done    MICROALBUMIN Q1  Never done    Eye Exam Retinal or Dilated  Never done    Cervical cancer screen  Never done    Colorectal Cancer Screening Combo  05/08/2018

## 2022-06-21 NOTE — PROGRESS NOTES
HISTORY OF PRESENT ILLNESS  Callie Albert is a 50 y.o. female. F/U hospitalization for UTI/Sepsis,AMS,discharged 1 week ago,had a brief spell of amnesia the day after discharge ,but is gradually improving. Very stressed by debilitated fathers care and her siblings [de-identified]. Remains bothered by chronic migraine,depression ,anxiety. No urinary sx    Hospital Follow Up  The history is provided by the patient. This is a new problem. The current episode started more than 1 week ago. The problem occurs daily. The problem has been gradually improving. Associated symptoms include headaches. Pertinent negatives include no chest pain and no abdominal pain. Fatigue  The history is provided by the patient. This is a chronic problem. The problem occurs daily. The problem has not changed since onset. Associated symptoms include headaches. Pertinent negatives include no chest pain and no abdominal pain. Headache  The history is provided by the patient. This is a chronic problem. The problem occurs daily. The problem has not changed since onset. Associated symptoms include headaches. Pertinent negatives include no chest pain and no abdominal pain. Review of Systems   Constitutional: Positive for fatigue. Negative for fever and malaise/fatigue. Cardiovascular: Negative for chest pain, palpitations and orthopnea. Gastrointestinal: Negative for abdominal pain. Genitourinary: Negative for dysuria. Musculoskeletal: Positive for back pain and joint pain. Negative for myalgias. Neurological: Positive for headaches. Psychiatric/Behavioral: Positive for depression. Physical Exam  Constitutional:       Appearance: She is obese. HENT:      Head: Normocephalic and atraumatic. Right Ear: Tympanic membrane normal.      Left Ear: Tympanic membrane normal.      Nose: Nose normal.      Mouth/Throat:      Mouth: Mucous membranes are moist.   Eyes:      Pupils: Pupils are equal, round, and reactive to light. Cardiovascular:      Rate and Rhythm: Normal rate and regular rhythm. Pulses: Normal pulses. Heart sounds: Normal heart sounds. Pulmonary:      Effort: Pulmonary effort is normal.      Breath sounds: Normal breath sounds. Musculoskeletal:      Cervical back: Neck supple. Skin:     General: Skin is warm and dry. Neurological:      Mental Status: She is alert and oriented to person, place, and time. Mental status is at baseline. Cranial Nerves: No cranial nerve deficit. Psychiatric:         Mood and Affect: Mood normal.         Diagnoses and all orders for this visit:    1. Urinary tract infection without hematuria, site unspecified  -     AMB POC URINALYSIS DIP STICK AUTO W/O MICRO    2. Dizziness  -     REFERRAL TO NEUROLOGY    3. Chronic migraine without aura, not intractable, without status migrainosus  -     REFERRAL TO NEUROLOGY    4. Fibromyalgia    5. Chronic pain syndrome    6. Cyst of left ovary    7. Polypharmacy  reviewed med list and reduced sedating meds to those absolutely needed      Follow-up and Dispositions    · Return in about 4 weeks (around 7/19/2022). Follow-up and Dispositions    · Return in about 4 weeks (around 7/19/2022).

## 2022-06-22 ENCOUNTER — TELEPHONE (OUTPATIENT)
Dept: NEUROLOGY | Age: 47
End: 2022-06-22

## 2022-06-22 ENCOUNTER — PATIENT OUTREACH (OUTPATIENT)
Dept: CASE MANAGEMENT | Age: 47
End: 2022-06-22

## 2022-06-22 NOTE — TELEPHONE ENCOUNTER
Patient called stating that she saw Dr. Xochilt Valerio in the hospital and that he wanted her to be seen within 3 to 4 weeks. I advised Pt that I did not see any openings within that time frame but would send a message to . Please advise.      [phone]

## 2022-06-23 ENCOUNTER — TELEPHONE (OUTPATIENT)
Dept: NEUROLOGY | Age: 47
End: 2022-06-23

## 2022-06-23 NOTE — TELEPHONE ENCOUNTER
Hi,    Please see my hospital note. I recommended follow up with Hoda Garcia. I typically place in my hospital note recommendations for follow up. Thanks.   Eliot Calixto

## 2022-06-23 NOTE — TELEPHONE ENCOUNTER
Patient returned called to say that if need be she can wait to be seen until her next appt with Phillip Kilpatrick.   8/5    [phone]

## 2022-06-24 RX ORDER — ONABOTULINUMTOXINA 200 [USP'U]/1
INJECTION, POWDER, LYOPHILIZED, FOR SOLUTION INTRADERMAL; INTRAMUSCULAR
Qty: 1 EACH | Refills: 3 | Status: SHIPPED | OUTPATIENT
Start: 2022-06-24

## 2022-06-29 DIAGNOSIS — R25.2 MUSCLE CRAMP: ICD-10-CM

## 2022-06-29 DIAGNOSIS — G43.709 CHRONIC MIGRAINE WITHOUT AURA, NOT INTRACTABLE, WITHOUT STATUS MIGRAINOSUS: ICD-10-CM

## 2022-06-29 RX ORDER — CYCLOBENZAPRINE HCL 10 MG
10 TABLET ORAL
Qty: 50 TABLET | Refills: 1 | Status: SHIPPED | OUTPATIENT
Start: 2022-06-29 | End: 2022-08-07

## 2022-06-29 RX ORDER — PROMETHAZINE HYDROCHLORIDE 25 MG/1
TABLET ORAL
Qty: 50 TABLET | Refills: 0 | Status: SHIPPED | OUTPATIENT
Start: 2022-06-29 | End: 2022-07-20

## 2022-07-06 DIAGNOSIS — M79.7 FIBROMYALGIA: ICD-10-CM

## 2022-07-06 DIAGNOSIS — M54.9 ACUTE BILATERAL BACK PAIN, UNSPECIFIED BACK LOCATION: ICD-10-CM

## 2022-07-06 DIAGNOSIS — F32.A DEPRESSION, UNSPECIFIED DEPRESSION TYPE: ICD-10-CM

## 2022-07-06 DIAGNOSIS — F41.1 GENERALIZED ANXIETY DISORDER: ICD-10-CM

## 2022-07-06 RX ORDER — ZOLPIDEM TARTRATE 5 MG/1
5 TABLET ORAL
Qty: 30 TABLET | Refills: 0 | Status: SHIPPED | OUTPATIENT
Start: 2022-07-06 | End: 2022-08-05 | Stop reason: SDUPTHER

## 2022-07-06 RX ORDER — ALPRAZOLAM 0.5 MG/1
0.5 TABLET ORAL
Qty: 90 TABLET | Refills: 0 | Status: SHIPPED | OUTPATIENT
Start: 2022-07-06 | End: 2022-08-05 | Stop reason: SDUPTHER

## 2022-07-06 RX ORDER — HYDROCODONE BITARTRATE AND ACETAMINOPHEN 7.5; 325 MG/1; MG/1
1 TABLET ORAL
Qty: 50 TABLET | Refills: 0 | Status: SHIPPED | OUTPATIENT
Start: 2022-07-06 | End: 2022-08-05 | Stop reason: SDUPTHER

## 2022-07-06 RX ORDER — TIZANIDINE 4 MG/1
4 TABLET ORAL EVERY 6 HOURS
Qty: 50 TABLET | Refills: 0 | Status: SHIPPED | OUTPATIENT
Start: 2022-07-06 | End: 2022-07-20

## 2022-07-19 ENCOUNTER — PATIENT OUTREACH (OUTPATIENT)
Dept: CASE MANAGEMENT | Age: 47
End: 2022-07-19

## 2022-07-19 NOTE — PROGRESS NOTES
Patient has graduated from the Transitions of Care Coordination  program on 7/19/22. Patient/family has the ability to self-manage at this time Care management goals have been completed. Patient was not referred to the [facility] team for further management. Goals Addressed                 This Visit's Progress     COMPLETED: Prevent complications post hospitalization. 6/13/22   Activity- activity intolerance/energy conservation/frequent rest, exercise to increase mobility and prevent falls.  Medication compliance- Keflex x 3 days   Attend MINDI appt- PCP 6/21/22   Using teach back, reviewed red flags associated with sepsis- fever/chills, difficulty breathing, low blood pressure, fast heart rate, and mental confusion, N/V-dehydration. [facility]  6/22/22 reports attendance of MINDI appt, completion of ABX and repeat urinalysis. Denies red flags. Will follow with patient in 2 weeks. [facility]    07/19/22    Patient states she is driving her car and can't talk right now, states she is doing well and has no needs at this time. Meme Valle MSN, RN, [facility] / Care Transition Nurse / [phone]             Patient has Care Transition Nurse's contact information for any further questions, concerns, or needs.   Patients upcoming visits:    Future Appointments   Date Time Provider Meeta Malik   7/26/2022  2:40 PM Ezio Oquendo MD [facility] BS AMB   8/5/2022  1:00 PM Merryl Grills, NP NEUM BS AMB   10/28/2022  8:30 AM Merryl Grills, NP NEUM BS AMB   1/20/2023  8:30 AM Merryl Grills, NP NEUM BS AMB

## 2022-07-20 DIAGNOSIS — G43.709 CHRONIC MIGRAINE WITHOUT AURA, NOT INTRACTABLE, WITHOUT STATUS MIGRAINOSUS: ICD-10-CM

## 2022-07-20 DIAGNOSIS — M54.9 ACUTE BILATERAL BACK PAIN, UNSPECIFIED BACK LOCATION: ICD-10-CM

## 2022-07-20 RX ORDER — TIZANIDINE 4 MG/1
4 TABLET ORAL EVERY 6 HOURS
Qty: 50 TABLET | Refills: 0 | Status: SHIPPED | OUTPATIENT
Start: 2022-07-20 | End: 2022-09-01

## 2022-07-20 RX ORDER — PROMETHAZINE HYDROCHLORIDE 25 MG/1
TABLET ORAL
Qty: 50 TABLET | Refills: 0 | Status: SHIPPED | OUTPATIENT
Start: 2022-07-20 | End: 2022-08-10

## 2022-07-22 ENCOUNTER — TELEPHONE (OUTPATIENT)
Dept: PHARMACY | Age: 47
End: 2022-07-22

## 2022-07-22 NOTE — TELEPHONE ENCOUNTER
Julia Booth MD - would patient benefit from statin therapy? Jeny Her has an appointment with you 2022, She has been identified with a care gap for Diabetes without a Statin. She meets all criteria from ADA and ACC/AHA current recommendations to initiate statin therapy. The ASCVD Risk score (Alessandra Vanessa, et al., 2013) failed to calculate for the following reasons: The patient has a prior MI or stroke diagnosis   Cholesterol, total 219 (H) 2021 04:17 PM   HDL Cholesterol 43 2021 02:20 AM   LDL, calculated 135.2 (H) 2021 02:20 AM     Hyperlipidemia Goal: Patient has a history of ASCVD and is therefore a candidate for high-intensity statin therapy based on updated guidelines. 2019 ADA Guidelines Age:    >/= 36years old:   History of ASCVD or 10-year ASCVD risk > 20% - high-intensity statin is recommended. LDL goal <70mg/dL (DM and MI,Stroke)     At this office visit I recommend initiating Rosuvastatin 10 mg once daily. Please let me know if you have any questions or if I may assist!  I can contact the patient to discuss any changes in therapy. For Adherence, Jagruti Spence is out of refills (rx ) for Metformin 500 mg ER, she will be due end of August.     Thank you,  Rodrigo Clark, PharmD, [address] [place] [address], toll free: [phone] Option 2  ==============================================================  [facility]OPULATION [facility]: STATIN THERAPY REVIEW  Identified statin use in persons with diabetes care gap per [employer] Records dated: 2022. Last Visit: 2022    ASSESSMENT    Estimated Creatinine Clearance: 144.9 mL/min (A) (by C-G formula based on SCr of 0.53 mg/dL (L)). DIABETES ADHERENCE  Per Insurance Records through 2022 ( [place] = NA;  Kirill Chen =  94%; Potential Fail Date: 10/18/2022 ):   Metformin 500mg ER last filled on 2022 for 90 day supply.  Next refill due: 08/24/2022      Per [facility]:   Metformin last picked up on 5/23/2022 for 90 day supply. 0 refills remaining. Billed through [employer] Value Date/Time    Hemoglobin A1c 6.5 (H) 06/09/2022 04:00 AM    Hemoglobin A1c 7.0 (H) 08/23/2021 11:30 AM    Hemoglobin A1c 6.5 (H) 04/19/2021 02:20 AM    Hemoglobin A1c (POC) 6.3 03/01/2022 11:30 AM    Hemoglobin A1c (POC) 5.7 08/13/2020 01:25 PM    Hemoglobin A1c (POC) 5.7 10/17/2017 09:50 AM     NOTE A1c <9%    STATIN GAP IDENTIFIED    Per chart review, patient is not prescribed a statin      Lab Results   Component Value Date/Time    Cholesterol, total 219 (H) 11/23/2021 04:17 PM    HDL Cholesterol 43 04/19/2021 02:20 AM    LDL, calculated 135.2 (H) 04/19/2021 02:20 AM    VLDL, calculated 51.8 04/19/2021 02:20 AM    Triglyceride 259 (H) 04/19/2021 02:20 AM    CHOL/HDL Ratio 5.3 (H) 04/19/2021 02:20 AM     ALT (SGPT)   Date Value Ref Range Status   06/11/2022 23 12 - 78 U/L Final     AST (SGOT)   Date Value Ref Range Status   06/11/2022 18 15 - 37 U/L Final     The ASCVD Risk score (Nevin Chaney, et al., 2013) failed to calculate for the following reasons: The patient has a prior MI or stroke diagnosis     Hyperlipidemia Goal: Patient has a history of ASCVD and is therefore a candidate for high-intensity statin therapy based on updated guidelines. 2019 ADA Guidelines Age:      >/= 36years old:   History of ASCVD or 10-year ASCVD risk > 20% - high-intensity statin is recommended.    LDL goal <70mg/dL (DM and MI,Stroke)    PLAN  The following are interventions that have been identified:  - Patient needs refills for Metformin  - Patient identified as having SUPD gap      Future Appointments   Date Time Provider Meeta Malik   7/26/2022  2:40 PM Santiago Lynch MD [facility] BS AMB   8/5/2022  1:00 PM Burnice Stable, CATIE WEIR AMB   10/28/2022  8:30 AM Burnice Stable, CATIE WEIR AMB   1/20/2023  8:30 AM Burnice Stable, NP [facility], PharmD, [address] [facility]   Department, toll free: [phone] Option 2  ================================================================================  Doned by MD  For Pharmacy Admin Tracking Only    CPA in place: No  Recommendation Provided To: Provider: 1 via Note to Provider   Intervention Detail: New Rx: 1, reason: Needs Additional Therapy  Gap Closed?: No  Intervention Accepted By: Provider: 0  Time Spent (min): 15

## 2022-07-26 ENCOUNTER — TELEPHONE (OUTPATIENT)
Dept: NEUROLOGY | Age: 47
End: 2022-07-26

## 2022-07-26 ENCOUNTER — OFFICE VISIT (OUTPATIENT)
Dept: FAMILY MEDICINE CLINIC | Age: 47
End: 2022-07-26
Payer: MEDICARE

## 2022-07-26 VITALS
RESPIRATION RATE: 20 BRPM | SYSTOLIC BLOOD PRESSURE: 118 MMHG | WEIGHT: 293 LBS | HEIGHT: 66 IN | TEMPERATURE: 99.1 F | HEART RATE: 119 BPM | DIASTOLIC BLOOD PRESSURE: 81 MMHG | BODY MASS INDEX: 47.09 KG/M2 | OXYGEN SATURATION: 97 %

## 2022-07-26 DIAGNOSIS — E11.9 TYPE 2 DIABETES MELLITUS WITHOUT COMPLICATION, WITHOUT LONG-TERM CURRENT USE OF INSULIN (HCC): Primary | ICD-10-CM

## 2022-07-26 DIAGNOSIS — E78.2 MIXED HYPERLIPIDEMIA: ICD-10-CM

## 2022-07-26 DIAGNOSIS — G89.4 CHRONIC PAIN SYNDROME: ICD-10-CM

## 2022-07-26 DIAGNOSIS — G43.709 CHRONIC MIGRAINE WITHOUT AURA, NOT INTRACTABLE, WITHOUT STATUS MIGRAINOSUS: ICD-10-CM

## 2022-07-26 DIAGNOSIS — F41.1 GENERALIZED ANXIETY DISORDER: ICD-10-CM

## 2022-07-26 DIAGNOSIS — M79.7 FIBROMYALGIA: ICD-10-CM

## 2022-07-26 PROCEDURE — G8427 DOCREV CUR MEDS BY ELIG CLIN: HCPCS | Performed by: FAMILY MEDICINE

## 2022-07-26 PROCEDURE — 99214 OFFICE O/P EST MOD 30 MIN: CPT | Performed by: FAMILY MEDICINE

## 2022-07-26 PROCEDURE — G9717 DOC PT DX DEP/BP F/U NT REQ: HCPCS | Performed by: FAMILY MEDICINE

## 2022-07-26 PROCEDURE — 2022F DILAT RTA XM EVC RTNOPTHY: CPT | Performed by: FAMILY MEDICINE

## 2022-07-26 PROCEDURE — G8417 CALC BMI ABV UP PARAM F/U: HCPCS | Performed by: FAMILY MEDICINE

## 2022-07-26 PROCEDURE — 3044F HG A1C LEVEL LT 7.0%: CPT | Performed by: FAMILY MEDICINE

## 2022-07-26 NOTE — PROGRESS NOTES
Identified pt with two pt identifiers(name and ). Reviewed record in preparation for visit and have obtained necessary documentation. Chief Complaint   Patient presents with    Follow-up     1 month follow up         Vitals:    22 1503   BP: 118/81   Pulse: (!) 119   Resp: 20   Temp: 99.1 °F (37.3 °C)   TempSrc: Oral   SpO2: 97%   Weight: 310 lb (140.6 kg)   Height: 5' 6\" (1.676 m)       Health Maintenance Due   Topic    Hepatitis C Screening     COVID-19 Vaccine (1)    Pneumococcal 0-64 years (1 - PCV)    Foot Exam Q1     MICROALBUMIN Q1     Eye Exam Retinal or Dilated     Cervical cancer screen     Colorectal Cancer Screening Combo        Coordination of Care Questionnaire:  :   1) Have you been to an emergency room, urgent care, or hospitalized since your last visit? If yes, where when, and reason for visit? no       2. Have seen or consulted any other health care provider since your last visit? If yes, where when, and reason for visit?   NO

## 2022-07-26 NOTE — PROGRESS NOTES
HISTORY OF PRESENT ILLNESS  Author Reason is a 50 y.o. female. f/u grief over fathers death,had recent uti/sepsis hospitalization. Feeling ok/relieved with bargain to buy fathers house  Anxiety  The history is provided by the Patient. This is a chronic problem. The problem occurs daily. The problem has been gradually improving. Associated symptoms include headaches. Pertinent negatives include no chest pain and no abdominal pain. Headache  The history is provided by the Patient. This is a chronic problem. The problem occurs daily. Associated symptoms include headaches. Pertinent negatives include no chest pain and no abdominal pain. Abnormal Lab Results  Associated symptoms include headaches. Pertinent negatives include no chest pain and no abdominal pain. Review of Systems   Constitutional:  Negative for fever and malaise/fatigue. Cardiovascular:  Negative for chest pain and palpitations. Gastrointestinal:  Negative for abdominal pain and constipation. Neurological:  Positive for headaches. Psychiatric/Behavioral:  Positive for depression. The patient is nervous/anxious. Physical Exam  Constitutional:       Appearance: Normal appearance. HENT:      Head: Normocephalic and atraumatic. Right Ear: Tympanic membrane normal.      Left Ear: Tympanic membrane normal.      Nose: Nose normal.   Cardiovascular:      Rate and Rhythm: Normal rate and regular rhythm. Pulses: Normal pulses. Heart sounds: Normal heart sounds. Pulmonary:      Effort: Pulmonary effort is normal.      Breath sounds: Normal breath sounds. Skin:     General: Skin is warm and dry. Neurological:      Mental Status: She is alert and oriented to person, place, and time. ASSESSMENT and PLAN  Diagnoses and all orders for this visit:    1. Type 2 diabetes mellitus without complication, without long-term current use of insulin ([facility])  -     METABOLIC PANEL, COMPREHENSIVE;  Future  -     CBC WITH AUTOMATED DIFF; Future    2. Chronic migraine without aura, not intractable, without status migrainosus    3. Fibromyalgia    4. Generalized anxiety disorder    5. Chronic pain syndrome    6. Mixed hyperlipidemia  Generally improving. less stress. Continue current meds and treatments,and call if you have any questions.

## 2022-07-27 LAB
ALBUMIN SERPL-MCNC: 4 G/DL (ref 3.5–5)
ALBUMIN/GLOB SERPL: 1 {RATIO} (ref 1.1–2.2)
ALP SERPL-CCNC: 78 U/L (ref 45–117)
ALT SERPL-CCNC: 38 U/L (ref 12–78)
ANION GAP SERPL CALC-SCNC: 7 MMOL/L (ref 5–15)
AST SERPL-CCNC: 24 U/L (ref 15–37)
BASOPHILS # BLD: 0.1 K/UL (ref 0–0.1)
BASOPHILS NFR BLD: 1 % (ref 0–1)
BILIRUB SERPL-MCNC: 0.2 MG/DL (ref 0.2–1)
BUN SERPL-MCNC: 16 MG/DL (ref 6–20)
BUN/CREAT SERPL: 19 (ref 12–20)
CALCIUM SERPL-MCNC: 10 MG/DL (ref 8.5–10.1)
CHLORIDE SERPL-SCNC: 100 MMOL/L (ref 97–108)
CO2 SERPL-SCNC: 31 MMOL/L (ref 21–32)
CREAT SERPL-MCNC: 0.85 MG/DL (ref 0.55–1.02)
DIFFERENTIAL METHOD BLD: ABNORMAL
EOSINOPHIL # BLD: 0 K/UL (ref 0–0.4)
EOSINOPHIL NFR BLD: 0 % (ref 0–7)
ERYTHROCYTE [DISTWIDTH] IN BLOOD BY AUTOMATED COUNT: 15.6 % (ref 11.5–14.5)
GLOBULIN SER CALC-MCNC: 4 G/DL (ref 2–4)
GLUCOSE SERPL-MCNC: 159 MG/DL (ref 65–100)
HCT VFR BLD AUTO: 40.4 % (ref 35–47)
HGB BLD-MCNC: 12.5 G/DL (ref 11.5–16)
IMM GRANULOCYTES # BLD AUTO: 0.1 K/UL (ref 0–0.04)
IMM GRANULOCYTES NFR BLD AUTO: 1 % (ref 0–0.5)
LYMPHOCYTES # BLD: 3.3 K/UL (ref 0.8–3.5)
LYMPHOCYTES NFR BLD: 27 % (ref 12–49)
MCH RBC QN AUTO: 29.1 PG (ref 26–34)
MCHC RBC AUTO-ENTMCNC: 30.9 G/DL (ref 30–36.5)
MCV RBC AUTO: 94.2 FL (ref 80–99)
MONOCYTES # BLD: 0.7 K/UL (ref 0–1)
MONOCYTES NFR BLD: 6 % (ref 5–13)
NEUTS SEG # BLD: 7.9 K/UL (ref 1.8–8)
NEUTS SEG NFR BLD: 65 % (ref 32–75)
NRBC # BLD: 0 K/UL (ref 0–0.01)
NRBC BLD-RTO: 0 PER 100 WBC
PLATELET # BLD AUTO: 537 K/UL (ref 150–400)
PMV BLD AUTO: 9.3 FL (ref 8.9–12.9)
POTASSIUM SERPL-SCNC: 3.6 MMOL/L (ref 3.5–5.1)
PROT SERPL-MCNC: 8 G/DL (ref 6.4–8.2)
RBC # BLD AUTO: 4.29 M/UL (ref 3.8–5.2)
SODIUM SERPL-SCNC: 138 MMOL/L (ref 136–145)
WBC # BLD AUTO: 12 K/UL (ref 3.6–11)

## 2022-07-29 ENCOUNTER — DOCUMENTATION ONLY (OUTPATIENT)
Dept: NEUROLOGY | Age: 47
End: 2022-07-29

## 2022-07-29 DIAGNOSIS — G43.719 INTRACTABLE CHRONIC MIGRAINE WITHOUT AURA AND WITHOUT STATUS MIGRAINOSUS: ICD-10-CM

## 2022-07-29 DIAGNOSIS — M79.7 FIBROMYALGIA: ICD-10-CM

## 2022-07-29 DIAGNOSIS — F32.A DEPRESSION, UNSPECIFIED DEPRESSION TYPE: ICD-10-CM

## 2022-07-29 NOTE — TELEPHONE ENCOUNTER
Patient's mother has fallen ill and Pt has to travel to [place] to be with her. Pt needs to r/s her botox appt that is on 8/5. She asked if her appt could be rescheduled for as soon as possible. Anytime after the 8/5. Please advise.  [phone]

## 2022-07-29 NOTE — TELEPHONE ENCOUNTER
Confirmed patient id. Patient has been rescheduled to 8/16. Patient states that she got a letter in the mail that Coral Teran has been approved.   Please resend the script to the pharmacy

## 2022-08-01 RX ORDER — TOPIRAMATE 100 MG/1
100 TABLET, FILM COATED ORAL 2 TIMES DAILY
Qty: 180 TABLET | Refills: 1 | Status: SHIPPED | OUTPATIENT
Start: 2022-08-01

## 2022-08-01 RX ORDER — ZOLPIDEM TARTRATE 5 MG/1
5 TABLET ORAL
Qty: 30 TABLET | Refills: 0 | OUTPATIENT
Start: 2022-08-01

## 2022-08-02 ENCOUNTER — DOCUMENTATION ONLY (OUTPATIENT)
Dept: NEUROLOGY | Age: 47
End: 2022-08-02

## 2022-08-02 NOTE — PROGRESS NOTES
Received botox from [facility] for patient appointment set for 8/16  :  Allergan  Lot number [id number]  Expiration date: 01/31/2025  Ndc [id number]

## 2022-08-05 DIAGNOSIS — R25.2 MUSCLE CRAMP: ICD-10-CM

## 2022-08-07 RX ORDER — CYCLOBENZAPRINE HCL 10 MG
10 TABLET ORAL
Qty: 50 TABLET | Refills: 1 | Status: SHIPPED | OUTPATIENT
Start: 2022-08-07 | End: 2022-09-07

## 2022-08-10 DIAGNOSIS — F41.1 GENERALIZED ANXIETY DISORDER: ICD-10-CM

## 2022-08-10 DIAGNOSIS — G43.709 CHRONIC MIGRAINE WITHOUT AURA, NOT INTRACTABLE, WITHOUT STATUS MIGRAINOSUS: ICD-10-CM

## 2022-08-10 RX ORDER — METFORMIN HYDROCHLORIDE 500 MG/1
TABLET, EXTENDED RELEASE ORAL
Qty: 90 TABLET | Refills: 4 | Status: SHIPPED | OUTPATIENT
Start: 2022-08-10

## 2022-08-10 RX ORDER — ALPRAZOLAM 0.5 MG/1
0.5 TABLET ORAL
Qty: 90 TABLET | Refills: 1 | Status: SHIPPED | OUTPATIENT
Start: 2022-08-10 | End: 2022-08-17 | Stop reason: SDUPTHER

## 2022-08-10 RX ORDER — PROMETHAZINE HYDROCHLORIDE 25 MG/1
TABLET ORAL
Qty: 50 TABLET | Refills: 0 | Status: SHIPPED | OUTPATIENT
Start: 2022-08-10 | End: 2022-09-01

## 2022-08-16 ENCOUNTER — OFFICE VISIT (OUTPATIENT)
Dept: NEUROLOGY | Age: 47
End: 2022-08-16
Payer: MEDICARE

## 2022-08-16 DIAGNOSIS — G43.719 INTRACTABLE CHRONIC MIGRAINE WITHOUT AURA AND WITHOUT STATUS MIGRAINOSUS: ICD-10-CM

## 2022-08-16 DIAGNOSIS — Z92.29 S/P BOTOX INJECTION: Primary | ICD-10-CM

## 2022-08-16 PROCEDURE — 64615 CHEMODENERV MUSC MIGRAINE: CPT | Performed by: PSYCHIATRY & NEUROLOGY

## 2022-08-16 NOTE — PATIENT INSTRUCTIONS
Office Policies      Appointments  Please make sure that you arrive for your next appointment at least 15 minutes prior to your appointment time. If for some reason you are going to be late please notify the office to determine if you need to be rescheduled or we can adjust your appointment time      Phone calls/patient messages:  Please allow up to 24 hours for someone in the office to contact you about your call or message. Be mindful your provider may be out of the office or your message may require further review. We encourage you to use [employer] for your messages as this is a faster, more efficient way to communicate with our office    Medication Refills:  Prescription medications require up to 48 business hours to process. We encourage you to use [employer] for your refills. For controlled medications: Please allow up to 72 business hours to process. Certain medications may require you to  a written prescription at our office. NO narcotic/controlled medications will be prescribed after 4pm Monday through Friday or on weekends    Form/Paperwork Completion:  We ask that you allow 7-14 business days. You may also download your forms to [employer] to have your doctor print off.

## 2022-08-17 DIAGNOSIS — F41.1 GENERALIZED ANXIETY DISORDER: ICD-10-CM

## 2022-08-17 DIAGNOSIS — M54.9 ACUTE BILATERAL BACK PAIN, UNSPECIFIED BACK LOCATION: ICD-10-CM

## 2022-08-17 RX ORDER — HYDROCODONE BITARTRATE AND ACETAMINOPHEN 7.5; 325 MG/1; MG/1
1 TABLET ORAL
Qty: 30 TABLET | Refills: 0 | OUTPATIENT
Start: 2022-08-17 | End: 2022-10-06

## 2022-08-17 RX ORDER — ALPRAZOLAM 0.5 MG/1
0.5 TABLET ORAL
Qty: 90 TABLET | Refills: 1 | Status: SHIPPED | OUTPATIENT
Start: 2022-08-17

## 2022-09-01 DIAGNOSIS — G43.909 MIGRAINE WITHOUT STATUS MIGRAINOSUS, NOT INTRACTABLE, UNSPECIFIED MIGRAINE TYPE: ICD-10-CM

## 2022-09-01 DIAGNOSIS — G43.709 CHRONIC MIGRAINE WITHOUT AURA, NOT INTRACTABLE, WITHOUT STATUS MIGRAINOSUS: ICD-10-CM

## 2022-09-01 DIAGNOSIS — M54.9 ACUTE BILATERAL BACK PAIN, UNSPECIFIED BACK LOCATION: ICD-10-CM

## 2022-09-01 RX ORDER — GABAPENTIN 600 MG/1
TABLET ORAL
Qty: 270 TABLET | Refills: 0 | Status: SHIPPED | OUTPATIENT
Start: 2022-09-01

## 2022-09-01 RX ORDER — TIZANIDINE 4 MG/1
4 TABLET ORAL EVERY 6 HOURS
Qty: 50 TABLET | Refills: 0 | Status: SHIPPED | OUTPATIENT
Start: 2022-09-01 | End: 2022-10-03

## 2022-09-01 RX ORDER — PROMETHAZINE HYDROCHLORIDE 25 MG/1
TABLET ORAL
Qty: 50 TABLET | Refills: 0 | Status: SHIPPED | OUTPATIENT
Start: 2022-09-01 | End: 2022-09-14

## 2022-09-07 DIAGNOSIS — G43.709 CHRONIC MIGRAINE WITHOUT AURA, NOT INTRACTABLE, WITHOUT STATUS MIGRAINOSUS: Primary | ICD-10-CM

## 2022-09-07 DIAGNOSIS — F32.A DEPRESSION, UNSPECIFIED DEPRESSION TYPE: ICD-10-CM

## 2022-09-07 DIAGNOSIS — R25.2 MUSCLE CRAMP: ICD-10-CM

## 2022-09-07 DIAGNOSIS — M79.7 FIBROMYALGIA: ICD-10-CM

## 2022-09-07 RX ORDER — CYCLOBENZAPRINE HCL 10 MG
10 TABLET ORAL
Qty: 50 TABLET | Refills: 1 | Status: SHIPPED | OUTPATIENT
Start: 2022-09-07 | End: 2022-10-18

## 2022-09-07 NOTE — TELEPHONE ENCOUNTER
----- Message from Linette Mojica sent at 9/7/2022  8:09 AM EDT -----  Subject: Refill Request    QUESTIONS  Name of Medication? HYDROcodone-acetaminophen (NORCO) 7.5-325 mg per   tablet  Patient-reported dosage and instructions? 7.5-325mg as needed  How many days do you have left? 0  Preferred Pharmacy? [id number]  Pharmacy phone number (if available)? [phone]  Additional Information for Provider? supply 50 pills , please call when   done  ---------------------------------------------------------------------------  --------------  CALL BACK INFO  What is the best way for the office to contact you? OK to leave message on   voicemail  Preferred Call Back Phone Number? [phone]  ---------------------------------------------------------------------------  --------------  SCRIPT ANSWERS  Relationship to Patient?  Self

## 2022-09-08 RX ORDER — HYDROCODONE BITARTRATE AND ACETAMINOPHEN 7.5; 325 MG/1; MG/1
1 TABLET ORAL
Qty: 30 TABLET | Refills: 0 | Status: SHIPPED | OUTPATIENT
Start: 2022-09-08 | End: 2022-10-12 | Stop reason: SDUPTHER

## 2022-09-11 RX ORDER — ZOLPIDEM TARTRATE 5 MG/1
5 TABLET ORAL
Qty: 30 TABLET | Refills: 0 | Status: SHIPPED | OUTPATIENT
Start: 2022-09-11

## 2022-09-14 DIAGNOSIS — F32.A DEPRESSION, UNSPECIFIED DEPRESSION TYPE: ICD-10-CM

## 2022-09-14 DIAGNOSIS — F41.1 GENERALIZED ANXIETY DISORDER: ICD-10-CM

## 2022-09-14 DIAGNOSIS — G43.709 CHRONIC MIGRAINE WITHOUT AURA, NOT INTRACTABLE, WITHOUT STATUS MIGRAINOSUS: ICD-10-CM

## 2022-09-14 RX ORDER — PROMETHAZINE HYDROCHLORIDE 25 MG/1
TABLET ORAL
Qty: 50 TABLET | Refills: 0 | Status: SHIPPED | OUTPATIENT
Start: 2022-09-14 | End: 2022-10-03

## 2022-09-14 RX ORDER — VENLAFAXINE HYDROCHLORIDE 150 MG/1
CAPSULE, EXTENDED RELEASE ORAL
Qty: 90 CAPSULE | Refills: 1 | Status: SHIPPED | OUTPATIENT
Start: 2022-09-14

## 2022-09-23 ENCOUNTER — TELEPHONE (OUTPATIENT)
Dept: PHARMACY | Age: 47
End: 2022-09-23

## 2022-09-23 NOTE — TELEPHONE ENCOUNTER
Mike Kimble MD - would patient benefit from statin therapy? Royer Brasher  has an appointment with you 9/26/2022. She has been identified with a care gap for diabetes without a statin (SUPD). She meets all  current ADA and ACC/AHA guidelines to initiate statin therapy     Per ADA 2019 Guidelines she is a candidate for a high -intensity statin. Hyperlipidemia Goal: Patient has a history of ASCVD and is therefore a candidate for high-intensity statin therapy based on updated guidelines. The patient has a prior MI or stroke diagnosis   2019 ADA Guidelines Age:    >/= 36years old:   History of ASCVD or 10-year ASCVD risk > 20% - high-intensity statin is recommended. LDL goal < 70 mg/dL (135 mg/dL)    If Royer Brasher has tried and failed two statins in the past from [facility], she can be excluded from this care gap by placing a CMS approved dx code in a billable office visit. If she cannot take a statin due to any of the following, please place a dx code in her OV to have Royer Brasher excluded from this care gap for 2022. Please submit one of the following CMS allowable diagnoses within visit encounter:  Myalgia due to statin (M79.10, T46.6X5A)   Statin myopathy (G72.0)  Adverse reaction to statin (I77.2R0P)     Please let me know if you have any questions!     Thank you,  Bennie Grossman, PharmD, [facility], toll free: [phone] Option 2  ================================================================================   [facility]ATION [facility]: STATIN THERAPY REVIEW  Identified statin use in persons with diabetes care gap per [employer]    Last Visit: 7/26/22    Lab Results   Component Value Date/Time    Cholesterol, total 219 (H) 11/23/2021 04:17 PM    HDL Cholesterol 43 04/19/2021 02:20 AM    LDL, calculated 135.2 (H) 04/19/2021 02:20 AM    VLDL, calculated 51.8 04/19/2021 02:20 AM Triglyceride 259 (H) 04/19/2021 02:20 AM    CHOL/HDL Ratio 5.3 (H) 04/19/2021 02:20 AM     ALT (SGPT)   Date Value Ref Range Status   07/26/2022 38 12 - 78 U/L Final     AST (SGOT)   Date Value Ref Range Status   07/26/2022 24 15 - 37 U/L Final     The ASCVD Risk score (Alma SILVA, et al., 2019) failed to calculate for the following reasons:     The patient has a prior MI or stroke diagnosis         Thank you,  Otilio Aparicio, PharmD, [address] [facility], toll free: [phone] Option 2   ================================================================================  \"Doned\" by PCP no mention of statin in [address] [place] [address] in place: No  Recommendation Provided To: Provider: 1 via Note to Provider   Intervention Detail: New Rx: 1, reason: Needs Additional Therapy  Gap Closed?: No  Intervention Accepted By: Provider: 0  Time Spent (min): 15

## 2022-09-26 ENCOUNTER — OFFICE VISIT (OUTPATIENT)
Dept: FAMILY MEDICINE CLINIC | Age: 47
End: 2022-09-26
Payer: MEDICARE

## 2022-09-26 VITALS
OXYGEN SATURATION: 96 % | WEIGHT: 293 LBS | SYSTOLIC BLOOD PRESSURE: 140 MMHG | BODY MASS INDEX: 47.09 KG/M2 | HEART RATE: 116 BPM | RESPIRATION RATE: 16 BRPM | TEMPERATURE: 99 F | DIASTOLIC BLOOD PRESSURE: 95 MMHG | HEIGHT: 66 IN

## 2022-09-26 DIAGNOSIS — F32.A DEPRESSION, UNSPECIFIED DEPRESSION TYPE: ICD-10-CM

## 2022-09-26 DIAGNOSIS — F41.1 GENERALIZED ANXIETY DISORDER: ICD-10-CM

## 2022-09-26 DIAGNOSIS — Z79.899 POLYPHARMACY: ICD-10-CM

## 2022-09-26 DIAGNOSIS — K63.5 POLYP OF COLON, UNSPECIFIED PART OF COLON, UNSPECIFIED TYPE: ICD-10-CM

## 2022-09-26 DIAGNOSIS — E11.9 TYPE 2 DIABETES MELLITUS WITHOUT COMPLICATION, WITHOUT LONG-TERM CURRENT USE OF INSULIN (HCC): ICD-10-CM

## 2022-09-26 DIAGNOSIS — M79.7 FIBROMYALGIA: ICD-10-CM

## 2022-09-26 DIAGNOSIS — G43.709 CHRONIC MIGRAINE WITHOUT AURA, NOT INTRACTABLE, WITHOUT STATUS MIGRAINOSUS: Primary | ICD-10-CM

## 2022-09-26 DIAGNOSIS — E78.2 MIXED HYPERLIPIDEMIA: ICD-10-CM

## 2022-09-26 PROCEDURE — 2022F DILAT RTA XM EVC RTNOPTHY: CPT | Performed by: FAMILY MEDICINE

## 2022-09-26 PROCEDURE — 96372 THER/PROPH/DIAG INJ SC/IM: CPT | Performed by: FAMILY MEDICINE

## 2022-09-26 PROCEDURE — 99214 OFFICE O/P EST MOD 30 MIN: CPT | Performed by: FAMILY MEDICINE

## 2022-09-26 PROCEDURE — G8427 DOCREV CUR MEDS BY ELIG CLIN: HCPCS | Performed by: FAMILY MEDICINE

## 2022-09-26 PROCEDURE — G9717 DOC PT DX DEP/BP F/U NT REQ: HCPCS | Performed by: FAMILY MEDICINE

## 2022-09-26 PROCEDURE — 3044F HG A1C LEVEL LT 7.0%: CPT | Performed by: FAMILY MEDICINE

## 2022-09-26 PROCEDURE — G8417 CALC BMI ABV UP PARAM F/U: HCPCS | Performed by: FAMILY MEDICINE

## 2022-09-26 RX ORDER — KETOROLAC TROMETHAMINE 30 MG/ML
30 INJECTION, SOLUTION INTRAMUSCULAR; INTRAVENOUS ONCE
Qty: 1 ML | Refills: 0
Start: 2022-09-26 | End: 2022-09-26

## 2022-09-26 RX ORDER — HYDROCODONE BITARTRATE AND ACETAMINOPHEN 7.5; 325 MG/1; MG/1
TABLET ORAL AS NEEDED
COMMUNITY

## 2022-09-26 NOTE — PROGRESS NOTES
HISTORY OF PRESENT ILLNESS  Mavis Camara is a 50 y.o. female. f/u grief over fathers death,chronic migraine for botox in 2 weeks. F/U IGT,Fibro,Lumbago  Anxiety  The history is provided by the Patient. This is a chronic problem. The problem occurs daily. The problem has been gradually improving. Associated symptoms include headaches. Headache  The history is provided by the Patient. This is a chronic problem. The problem occurs daily. Associated symptoms include headaches. Diabetes  This is a chronic problem. The problem occurs daily. The problem has not changed since onset. Associated symptoms include headaches. Review of Systems   Constitutional:  Negative for fever and malaise/fatigue. HENT:  Negative for hearing loss. Cardiovascular:  Negative for palpitations. Gastrointestinal:  Negative for diarrhea. Genitourinary:  Negative for frequency. Musculoskeletal:  Positive for back pain and myalgias. Neurological:  Positive for headaches. Psychiatric/Behavioral:  Positive for depression. Negative for suicidal ideas. The patient is nervous/anxious and has insomnia. Physical Exam  Constitutional:       Appearance: Normal appearance. She is obese. HENT:      Head: Normocephalic and atraumatic. Right Ear: Tympanic membrane normal.      Left Ear: Tympanic membrane normal.      Nose: Nose normal.   Cardiovascular:      Rate and Rhythm: Normal rate and regular rhythm. Pulses: Normal pulses. Heart sounds: Normal heart sounds. Pulmonary:      Effort: Pulmonary effort is normal.      Breath sounds: Normal breath sounds. Musculoskeletal:      Cervical back: Neck supple. Skin:     General: Skin is warm and dry. Neurological:      Mental Status: She is alert and oriented to person, place, and time. ASSESSMENT and PLAN  Diagnoses and all orders for this visit:    1.  Chronic migraine without aura, not intractable, without status migrainosus  -     KETOROLAC TROMETHAMINE INJ  -     ID THER/PROPH/DIAG INJECTION, SUBCUT/IM    2. Type 2 diabetes mellitus without complication, without long-term current use of insulin (HCC)  -     AMB POC HEMOGLOBIN A1C    3. Fibromyalgia    4. Generalized anxiety disorder    5. Depression, unspecified depression type    6. Mixed hyperlipidemia    7. Polypharmacy,discussed reduction of sedating meds    8. Polyp of colon, unspecified part of colon, unspecified type    Other orders  -     ketorolac (TORADOL) 30 mg/mL (1 mL) injection; 1 mL by IntraVENous route once for 1 dose. Generally improving. less stress. Continue current meds and treatments,and call if you have any questions.

## 2022-09-26 NOTE — PROGRESS NOTES
Patient identified with two identification factors, Name and Date of Birth. Chief Complaint   Patient presents with    Diabetes     2 month follow-up. Pt believes she is sleep walking. Visit Vitals  BP (!) 140/95 (BP 1 Location: Left arm, BP Patient Position: Sitting, BP Cuff Size: Adult)   Pulse (!) 116   Temp 99 °F (37.2 °C) (Oral)   Resp 16   Ht 5' 6\" (1.676 m)   Wt 320 lb 12.8 oz (145.5 kg)   SpO2 96%   BMI 51.78 kg/m²       Health Maintenance Due   Topic    Hepatitis C Screening     COVID-19 Vaccine (1)    Pneumococcal 0-64 years (1 - PCV)    Foot Exam Q1     MICROALBUMIN Q1     Eye Exam Retinal or Dilated     Cervical cancer screen     Colorectal Cancer Screening Combo     Flu Vaccine (1)        1. \"Have you been to the ER, urgent care clinic since your last visit? Hospitalized since your last visit? \" No    2. \"Have you seen or consulted any other health care providers outside of the [address] [place] [address] since your last visit? \" No     3. For patients aged 39-70: Has the patient had a colonoscopy / FIT/ Cologuard? No      If the patient is female:    4. For patients aged 41-77: Has the patient had a mammogram within the past 2 years? No      5. For patients aged 21-65: Has the patient had a pap smear?  No

## 2022-10-02 DIAGNOSIS — M54.9 ACUTE BILATERAL BACK PAIN, UNSPECIFIED BACK LOCATION: ICD-10-CM

## 2022-10-02 DIAGNOSIS — G43.709 CHRONIC MIGRAINE WITHOUT AURA, NOT INTRACTABLE, WITHOUT STATUS MIGRAINOSUS: ICD-10-CM

## 2022-10-03 RX ORDER — PROMETHAZINE HYDROCHLORIDE 25 MG/1
TABLET ORAL
Qty: 50 TABLET | Refills: 0 | Status: SHIPPED | OUTPATIENT
Start: 2022-10-03 | End: 2022-10-18

## 2022-10-03 RX ORDER — TIZANIDINE 4 MG/1
TABLET ORAL
Qty: 50 TABLET | Refills: 0 | Status: SHIPPED | OUTPATIENT
Start: 2022-10-03 | End: 2022-10-18

## 2022-10-06 ENCOUNTER — TELEPHONE (OUTPATIENT)
Dept: NEUROLOGY | Age: 47
End: 2022-10-06

## 2022-10-06 NOTE — TELEPHONE ENCOUNTER
Botox renewal request sent to [facility] through [facility]    Approved  Request Reference Number: [id number]. BOTOX INJ 200UNIT is approved through 01/06/2023. Your patient may now fill this prescription and it will be covered. CPT CPT 80358 No PA required. SPP is [facility] ph to order call [phone]. taryn

## 2022-10-12 ENCOUNTER — TELEPHONE (OUTPATIENT)
Dept: FAMILY MEDICINE CLINIC | Age: 47
End: 2022-10-12

## 2022-10-12 DIAGNOSIS — G43.709 CHRONIC MIGRAINE WITHOUT AURA, NOT INTRACTABLE, WITHOUT STATUS MIGRAINOSUS: ICD-10-CM

## 2022-10-12 RX ORDER — HYDROCODONE BITARTRATE AND ACETAMINOPHEN 7.5; 325 MG/1; MG/1
1 TABLET ORAL
Qty: 30 TABLET | Refills: 0 | Status: SHIPPED | OUTPATIENT
Start: 2022-10-12 | End: 2022-10-15

## 2022-10-12 RX ORDER — HYDROCODONE BITARTRATE AND ACETAMINOPHEN 7.5; 325 MG/1; MG/1
1 TABLET ORAL AS NEEDED
Qty: 30 TABLET | Refills: 0 | OUTPATIENT
Start: 2022-10-12 | End: 2022-11-11

## 2022-10-12 NOTE — TELEPHONE ENCOUNTER
----- Message from Mandy Warren sent at 10/12/2022  4:22 AM EDT -----  Regarding: Hydrocodone Refill Needed  Dr. Wilman Leiva,  I just finally got back into my email after a fight w/ [employer] over Dad's cancelled account & my new account! So I haven't been able to get into [employer] until two days ago. Well I went in to order my Hydrocodone 7.5 - 325mg,  50 pills & there wasn't a place for me to hit refill! It's done that in the past & I would usually call Eva Leigh & she would give the message to you & you would take care of it! Well now I don't exactly know who to contact so I thought I'd give this a try. I've never tried sending you a prescription question before but I thought I'd give it a shot. I also wanted to tell you starting next Friday twice a week for 8 weeks I'm going to be talking to a therapist on the phone through [employer]! I thought it might be a good idea with Dad passing away & what's happened to my immediate family! The last two days I haven't watched any tv & have just listened to music & started to slowly make some headway getting all the mountains of paperwork from buying my home & adding twelve new bills to my monthly regime! I even ordered an accounting ledger today similar to the one my Mom always used & I felt some pride in that & I know they are both looking down happily that I'm still at home!     Thanks & I thought I'd give you a little update about me,  Jess Boyd

## 2022-10-18 DIAGNOSIS — R25.2 MUSCLE CRAMP: ICD-10-CM

## 2022-10-18 DIAGNOSIS — G43.709 CHRONIC MIGRAINE WITHOUT AURA, NOT INTRACTABLE, WITHOUT STATUS MIGRAINOSUS: ICD-10-CM

## 2022-10-18 DIAGNOSIS — M54.9 ACUTE BILATERAL BACK PAIN, UNSPECIFIED BACK LOCATION: ICD-10-CM

## 2022-10-18 RX ORDER — HYDROCHLOROTHIAZIDE 25 MG/1
TABLET ORAL
Qty: 90 TABLET | Refills: 3 | Status: SHIPPED | OUTPATIENT
Start: 2022-10-18

## 2022-10-18 RX ORDER — TIZANIDINE 4 MG/1
TABLET ORAL
Qty: 50 TABLET | Refills: 0 | Status: SHIPPED | OUTPATIENT
Start: 2022-10-18

## 2022-10-18 RX ORDER — CYCLOBENZAPRINE HCL 10 MG
TABLET ORAL
Qty: 50 TABLET | Refills: 1 | Status: SHIPPED | OUTPATIENT
Start: 2022-10-18

## 2022-10-18 RX ORDER — PROMETHAZINE HYDROCHLORIDE 25 MG/1
TABLET ORAL
Qty: 50 TABLET | Refills: 0 | Status: SHIPPED | OUTPATIENT
Start: 2022-10-18

## 2022-10-27 ENCOUNTER — TELEPHONE (OUTPATIENT)
Dept: NEUROLOGY | Age: 47
End: 2022-10-27

## 2022-10-27 NOTE — TELEPHONE ENCOUNTER
Pt is sick, needs to cancel 10/28 botox appt. Please contact pt with new botox appt date for a couple weeks out.     [phone]

## 2022-11-08 ENCOUNTER — TELEPHONE (OUTPATIENT)
Dept: NEUROLOGY | Age: 47
End: 2022-11-08

## 2022-11-11 ENCOUNTER — TELEPHONE (OUTPATIENT)
Dept: FAMILY MEDICINE CLINIC | Age: 47
End: 2022-11-11

## 2022-11-11 DIAGNOSIS — M54.9 ACUTE BILATERAL BACK PAIN, UNSPECIFIED BACK LOCATION: ICD-10-CM

## 2022-11-11 DIAGNOSIS — G43.709 CHRONIC MIGRAINE WITHOUT AURA, NOT INTRACTABLE, WITHOUT STATUS MIGRAINOSUS: Primary | ICD-10-CM

## 2022-11-11 DIAGNOSIS — G43.709 CHRONIC MIGRAINE WITHOUT AURA, NOT INTRACTABLE, WITHOUT STATUS MIGRAINOSUS: ICD-10-CM

## 2022-11-11 RX ORDER — PROMETHAZINE HYDROCHLORIDE 25 MG/1
25 TABLET ORAL
Qty: 50 TABLET | Refills: 1 | Status: SHIPPED | OUTPATIENT
Start: 2022-11-11

## 2022-11-11 RX ORDER — HYDROCODONE BITARTRATE AND ACETAMINOPHEN 7.5; 325 MG/1; MG/1
1 TABLET ORAL AS NEEDED
Qty: 50 TABLET | Refills: 0 | Status: SHIPPED | OUTPATIENT
Start: 2022-11-11 | End: 2022-12-11

## 2022-11-11 RX ORDER — TIZANIDINE 4 MG/1
TABLET ORAL
Qty: 50 TABLET | Refills: 2 | Status: SHIPPED | OUTPATIENT
Start: 2022-11-11

## 2022-11-11 NOTE — TELEPHONE ENCOUNTER
----- Message from Baylee Kaplan sent at 11/11/2022  7:54 AM EST -----  Regarding: Hydrocodone Refill Needed  Dr. Christiano Degroot or new Nurse - I just went in to order my Hydrocodone 7.5-325/50 pills & for some reason it was greyed out & wouldn't allow me to order it on MyChart. The 30 pills we've tried just honestly hasn't worked for me! When you figure I have 20 to 25 days a month of a level 9 to 10 migraine pain/ 24/7! I have been barely making it on the 50 pills a month since I was cut down to that number. The only thing we discussed changing & actually stopped was my Zolpidem Tartrate. I haven't had any more of the blackout spells in two months. So please can we make it at the very least 50 pills a month for the Hydrocodone? I really don't want to start having to go back to the ER for my migraine pain because I can't afford that after purchasing the family home!          Thanks so much,  Tanna   [phone]

## 2022-11-16 DIAGNOSIS — G43.709 CHRONIC MIGRAINE WITHOUT AURA, NOT INTRACTABLE, WITHOUT STATUS MIGRAINOSUS: ICD-10-CM

## 2022-11-16 NOTE — TELEPHONE ENCOUNTER
----- Message from Colette Davis sent at 11/16/2022  4:49 PM EST -----  Subject: Refill Request    QUESTIONS  Name of Medication? HYDROcodone-acetaminophen (NORCO) 7.5-325 mg per   tablet  Patient-reported dosage and instructions? 7.5-325  How many days do you have left? 0  Preferred Pharmacy? [facility]/PHARMACY #[id number]  Pharmacy phone number (if available)? [phone]  Additional Information for Provider? [facility] texted patient saying that there   is something wrong with the refill request and asked for the doctor to   send it again  ---------------------------------------------------------------------------  --------------  [address]  What is the best way for the office to contact you? OK to leave message on   voicemail  Preferred Call Back Phone Number? [phone]  ---------------------------------------------------------------------------  --------------  SCRIPT ANSWERS  Relationship to Patient?  Self

## 2022-11-17 RX ORDER — HYDROCODONE BITARTRATE AND ACETAMINOPHEN 7.5; 325 MG/1; MG/1
1 TABLET ORAL AS NEEDED
Qty: 50 TABLET | Refills: 0 | OUTPATIENT
Start: 2022-11-17 | End: 2022-12-17

## 2022-11-18 ENCOUNTER — TELEPHONE (OUTPATIENT)
Dept: FAMILY MEDICINE CLINIC | Age: 47
End: 2022-11-18

## 2022-11-18 RX ORDER — OMEPRAZOLE 40 MG/1
CAPSULE, DELAYED RELEASE ORAL
Qty: 90 CAPSULE | Refills: 2 | Status: SHIPPED | OUTPATIENT
Start: 2022-11-18

## 2022-11-18 NOTE — TELEPHONE ENCOUNTER
Patient called stating her rx HYDROcodone-acetaminophen (NORCO) 7.5-325 mg per tablet   Still has not been filled. She can be reached at [phone]. Pharmacy has been verified.

## 2022-11-18 NOTE — TELEPHONE ENCOUNTER
----- Message from Andrew Lucio sent at 11/18/2022  2:16 PM EST -----  Subject: Medication Problem    Medication: HYDROcodone-acetaminophen (NORCO) 7.5-325 mg per tablet  Dosage: TAKE 1 TABLET BY MOUTH EVERY DAY  Ordering Provider: Letty Parisi    Question/Problem: [facility] states there was an issue refilling hydrocodone   medication. Patient would like to know why it was denied.       Pharmacy: [facility]/PHARMACY [facility], [address] [place] [address]    ---------------------------------------------------------------------------  --------------  [id number]  [phone]; OK to leave message on voicemail  ---------------------------------------------------------------------------  --------------    SCRIPT ANSWERS  Relationship to Patient: Self

## 2022-11-21 DIAGNOSIS — R25.2 MUSCLE CRAMP: ICD-10-CM

## 2022-11-21 RX ORDER — CYCLOBENZAPRINE HCL 10 MG
TABLET ORAL
Qty: 50 TABLET | Refills: 1 | Status: SHIPPED | OUTPATIENT
Start: 2022-11-21

## 2022-12-03 DIAGNOSIS — G43.909 MIGRAINE WITHOUT STATUS MIGRAINOSUS, NOT INTRACTABLE, UNSPECIFIED MIGRAINE TYPE: ICD-10-CM

## 2022-12-03 RX ORDER — VERAPAMIL HYDROCHLORIDE 240 MG/1
TABLET, FILM COATED, EXTENDED RELEASE ORAL
Qty: 90 TABLET | Refills: 3 | Status: SHIPPED | OUTPATIENT
Start: 2022-12-03

## 2022-12-03 RX ORDER — GABAPENTIN 600 MG/1
TABLET ORAL
Qty: 270 TABLET | Refills: 1 | Status: SHIPPED | OUTPATIENT
Start: 2022-12-03

## 2022-12-15 DIAGNOSIS — M79.7 FIBROMYALGIA: Primary | ICD-10-CM

## 2022-12-15 RX ORDER — HYDROCODONE BITARTRATE AND ACETAMINOPHEN 7.5; 325 MG/1; MG/1
1 TABLET ORAL
Qty: 30 TABLET | Refills: 0 | Status: SHIPPED | OUTPATIENT
Start: 2022-12-15 | End: 2022-12-18

## 2022-12-16 DIAGNOSIS — F41.1 GENERALIZED ANXIETY DISORDER: ICD-10-CM

## 2022-12-16 DIAGNOSIS — G43.709 CHRONIC MIGRAINE WITHOUT AURA, NOT INTRACTABLE, WITHOUT STATUS MIGRAINOSUS: ICD-10-CM

## 2022-12-16 DIAGNOSIS — M79.7 FIBROMYALGIA: ICD-10-CM

## 2022-12-16 RX ORDER — PROMETHAZINE HYDROCHLORIDE 25 MG/1
TABLET ORAL
Qty: 50 TABLET | Refills: 1 | Status: SHIPPED | OUTPATIENT
Start: 2022-12-16

## 2022-12-16 RX ORDER — ALPRAZOLAM 0.5 MG/1
0.5 TABLET ORAL
Qty: 90 TABLET | Refills: 1 | Status: SHIPPED | OUTPATIENT
Start: 2022-12-16

## 2022-12-16 RX ORDER — HYDROCODONE BITARTRATE AND ACETAMINOPHEN 7.5; 325 MG/1; MG/1
1 TABLET ORAL
Qty: 30 TABLET | Refills: 0 | Status: CANCELLED | OUTPATIENT
Start: 2022-12-16 | End: 2022-12-19

## 2022-12-16 RX ORDER — ALPRAZOLAM 0.5 MG/1
0.5 TABLET ORAL
Qty: 90 TABLET | Refills: 1 | Status: CANCELLED | OUTPATIENT
Start: 2022-12-16

## 2022-12-16 RX ORDER — HYDROCHLOROTHIAZIDE 25 MG/1
25 TABLET ORAL DAILY
Qty: 90 TABLET | Refills: 3 | Status: CANCELLED | OUTPATIENT
Start: 2022-12-16

## 2022-12-16 NOTE — TELEPHONE ENCOUNTER
Xanax was sent to the pharmacy today. Hatillo Leyland was sent to the pharmacy yesterday. HCTZ was sent on 10/18/22 for #90 with 3 refills. For [facility] in place:   Recommendation Provided To:    Intervention Detail: Discontinued Rx: 2, reason: Duplicate Therapy and New Rx: 1, reason: Patient Preference  Gap Closed?:   Intervention Accepted By:   Time Spent (min): 5

## 2022-12-16 NOTE — TELEPHONE ENCOUNTER
----- Message from Eloisa [employer] Ltd sent at 12/16/2022  3:37 PM EST -----  Subject: Refill Request    QUESTIONS  Name of Medication? hydroCHLOROthiazide (HYDRODIURIL) 25 mg tablet  Patient-reported dosage and instructions? hydroCHLOROthiazide   (HYDRODIURIL) 25 mg tablet 1 a day   How many days do you have left? 8  Preferred Pharmacy? [facility]/PHARMACY #[id number]  Pharmacy phone number (if available)? [phone]  ---------------------------------------------------------------------------  --------------,  Name of Medication? ALPRAZolam (XANAX) 0.5 mg tablet  Patient-reported dosage and instructions? ALPRAZolam (XANAX) 0.5 mg tablet   3x a day  How many days do you have left? 0  Preferred Pharmacy? [facility]/PHARMACY #[id number]  Pharmacy phone number (if available)? [phone]  ---------------------------------------------------------------------------  --------------,  Name of Medication? HYDROcodone-acetaminophen (NORCO) 7.5-325 mg per   tablet  Patient-reported dosage and instructions? HYDROcodone-acetaminophen   (NORCO) 7.5-325 mg per tablet As needed   How many days do you have left? 0  Preferred Pharmacy? [facility]/PHARMACY #[id number]  Pharmacy phone number (if available)? [phone]  ---------------------------------------------------------------------------  --------------  Shaista MOORE  What is the best way for the office to contact you? OK to leave message on   voicemail  Preferred Call Back Phone Number? [phone]  ---------------------------------------------------------------------------  --------------  SCRIPT ANSWERS  Relationship to Patient?  Self

## 2022-12-19 ENCOUNTER — TELEPHONE (OUTPATIENT)
Dept: FAMILY MEDICINE CLINIC | Age: 47
End: 2022-12-19

## 2022-12-19 DIAGNOSIS — M79.7 FIBROMYALGIA: ICD-10-CM

## 2022-12-19 DIAGNOSIS — R60.9 PERIPHERAL EDEMA: Primary | ICD-10-CM

## 2022-12-19 DIAGNOSIS — F41.1 GENERALIZED ANXIETY DISORDER: ICD-10-CM

## 2022-12-19 RX ORDER — ALPRAZOLAM 0.5 MG/1
0.5 TABLET ORAL
Qty: 90 TABLET | Refills: 1 | Status: SHIPPED | OUTPATIENT
Start: 2022-12-19

## 2022-12-19 RX ORDER — HYDROCHLOROTHIAZIDE 25 MG/1
25 TABLET ORAL DAILY
Qty: 90 TABLET | Refills: 3 | Status: SHIPPED | OUTPATIENT
Start: 2022-12-19

## 2022-12-19 RX ORDER — HYDROCODONE BITARTRATE AND ACETAMINOPHEN 7.5; 325 MG/1; MG/1
1 TABLET ORAL
Qty: 30 TABLET | Refills: 0 | Status: SHIPPED | OUTPATIENT
Start: 2022-12-19 | End: 2022-12-22

## 2022-12-19 NOTE — TELEPHONE ENCOUNTER
----- Message from Mandy Warren sent at 12/17/2022 12:59 AM EST -----  Regarding: 3 Medications Denied  Dr. Wilman Leiva,       I don't understand why my meds have been denied:  Hydrochlorothiazide 25mg tab - 90 tabs/3 months worth was last filled on 9/14/22! Alprozolam 0.5mg tab - 90 tabs take 3 a day was last filled on 11/17/22! Hydrocodone-Acetamenaphine 7.5-325 tab - 50 tabs take 1 as needed for my migraine pain was last filled 11/18/22 ! I'm at a total loss why you denied all 3 meds when there dates range from the 14 thru the 18th? I tried using [employer] to order & never heard back so I called & left a message! I know this time of year gets hectic at Dr. Boni Lloyd don't want to be without my meds over the first holidays I will be spending alone! Lucero Palomares are going away for a second honeymoon. Shawanda Simons is barely speaking to us & not spending more than five mins here when I need him for something. He is very depressed & is actually throwing things in the dump that meant a lot to him. He texted us a little over a week ago & wanted to know if we wanted Dad's Oktoberfest Mugs that were left to him & our 8 year old [facility]. Then [employer] daughter Naty Lucas contacted me & wanted to know why I wasn't invited to the yearly female [place] night out. I told her the life of the party Kaykay Marrufo has kicked the 3 of us out of the [employer]. Keep in mind that is the major side of our family (over 76)   & there are less than a handful of my Dad's side of the family alive. So I'm at my wits end just trying to hold myself together!   Cher Arroyo  [phone]

## 2022-12-19 NOTE — TELEPHONE ENCOUNTER
----- Message from Lynnette Snyder sent at 12/17/2022  1:51 AM EST -----  Regarding: Nevermind about meds denied question  It was the pharmacy causing the issue - [facility] & they said they are in the queue to be filled today the 17th! The reason [facility] [address] [place] [address] is their RX numbers have already been changed. That's when I went into [place] & saw the denial notice! I couldn't tell you had already emailed them into the pharmacy! I'm coming in to see you on the 27th!   I was wondering if you could find out who I'm supposed to call to get Dad's medical bed taken away because it's not [facility]?      Sanchez Frazier to Karey Chan, a new nurse if you have one yet & of course yourself & everyone else at [facility],  Manjit Monroy

## 2022-12-19 NOTE — TELEPHONE ENCOUNTER
----- Message from Alcides Bliss sent at 12/17/2022  1:51 AM EST -----  Regarding: Nevermind about meds denied question  It was the pharmacy causing the issue - [facility] & they said they are in the queue to be filled today the 17th! The reason I couldn't [facility] them [facility] [place] [address] [place] [address] is their RX numbers have already been changed. That's when I went into [facility] & saw the denial notice! I couldn't tell you had already emailed them into the pharmacy! I'm coming in to see you on the 27th!   I was wondering if you could find out who I'm supposed to call to get Dad's medical bed taken away because it's not [facility]?      Mathew Frazier to Corby Donald, a new nurse if you have one yet & of course yourself & everyone else at [facility],  Darrick Serrato

## 2023-01-02 ENCOUNTER — TELEPHONE (OUTPATIENT)
Dept: NEUROLOGY | Age: 48
End: 2023-01-02

## 2023-01-03 NOTE — TELEPHONE ENCOUNTER
Re: Botox renewal - sent about.met message to patient for confirming her insurance. Unable to verify her plan through [facility].

## 2023-01-20 ENCOUNTER — TELEPHONE (OUTPATIENT)
Dept: NEUROLOGY | Age: 48
End: 2023-01-20

## 2023-01-26 ENCOUNTER — TELEPHONE (OUTPATIENT)
Dept: FAMILY MEDICINE CLINIC | Age: 48
End: 2023-01-26

## 2023-01-26 DIAGNOSIS — R25.2 MUSCLE CRAMP: ICD-10-CM

## 2023-01-26 DIAGNOSIS — M54.9 ACUTE BILATERAL BACK PAIN, UNSPECIFIED BACK LOCATION: ICD-10-CM

## 2023-01-26 DIAGNOSIS — G43.709 CHRONIC MIGRAINE WITHOUT AURA, NOT INTRACTABLE, WITHOUT STATUS MIGRAINOSUS: ICD-10-CM

## 2023-01-26 DIAGNOSIS — G43.709 CHRONIC MIGRAINE WITHOUT AURA, NOT INTRACTABLE, WITHOUT STATUS MIGRAINOSUS: Primary | ICD-10-CM

## 2023-01-26 RX ORDER — CYCLOBENZAPRINE HCL 10 MG
TABLET ORAL
Qty: 50 TABLET | Refills: 1 | Status: SHIPPED | OUTPATIENT
Start: 2023-01-26

## 2023-01-26 RX ORDER — TIZANIDINE 4 MG/1
TABLET ORAL
Qty: 50 TABLET | Refills: 2 | Status: SHIPPED | OUTPATIENT
Start: 2023-01-26

## 2023-01-26 RX ORDER — PROMETHAZINE HYDROCHLORIDE 25 MG/1
TABLET ORAL
Qty: 50 TABLET | Refills: 1 | Status: SHIPPED | OUTPATIENT
Start: 2023-01-26

## 2023-01-26 RX ORDER — HYDROCODONE BITARTRATE AND ACETAMINOPHEN 7.5; 325 MG/1; MG/1
1 TABLET ORAL
Qty: 50 TABLET | Refills: 0 | Status: SHIPPED | OUTPATIENT
Start: 2023-01-26 | End: 2023-01-29

## 2023-01-26 NOTE — TELEPHONE ENCOUNTER
----- Message from Tung Johansen sent at 1/26/2023 10:58 AM EST -----  Subject: Refill Request    QUESTIONS  Name of Medication? HYDROcodone-acetaminophen (NORCO) 7.5-325 mg per   tablet  Patient-reported dosage and instructions? Take one tablet by mouth every 8   hours as needed for pain. How many days do you have left? 0  Preferred Pharmacy? [facility]/PHARMACY #[id number]  Pharmacy phone number (if available)? [phone]  Additional Information for Provider? Pt stated last time she got this   refilled she was only given 30 pills instead of the 50 she usually   receives. She would like to get 50 pills this time. ---------------------------------------------------------------------------  --------------  Antonio MOORE  What is the best way for the office to contact you? OK to leave message on   voicemail  Preferred Call Back Phone Number? [phone]  ---------------------------------------------------------------------------  --------------  SCRIPT ANSWERS  Relationship to Patient?  Self

## 2023-02-04 ENCOUNTER — TELEPHONE (OUTPATIENT)
Dept: NEUROLOGY | Age: 48
End: 2023-02-04

## 2023-02-07 ENCOUNTER — TELEPHONE (OUTPATIENT)
Dept: NEUROLOGY | Age: 48
End: 2023-02-07

## 2023-02-07 NOTE — TELEPHONE ENCOUNTER
Re: Botox appt 2/17    Called pt, left vm on cell, her name identified - asking for update on insurance or if she needs to cancel the appt, to please call me to let us know. I left my dd number [phone]. Previous [employer] message sent on 2/4 - it has not been read yet.

## 2023-02-14 ENCOUNTER — TELEPHONE (OUTPATIENT)
Dept: NEUROLOGY | Age: 48
End: 2023-02-14

## 2023-02-17 ENCOUNTER — TELEPHONE (OUTPATIENT)
Dept: NEUROLOGY | Age: 48
End: 2023-02-17

## 2023-02-17 NOTE — TELEPHONE ENCOUNTER
Patient called stating that she has to cancel this appt due to her brother having a heart attack. She is currently on her way to [facility] because things aren't looking good for him. Pt apologizes for having to cancel so late but that if the Nurse could be just reschedule for any time after a week in the afternoon, if possible. Please advise.   [phone]

## 2023-02-20 NOTE — TELEPHONE ENCOUNTER
Lvm for patient that sorry to hear about her brother but as soon as I am able to figure where I am going to fit the patient into the schedule I will get with her and let her know.

## 2023-03-03 ENCOUNTER — TELEPHONE (OUTPATIENT)
Dept: FAMILY MEDICINE CLINIC | Age: 48
End: 2023-03-03

## 2023-03-03 DIAGNOSIS — M79.7 FIBROMYALGIA: ICD-10-CM

## 2023-03-03 DIAGNOSIS — G43.909 MIGRAINE WITHOUT STATUS MIGRAINOSUS, NOT INTRACTABLE, UNSPECIFIED MIGRAINE TYPE: ICD-10-CM

## 2023-03-03 DIAGNOSIS — G43.909 MIGRAINE WITHOUT STATUS MIGRAINOSUS, NOT INTRACTABLE, UNSPECIFIED MIGRAINE TYPE: Primary | ICD-10-CM

## 2023-03-03 DIAGNOSIS — F32.A DEPRESSION, UNSPECIFIED DEPRESSION TYPE: ICD-10-CM

## 2023-03-03 RX ORDER — HYDROCODONE BITARTRATE AND ACETAMINOPHEN 7.5; 325 MG/1; MG/1
1 TABLET ORAL
Qty: 50 TABLET | Refills: 0 | Status: SHIPPED | OUTPATIENT
Start: 2023-03-03 | End: 2023-03-06

## 2023-03-03 RX ORDER — VERAPAMIL HYDROCHLORIDE 240 MG/1
240 TABLET, FILM COATED, EXTENDED RELEASE ORAL
Qty: 90 TABLET | Refills: 3 | OUTPATIENT
Start: 2023-03-03

## 2023-03-03 RX ORDER — ZOLPIDEM TARTRATE 5 MG/1
5 TABLET ORAL
Qty: 30 TABLET | Refills: 0 | Status: SHIPPED | OUTPATIENT
Start: 2023-03-03

## 2023-03-03 RX ORDER — GABAPENTIN 600 MG/1
600 TABLET ORAL 3 TIMES DAILY
Qty: 270 TABLET | Refills: 1 | OUTPATIENT
Start: 2023-03-03

## 2023-03-03 NOTE — TELEPHONE ENCOUNTER
Patient needs rx refill for HYDROcodone-acetaminophen (NORCO) 7.5-325 mg per tablet   She can be reached at [phone]. Pharmacy has been verified.

## 2023-03-03 NOTE — TELEPHONE ENCOUNTER
Patient is requesting to schedule an appt in another encounter    Last Visit: 12/27/22 with MD Akira Rios  Previous Refill Encounter(s): 9/11/22 #30    Requested Prescriptions     Pending Prescriptions Disp Refills    zolpidem (AMBIEN) 5 mg tablet 30 Tablet 0     Sig: Take 1 Tablet by mouth nightly. Max Daily Amount: 5 mg. For Pharmacy Admin Tracking Only    Program: Medication Refill  CPA in place:   Recommendation Provided To:    Intervention Detail: New Rx: 1, reason: Patient Preference and Scheduled Appointment  Intervention Accepted By:   Haleigh Unger Closed?:   Time Spent (min): 5

## 2023-03-03 NOTE — TELEPHONE ENCOUNTER
Neurontin was sent on 12/3/22 for #270 with 1 refill. Calan was sent on 12/3/22 for #90 with 3 refills. For Pharmacy Admin Tracking Only    Program: Medication Refill  CPA in place:   Recommendation Provided To:    Intervention Detail: New Rx: 2, reason: Patient Preference  Intervention Accepted By:   Haleigh Unger Closed?:   Time Spent (min): 5

## 2023-03-14 DIAGNOSIS — G43.709 CHRONIC MIGRAINE WITHOUT AURA, NOT INTRACTABLE, WITHOUT STATUS MIGRAINOSUS: ICD-10-CM

## 2023-03-14 DIAGNOSIS — R25.2 MUSCLE CRAMP: ICD-10-CM

## 2023-03-15 RX ORDER — CYCLOBENZAPRINE HCL 10 MG
TABLET ORAL
Qty: 50 TABLET | Refills: 1 | Status: SHIPPED | OUTPATIENT
Start: 2023-03-15

## 2023-03-15 RX ORDER — PROMETHAZINE HYDROCHLORIDE 25 MG/1
TABLET ORAL
Qty: 50 TABLET | Refills: 1 | Status: SHIPPED | OUTPATIENT
Start: 2023-03-15

## 2023-03-19 DIAGNOSIS — F41.1 GENERALIZED ANXIETY DISORDER: ICD-10-CM

## 2023-03-19 DIAGNOSIS — F32.A DEPRESSION, UNSPECIFIED DEPRESSION TYPE: ICD-10-CM

## 2023-03-20 RX ORDER — VENLAFAXINE HYDROCHLORIDE 150 MG/1
CAPSULE, EXTENDED RELEASE ORAL
Qty: 90 CAPSULE | Refills: 0 | Status: SHIPPED | OUTPATIENT
Start: 2023-03-20

## 2023-03-21 ENCOUNTER — TELEPHONE (OUTPATIENT)
Dept: FAMILY MEDICINE CLINIC | Age: 48
End: 2023-03-21

## 2023-03-21 NOTE — TELEPHONE ENCOUNTER
Impression: Dry eye syndrome of bilateral lacrimal glands: H04.123. Plan: Patient notes intermittent mild eye pain. He is not using ATs. Start ATs BID-QID OU. Valerie Escobar wants a call back, she states it's urgent. She can be reached at [phone].

## 2023-04-21 ENCOUNTER — TELEPHONE (OUTPATIENT)
Dept: FAMILY MEDICINE CLINIC | Age: 48
End: 2023-04-21

## 2023-04-21 DIAGNOSIS — F32.A DEPRESSION, UNSPECIFIED DEPRESSION TYPE: ICD-10-CM

## 2023-04-21 DIAGNOSIS — M79.7 FIBROMYALGIA: ICD-10-CM

## 2023-04-21 RX ORDER — ZOLPIDEM TARTRATE 5 MG/1
5 TABLET ORAL
Qty: 30 TABLET | Refills: 1 | Status: SHIPPED | OUTPATIENT
Start: 2023-04-21

## 2023-04-21 NOTE — TELEPHONE ENCOUNTER
Patient wants to get the medication zolpidem (AMBIEN) 5 mg tablet. She said she threw them in the trash by accident.   Please give her a call @ [phone]

## 2023-04-24 ENCOUNTER — TELEPHONE (OUTPATIENT)
Dept: FAMILY MEDICINE CLINIC | Age: 48
End: 2023-04-24

## 2023-04-24 DIAGNOSIS — F41.1 GENERALIZED ANXIETY DISORDER: ICD-10-CM

## 2023-04-24 DIAGNOSIS — G43.709 CHRONIC MIGRAINE WITHOUT AURA, NOT INTRACTABLE, WITHOUT STATUS MIGRAINOSUS: ICD-10-CM

## 2023-04-24 RX ORDER — ALPRAZOLAM 0.5 MG/1
0.5 TABLET ORAL
Qty: 90 TABLET | Refills: 1 | Status: SHIPPED | OUTPATIENT
Start: 2023-04-24

## 2023-04-24 NOTE — TELEPHONE ENCOUNTER
Last Visit: 4/10/23 with MD Bel Spain  Next Appointment: 6/12/23 with MD Bel Spain  Previous Refill Encounter(s): 12/19/22 #90 with 1 refill    Requested Prescriptions     Pending Prescriptions Disp Refills    ALPRAZolam (XANAX) 0.5 mg tablet 90 Tablet 1     Sig: Take 1 Tablet by mouth three (3) times daily as needed for Anxiety. Max Daily Amount: 1.5 mg. For Pharmacy Admin Tracking Only    Program: Medication Refill  CPA in place:   Recommendation Provided To:    Intervention Detail: New Rx: 1, reason: Patient Preference  Intervention Accepted By:   Cassandra Piper Closed?:   Time Spent (min): 5

## 2023-04-24 NOTE — TELEPHONE ENCOUNTER
----- Message from Abigail Marks sent at 4/24/2023  3:22 PM EDT -----  Subject: Message to Provider    QUESTIONS  Information for Provider? Patient is calling because her pharmacy has been   reaching out to the office about a medication refill and they have not   been able to reach anyone. Please call patient back. ---------------------------------------------------------------------------  --------------  Kimmy PRO  [phone]; OK to leave message on voicemail  ---------------------------------------------------------------------------  --------------  SCRIPT ANSWERS  Relationship to Patient?  Self

## 2023-04-25 RX ORDER — PROMETHAZINE HYDROCHLORIDE 25 MG/1
TABLET ORAL
Qty: 50 TABLET | Refills: 1 | Status: SHIPPED | OUTPATIENT
Start: 2023-04-25

## 2023-04-27 ENCOUNTER — TELEPHONE (OUTPATIENT)
Dept: NEUROLOGY | Age: 48
End: 2023-04-27

## 2023-04-27 NOTE — TELEPHONE ENCOUNTER
Pt called in,verified pt with two pt identifiers, pt advised she was on the phone with her brother and he thinks she was slurring her words and they wanted her to call us. Pt is very slurred on the phone. She states it is the medications that cause her mouth to be very dry. She has had 3 glasses of water today. Pt denies any dizziness, blurry vision, headaches, weakness on one side of body or any confusion. Pt could identify her name ,  and telling stories of her father-recently  and her brothers. Denies any confusion. Her last visit in office with Marin Bedoya was 22. Asked pt for any vital signs- she took BP while on the phone with me -  147/ 96 and pulse rate 62. Advised pt she should seek Urgent Care due to her speech slurring. Pt is reluctant  to go to ER, she thinks it is just her mouth being dry-as this has happened before to her. Advised if this does not clear very soon , she needs to go to ER. Advised her everyone is different as far as the symptoms they have when having a stroke. Advised pt I will send to [facility] as well. Pt verbalized understanding.

## 2023-04-30 DIAGNOSIS — R25.2 MUSCLE CRAMP: ICD-10-CM

## 2023-04-30 RX ORDER — CYCLOBENZAPRINE HCL 10 MG
TABLET ORAL
Qty: 50 TABLET | Refills: 1 | Status: SHIPPED | OUTPATIENT
Start: 2023-04-30

## 2023-05-03 ENCOUNTER — TELEPHONE (OUTPATIENT)
Dept: FAMILY MEDICINE CLINIC | Age: 48
End: 2023-05-03

## 2023-05-09 ENCOUNTER — TELEPHONE (OUTPATIENT)
Age: 48
End: 2023-05-09

## 2023-05-09 NOTE — TELEPHONE ENCOUNTER
Botox 200 units  PENDING     Your information has been submitted to [facility]. [employer] will review the request and will issue a decision, typically within 3-7 days from your submission. You can check the updated outcome later by reopening this request.    If [employer] has not responded in 3-7 days or if you have any questions about your ePA request, please contact [employer] at [phone]. If you think there may be a problem with your PA request, use our live chat feature at the bottom right. MARGIEI to [place] I won't know the co-pay until it's approved.

## 2023-05-09 NOTE — TELEPHONE ENCOUNTER
Pt has appt on 5/12 for botox with new ins. Wants to know if she will be responsible for any balance. Please call with amount due from her. [phone]    Pt states it is ok to leave a message if she does not answer.

## 2023-05-10 ENCOUNTER — TELEPHONE (OUTPATIENT)
Age: 48
End: 2023-05-10

## 2023-05-10 NOTE — TELEPHONE ENCOUNTER
[employer] denied Botox. Dinorah Caceres with [id number] was assisting me with it and her research showed patient has tried Depakote from a note from 4/10/13. Letter of denial in Media for your review. Denial letter shows:     Denied today  The drug you asked for is not listed in your preferred drug list (formulary). The preferred drug(s), you may not have tried, are: divalproex capsule delayed release sprinkle, propranolol tablet. Your provider needs to give us medical reasons why the preferred drug(s) would not work for you and/or would have bad side effects. Sometimes a preferred drug needs more review for approval. Additionally, some preferred drugs listed may be the same drugs with different strengths or forms. [employer] may only require one strength or form of that drug to be tried. This decision was from [facility] Non-Formulary Exceptions Coverage Policy.   Drug Botox 200UNIT solution  Form [facility] Form

## 2023-05-15 ENCOUNTER — TELEPHONE (OUTPATIENT)
Age: 48
End: 2023-05-15

## 2023-05-15 NOTE — TELEPHONE ENCOUNTER
Patient called stating that she was unable to get through on the phones or MyChart on Thursday or Friday to let us know that she had a family emergency arise causing her to miss her appt 5/10.  Requested call back to reschedule appt and discuss Botox denial.  [phone]

## 2023-05-16 NOTE — TELEPHONE ENCOUNTER
Letter has been sent to patient as she has missed botox visits that have been scheduled three times and her last botox was done 8/16/2022.   No reschedule will be done

## 2023-06-04 DIAGNOSIS — R25.2 CRAMP AND SPASM: ICD-10-CM

## 2023-06-04 DIAGNOSIS — G43.709 CHRONIC MIGRAINE WITHOUT AURA, NOT INTRACTABLE, WITHOUT STATUS MIGRAINOSUS: ICD-10-CM

## 2023-06-04 DIAGNOSIS — M54.9 DORSALGIA, UNSPECIFIED: ICD-10-CM

## 2023-06-06 RX ORDER — CYCLOBENZAPRINE HCL 10 MG
TABLET ORAL
Qty: 50 TABLET | Refills: 1 | Status: SHIPPED | OUTPATIENT
Start: 2023-06-06

## 2023-06-06 RX ORDER — TIZANIDINE 4 MG/1
TABLET ORAL
Qty: 50 TABLET | Refills: 2 | Status: SHIPPED | OUTPATIENT
Start: 2023-06-06

## 2023-06-06 RX ORDER — PROMETHAZINE HYDROCHLORIDE 25 MG/1
TABLET ORAL
Qty: 50 TABLET | Refills: 1 | Status: SHIPPED | OUTPATIENT
Start: 2023-06-06

## 2023-06-09 DIAGNOSIS — G43.909 MIGRAINE WITHOUT STATUS MIGRAINOSUS, NOT INTRACTABLE, UNSPECIFIED MIGRAINE TYPE: Primary | ICD-10-CM

## 2023-06-09 NOTE — TELEPHONE ENCOUNTER
Last appointment: 4/10/23  Next appointment: 6/12/23  Previous refill encounter(s): 12/3/22 #270 with 1 refill    Requested Prescriptions     Pending Prescriptions Disp Refills    gabapentin (NEURONTIN) 600 MG tablet 270 tablet 1     Sig: Take 1 tablet by mouth 3 times daily for 180 days. Max Daily Amount: 1,800 mg         For Pharmacy Admin Tracking Only    Program: Medication Refill  CPA in place:    Recommendation Provided To:    Intervention Detail: New Rx: 1, reason: Patient Preference  Intervention Accepted By:   Romana Davey Closed?:    Time Spent (min): 5

## 2023-06-11 RX ORDER — GABAPENTIN 600 MG/1
600 TABLET ORAL 3 TIMES DAILY
Qty: 270 TABLET | Refills: 1 | Status: SHIPPED | OUTPATIENT
Start: 2023-06-11 | End: 2023-12-08

## 2023-06-21 ENCOUNTER — OFFICE VISIT (OUTPATIENT)
Age: 48
End: 2023-06-21
Payer: MEDICARE

## 2023-06-21 VITALS
WEIGHT: 293 LBS | BODY MASS INDEX: 47.09 KG/M2 | HEIGHT: 66 IN | DIASTOLIC BLOOD PRESSURE: 72 MMHG | SYSTOLIC BLOOD PRESSURE: 116 MMHG | OXYGEN SATURATION: 99 % | TEMPERATURE: 99.9 F | HEART RATE: 111 BPM | RESPIRATION RATE: 18 BRPM

## 2023-06-21 DIAGNOSIS — G43.909 MIGRAINE WITHOUT STATUS MIGRAINOSUS, NOT INTRACTABLE, UNSPECIFIED MIGRAINE TYPE: Primary | ICD-10-CM

## 2023-06-21 DIAGNOSIS — D75.839 THROMBOCYTOSIS: ICD-10-CM

## 2023-06-21 DIAGNOSIS — D32.0 MENINGIOMA OF CEREBELLUM (HCC): ICD-10-CM

## 2023-06-21 DIAGNOSIS — F32.A DEPRESSION, UNSPECIFIED DEPRESSION TYPE: ICD-10-CM

## 2023-06-21 DIAGNOSIS — G43.709 CHRONIC MIGRAINE WITHOUT AURA, NOT INTRACTABLE, WITHOUT STATUS MIGRAINOSUS: ICD-10-CM

## 2023-06-21 DIAGNOSIS — M79.7 FIBROMYALGIA: ICD-10-CM

## 2023-06-21 DIAGNOSIS — E11.9 TYPE 2 DIABETES MELLITUS WITHOUT COMPLICATION, WITHOUT LONG-TERM CURRENT USE OF INSULIN (HCC): ICD-10-CM

## 2023-06-21 PROBLEM — I50.22 CHRONIC SYSTOLIC (CONGESTIVE) HEART FAILURE (HCC): Status: ACTIVE | Noted: 2023-06-21

## 2023-06-21 LAB — GLUCOSE, POC: 159 MG/DL

## 2023-06-21 PROCEDURE — PBSHW AMB POC GLUCOSE, QUANTITATIVE, BLOOD: Performed by: FAMILY MEDICINE

## 2023-06-21 PROCEDURE — 3017F COLORECTAL CA SCREEN DOC REV: CPT | Performed by: FAMILY MEDICINE

## 2023-06-21 PROCEDURE — 1036F TOBACCO NON-USER: CPT | Performed by: FAMILY MEDICINE

## 2023-06-21 PROCEDURE — 3046F HEMOGLOBIN A1C LEVEL >9.0%: CPT | Performed by: FAMILY MEDICINE

## 2023-06-21 PROCEDURE — 2022F DILAT RTA XM EVC RTNOPTHY: CPT | Performed by: FAMILY MEDICINE

## 2023-06-21 PROCEDURE — 82947 ASSAY GLUCOSE BLOOD QUANT: CPT | Performed by: FAMILY MEDICINE

## 2023-06-21 PROCEDURE — G8427 DOCREV CUR MEDS BY ELIG CLIN: HCPCS | Performed by: FAMILY MEDICINE

## 2023-06-21 PROCEDURE — 99213 OFFICE O/P EST LOW 20 MIN: CPT | Performed by: FAMILY MEDICINE

## 2023-06-21 PROCEDURE — G8417 CALC BMI ABV UP PARAM F/U: HCPCS | Performed by: FAMILY MEDICINE

## 2023-06-21 RX ORDER — ALPRAZOLAM 0.5 MG/1
0.5 TABLET ORAL 3 TIMES DAILY PRN
Qty: 90 TABLET | Refills: 2 | Status: SHIPPED | OUTPATIENT
Start: 2023-06-21 | End: 2023-07-21

## 2023-06-21 RX ORDER — HYDROCODONE BITARTRATE AND ACETAMINOPHEN 7.5; 325 MG/1; MG/1
1 TABLET ORAL EVERY 8 HOURS PRN
Qty: 30 TABLET | Refills: 0 | Status: SHIPPED | OUTPATIENT
Start: 2023-06-21 | End: 2023-07-21

## 2023-06-21 RX ORDER — ALPRAZOLAM 0.5 MG/1
0.5 TABLET ORAL 3 TIMES DAILY PRN
Qty: 90 TABLET | Refills: 2 | Status: SHIPPED | OUTPATIENT
Start: 2023-06-21 | End: 2023-06-21 | Stop reason: SDUPTHER

## 2023-06-21 RX ORDER — ZOLPIDEM TARTRATE 5 MG/1
5 TABLET ORAL NIGHTLY PRN
Qty: 30 TABLET | Refills: 0 | Status: SHIPPED | OUTPATIENT
Start: 2023-06-21 | End: 2023-06-21 | Stop reason: SDUPTHER

## 2023-06-21 RX ORDER — ZOLPIDEM TARTRATE 5 MG/1
5 TABLET ORAL NIGHTLY PRN
Qty: 30 TABLET | Refills: 0 | Status: SHIPPED | OUTPATIENT
Start: 2023-06-21 | End: 2023-07-21

## 2023-06-21 SDOH — ECONOMIC STABILITY: FOOD INSECURITY: WITHIN THE PAST 12 MONTHS, THE FOOD YOU BOUGHT JUST DIDN'T LAST AND YOU DIDN'T HAVE MONEY TO GET MORE.: NEVER TRUE

## 2023-06-21 SDOH — ECONOMIC STABILITY: HOUSING INSECURITY
IN THE LAST 12 MONTHS, WAS THERE A TIME WHEN YOU DID NOT HAVE A STEADY PLACE TO SLEEP OR SLEPT IN A SHELTER (INCLUDING NOW)?: NO

## 2023-06-21 SDOH — ECONOMIC STABILITY: INCOME INSECURITY: HOW HARD IS IT FOR YOU TO PAY FOR THE VERY BASICS LIKE FOOD, HOUSING, MEDICAL CARE, AND HEATING?: NOT HARD AT ALL

## 2023-06-21 SDOH — ECONOMIC STABILITY: FOOD INSECURITY: WITHIN THE PAST 12 MONTHS, YOU WORRIED THAT YOUR FOOD WOULD RUN OUT BEFORE YOU GOT MONEY TO BUY MORE.: NEVER TRUE

## 2023-06-21 ASSESSMENT — ENCOUNTER SYMPTOMS: APNEA: 0

## 2023-06-21 ASSESSMENT — PATIENT HEALTH QUESTIONNAIRE - PHQ9
SUM OF ALL RESPONSES TO PHQ9 QUESTIONS 1 & 2: 0
SUM OF ALL RESPONSES TO PHQ QUESTIONS 1-9: 0
SUM OF ALL RESPONSES TO PHQ QUESTIONS 1-9: 0
2. FEELING DOWN, DEPRESSED OR HOPELESS: 0
1. LITTLE INTEREST OR PLEASURE IN DOING THINGS: 0
SUM OF ALL RESPONSES TO PHQ QUESTIONS 1-9: 0
SUM OF ALL RESPONSES TO PHQ QUESTIONS 1-9: 0
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
## 2023-06-21 NOTE — PROGRESS NOTES
Chief Complaint   Patient presents with    Follow-up     Patent is here today for a 2 month follow up. 1. Have you been to the ER, urgent care clinic since your last visit? Hospitalized since your last visit? No    2. Have you seen or consulted any other health care providers outside of the [address] [place] [address] since your last visit? Include any pap smears or colon screening.  No

## 2023-06-29 ENCOUNTER — TELEPHONE (OUTPATIENT)
Age: 48
End: 2023-06-29

## 2023-07-05 ENCOUNTER — TELEPHONE (OUTPATIENT)
Age: 48
End: 2023-07-05

## 2023-07-05 DIAGNOSIS — R53.83 OTHER FATIGUE: Primary | ICD-10-CM

## 2023-07-05 NOTE — TELEPHONE ENCOUNTER
----- Message from Martin Ellis sent at 7/5/2023 11:38 AM EDT -----  Subject: Referral Request    Reason for referral request? Pt would like referral for sleep study. Neuro   Surgeon stated to ask if office would write referral. Please advise. Pt   stated it is in regards to brain cyst.   Provider patient wants to be referred to(if known):     Provider Phone Number(if known):     Additional Information for Provider?   ---------------------------------------------------------------------------  --------------  [address]    [phone]; OK to leave message on voicemail  ---------------------------------------------------------------------------  --------------

## 2023-07-19 DIAGNOSIS — R25.2 CRAMP AND SPASM: ICD-10-CM

## 2023-07-19 DIAGNOSIS — G43.709 CHRONIC MIGRAINE WITHOUT AURA, NOT INTRACTABLE, WITHOUT STATUS MIGRAINOSUS: ICD-10-CM

## 2023-07-19 RX ORDER — CYCLOBENZAPRINE HCL 10 MG
TABLET ORAL
Qty: 50 TABLET | Refills: 1 | Status: SHIPPED | OUTPATIENT
Start: 2023-07-19

## 2023-07-19 RX ORDER — PROMETHAZINE HYDROCHLORIDE 25 MG/1
TABLET ORAL
Qty: 50 TABLET | Refills: 1 | Status: SHIPPED | OUTPATIENT
Start: 2023-07-19

## 2023-07-19 NOTE — TELEPHONE ENCOUNTER
Last appointment: 6/21/23  Next appointment: 7/26/23  Previous refill encounter(s): 6/6/23 #50 with 1 refill    Requested Prescriptions     Pending Prescriptions Disp Refills    cyclobenzaprine (FLEXERIL) 10 MG tablet [Pharmacy Med Name: CYCLOBENZAPRINE 10 MG TABLET] 50 tablet 1     Sig: TAKE 1 TABLET BY MOUTH THREE TIMES A DAY WITH MEALS    promethazine (PHENERGAN) 25 MG tablet [Pharmacy Med Name: PROMETHAZINE 25 MG TABLET] 50 tablet 1     Sig: TAKE 1 TABLET BY MOUTH EVERY 8 HOURS AS NEEDED FOR NAUSEA         For Pharmacy Admin Tracking Only    Program: Medication Refill  CPA in place:    Recommendation Provided To:    Intervention Detail: New Rx: 2, reason: Patient Preference  Intervention Accepted By:   Gertrude Hernandez Closed?:    Time Spent (min): 5

## 2023-07-26 ENCOUNTER — OFFICE VISIT (OUTPATIENT)
Age: 48
End: 2023-07-26
Payer: MEDICARE

## 2023-07-26 VITALS
BODY MASS INDEX: 51.97 KG/M2 | HEART RATE: 110 BPM | RESPIRATION RATE: 18 BRPM | DIASTOLIC BLOOD PRESSURE: 79 MMHG | WEIGHT: 293 LBS | OXYGEN SATURATION: 98 % | TEMPERATURE: 98.6 F | SYSTOLIC BLOOD PRESSURE: 139 MMHG

## 2023-07-26 DIAGNOSIS — D32.0 MENINGIOMA OF CEREBELLUM (HCC): ICD-10-CM

## 2023-07-26 DIAGNOSIS — E11.9 TYPE 2 DIABETES MELLITUS WITHOUT COMPLICATION, WITHOUT LONG-TERM CURRENT USE OF INSULIN (HCC): ICD-10-CM

## 2023-07-26 DIAGNOSIS — M79.7 FIBROMYALGIA: ICD-10-CM

## 2023-07-26 DIAGNOSIS — E78.2 MIXED HYPERLIPIDEMIA: ICD-10-CM

## 2023-07-26 DIAGNOSIS — Z11.4 SCREENING FOR HIV (HUMAN IMMUNODEFICIENCY VIRUS): ICD-10-CM

## 2023-07-26 DIAGNOSIS — D75.839 THROMBOCYTOSIS: ICD-10-CM

## 2023-07-26 DIAGNOSIS — G43.709 CHRONIC MIGRAINE WITHOUT AURA, NOT INTRACTABLE, WITHOUT STATUS MIGRAINOSUS: Primary | ICD-10-CM

## 2023-07-26 DIAGNOSIS — G89.4 CHRONIC PAIN SYNDROME: ICD-10-CM

## 2023-07-26 LAB — HBA1C MFR BLD: 6.7 %

## 2023-07-26 PROCEDURE — G8417 CALC BMI ABV UP PARAM F/U: HCPCS | Performed by: FAMILY MEDICINE

## 2023-07-26 PROCEDURE — G8427 DOCREV CUR MEDS BY ELIG CLIN: HCPCS | Performed by: FAMILY MEDICINE

## 2023-07-26 PROCEDURE — 3046F HEMOGLOBIN A1C LEVEL >9.0%: CPT | Performed by: FAMILY MEDICINE

## 2023-07-26 PROCEDURE — 83036 HEMOGLOBIN GLYCOSYLATED A1C: CPT | Performed by: FAMILY MEDICINE

## 2023-07-26 PROCEDURE — 1036F TOBACCO NON-USER: CPT | Performed by: FAMILY MEDICINE

## 2023-07-26 PROCEDURE — PBSHW AMB POC HEMOGLOBIN A1C: Performed by: FAMILY MEDICINE

## 2023-07-26 PROCEDURE — 3017F COLORECTAL CA SCREEN DOC REV: CPT | Performed by: FAMILY MEDICINE

## 2023-07-26 PROCEDURE — 99213 OFFICE O/P EST LOW 20 MIN: CPT | Performed by: FAMILY MEDICINE

## 2023-07-26 PROCEDURE — 2022F DILAT RTA XM EVC RTNOPTHY: CPT | Performed by: FAMILY MEDICINE

## 2023-07-26 RX ORDER — ZOLPIDEM TARTRATE 5 MG/1
5 TABLET ORAL NIGHTLY PRN
Qty: 30 TABLET | Refills: 0 | Status: SHIPPED | OUTPATIENT
Start: 2023-07-26 | End: 2023-08-25

## 2023-07-26 RX ORDER — HYDROCODONE BITARTRATE AND ACETAMINOPHEN 7.5; 325 MG/1; MG/1
1 TABLET ORAL EVERY 6 HOURS PRN
COMMUNITY
End: 2023-07-26 | Stop reason: SDUPTHER

## 2023-07-26 RX ORDER — HYDROCODONE BITARTRATE AND ACETAMINOPHEN 7.5; 325 MG/1; MG/1
1 TABLET ORAL EVERY 8 HOURS PRN
Qty: 50 TABLET | Refills: 0 | Status: SHIPPED | OUTPATIENT
Start: 2023-07-26 | End: 2023-08-25

## 2023-07-26 RX ORDER — ZOLPIDEM TARTRATE 5 MG/1
5 TABLET ORAL NIGHTLY PRN
COMMUNITY
End: 2023-07-26 | Stop reason: SDUPTHER

## 2023-07-26 ASSESSMENT — ENCOUNTER SYMPTOMS
CHEST TIGHTNESS: 0
SHORTNESS OF BREATH: 0
RHINORRHEA: 0

## 2023-07-26 ASSESSMENT — PATIENT HEALTH QUESTIONNAIRE - PHQ9
SUM OF ALL RESPONSES TO PHQ9 QUESTIONS 1 & 2: 0
1. LITTLE INTEREST OR PLEASURE IN DOING THINGS: 0
SUM OF ALL RESPONSES TO PHQ QUESTIONS 1-9: 0
2. FEELING DOWN, DEPRESSED OR HOPELESS: 0
SUM OF ALL RESPONSES TO PHQ QUESTIONS 1-9: 0

## 2023-07-26 NOTE — PROGRESS NOTES
Subjective:      Patient ID: Mingo Mckeon is a 46 y.o. female. f/u chronic migraine,seen by NS  for occipital meningioma. sleep study recommended pending. Now caring for her brother with metastatic lung cancer. HPI  Anxiety  The history is provided by the Patient. This is a chronic problem. The problem occurs daily. The problem has been gradually improving. Associated symptoms include headaches. Headache  The history is provided by the Patient. This is a chronic problem. The problem occurs daily. Associated symptoms include headaches. Diabetes  This is a chronic problem. The problem occurs daily. The problem has been gradually improving. Associated symptoms include headaches. Follow-up  The history is provided by the Patient. This is a chronic problem. The problem occurs daily. Associated symptoms include headaches         Review of Systems   Constitutional:  Negative for activity change. HENT:  Negative for congestion and rhinorrhea. Respiratory:  Negative for chest tightness and shortness of breath. Cardiovascular:  Negative for chest pain. Musculoskeletal:  Positive for arthralgias, myalgias and neck stiffness. Neurological:  Positive for dizziness. Negative for light-headedness. Psychiatric/Behavioral:  Positive for sleep disturbance. Objective:   Physical Exam  Constitutional:       Appearance: She is obese. HENT:      Head: Normocephalic and atraumatic. Right Ear: Tympanic membrane normal.      Left Ear: Tympanic membrane normal.   Cardiovascular:      Rate and Rhythm: Normal rate and regular rhythm. Pulses: Normal pulses. Heart sounds: Normal heart sounds. Pulmonary:      Effort: Pulmonary effort is normal.      Breath sounds: Normal breath sounds. Abdominal:      Palpations: Abdomen is soft. Skin:     General: Skin is warm. Neurological:      Mental Status: She is alert and oriented to person, place, and time. Mental status is at baseline.

## 2023-07-27 DIAGNOSIS — M54.9 DORSALGIA, UNSPECIFIED: ICD-10-CM

## 2023-07-27 LAB
ALBUMIN SERPL-MCNC: 3.7 G/DL (ref 3.5–5)
ALBUMIN/GLOB SERPL: 0.9 (ref 1.1–2.2)
ALP SERPL-CCNC: 92 U/L (ref 45–117)
ALT SERPL-CCNC: 47 U/L (ref 12–78)
ANION GAP SERPL CALC-SCNC: 4 MMOL/L (ref 5–15)
AST SERPL-CCNC: 19 U/L (ref 15–37)
BASOPHILS # BLD: 0.1 K/UL (ref 0–0.1)
BASOPHILS NFR BLD: 1 % (ref 0–1)
BILIRUB SERPL-MCNC: 0.2 MG/DL (ref 0.2–1)
BUN SERPL-MCNC: 11 MG/DL (ref 6–20)
BUN/CREAT SERPL: 13 (ref 12–20)
CALCIUM SERPL-MCNC: 9.5 MG/DL (ref 8.5–10.1)
CHLORIDE SERPL-SCNC: 104 MMOL/L (ref 97–108)
CHOLEST SERPL-MCNC: 224 MG/DL
CO2 SERPL-SCNC: 28 MMOL/L (ref 21–32)
CREAT SERPL-MCNC: 0.87 MG/DL (ref 0.55–1.02)
DIFFERENTIAL METHOD BLD: ABNORMAL
EOSINOPHIL # BLD: 0 K/UL (ref 0–0.4)
EOSINOPHIL NFR BLD: 0 % (ref 0–7)
ERYTHROCYTE [DISTWIDTH] IN BLOOD BY AUTOMATED COUNT: 15.7 % (ref 11.5–14.5)
GLOBULIN SER CALC-MCNC: 4.1 G/DL (ref 2–4)
GLUCOSE SERPL-MCNC: 164 MG/DL (ref 65–100)
HCT VFR BLD AUTO: 44.1 % (ref 35–47)
HDLC SERPL-MCNC: 37 MG/DL
HDLC SERPL: 6.1 (ref 0–5)
HGB BLD-MCNC: 13.3 G/DL (ref 11.5–16)
IMM GRANULOCYTES # BLD AUTO: 0.1 K/UL (ref 0–0.04)
IMM GRANULOCYTES NFR BLD AUTO: 1 % (ref 0–0.5)
LDLC SERPL CALC-MCNC: ABNORMAL MG/DL (ref 0–100)
LDLC SERPL DIRECT ASSAY-MCNC: 147 MG/DL (ref 0–100)
LYMPHOCYTES # BLD: 2.7 K/UL (ref 0.8–3.5)
LYMPHOCYTES NFR BLD: 18 % (ref 12–49)
MCH RBC QN AUTO: 28.4 PG (ref 26–34)
MCHC RBC AUTO-ENTMCNC: 30.2 G/DL (ref 30–36.5)
MCV RBC AUTO: 94.2 FL (ref 80–99)
MONOCYTES # BLD: 0.7 K/UL (ref 0–1)
MONOCYTES NFR BLD: 5 % (ref 5–13)
NEUTS SEG # BLD: 11.7 K/UL (ref 1.8–8)
NEUTS SEG NFR BLD: 75 % (ref 32–75)
NRBC # BLD: 0 K/UL (ref 0–0.01)
NRBC BLD-RTO: 0 PER 100 WBC
PLATELET # BLD AUTO: 572 K/UL (ref 150–400)
PMV BLD AUTO: 9.7 FL (ref 8.9–12.9)
POTASSIUM SERPL-SCNC: 4.3 MMOL/L (ref 3.5–5.1)
PROT SERPL-MCNC: 7.8 G/DL (ref 6.4–8.2)
RBC # BLD AUTO: 4.68 M/UL (ref 3.8–5.2)
SODIUM SERPL-SCNC: 136 MMOL/L (ref 136–145)
TRIGL SERPL-MCNC: 448 MG/DL
VLDLC SERPL CALC-MCNC: ABNORMAL MG/DL
WBC # BLD AUTO: 15.3 K/UL (ref 3.6–11)

## 2023-07-28 RX ORDER — TIZANIDINE 4 MG/1
TABLET ORAL
Qty: 50 TABLET | Refills: 2 | Status: SHIPPED | OUTPATIENT
Start: 2023-07-28

## 2023-08-12 NOTE — TELEPHONE ENCOUNTER
"Labor Note    S: No concerns. Mother at bedside.     O: /61   Pulse 67   Temp 36.9 °C (98.5 °F) (Temporal)   Resp 15   Ht 1.575 m (5' 2\")   Wt 90.8 kg (200 lb 3.2 oz)   SpO2 94%   Gen: NAD  SVE: complete/+2    Beecher: CTX q2-5  FHT: Baseline 150s with moderate LTV. +accels. Lates present     Labs: AB+, RI, GBS neg  PC ratio: too low to calculate    A/P 37yo  @ 37w5d, IOL for GHTN  AMA  Depression on Lexapro 5mg  -  Labor: Routine resuscitation and Decrease pitocin. Plan to start pushing   GHTN: no s/sx of PreE  FWB: Cat 2  Pain: Epidural in place     April Rausch MD   " From: Ozzie Florence  To:  Enid Post MD  Sent: 2/14/2018 1:29 PM EST  Subject: Medication Renewal Request    Original authorizing provider: MD Ozzie Garrison would like a refill of the following medications:  zolpidem (AMBIEN) 5 mg tablet Enid Post MD]  meperidine (DEMEROL) 100 mg tablet Enid Post MD]  HYDROcodone-acetaminophen (NORCO) 7.5-325 mg per tablet Enid Post MD]  promethazine (PHENERGAN) 25 mg tablet Enid Post MD]    Preferred pharmacy: [facility] [address]    Comment:

## 2023-08-17 DIAGNOSIS — G43.709 CHRONIC MIGRAINE WITHOUT AURA, NOT INTRACTABLE, WITHOUT STATUS MIGRAINOSUS: ICD-10-CM

## 2023-08-18 RX ORDER — PROMETHAZINE HYDROCHLORIDE 25 MG/1
TABLET ORAL
Qty: 50 TABLET | Refills: 1 | Status: SHIPPED | OUTPATIENT
Start: 2023-08-18

## 2023-08-18 RX ORDER — TOPIRAMATE 100 MG/1
TABLET, FILM COATED ORAL
Qty: 180 TABLET | Refills: 0 | Status: SHIPPED | OUTPATIENT
Start: 2023-08-18

## 2023-08-18 RX ORDER — METFORMIN HYDROCHLORIDE 500 MG/1
TABLET, EXTENDED RELEASE ORAL
Qty: 90 TABLET | Refills: 3 | Status: SHIPPED | OUTPATIENT
Start: 2023-08-18

## 2023-08-18 NOTE — TELEPHONE ENCOUNTER
Last appointment: 7/26/23  Next appointment: 10/2/23  Previous refill encounter(s): 7/19/23 #50 with 1 refill    Requested Prescriptions     Pending Prescriptions Disp Refills    promethazine (PHENERGAN) 25 MG tablet [Pharmacy Med Name: PROMETHAZINE 25 MG TABLET] 50 tablet 1     Sig: TAKE 1 TABLET BY MOUTH EVERY 8 HOURS AS NEEDED FOR NAUSEA         For Pharmacy Admin Tracking Only    Program: Medication Refill  CPA in place:    Recommendation Provided To:    Intervention Detail: New Rx: 1, reason: Patient Preference  Intervention Accepted By:   Todd Bush Closed?:    Time Spent (min): 5

## 2023-08-18 NOTE — TELEPHONE ENCOUNTER
Last appointment: 7/26/23  Next appointment: [place]umfield Florina to follow-up 9/26/23  Previous refill encounter(s): 8/1/22 Topamax #180 with 1 refill, 8/10/22 #90 with 4 refills    Requested Prescriptions     Pending Prescriptions Disp Refills    topiramate (TOPAMAX) 100 MG tablet [Pharmacy Med Name: TOPIRAMATE 100 MG TABLET] 180 tablet 3     Sig: TAKE 1 TABLET BY MOUTH TWO TIMES A DAY. metFORMIN (GLUCOPHAGE-XR) 500 MG extended release tablet [Pharmacy Med Name: METFORMIN HCL  MG TABLET] 90 tablet 3     Sig: TAKE 1 TABLET BY MOUTH EVERY DAY WITH DINNER         For Pharmacy Admin Tracking Only    Program: Medication Refill  CPA in place:    Recommendation Provided To:    Intervention Detail: New Rx: 2, reason: Patient Preference  Intervention Accepted By:   Belkis Abarca Closed?:    Time Spent (min): 5

## 2023-08-21 ENCOUNTER — TELEPHONE (OUTPATIENT)
Age: 48
End: 2023-08-21

## 2023-08-21 NOTE — TELEPHONE ENCOUNTER
Alphonse calling stating \"Patient is a diabetic, they should be on a statin per guidelines\"     Call back number is [phone]

## 2023-09-06 ENCOUNTER — TELEPHONE (OUTPATIENT)
Age: 48
End: 2023-09-06

## 2023-09-06 NOTE — TELEPHONE ENCOUNTER
----- Message from Tamara Smallwood sent at 9/5/2023  4:17 PM EDT -----  Regarding: Cephalexin Request  Contact: [phone]  Dr. Mariola Ríos,  I have a terrible UTI & I need a good ole bottle of Cephalexin to get rid of it! I would greatly appreciate it if you could call a prescription in for me. I'm having all of my usual classic symptoms. Caring for María Irwin I have to be on top of my game at all times! In a strange way it's much worse then taking care of Mom & Dad hearing poor Hermes cough all night long through our bedroom zamora. I honestly feel he won't even make it through all four rounds of treatment. It's so difficult watching my very best friend in the Universe & not just my brother go through hell & die in front of me! He has never done a single thing to hurt a soul in his life & only helps others even while being one of the most solitary individuals on the planet! I wish you knew him before he got PTSD from his [address] that changed him forever. He was the most popular boy in school &  the most popular girl in school it was! Sorry I just needed to vent some but michel got to get rid of this UTI ASAP!   Thanks so much,  Rosina Price

## 2023-09-06 NOTE — TELEPHONE ENCOUNTER
KIRBYM to inform the patient that her 9/7/23 appointment will be virtual.  As Dr. Toan Rm will not be in the office.

## 2023-09-07 ENCOUNTER — TELEMEDICINE (OUTPATIENT)
Age: 48
End: 2023-09-07
Payer: MEDICARE

## 2023-09-07 DIAGNOSIS — E11.9 TYPE 2 DIABETES MELLITUS WITHOUT COMPLICATION, WITHOUT LONG-TERM CURRENT USE OF INSULIN (HCC): ICD-10-CM

## 2023-09-07 DIAGNOSIS — E66.01 SEVERE OBESITY (BMI >= 40) (HCC): ICD-10-CM

## 2023-09-07 DIAGNOSIS — N30.90 CYSTITIS: Primary | ICD-10-CM

## 2023-09-07 DIAGNOSIS — G47.33 OBSTRUCTIVE SLEEP APNEA (ADULT) (PEDIATRIC): Primary | ICD-10-CM

## 2023-09-07 PROCEDURE — 2022F DILAT RTA XM EVC RTNOPTHY: CPT | Performed by: INTERNAL MEDICINE

## 2023-09-07 PROCEDURE — G8427 DOCREV CUR MEDS BY ELIG CLIN: HCPCS | Performed by: INTERNAL MEDICINE

## 2023-09-07 PROCEDURE — 99204 OFFICE O/P NEW MOD 45 MIN: CPT | Performed by: INTERNAL MEDICINE

## 2023-09-07 PROCEDURE — 3017F COLORECTAL CA SCREEN DOC REV: CPT | Performed by: INTERNAL MEDICINE

## 2023-09-07 PROCEDURE — 3046F HEMOGLOBIN A1C LEVEL >9.0%: CPT | Performed by: INTERNAL MEDICINE

## 2023-09-07 RX ORDER — CEPHALEXIN 500 MG/1
500 CAPSULE ORAL 3 TIMES DAILY
Qty: 15 CAPSULE | Refills: 0 | Status: SHIPPED | OUTPATIENT
Start: 2023-09-07 | End: 2023-09-12

## 2023-09-07 ASSESSMENT — SLEEP AND FATIGUE QUESTIONNAIRES
DO YOU HAVE DIFFICULTY OPERATING A MOTOR VEHICLE FOR SHORT DISTANCES (LESS THAN 100 MILES) BECAUSE YOU BECOME SLEEPY: NO
WHAT TIME DO YOU USUALLY GO TO BED: 00:00
ARE YOU BOTHERED BY WAKING UP TOO EARLY AND NOT BEING ABLE TO GET BACK TO SLEEP: IS NOT
DO YOU GET TOO LITTLE SLEEP AT NIGHT: DOES
DO YOU HAVE DIFFICULTY BEING AS ACTIVE AS YOU WANT TO BE IN THE MORNING BECAUSE YOU ARE SLEEPY OR TIRED: YES, MODERATE
HOW LIKELY ARE YOU TO NOD OFF OR FALL ASLEEP WHILE SITTING INACTIVE IN A PUBLIC PLACE: 0
SELECT ANY OF THE FOLLOWING BEHAVIORS OBSERVED WHILE YOU ARE ASLEEP: HEAD ROCKING OR BANGING
HOW LIKELY ARE YOU TO NOD OFF OR FALL ASLEEP WHILE WATCHING TV: WOULD NEVER DOZE
HOW LIKELY ARE YOU TO NOD OFF OR FALL ASLEEP WHILE LYING DOWN TO REST IN THE AFTERNOON WHEN CIRCUMSTANCES PERMIT: MODERATE CHANCE OF DOZING
SELECT ANY OF THE FOLLOWING BEHAVIORS OBSERVED WHILE YOU ARE ASLEEP: GRINDING TEETH
HOW LIKELY ARE YOU TO NOD OFF OR FALL ASLEEP WHEN YOU ARE A PASSENGER IN A CAR FOR AN HOUR WITHOUT A BREAK: 0
SELECT ANY OF THE FOLLOWING BEHAVIORS OBSERVED WHILE YOU ARE ASLEEP: SLEEP TALKING
DO YOU HAVE DIFFICULTY CONCENTRATING ON THE THINGS YOU DO BECAUSE YOU ARE SLEEPY OR TIRED: YES, MODERATE
NUMBER OF TIMES YOU WAKE PER NIGHT: 6
SELECT ANY OF THE FOLLOWING BEHAVIORS OBSERVED WHILE YOU ARE ASLEEP: LIGHT SNORING
HAS YOUR RELATIONSHIP WITH FAMILY, FRIENDS OR WORK COLLEAGUES BEEN AFFECTED BECAUSE YOU ARE SLEEPY OR TIRED: YES, MODERATE
ESS TOTAL SCORE: 2
HOW LIKELY ARE YOU TO NOD OFF OR FALL ASLEEP WHILE SITTING QUIETLY AFTER LUNCH WITHOUT ALCOHOL: WOULD NEVER DOZE
DO YOU GENERALLY HAVE DIFFICULTY REMEMBERING THINGS BECAUSE YOU ARE SLEEPY OR TIRED: YES, A LITTLE
DO YOU HAVE DIFFICULTY BEING AS ACTIVE AS YOU WANT TO BE IN THE EVENING BECAUSE YOU ARE SLEEPY OR TIRED: YES, LITTLE
HOW LIKELY ARE YOU TO NOD OFF OR FALL ASLEEP WHILE SITTING QUIETLY AFTER LUNCH WITHOUT ALCOHOL: 0
HOW LIKELY ARE YOU TO NOD OFF OR FALL ASLEEP IN A CAR, WHILE STOPPED FOR A FEW MINUTES IN TRAFFIC: 0
HOW LIKELY ARE YOU TO NOD OFF OR FALL ASLEEP IN A CAR, WHILE STOPPED FOR A FEW MINUTES IN TRAFFIC: WOULD NEVER DOZE
HOW LIKELY ARE YOU TO NOD OFF OR FALL ASLEEP WHILE SITTING AND READING: 0
HOW LIKELY ARE YOU TO NOD OFF OR FALL ASLEEP WHILE LYING DOWN TO REST IN THE AFTERNOON WHEN CIRCUMSTANCES PERMIT: 2
HOW LIKELY ARE YOU TO NOD OFF OR FALL ASLEEP WHILE WATCHING TV: 0
ARE YOU BOTHERED BY WAKING UP TOO EARLY AND NOT BEING ABLE TO GET BACK TO SLEEP: NO
HOW LIKELY ARE YOU TO NOD OFF OR FALL ASLEEP WHILE SITTING INACTIVE IN A PUBLIC PLACE: WOULD NEVER DOZE
DO YOU WORK SHIFTS: NO
SELECT ANY OF THE FOLLOWING BEHAVIORS OBSERVED WHILE YOU ARE ASLEEP: LOUD SNORING
HOW LIKELY ARE YOU TO NOD OFF OR FALL ASLEEP WHILE SITTING AND READING: WOULD NEVER DOZE
DO YOU HAVE DIFFICULTY VISITING YOUR FAMILY OR FRIENDS IN THEIR HOME BECAUSE YOU BECOME SLEEPY OR TIRED: NO
HOW LONG DO YOU NAP: OTHER
AVERAGE NUMBER OF SLEEP HOURS: 4
SELECT ANY OF THE FOLLOWING BEHAVIORS OBSERVED WHILE YOU ARE ASLEEP: PAUSES IN BREATHING
HOW MANY NAPS DO YOU TAKE PER WEEK: 7
HOW LIKELY ARE YOU TO NOD OFF OR FALL ASLEEP WHILE SITTING AND TALKING TO SOMEONE: WOULD NEVER DOZE
HOW LIKELY ARE YOU TO NOD OFF OR FALL ASLEEP WHEN YOU ARE A PASSENGER IN A CAR FOR AN HOUR WITHOUT A BREAK: WOULD NEVER DOZE
AVERAGE NUMBER OF SLEEP HOURS: 4
HOW LIKELY ARE YOU TO NOD OFF OR FALL ASLEEP WHILE SITTING AND TALKING TO SOMEONE: 0
DO YOU TAKE NAPS: YES
DO YOU GET TOO LITTLE SLEEP AT NIGHT: YES
DO YOU HAVE DIFFICULTY WATCHING A MOVIE OR VIDEO BECAUSE YOU BECOME SLEEPY OR TIRED: NO
HAS YOUR MOOD BEEN AFFECTED BECAUSE YOU ARE SLEEPY OR TIRED: YES, MODERATE
FOSQ SCORE: 14.5
DO YOU HAVE PROBLEMS WITH FREQUENT AWAKENINGS AT NIGHT: YES
DO YOU HAVE DIFFICULTY OPERATING A MOTOR VEHICLE FOR LONG DISTANCES (GREATER THAN 100 MILES) BECAUSE YOU BECOME SLEEPY: YES, A LITTLE

## 2023-09-07 NOTE — PATIENT INSTRUCTIONS
[address] [facility].[place], [address]  Tel.  [phone]  Fax. [address] [facility], [address] [place] [address]  Tel.  [phone]  Fax. [phone] [facility], [address] [facility]  Tel.  [phone]  Fax. [phone]     Sleep Apnea: After Your Visit  Your Care Instructions  Sleep apnea occurs when you frequently stop breathing for 10 seconds or longer during sleep. It can be mild to severe, based on the number of times per hour that you stop breathing or have slowed breathing. Blocked or narrowed airways in your nose, mouth, or throat can cause sleep apnea. Your airway can become blocked when your throat muscles and tongue relax during sleep. Sleep apnea is common, occurring in 1 out of 20 individuals. Individuals having any of the following characteristics should be evaluated and treated right away due to high risk and detrimental consequences from untreated sleep apnea:  Obesity  Congestive Heart failure  Atrial Fibrillation  Uncontrolled Hypertension  Type II Diabetes  Night-time Arrhythmias  Stroke  Pulmonary Hypertension  High-risk Driving Populations (pilots, truck drivers, etc.)  Patients Considering Weight-loss Surgery    How do you know you have sleep apnea? You probably have sleep apnea if you answer 'yes' to 3 or more of the following questions:  S - Have you been told that you Snore? T - Are you often Tired during the day? O - Has anyone Observed you stop breathing while sleeping? P- Do you have (or are being treated for) high blood Pressure? B - Are you obese (Body Mass Index > 35)? A - Is your Age 48years old or older? N - Is your Neck size greater than 16 inches? G - Are you male Gender? A sleep physician can prescribe a breathing device that prevents tissues in the throat from blocking your airway. Or your doctor may recommend using a dental device (oral breathing device) to help keep your airway open.  In some cases, surgery may

## 2023-09-07 NOTE — PROGRESS NOTES
Pk Irving is a 46 y.o. female who was seen by synchronous (real-time) audio-video technology on 9/7/2023. Consent:  She and/or her healthcare decision maker is aware that this patient-initiated Telehealth encounter is a billable service, with coverage as determined by her insurance carrier. She is aware that she may receive a bill and has provided verbal consent to proceed: Yes       The patient was located at Home: [address] [facility] [address]. The provider was located at Home ([address] [place] [address]): [address] [facility].                           [address] [facility] [place], [address]  Tel.  [phone]  Fax. [address] [place] [address]  Tel.  [phone]  Fax. [phone] [address]  Tel.  [phone]  Fax. [phone]        Patient called and identity confirmed with 2 patient identifers     Pk Irving is a 46 y.o. female who was seen by synchronous (real-time) audio-video technology on 9/7/2023. --Letty Mauricio MD on 9/7/2023 at 11:58 AM                                 [address] [place], [address]  Tel.  [phone]  Fax. [address] [place] [address]  Tel.  [phone]  Fax. [phone] [address]  Tel.  [phone]  Fax. [phone]         Subjective:             Pk Irving is an 46 y.o. female  -referred for evaluation for a sleep disorder. She complains of snoring, periods of not breathing associated with excessive daytime sleepiness. Symptoms began a few years ago, gradually worsening since that time. She usually can fall asleep in variable minutes unless she is one of her episodes where she is not sleeping. . She can have a few days where she may not sleep for a few days. Family or house members note snoring, periods of not breathing. She denies falling asleep while driving.   .  Pk Foot

## 2023-09-16 DIAGNOSIS — M54.9 DORSALGIA, UNSPECIFIED: ICD-10-CM

## 2023-09-16 DIAGNOSIS — F32.A DEPRESSION, UNSPECIFIED DEPRESSION TYPE: ICD-10-CM

## 2023-09-16 DIAGNOSIS — G43.709 CHRONIC MIGRAINE WITHOUT AURA, NOT INTRACTABLE, WITHOUT STATUS MIGRAINOSUS: ICD-10-CM

## 2023-09-18 RX ORDER — TIZANIDINE 4 MG/1
TABLET ORAL
Qty: 50 TABLET | Refills: 2 | Status: SHIPPED | OUTPATIENT
Start: 2023-09-18

## 2023-09-18 RX ORDER — ALPRAZOLAM 0.5 MG/1
0.5 TABLET ORAL 3 TIMES DAILY PRN
Qty: 90 TABLET | Refills: 2 | Status: SHIPPED | OUTPATIENT
Start: 2023-09-18 | End: 2023-10-18

## 2023-09-18 RX ORDER — PROMETHAZINE HYDROCHLORIDE 25 MG/1
TABLET ORAL
Qty: 50 TABLET | Refills: 1 | Status: SHIPPED | OUTPATIENT
Start: 2023-09-18

## 2023-09-18 NOTE — TELEPHONE ENCOUNTER
Last appointment: 7/26/23  Next appointment: 10/2/23  Previous refill encounter(s): 7/28/23 Zanaflex #50 with 2 refills, 8/18/23 Phenergan #50 with 1 refill, 6/21/23 Xanax #90 with 2 refills    Requested Prescriptions     Pending Prescriptions Disp Refills    tiZANidine (ZANAFLEX) 4 MG tablet [Pharmacy Med Name: TIZANIDINE HCL 4 MG TABLET] 50 tablet 2     Sig: TAKE 1 TABLET BY MOUTH THREE TIMES A DAY AS NEEDED FOR MUSCLE SPASM    ALPRAZolam (XANAX) 0.5 MG tablet [Pharmacy Med Name: ALPRAZOLAM 0.5 MG TABLET] 90 tablet 2     Sig: Take 1 tablet by mouth 3 times daily as needed for Anxiety for up to 30 days. Max Daily Amount: 1.5 mg    promethazine (PHENERGAN) 25 MG tablet [Pharmacy Med Name: PROMETHAZINE 25 MG TABLET] 50 tablet 1     Sig: TAKE 1 TABLET BY MOUTH EVERY 8 HOURS AS NEEDED FOR NAUSEA         For Pharmacy Admin Tracking Only    Program: Medication Refill  CPA in place:    Recommendation Provided To:    Intervention Detail: New Rx: 3, reason: Patient Preference  Intervention Accepted By:   Donny Harding Closed?:    Time Spent (min): 5

## 2023-10-02 ENCOUNTER — OFFICE VISIT (OUTPATIENT)
Age: 48
End: 2023-10-02
Payer: MEDICARE

## 2023-10-02 VITALS
DIASTOLIC BLOOD PRESSURE: 86 MMHG | TEMPERATURE: 98.8 F | WEIGHT: 293 LBS | HEART RATE: 116 BPM | RESPIRATION RATE: 18 BRPM | OXYGEN SATURATION: 97 % | HEIGHT: 66 IN | BODY MASS INDEX: 47.09 KG/M2 | SYSTOLIC BLOOD PRESSURE: 128 MMHG

## 2023-10-02 DIAGNOSIS — D32.0 MENINGIOMA OF CEREBELLUM (HCC): ICD-10-CM

## 2023-10-02 DIAGNOSIS — E78.2 MIXED HYPERLIPIDEMIA: ICD-10-CM

## 2023-10-02 DIAGNOSIS — F32.A DEPRESSION, UNSPECIFIED DEPRESSION TYPE: ICD-10-CM

## 2023-10-02 DIAGNOSIS — M79.7 FIBROMYALGIA: ICD-10-CM

## 2023-10-02 DIAGNOSIS — G43.709 CHRONIC MIGRAINE WITHOUT AURA, NOT INTRACTABLE, WITHOUT STATUS MIGRAINOSUS: Primary | ICD-10-CM

## 2023-10-02 DIAGNOSIS — E11.9 TYPE 2 DIABETES MELLITUS WITHOUT COMPLICATION, WITHOUT LONG-TERM CURRENT USE OF INSULIN (HCC): ICD-10-CM

## 2023-10-02 LAB — HBA1C MFR BLD: 6 %

## 2023-10-02 PROCEDURE — PBSHW AMB POC HEMOGLOBIN A1C: Performed by: FAMILY MEDICINE

## 2023-10-02 PROCEDURE — G8417 CALC BMI ABV UP PARAM F/U: HCPCS | Performed by: FAMILY MEDICINE

## 2023-10-02 PROCEDURE — 1036F TOBACCO NON-USER: CPT | Performed by: FAMILY MEDICINE

## 2023-10-02 PROCEDURE — 3046F HEMOGLOBIN A1C LEVEL >9.0%: CPT | Performed by: FAMILY MEDICINE

## 2023-10-02 PROCEDURE — 99214 OFFICE O/P EST MOD 30 MIN: CPT | Performed by: FAMILY MEDICINE

## 2023-10-02 PROCEDURE — 83036 HEMOGLOBIN GLYCOSYLATED A1C: CPT | Performed by: FAMILY MEDICINE

## 2023-10-02 PROCEDURE — G8427 DOCREV CUR MEDS BY ELIG CLIN: HCPCS | Performed by: FAMILY MEDICINE

## 2023-10-02 PROCEDURE — 2022F DILAT RTA XM EVC RTNOPTHY: CPT | Performed by: FAMILY MEDICINE

## 2023-10-02 PROCEDURE — 3017F COLORECTAL CA SCREEN DOC REV: CPT | Performed by: FAMILY MEDICINE

## 2023-10-02 PROCEDURE — G8484 FLU IMMUNIZE NO ADMIN: HCPCS | Performed by: FAMILY MEDICINE

## 2023-10-02 RX ORDER — ZOLPIDEM TARTRATE 5 MG/1
TABLET ORAL
Qty: 30 TABLET | Refills: 0 | Status: SHIPPED | OUTPATIENT
Start: 2023-10-02 | End: 2023-10-31 | Stop reason: SDUPTHER

## 2023-10-02 RX ORDER — HYDROCODONE BITARTRATE AND ACETAMINOPHEN 7.5; 325 MG/1; MG/1
1 TABLET ORAL EVERY 8 HOURS PRN
Qty: 50 TABLET | Refills: 0 | Status: SHIPPED | OUTPATIENT
Start: 2023-10-02 | End: 2023-10-31 | Stop reason: SDUPTHER

## 2023-10-02 RX ORDER — ZOLPIDEM TARTRATE 5 MG/1
TABLET ORAL
COMMUNITY
Start: 2023-09-01 | End: 2023-10-02 | Stop reason: SDUPTHER

## 2023-10-02 RX ORDER — HYDROCODONE BITARTRATE AND ACETAMINOPHEN 7.5; 325 MG/1; MG/1
TABLET ORAL
COMMUNITY
Start: 2023-09-01 | End: 2023-10-02 | Stop reason: SDUPTHER

## 2023-10-02 RX ORDER — ALPRAZOLAM 0.5 MG/1
0.5 TABLET ORAL 3 TIMES DAILY PRN
Qty: 90 TABLET | Refills: 2 | Status: CANCELLED | OUTPATIENT
Start: 2023-10-02 | End: 2023-11-01

## 2023-10-02 ASSESSMENT — PATIENT HEALTH QUESTIONNAIRE - PHQ9
SUM OF ALL RESPONSES TO PHQ QUESTIONS 1-9: 2
SUM OF ALL RESPONSES TO PHQ9 QUESTIONS 1 & 2: 2
2. FEELING DOWN, DEPRESSED OR HOPELESS: 1
SUM OF ALL RESPONSES TO PHQ QUESTIONS 1-9: 2
1. LITTLE INTEREST OR PLEASURE IN DOING THINGS: 1

## 2023-10-02 ASSESSMENT — ENCOUNTER SYMPTOMS
SHORTNESS OF BREATH: 0
ABDOMINAL DISTENTION: 0
CHEST TIGHTNESS: 0

## 2023-10-02 NOTE — PROGRESS NOTES
Chief Complaint   Patient presents with    Follow-up     Patient is here today for a 2 month follow up. 1. Have you been to the ER, urgent care clinic since your last visit? Hospitalized since your last visit? No    2. Have you seen or consulted any other health care providers outside of the [address] [place] [address] since your last visit? Include any pap smears or colon screening.  No

## 2023-10-02 NOTE — PROGRESS NOTES
Subjective:      Patient ID: Niharika Sibley is a 46 y.o. female. f/u chronic migraine,dm2 ,gerd,depression. Stressed by caring for her ill brother    HPI       Anxiety  The history is provided by the Patient. This is a chronic problem. The problem occurs daily. The problem has been gradually improving. Associated symptoms include headaches. Headache  The history is provided by the Patient. This is a chronic problem. The problem occurs daily. Associated symptoms include headaches. Diabetes  This is a chronic problem. The problem occurs daily. The problem has been gradually improving. Associated symptoms include headaches. Follow-up  The history is provided by the Patient. This is a chronic problem. The problem occurs daily. Associated symptoms include headaches         Review of Systems   HENT:  Negative for congestion. Respiratory:  Negative for chest tightness and shortness of breath. Cardiovascular:  Negative for chest pain. Gastrointestinal:  Negative for abdominal distention. Musculoskeletal:  Positive for arthralgias. Negative for joint swelling. Neurological:  Positive for headaches. Psychiatric/Behavioral:  Positive for dysphoric mood. Objective:   Physical Exam  Constitutional:       Appearance: Normal appearance. She is obese. Cardiovascular:      Rate and Rhythm: Normal rate and regular rhythm. Pulses: Normal pulses. Pulmonary:      Effort: Pulmonary effort is normal.      Breath sounds: Normal breath sounds. Abdominal:      General: Abdomen is flat. Palpations: Abdomen is soft. Musculoskeletal:      Cervical back: Normal range of motion and neck supple. Skin:     General: Skin is warm and dry. Neurological:      General: No focal deficit present. Mental Status: She is alert. She is disoriented. Psychiatric:         Mood and Affect: Mood normal.         Behavior: Behavior normal.         Diego Marin was seen today for follow-up.     Diagnoses and all

## 2023-10-03 ENCOUNTER — TELEPHONE (OUTPATIENT)
Age: 48
End: 2023-10-03
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
## 2023-10-10 DIAGNOSIS — R25.2 CRAMP AND SPASM: ICD-10-CM

## 2023-10-11 RX ORDER — CYCLOBENZAPRINE HCL 10 MG
TABLET ORAL
Qty: 50 TABLET | Refills: 1 | Status: SHIPPED | OUTPATIENT
Start: 2023-10-11

## 2023-10-11 NOTE — TELEPHONE ENCOUNTER
Last appointment: 10/2/23  Next appointment: 12/4/23  Previous refill encounter(s): 7/19/23 #50 with 1 refill    Requested Prescriptions     Pending Prescriptions Disp Refills    cyclobenzaprine (FLEXERIL) 10 MG tablet [Pharmacy Med Name: CYCLOBENZAPRINE 10 MG TABLET] 50 tablet 1     Sig: TAKE 1 TABLET BY MOUTH THREE TIMES A DAY WITH MEALS         For Pharmacy Admin Tracking Only    Program: Medication Refill  CPA in place:    Recommendation Provided To:    Intervention Detail: New Rx: 1, reason: Patient Preference  Intervention Accepted By:   Latrice Naik Closed?:    Time Spent (min): 5

## 2023-10-16 RX ORDER — OMEPRAZOLE 40 MG/1
CAPSULE, DELAYED RELEASE ORAL
Qty: 90 CAPSULE | Refills: 1 | Status: SHIPPED | OUTPATIENT
Start: 2023-10-16

## 2023-10-16 NOTE — TELEPHONE ENCOUNTER
Last appointment: 10/2/23  Next appointment: 12/4/23  Previous refill encounter(s): 11/18/22 #90 with 2 refills    Requested Prescriptions     Pending Prescriptions Disp Refills    omeprazole (PRILOSEC) 40 MG delayed release capsule [Pharmacy Med Name: OMEPRAZOLE DR 40 MG CAPSULE] 90 capsule 3     Sig: TAKE 1 CAPSULE BY MOUTH EVERY DAY         For Pharmacy Admin Tracking Only    Program: Medication Refill  CPA in place:    Recommendation Provided To:    Intervention Detail: New Rx: 1, reason: Patient Preference  Intervention Accepted By:   Louise Leon Closed?:    Time Spent (min): 5

## 2023-10-31 DIAGNOSIS — G43.709 CHRONIC MIGRAINE WITHOUT AURA, NOT INTRACTABLE, WITHOUT STATUS MIGRAINOSUS: ICD-10-CM

## 2023-11-01 NOTE — TELEPHONE ENCOUNTER
Last appointment: 10/2/23  Next appointment: 12/4/23  Previous refill encounter(s): 10/2/23    Requested Prescriptions     Pending Prescriptions Disp Refills    zolpidem (AMBIEN) 5 MG tablet 30 tablet 0     Sig: TAKE 1 TABLET BY MOUTH NIGHTLY AS NEEDED FOR SLEEP FOR UP TO 30 DAYS. MAX DAILY AMOUNT: 5 MG. HYDROcodone-acetaminophen (NORCO) 7.5-325 MG per tablet 50 tablet 0     Sig: Take 1 tablet by mouth every 8 hours as needed for Pain for up to 30 days. Max Daily Amount: 3 tablets         For Pharmacy Admin Tracking Only    Program: Medication Refill  CPA in place:    Recommendation Provided To:    Intervention Detail: New Rx: 2, reason: Patient Preference  Intervention Accepted By:   Weston Magana Closed?:    Time Spent (min): 5
normal (ped)...

## 2023-11-02 RX ORDER — ZOLPIDEM TARTRATE 5 MG/1
TABLET ORAL
Qty: 30 TABLET | Refills: 0 | Status: SHIPPED | OUTPATIENT
Start: 2023-11-02 | End: 2023-12-01

## 2023-11-02 RX ORDER — HYDROCODONE BITARTRATE AND ACETAMINOPHEN 7.5; 325 MG/1; MG/1
1 TABLET ORAL EVERY 8 HOURS PRN
Qty: 50 TABLET | Refills: 0 | Status: SHIPPED | OUTPATIENT
Start: 2023-11-02 | End: 2023-12-02

## 2023-11-06 ENCOUNTER — PROCEDURE VISIT (OUTPATIENT)
Age: 48
End: 2023-11-06

## 2023-11-06 DIAGNOSIS — G47.33 OSA (OBSTRUCTIVE SLEEP APNEA): Primary | ICD-10-CM

## 2023-11-07 ENCOUNTER — HOSPITAL ENCOUNTER (OUTPATIENT)
Facility: HOSPITAL | Age: 48
Discharge: HOME | End: 2023-11-10
Payer: MEDICARE

## 2023-11-07 PROCEDURE — G0400 HOME SLEEP TEST/TYPE 4 PORTA: HCPCS

## 2023-11-09 ENCOUNTER — TELEPHONE (OUTPATIENT)
Age: 48
End: 2023-11-09
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
## 2023-11-14 ENCOUNTER — TELEPHONE (OUTPATIENT)
Age: 48
End: 2023-11-14

## 2023-11-14 RX ORDER — TOPIRAMATE 100 MG/1
100 TABLET, FILM COATED ORAL 2 TIMES DAILY
Qty: 180 TABLET | Refills: 1 | Status: SHIPPED | OUTPATIENT
Start: 2023-11-14

## 2023-11-14 NOTE — TELEPHONE ENCOUNTER
Last appointment: 10/2/23  Next appointment: 12/4/23  Previous refill encounter(s): 8/18/23 90 d/s    Requested Prescriptions     Pending Prescriptions Disp Refills    topiramate (TOPAMAX) 100 MG tablet [Pharmacy Med Name: TOPIRAMATE 100 MG TABLET] 180 tablet 3     Sig: TAKE 1 TABLET BY MOUTH TWICE A DAY         For Pharmacy Admin Tracking Only    Program: Medication Refill  CPA in place:    Recommendation Provided To:   Intervention Detail: New Rx: 1, reason: Patient Preference  Intervention Accepted By:   Gap Closed?:    Time Spent (min): 5

## 2023-11-15 ENCOUNTER — TELEPHONE (OUTPATIENT)
Age: 48
End: 2023-11-15

## 2023-11-15 ENCOUNTER — PROCEDURE VISIT (OUTPATIENT)
Age: 48
End: 2023-11-15

## 2023-11-15 DIAGNOSIS — G47.33 OSA (OBSTRUCTIVE SLEEP APNEA): Primary | ICD-10-CM

## 2023-11-15 DIAGNOSIS — G47.33 OBSTRUCTIVE SLEEP APNEA (ADULT) (PEDIATRIC): Primary | ICD-10-CM
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
## 2023-11-15 NOTE — PROGRESS NOTES
Technician picked up device number [id number] from patients front porch. Patient will be notified in 10 - 14 business days of her results.

## 2023-11-17 ENCOUNTER — CLINICAL DOCUMENTATION (OUTPATIENT)
Age: 48
End: 2023-11-17

## 2023-11-17 NOTE — TELEPHONE ENCOUNTER
HSAT in r-drive. Tech to convey results to patient        Results of HSAT were equivocal for sleep apnea. I recommend she come into the sleep center for an attended polysomnogram for further evaluation/clarification. The attended sleep study will provide more information about sleep stages as her home sleep study was borderline/equivocal for sleep apnea      (AHI 3.5/hour based on medicare criteria of 4% oxygen desaturation. A positive test shows an apnea index/AHI of >5/hour)  Patient with high clinical suspicion of sleep apnea.  Morning headaches, STOP BANG 6

## 2023-11-20 DIAGNOSIS — G43.709 CHRONIC MIGRAINE WITHOUT AURA, NOT INTRACTABLE, WITHOUT STATUS MIGRAINOSUS: ICD-10-CM

## 2023-12-01 ENCOUNTER — TELEPHONE (OUTPATIENT)
Age: 48
End: 2023-12-01

## 2023-12-01 DIAGNOSIS — G43.709 CHRONIC MIGRAINE WITHOUT AURA, NOT INTRACTABLE, WITHOUT STATUS MIGRAINOSUS: ICD-10-CM

## 2023-12-01 RX ORDER — PROMETHAZINE HYDROCHLORIDE 25 MG/1
25 TABLET ORAL EVERY 8 HOURS PRN
Qty: 50 TABLET | Refills: 1 | Status: CANCELLED | OUTPATIENT
Start: 2023-12-01

## 2023-12-01 RX ORDER — ZOLPIDEM TARTRATE 5 MG/1
TABLET ORAL
Qty: 30 TABLET | Refills: 0 | Status: CANCELLED | OUTPATIENT
Start: 2023-12-01 | End: 2023-12-30

## 2023-12-01 RX ORDER — HYDROCODONE BITARTRATE AND ACETAMINOPHEN 7.5; 325 MG/1; MG/1
1 TABLET ORAL EVERY 8 HOURS PRN
Qty: 50 TABLET | Refills: 0 | Status: CANCELLED | OUTPATIENT
Start: 2023-12-01 | End: 2023-12-31

## 2023-12-01 NOTE — TELEPHONE ENCOUNTER
Last appointment: 10/2/23  Next appointment: 12/4/23  Previous refill encounter(s): 11/2/23 Ambien #30 & [place] #50, 9/18/23 Phenergan #50 with 1 refill    Requested Prescriptions     Pending Prescriptions Disp Refills    promethazine (PHENERGAN) 25 MG tablet 50 tablet 1     Sig: Take 1 tablet by mouth every 8 hours as needed for Nausea for nausea    zolpidem (AMBIEN) 5 MG tablet 30 tablet 0     Sig: TAKE 1 TABLET BY MOUTH NIGHTLY AS NEEDED FOR SLEEP FOR UP TO 30 DAYS. MAX DAILY AMOUNT: 5 MG.    HYDROcodone-acetaminophen (NORCO) 7.5-325 MG per tablet 50 tablet 0     Sig: Take 1 tablet by mouth every 8 hours as needed for Pain for up to 30 days. Max Daily Amount: 3 tablets         For Pharmacy Admin Tracking Only    Program: Medication Refill  CPA in place:    Recommendation Provided To:   Intervention Detail: New Rx: 3, reason: Patient Preference  Intervention Accepted By:   Gap Closed?:    Time Spent (min): 5
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
## 2023-12-04 ENCOUNTER — OFFICE VISIT (OUTPATIENT)
Age: 48
End: 2023-12-04
Payer: MEDICARE

## 2023-12-04 VITALS
TEMPERATURE: 101.5 F | HEART RATE: 112 BPM | DIASTOLIC BLOOD PRESSURE: 76 MMHG | WEIGHT: 293 LBS | SYSTOLIC BLOOD PRESSURE: 123 MMHG | BODY MASS INDEX: 47.09 KG/M2 | RESPIRATION RATE: 18 BRPM | OXYGEN SATURATION: 97 % | HEIGHT: 66 IN

## 2023-12-04 DIAGNOSIS — U07.1 COVID-19: Primary | ICD-10-CM

## 2023-12-04 DIAGNOSIS — F32.A DEPRESSION, UNSPECIFIED DEPRESSION TYPE: ICD-10-CM

## 2023-12-04 DIAGNOSIS — R50.9 FEVER, UNSPECIFIED FEVER CAUSE: ICD-10-CM

## 2023-12-04 DIAGNOSIS — G43.709 CHRONIC MIGRAINE WITHOUT AURA, NOT INTRACTABLE, WITHOUT STATUS MIGRAINOSUS: ICD-10-CM

## 2023-12-04 DIAGNOSIS — E11.9 TYPE 2 DIABETES MELLITUS WITHOUT COMPLICATION, WITHOUT LONG-TERM CURRENT USE OF INSULIN (HCC): ICD-10-CM

## 2023-12-04 DIAGNOSIS — M79.7 FIBROMYALGIA: ICD-10-CM

## 2023-12-04 PROCEDURE — 87635 SARS-COV-2 COVID-19 AMP PRB: CPT | Performed by: FAMILY MEDICINE

## 2023-12-04 PROCEDURE — G8417 CALC BMI ABV UP PARAM F/U: HCPCS | Performed by: FAMILY MEDICINE

## 2023-12-04 PROCEDURE — 99214 OFFICE O/P EST MOD 30 MIN: CPT | Performed by: FAMILY MEDICINE

## 2023-12-04 PROCEDURE — 3017F COLORECTAL CA SCREEN DOC REV: CPT | Performed by: FAMILY MEDICINE

## 2023-12-04 PROCEDURE — G8427 DOCREV CUR MEDS BY ELIG CLIN: HCPCS | Performed by: FAMILY MEDICINE

## 2023-12-04 PROCEDURE — 3046F HEMOGLOBIN A1C LEVEL >9.0%: CPT | Performed by: FAMILY MEDICINE

## 2023-12-04 PROCEDURE — PBSHW POCT COVID-19, SARS-COV-2, PCR: Performed by: FAMILY MEDICINE

## 2023-12-04 PROCEDURE — G8484 FLU IMMUNIZE NO ADMIN: HCPCS | Performed by: FAMILY MEDICINE

## 2023-12-04 PROCEDURE — 1036F TOBACCO NON-USER: CPT | Performed by: FAMILY MEDICINE

## 2023-12-04 PROCEDURE — 2022F DILAT RTA XM EVC RTNOPTHY: CPT | Performed by: FAMILY MEDICINE

## 2023-12-04 RX ORDER — HYDROCODONE BITARTRATE AND ACETAMINOPHEN 7.5; 325 MG/1; MG/1
1 TABLET ORAL EVERY 8 HOURS PRN
Qty: 50 TABLET | Refills: 0 | Status: SHIPPED | OUTPATIENT
Start: 2023-12-04 | End: 2024-01-03

## 2023-12-04 RX ORDER — ALPRAZOLAM 0.5 MG/1
TABLET ORAL
COMMUNITY
Start: 2023-11-20

## 2023-12-04 RX ORDER — GUAIFENESIN AND DEXTROMETHORPHAN HYDROBROMIDE 600; 30 MG/1; MG/1
1 TABLET, EXTENDED RELEASE ORAL 2 TIMES DAILY PRN
Qty: 20 TABLET | Refills: 2 | Status: SHIPPED | OUTPATIENT
Start: 2023-12-04

## 2023-12-04 RX ORDER — PROMETHAZINE HYDROCHLORIDE 25 MG/1
25 TABLET ORAL EVERY 8 HOURS PRN
Qty: 50 TABLET | Refills: 1 | Status: SHIPPED | OUTPATIENT
Start: 2023-12-04

## 2023-12-04 RX ORDER — ZOLPIDEM TARTRATE 5 MG/1
TABLET ORAL
Qty: 30 TABLET | Refills: 0 | Status: SHIPPED | OUTPATIENT
Start: 2023-12-04 | End: 2024-01-04

## 2023-12-04 RX ORDER — PROMETHAZINE HYDROCHLORIDE 25 MG/1
TABLET ORAL
Qty: 50 TABLET | Refills: 1 | OUTPATIENT
Start: 2023-12-04

## 2023-12-04 ASSESSMENT — ENCOUNTER SYMPTOMS
ABDOMINAL PAIN: 1
SINUS PRESSURE: 1
HEMATOCHEZIA: 1
ANAL BLEEDING: 1
RHINORRHEA: 1
STRIDOR: 1
COUGH: 1

## 2023-12-04 ASSESSMENT — PATIENT HEALTH QUESTIONNAIRE - PHQ9
2. FEELING DOWN, DEPRESSED OR HOPELESS: 1
SUM OF ALL RESPONSES TO PHQ9 QUESTIONS 1 & 2: 2
SUM OF ALL RESPONSES TO PHQ QUESTIONS 1-9: 2
1. LITTLE INTEREST OR PLEASURE IN DOING THINGS: 1

## 2023-12-04 NOTE — PROGRESS NOTES
Subjective:      Patient ID: Ulises Mehta is a 46 y.o. female. 1 day hx of fever ,cough abdominal pain with brbpr. F/U chronic migraine dm2     Abdominal Pain  The current episode started yesterday. The onset quality is sudden. The problem has been gradually improving. The pain is located in the RLQ. Associated symptoms include a fever, headaches and hematochezia. Pertinent negatives include no arthralgias. Cough  This is a new problem. The current episode started yesterday. The problem has been unchanged. The cough is Non-productive. Associated symptoms include a fever, headaches, postnasal drip and rhinorrhea. Fever   This is a new problem. The current episode started yesterday. The problem occurs 2 to 4 times per day. The maximum temperature noted was 100 to 100.9 F. Associated symptoms include abdominal pain, coughing and headaches. Pertinent negatives include no congestion. Rectal Bleeding   The current episode started today. The problem occurs rarely. The problem has been unchanged. Associated symptoms include a fever, abdominal pain, headaches and coughing. Review of Systems   Constitutional:  Positive for fever. HENT:  Positive for postnasal drip, rhinorrhea and sinus pressure. Negative for congestion. Respiratory:  Positive for cough and stridor. Gastrointestinal:  Positive for abdominal pain, anal bleeding and hematochezia. Musculoskeletal:  Negative for arthralgias. Neurological:  Positive for headaches. Objective:   Physical Exam  Constitutional:       Appearance: Normal appearance. She is obese. HENT:      Head: Normocephalic and atraumatic. Right Ear: Tympanic membrane normal.      Left Ear: Tympanic membrane normal.      Nose: Congestion and rhinorrhea present. Mouth/Throat:      Pharynx: Posterior oropharyngeal erythema present. Eyes:      Pupils: Pupils are equal, round, and reactive to light. Cardiovascular:      Rate and Rhythm: Normal rate.

## 2023-12-04 NOTE — PROGRESS NOTES
Chief Complaint   Patient presents with    Follow-up     Patient is here today for a 2 month follow up. 1. Have you been to the ER, urgent care clinic since your last visit? Hospitalized since your last visit? No    2. Have you seen or consulted any other health care providers outside of the [address] [place] [address] since your last visit? Include any pap smears or colon screening.  No

## 2023-12-05 LAB
ALBUMIN SERPL-MCNC: 3.5 G/DL (ref 3.5–5)
ALBUMIN/GLOB SERPL: 0.9 (ref 1.1–2.2)
ALP SERPL-CCNC: 66 U/L (ref 45–117)
ALT SERPL-CCNC: 30 U/L (ref 12–78)
ANION GAP SERPL CALC-SCNC: 5 MMOL/L (ref 5–15)
AST SERPL-CCNC: 26 U/L (ref 15–37)
BASOPHILS # BLD: 0 K/UL (ref 0–0.1)
BASOPHILS NFR BLD: 0 % (ref 0–1)
BILIRUB SERPL-MCNC: 0.1 MG/DL (ref 0.2–1)
BUN SERPL-MCNC: 12 MG/DL (ref 6–20)
BUN/CREAT SERPL: 16 (ref 12–20)
CALCIUM SERPL-MCNC: 8.3 MG/DL (ref 8.5–10.1)
CHLORIDE SERPL-SCNC: 102 MMOL/L (ref 97–108)
CO2 SERPL-SCNC: 29 MMOL/L (ref 21–32)
CREAT SERPL-MCNC: 0.75 MG/DL (ref 0.55–1.02)
DIFFERENTIAL METHOD BLD: ABNORMAL
EOSINOPHIL # BLD: 0 K/UL (ref 0–0.4)
EOSINOPHIL NFR BLD: 0 % (ref 0–7)
ERYTHROCYTE [DISTWIDTH] IN BLOOD BY AUTOMATED COUNT: 16.2 % (ref 11.5–14.5)
EST. AVERAGE GLUCOSE BLD GHB EST-MCNC: 117 MG/DL
GLOBULIN SER CALC-MCNC: 3.7 G/DL (ref 2–4)
GLUCOSE SERPL-MCNC: 112 MG/DL (ref 65–100)
HBA1C MFR BLD: 5.7 % (ref 4–5.6)
HCT VFR BLD AUTO: 36.9 % (ref 35–47)
HGB BLD-MCNC: 11.8 G/DL (ref 11.5–16)
IMM GRANULOCYTES # BLD AUTO: 0 K/UL (ref 0–0.04)
IMM GRANULOCYTES NFR BLD AUTO: 1 % (ref 0–0.5)
LYMPHOCYTES # BLD: 1.4 K/UL (ref 0.8–3.5)
LYMPHOCYTES NFR BLD: 18 % (ref 12–49)
Lab: ABNORMAL
MCH RBC QN AUTO: 28.9 PG (ref 26–34)
MCHC RBC AUTO-ENTMCNC: 32 G/DL (ref 30–36.5)
MCV RBC AUTO: 90.4 FL (ref 80–99)
MONOCYTES # BLD: 0.7 K/UL (ref 0–1)
MONOCYTES NFR BLD: 9 % (ref 5–13)
NEUTS SEG # BLD: 5.7 K/UL (ref 1.8–8)
NEUTS SEG NFR BLD: 72 % (ref 32–75)
NRBC # BLD: 0 K/UL (ref 0–0.01)
NRBC BLD-RTO: 0 PER 100 WBC
PLATELET # BLD AUTO: 449 K/UL (ref 150–400)
PMV BLD AUTO: 9.5 FL (ref 8.9–12.9)
POTASSIUM SERPL-SCNC: 3.1 MMOL/L (ref 3.5–5.1)
PROT SERPL-MCNC: 7.2 G/DL (ref 6.4–8.2)
QC PASS/FAIL: ABNORMAL
RBC # BLD AUTO: 4.08 M/UL (ref 3.8–5.2)
SARS-COV-2, POC: DETECTED
SODIUM SERPL-SCNC: 136 MMOL/L (ref 136–145)
WBC # BLD AUTO: 7.9 K/UL (ref 3.6–11)

## 2023-12-06 DIAGNOSIS — E87.6 HYPOKALEMIA: Primary | ICD-10-CM

## 2023-12-06 RX ORDER — POTASSIUM CHLORIDE 750 MG/1
20 TABLET, EXTENDED RELEASE ORAL DAILY
Qty: 180 TABLET | Refills: 1 | Status: SHIPPED | OUTPATIENT
Start: 2023-12-06

## 2023-12-10 DIAGNOSIS — R25.2 CRAMP AND SPASM: ICD-10-CM

## 2023-12-11 RX ORDER — CYCLOBENZAPRINE HCL 10 MG
TABLET ORAL
Qty: 50 TABLET | Refills: 0 | Status: SHIPPED | OUTPATIENT
Start: 2023-12-11

## 2023-12-20 RX ORDER — VERAPAMIL HYDROCHLORIDE 240 MG/1
TABLET, FILM COATED, EXTENDED RELEASE ORAL
Qty: 90 TABLET | Refills: 3 | OUTPATIENT
Start: 2023-12-20

## 2024-01-05 DIAGNOSIS — G43.709 CHRONIC MIGRAINE WITHOUT AURA, NOT INTRACTABLE, WITHOUT STATUS MIGRAINOSUS: Primary | ICD-10-CM

## 2024-01-05 DIAGNOSIS — M79.7 FIBROMYALGIA: ICD-10-CM

## 2024-01-05 RX ORDER — HYDROCODONE BITARTRATE AND ACETAMINOPHEN 7.5; 325 MG/1; MG/1
1 TABLET ORAL EVERY 8 HOURS PRN
Qty: 50 TABLET | Refills: 0 | Status: SHIPPED | OUTPATIENT
Start: 2024-01-05 | End: 2024-02-04

## 2024-01-05 RX ORDER — ZOLPIDEM TARTRATE 5 MG/1
5 TABLET ORAL NIGHTLY PRN
Qty: 30 TABLET | Refills: 0 | Status: SHIPPED | OUTPATIENT
Start: 2024-01-05 | End: 2024-02-04
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
## 2024-01-15 DIAGNOSIS — G43.709 CHRONIC MIGRAINE WITHOUT AURA, NOT INTRACTABLE, WITHOUT STATUS MIGRAINOSUS: ICD-10-CM

## 2024-01-15 DIAGNOSIS — R25.2 CRAMP AND SPASM: ICD-10-CM

## 2024-01-15 DIAGNOSIS — M54.9 DORSALGIA, UNSPECIFIED: ICD-10-CM

## 2024-01-16 ENCOUNTER — TELEPHONE (OUTPATIENT)
Age: 49
End: 2024-01-16

## 2024-01-16 RX ORDER — TIZANIDINE 4 MG/1
TABLET ORAL
Qty: 50 TABLET | Refills: 0 | Status: SHIPPED | OUTPATIENT
Start: 2024-01-16

## 2024-01-16 RX ORDER — CYCLOBENZAPRINE HCL 10 MG
TABLET ORAL
Qty: 50 TABLET | Refills: 0 | Status: SHIPPED | OUTPATIENT
Start: 2024-01-16

## 2024-01-16 RX ORDER — PROMETHAZINE HYDROCHLORIDE 25 MG/1
TABLET ORAL
Qty: 50 TABLET | Refills: 1 | Status: SHIPPED | OUTPATIENT
Start: 2024-01-16

## 2024-01-16 NOTE — TELEPHONE ENCOUNTER
Last appointment: 12/4/23  Next appointment: 1/17/24  Previous refill encounter(s): 12/22/23 Zanaflex & Flexeril #50, 12/4/23 Phenergan #50 with 1 refill,     Requested Prescriptions     Pending Prescriptions Disp Refills    promethazine (PHENERGAN) 25 MG tablet [Pharmacy Med Name: PROMETHAZINE 25 MG TABLET] 50 tablet 1     Sig: TAKE 1 TABLET EVERY 8 HOURS AS NEEDED FOR NAUSEA.    cyclobenzaprine (FLEXERIL) 10 MG tablet [Pharmacy Med Name: CYCLOBENZAPRINE 10 MG TABLET] 50 tablet 0     Sig: TAKE 1 TABLET BY MOUTH THREE TIMES A DAY WITH MEALS    tiZANidine (ZANAFLEX) 4 MG tablet [Pharmacy Med Name: TIZANIDINE HCL 4 MG TABLET] 50 tablet 0     Sig: TAKE 1 TABLET BY MOUTH THREE TIMES A DAY AS NEEDED FOR MUSCLE SPASM         For Pharmacy Admin Tracking Only    Program: Medication Refill  CPA in place:    Recommendation Provided To:   Intervention Detail: New Rx: 3, reason: Patient Preference  Intervention Accepted By:   Gap Closed?:    Time Spent (min): 5

## 2024-01-17 ENCOUNTER — OFFICE VISIT (OUTPATIENT)
Age: 49
End: 2024-01-17

## 2024-01-17 VITALS
TEMPERATURE: 98.2 F | RESPIRATION RATE: 19 BRPM | SYSTOLIC BLOOD PRESSURE: 139 MMHG | WEIGHT: 291.4 LBS | DIASTOLIC BLOOD PRESSURE: 91 MMHG | BODY MASS INDEX: 46.83 KG/M2 | HEIGHT: 66 IN | OXYGEN SATURATION: 98 % | HEART RATE: 121 BPM

## 2024-01-17 DIAGNOSIS — G43.709 CHRONIC MIGRAINE WITHOUT AURA, NOT INTRACTABLE, WITHOUT STATUS MIGRAINOSUS: ICD-10-CM

## 2024-01-17 DIAGNOSIS — S51.032D: ICD-10-CM

## 2024-01-17 DIAGNOSIS — S06.0X1D CONCUSSION WITH LOSS OF CONSCIOUSNESS OF 30 MINUTES OR LESS, SUBSEQUENT ENCOUNTER: ICD-10-CM

## 2024-01-17 DIAGNOSIS — M79.7 FIBROMYALGIA: ICD-10-CM
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
## 2024-01-17 DIAGNOSIS — F32.A DEPRESSION, UNSPECIFIED DEPRESSION TYPE: ICD-10-CM

## 2024-01-17 DIAGNOSIS — E11.9 TYPE 2 DIABETES MELLITUS WITHOUT COMPLICATION, WITHOUT LONG-TERM CURRENT USE OF INSULIN (HCC): ICD-10-CM

## 2024-01-17 DIAGNOSIS — R19.00 PELVIC MASS: ICD-10-CM

## 2024-01-17 DIAGNOSIS — S22.31XD CLOSED FRACTURE OF ONE RIB OF RIGHT SIDE WITH ROUTINE HEALING, SUBSEQUENT ENCOUNTER: ICD-10-CM

## 2024-01-17 DIAGNOSIS — F11.99 OPIOID USE, UNSPECIFIED WITH UNSPECIFIED OPIOID-INDUCED DISORDER (HCC): ICD-10-CM

## 2024-01-17 PROBLEM — S51.032A: Status: ACTIVE | Noted: 2024-01-17

## 2024-01-17 RX ORDER — HYDROCODONE BITARTRATE AND ACETAMINOPHEN 7.5; 325 MG/1; MG/1
1 TABLET ORAL EVERY 8 HOURS PRN
Qty: 50 TABLET | Refills: 0 | Status: SHIPPED | OUTPATIENT
Start: 2024-01-17 | End: 2024-02-16

## 2024-01-17 ASSESSMENT — PATIENT HEALTH QUESTIONNAIRE - PHQ9
SUM OF ALL RESPONSES TO PHQ QUESTIONS 1-9: 0
2. FEELING DOWN, DEPRESSED OR HOPELESS: 0
SUM OF ALL RESPONSES TO PHQ9 QUESTIONS 1 & 2: 0
10. IF YOU CHECKED OFF ANY PROBLEMS, HOW DIFFICULT HAVE THESE PROBLEMS MADE IT FOR YOU TO DO YOUR WORK, TAKE CARE OF THINGS AT HOME, OR GET ALONG WITH OTHER PEOPLE: 0
6. FEELING BAD ABOUT YOURSELF - OR THAT YOU ARE A FAILURE OR HAVE LET YOURSELF OR YOUR FAMILY DOWN: 0
9. THOUGHTS THAT YOU WOULD BE BETTER OFF DEAD, OR OF HURTING YOURSELF: 0
3. TROUBLE FALLING OR STAYING ASLEEP: 0
SUM OF ALL RESPONSES TO PHQ9 QUESTIONS 1 & 2: 0
6. FEELING BAD ABOUT YOURSELF - OR THAT YOU ARE A FAILURE OR HAVE LET YOURSELF OR YOUR FAMILY DOWN: 0
5. POOR APPETITE OR OVEREATING: 0
SUM OF ALL RESPONSES TO PHQ QUESTIONS 1-9: 0
7. TROUBLE CONCENTRATING ON THINGS, SUCH AS READING THE NEWSPAPER OR WATCHING TELEVISION: 0
DEPRESSION UNABLE TO ASSESS: PT REFUSES
SUM OF ALL RESPONSES TO PHQ QUESTIONS 1-9: 0
SUM OF ALL RESPONSES TO PHQ QUESTIONS 1-9: 0
9. THOUGHTS THAT YOU WOULD BE BETTER OFF DEAD, OR OF HURTING YOURSELF: 0
SUM OF ALL RESPONSES TO PHQ QUESTIONS 1-9: 0
10. IF YOU CHECKED OFF ANY PROBLEMS, HOW DIFFICULT HAVE THESE PROBLEMS MADE IT FOR YOU TO DO YOUR WORK, TAKE CARE OF THINGS AT HOME, OR GET ALONG WITH OTHER PEOPLE: 0
7. TROUBLE CONCENTRATING ON THINGS, SUCH AS READING THE NEWSPAPER OR WATCHING TELEVISION: 0
4. FEELING TIRED OR HAVING LITTLE ENERGY: 0
2. FEELING DOWN, DEPRESSED OR HOPELESS: 0
SUM OF ALL RESPONSES TO PHQ QUESTIONS 1-9: 0
1. LITTLE INTEREST OR PLEASURE IN DOING THINGS: 0
8. MOVING OR SPEAKING SO SLOWLY THAT OTHER PEOPLE COULD HAVE NOTICED. OR THE OPPOSITE, BEING SO FIGETY OR RESTLESS THAT YOU HAVE BEEN MOVING AROUND A LOT MORE THAN USUAL: 0
SUM OF ALL RESPONSES TO PHQ QUESTIONS 1-9: 0
5. POOR APPETITE OR OVEREATING: 0
SUM OF ALL RESPONSES TO PHQ QUESTIONS 1-9: 0
1. LITTLE INTEREST OR PLEASURE IN DOING THINGS: 0
4. FEELING TIRED OR HAVING LITTLE ENERGY: 0
8. MOVING OR SPEAKING SO SLOWLY THAT OTHER PEOPLE COULD HAVE NOTICED. OR THE OPPOSITE, BEING SO FIGETY OR RESTLESS THAT YOU HAVE BEEN MOVING AROUND A LOT MORE THAN USUAL: 0
3. TROUBLE FALLING OR STAYING ASLEEP: 0

## 2024-01-17 ASSESSMENT — LIFESTYLE VARIABLES
HOW MANY STANDARD DRINKS CONTAINING ALCOHOL DO YOU HAVE ON A TYPICAL DAY: PATIENT DOES NOT DRINK
HOW MANY STANDARD DRINKS CONTAINING ALCOHOL DO YOU HAVE ON A TYPICAL DAY: PATIENT DOES NOT DRINK

## 2024-01-17 ASSESSMENT — ENCOUNTER SYMPTOMS: ABDOMINAL PAIN: 1
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
## 2024-01-31 DIAGNOSIS — M54.9 DORSALGIA, UNSPECIFIED: ICD-10-CM

## 2024-01-31 DIAGNOSIS — R25.2 CRAMP AND SPASM: ICD-10-CM

## 2024-01-31 RX ORDER — OMEPRAZOLE 40 MG/1
CAPSULE, DELAYED RELEASE ORAL
Qty: 90 CAPSULE | Refills: 1 | Status: SHIPPED | OUTPATIENT
Start: 2024-01-31

## 2024-01-31 RX ORDER — CYCLOBENZAPRINE HCL 10 MG
TABLET ORAL
Qty: 50 TABLET | Refills: 0 | Status: SHIPPED | OUTPATIENT
Start: 2024-01-31

## 2024-01-31 RX ORDER — TIZANIDINE 4 MG/1
TABLET ORAL
Qty: 50 TABLET | Refills: 0 | Status: SHIPPED | OUTPATIENT
Start: 2024-01-31

## 2024-01-31 NOTE — TELEPHONE ENCOUNTER
Last appointment: 1/17/24  Next appointment: none  Previous refill encounter(s): 1/16/24 Zanaflex & Flexeril #50, 10/16/23 Prilosec #90 with 1 refill    Requested Prescriptions     Pending Prescriptions Disp Refills    tiZANidine (ZANAFLEX) 4 MG tablet [Pharmacy Med Name: TIZANIDINE HCL 4 MG TABLET] 50 tablet 0     Sig: TAKE 1 TABLET BY MOUTH THREE TIMES A DAY AS NEEDED FOR MUSCLE SPASM    cyclobenzaprine (FLEXERIL) 10 MG tablet [Pharmacy Med Name: CYCLOBENZAPRINE 10 MG TABLET] 50 tablet 0     Sig: TAKE 1 TABLET BY MOUTH THREE TIMES A DAY WITH MEALS    omeprazole (PRILOSEC) 40 MG delayed release capsule [Pharmacy Med Name: OMEPRAZOLE DR 40 MG CAPSULE] 90 capsule 1     Sig: TAKE 1 CAPSULE BY MOUTH EVERY DAY         For Pharmacy Admin Tracking Only    Program: Medication Refill  CPA in place:    Recommendation Provided To:   Intervention Detail: New Rx: 3, reason: Patient Preference  Intervention Accepted By:   Gap Closed?:    Time Spent (min): 5

## 2024-02-06 ENCOUNTER — TELEPHONE (OUTPATIENT)
Age: 49
End: 2024-02-06

## 2024-02-06 NOTE — TELEPHONE ENCOUNTER
----- Message from Belem Xiao sent at 2/6/2024  3:54 AM EST -----  Regarding: Follow Up Appt  Contact: [phone]  Lisa or Dr. Barrett whomever gets this first,    I originally had a follow up appt on 1/31 but I had to cancel that because I woke up at 4a.m. with a horrific migraine.  Plus I didn't have a ride to the office since Hermes has taken a month's vacation to get his head straight with the terminal diagnosis.  I was in so much agony & I had never used an Uber before at that time.  However at the same time I canceled my appt in [place] I made a request for a new appt.  Then I did it again on 2/1 & I haven't heard a word back.  I know how to use [employer] now.  I really need to get in to see you.  I think there is something still in my elbow.  I can feel it & there is still a knot.  I don't want it to get septic or anything.  I'm putting neosporin on it three times a day from the outside.    Thank you,  Belem
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
## 2024-02-07 ENCOUNTER — ENROLLMENT (OUTPATIENT)
Dept: PHARMACY | Facility: CLINIC | Age: 49
End: 2024-02-07

## 2024-02-13 ENCOUNTER — OFFICE VISIT (OUTPATIENT)
Age: 49
End: 2024-02-13
Payer: MEDICARE

## 2024-02-13 VITALS
SYSTOLIC BLOOD PRESSURE: 122 MMHG | BODY MASS INDEX: 46.64 KG/M2 | HEART RATE: 99 BPM | OXYGEN SATURATION: 96 % | RESPIRATION RATE: 18 BRPM | HEIGHT: 66 IN | TEMPERATURE: 97.7 F | WEIGHT: 290.2 LBS | DIASTOLIC BLOOD PRESSURE: 78 MMHG

## 2024-02-13 DIAGNOSIS — S06.0X1D CONCUSSION WITH LOSS OF CONSCIOUSNESS OF 30 MINUTES OR LESS, SUBSEQUENT ENCOUNTER: ICD-10-CM

## 2024-02-13 DIAGNOSIS — G43.709 CHRONIC MIGRAINE WITHOUT AURA, NOT INTRACTABLE, WITHOUT STATUS MIGRAINOSUS: ICD-10-CM

## 2024-02-13 DIAGNOSIS — M54.9 DORSALGIA, UNSPECIFIED: ICD-10-CM

## 2024-02-13 DIAGNOSIS — S51.032D: ICD-10-CM

## 2024-02-13 DIAGNOSIS — S22.31XD CLOSED FRACTURE OF ONE RIB OF RIGHT SIDE WITH ROUTINE HEALING, SUBSEQUENT ENCOUNTER: ICD-10-CM

## 2024-02-13 DIAGNOSIS — R25.2 CRAMP AND SPASM: ICD-10-CM

## 2024-02-13 DIAGNOSIS — M79.7 FIBROMYALGIA: ICD-10-CM
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
## 2024-02-13 PROCEDURE — G8484 FLU IMMUNIZE NO ADMIN: HCPCS | Performed by: FAMILY MEDICINE

## 2024-02-13 PROCEDURE — 99214 OFFICE O/P EST MOD 30 MIN: CPT | Performed by: FAMILY MEDICINE

## 2024-02-13 PROCEDURE — G8427 DOCREV CUR MEDS BY ELIG CLIN: HCPCS | Performed by: FAMILY MEDICINE

## 2024-02-13 PROCEDURE — 1036F TOBACCO NON-USER: CPT | Performed by: FAMILY MEDICINE

## 2024-02-13 PROCEDURE — G8417 CALC BMI ABV UP PARAM F/U: HCPCS | Performed by: FAMILY MEDICINE

## 2024-02-13 PROCEDURE — 3017F COLORECTAL CA SCREEN DOC REV: CPT | Performed by: FAMILY MEDICINE

## 2024-02-13 PROCEDURE — PBSHW PBB SHADOW CHARGE: Performed by: FAMILY MEDICINE

## 2024-02-13 RX ORDER — ALPRAZOLAM 0.5 MG/1
0.5 TABLET ORAL 2 TIMES DAILY PRN
Qty: 60 TABLET | Refills: 2 | Status: SHIPPED | OUTPATIENT
Start: 2024-02-13 | End: 2024-02-16 | Stop reason: DRUGHIGH

## 2024-02-13 RX ORDER — KETOROLAC TROMETHAMINE 30 MG/ML
30 INJECTION, SOLUTION INTRAMUSCULAR; INTRAVENOUS ONCE
Status: COMPLETED | OUTPATIENT
Start: 2024-02-13 | End: 2024-02-13

## 2024-02-13 RX ADMIN — KETOROLAC TROMETHAMINE 30 MG: 30 INJECTION, SOLUTION INTRAMUSCULAR; INTRAVENOUS at 16:06
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
## 2024-02-13 NOTE — PROGRESS NOTES
Chief Complaint   Patient presents with    Follow-up     Patient is here today for a 2 week follow up.      1. Have you been to the ER, urgent care clinic since your last visit?  Hospitalized since your last visit?No    2. Have you seen or consulted any other health care providers outside of the [facility] since your last visit?  Include any pap smears or colon screening. No

## 2024-02-14 ENCOUNTER — TELEPHONE (OUTPATIENT)
Age: 49
End: 2024-02-14

## 2024-02-14 RX ORDER — PROMETHAZINE HYDROCHLORIDE 25 MG/1
25 TABLET ORAL EVERY 8 HOURS PRN
Qty: 50 TABLET | Refills: 1 | Status: SHIPPED | OUTPATIENT
Start: 2024-02-14

## 2024-02-14 RX ORDER — TIZANIDINE 4 MG/1
TABLET ORAL
Qty: 50 TABLET | Refills: 0 | Status: SHIPPED | OUTPATIENT
Start: 2024-02-14

## 2024-02-14 RX ORDER — CYCLOBENZAPRINE HCL 10 MG
TABLET ORAL
Qty: 50 TABLET | Refills: 0 | Status: SHIPPED | OUTPATIENT
Start: 2024-02-14

## 2024-02-14 NOTE — TELEPHONE ENCOUNTER
Last appointment: 2/13/24  Next appointment: 3/13/24  Previous refill encounter(s): 1/16/24 Phenergan #50 with 1 refill, 1/31/24 Zanaflex & Flexeril #50    Requested Prescriptions     Pending Prescriptions Disp Refills    tiZANidine (ZANAFLEX) 4 MG tablet [Pharmacy Med Name: TIZANIDINE HCL 4 MG TABLET] 50 tablet 0     Sig: TAKE 1 TABLET BY MOUTH THREE TIMES A DAY AS NEEDED FOR MUSCLE SPASM    promethazine (PHENERGAN) 25 MG tablet [Pharmacy Med Name: PROMETHAZINE 25 MG TABLET] 50 tablet 1     Sig: TAKE 1 TABLET BY MOUTH EVERY 8 HOURS AS NEEDED FOR NAUSEA    cyclobenzaprine (FLEXERIL) 10 MG tablet [Pharmacy Med Name: CYCLOBENZAPRINE 10 MG TABLET] 50 tablet 0     Sig: TAKE 1 TABLET BY MOUTH THREE TIMES A DAY WITH MEALS         For Pharmacy Admin Tracking Only    Program: Medication Refill  CPA in place:    Recommendation Provided To:   Intervention Detail: New Rx: 3, reason: Patient Preference  Intervention Accepted By:   Gap Closed?:    Time Spent (min): 5

## 2024-02-16 ENCOUNTER — TELEPHONE (OUTPATIENT)
Age: 49
End: 2024-02-16

## 2024-02-16 DIAGNOSIS — F41.1 GENERALIZED ANXIETY DISORDER: Primary | ICD-10-CM

## 2024-02-16 RX ORDER — ALPRAZOLAM 0.5 MG/1
0.5 TABLET ORAL 3 TIMES DAILY PRN
Qty: 90 TABLET | Refills: 0 | Status: SHIPPED | OUTPATIENT
Start: 2024-02-16 | End: 2024-03-17

## 2024-02-16 NOTE — TELEPHONE ENCOUNTER
----- Message from Belem Xiao sent at 2/16/2024  8:40 AM EST -----  Regarding: Alprazolam [facility]  Contact: [phone]  They said exactly what Dr. Barrett thought they might that it was a bit too early.  But all they needed him to do was give them a quick call.   Then they can take care of it with insurance & fill it.  I called the office twice yesterday & got hung up on.  Then I just tried four times & was hung up each time.  I'm out of my Alprazolam now.  I did not know when Dr. Barrett filled it last time that he switched me from 3 a day to 2 a day so I continued taking 3 a day.  But now I know he wants me taking 2 a day.    Thanks & I miss seeing you Belem Gonzalez  ---------------------------------------------------------------------------------  Pt wants Dr. Barrett to call her pharmacy to get her refill of the alprazolam refilled.  She stated the pharmacist said it was too early.  Pt was taking the wrong dose.  She can be reached at the number above.
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
## 2024-02-26 RX ORDER — HYDROCODONE BITARTRATE AND ACETAMINOPHEN 7.5; 325 MG/1; MG/1
1 TABLET ORAL EVERY 8 HOURS PRN
Qty: 50 TABLET | Refills: 0 | Status: SHIPPED | OUTPATIENT
Start: 2024-02-26 | End: 2024-03-27

## 2024-03-12 DIAGNOSIS — M54.9 DORSALGIA, UNSPECIFIED: ICD-10-CM

## 2024-03-12 DIAGNOSIS — R25.2 CRAMP AND SPASM: ICD-10-CM

## 2024-03-13 ENCOUNTER — OFFICE VISIT (OUTPATIENT)
Age: 49
End: 2024-03-13
Payer: MEDICARE

## 2024-03-13 VITALS
HEIGHT: 66 IN | TEMPERATURE: 98.2 F | DIASTOLIC BLOOD PRESSURE: 80 MMHG | WEIGHT: 290.4 LBS | BODY MASS INDEX: 46.67 KG/M2 | OXYGEN SATURATION: 96 % | HEART RATE: 102 BPM | RESPIRATION RATE: 18 BRPM | SYSTOLIC BLOOD PRESSURE: 130 MMHG

## 2024-03-13 DIAGNOSIS — E78.2 MIXED HYPERLIPIDEMIA: ICD-10-CM

## 2024-03-13 DIAGNOSIS — G43.709 CHRONIC MIGRAINE WITHOUT AURA, NOT INTRACTABLE, WITHOUT STATUS MIGRAINOSUS: ICD-10-CM
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
## 2024-03-13 DIAGNOSIS — S06.0X1D CONCUSSION WITH LOSS OF CONSCIOUSNESS OF 30 MINUTES OR LESS, SUBSEQUENT ENCOUNTER: ICD-10-CM

## 2024-03-13 DIAGNOSIS — F32.A DEPRESSION, UNSPECIFIED DEPRESSION TYPE: ICD-10-CM

## 2024-03-13 DIAGNOSIS — M79.7 FIBROMYALGIA: ICD-10-CM

## 2024-03-13 DIAGNOSIS — E11.9 TYPE 2 DIABETES MELLITUS WITHOUT COMPLICATION, WITHOUT LONG-TERM CURRENT USE OF INSULIN (HCC): ICD-10-CM

## 2024-03-13 LAB — HBA1C MFR BLD: 5.9 %

## 2024-03-13 PROCEDURE — 3046F HEMOGLOBIN A1C LEVEL >9.0%: CPT | Performed by: FAMILY MEDICINE

## 2024-03-13 PROCEDURE — G8417 CALC BMI ABV UP PARAM F/U: HCPCS | Performed by: FAMILY MEDICINE

## 2024-03-13 PROCEDURE — 3017F COLORECTAL CA SCREEN DOC REV: CPT | Performed by: FAMILY MEDICINE

## 2024-03-13 PROCEDURE — 99213 OFFICE O/P EST LOW 20 MIN: CPT | Performed by: FAMILY MEDICINE

## 2024-03-13 PROCEDURE — 2022F DILAT RTA XM EVC RTNOPTHY: CPT | Performed by: FAMILY MEDICINE

## 2024-03-13 PROCEDURE — 83036 HEMOGLOBIN GLYCOSYLATED A1C: CPT | Performed by: FAMILY MEDICINE

## 2024-03-13 PROCEDURE — G8484 FLU IMMUNIZE NO ADMIN: HCPCS | Performed by: FAMILY MEDICINE

## 2024-03-13 PROCEDURE — 1036F TOBACCO NON-USER: CPT | Performed by: FAMILY MEDICINE

## 2024-03-13 PROCEDURE — G8427 DOCREV CUR MEDS BY ELIG CLIN: HCPCS | Performed by: FAMILY MEDICINE

## 2024-03-13 PROCEDURE — PBSHW AMB POC HEMOGLOBIN A1C: Performed by: FAMILY MEDICINE

## 2024-03-13 RX ORDER — CYCLOBENZAPRINE HCL 10 MG
TABLET ORAL
Qty: 50 TABLET | Refills: 0 | Status: SHIPPED | OUTPATIENT
Start: 2024-03-13

## 2024-03-13 RX ORDER — TIZANIDINE 4 MG/1
TABLET ORAL
Qty: 50 TABLET | Refills: 0 | Status: SHIPPED | OUTPATIENT
Start: 2024-03-13

## 2024-03-13 ASSESSMENT — PATIENT HEALTH QUESTIONNAIRE - PHQ9
3. TROUBLE FALLING OR STAYING ASLEEP: 0
SUM OF ALL RESPONSES TO PHQ QUESTIONS 1-9: 0
8. MOVING OR SPEAKING SO SLOWLY THAT OTHER PEOPLE COULD HAVE NOTICED. OR THE OPPOSITE, BEING SO FIGETY OR RESTLESS THAT YOU HAVE BEEN MOVING AROUND A LOT MORE THAN USUAL: 0
1. LITTLE INTEREST OR PLEASURE IN DOING THINGS: 0
SUM OF ALL RESPONSES TO PHQ QUESTIONS 1-9: 0
SUM OF ALL RESPONSES TO PHQ9 QUESTIONS 1 & 2: 0
10. IF YOU CHECKED OFF ANY PROBLEMS, HOW DIFFICULT HAVE THESE PROBLEMS MADE IT FOR YOU TO DO YOUR WORK, TAKE CARE OF THINGS AT HOME, OR GET ALONG WITH OTHER PEOPLE: 0
5. POOR APPETITE OR OVEREATING: 0
2. FEELING DOWN, DEPRESSED OR HOPELESS: 0
SUM OF ALL RESPONSES TO PHQ QUESTIONS 1-9: 0
SUM OF ALL RESPONSES TO PHQ QUESTIONS 1-9: 0
7. TROUBLE CONCENTRATING ON THINGS, SUCH AS READING THE NEWSPAPER OR WATCHING TELEVISION: 0
6. FEELING BAD ABOUT YOURSELF - OR THAT YOU ARE A FAILURE OR HAVE LET YOURSELF OR YOUR FAMILY DOWN: 0
9. THOUGHTS THAT YOU WOULD BE BETTER OFF DEAD, OR OF HURTING YOURSELF: 0
4. FEELING TIRED OR HAVING LITTLE ENERGY: 0

## 2024-03-13 ASSESSMENT — ENCOUNTER SYMPTOMS
SHORTNESS OF BREATH: 0
BLURRED VISION: 0
CHEST TIGHTNESS: 0
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
## 2024-03-13 NOTE — TELEPHONE ENCOUNTER
Last appointment: 2/13/24  Next appointment: today  Previous refill encounter(s): 2/14/24 #50    Requested Prescriptions     Pending Prescriptions Disp Refills    cyclobenzaprine (FLEXERIL) 10 MG tablet [Pharmacy Med Name: CYCLOBENZAPRINE 10 MG TABLET] 50 tablet 0     Sig: TAKE 1 TABLET BY MOUTH THREE TIMES A DAY WITH MEALS    tiZANidine (ZANAFLEX) 4 MG tablet [Pharmacy Med Name: TIZANIDINE HCL 4 MG TABLET] 50 tablet 0     Sig: TAKE 1 TABLET BY MOUTH THREE TIMES A DAY AS NEEDED FOR MUSCLE SPASM         For Pharmacy Admin Tracking Only    Program: Medication Refill  CPA in place:    Recommendation Provided To:   Intervention Detail: New Rx: 2, reason: Patient Preference  Intervention Accepted By:   Gap Closed?:    Time Spent (min): 5

## 2024-03-26 DIAGNOSIS — G43.709 CHRONIC MIGRAINE WITHOUT AURA, NOT INTRACTABLE, WITHOUT STATUS MIGRAINOSUS: ICD-10-CM

## 2024-03-26 DIAGNOSIS — M54.9 DORSALGIA, UNSPECIFIED: ICD-10-CM

## 2024-03-26 DIAGNOSIS — R25.2 CRAMP AND SPASM: ICD-10-CM

## 2024-03-27 DIAGNOSIS — F41.1 GENERALIZED ANXIETY DISORDER: ICD-10-CM
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
## 2024-03-27 RX ORDER — ALPRAZOLAM 0.5 MG/1
0.5 TABLET ORAL 3 TIMES DAILY PRN
Qty: 90 TABLET | Refills: 0 | Status: SHIPPED | OUTPATIENT
Start: 2024-03-27 | End: 2024-04-26

## 2024-03-27 RX ORDER — PROMETHAZINE HYDROCHLORIDE 25 MG/1
25 TABLET ORAL EVERY 8 HOURS PRN
Qty: 50 TABLET | Refills: 1 | Status: SHIPPED | OUTPATIENT
Start: 2024-03-27

## 2024-03-27 RX ORDER — CYCLOBENZAPRINE HCL 10 MG
TABLET ORAL
Qty: 50 TABLET | Refills: 0 | Status: SHIPPED | OUTPATIENT
Start: 2024-03-27

## 2024-03-27 RX ORDER — TIZANIDINE 4 MG/1
TABLET ORAL
Qty: 50 TABLET | Refills: 0 | Status: SHIPPED | OUTPATIENT
Start: 2024-03-27

## 2024-03-27 NOTE — TELEPHONE ENCOUNTER
Last appointment: 3/13/24  Next appointment: 6/14/24  Previous refill encounter(s): 2/26/24 #50    Requested Prescriptions     Pending Prescriptions Disp Refills    HYDROcodone-acetaminophen (NORCO) 7.5-325 MG per tablet 50 tablet 0     Sig: Take 1 tablet by mouth every 8 hours as needed for Pain for up to 30 days. Intended supply: 30 days Max Daily Amount: 3 tablets         For Pharmacy Admin Tracking Only    Program: Medication Refill  CPA in place:    Recommendation Provided To:   Intervention Detail: New Rx: 1, reason: Patient Preference  Intervention Accepted By:   Gap Closed?:    Time Spent (min): 5

## 2024-03-27 NOTE — TELEPHONE ENCOUNTER
Last appointment: 3/13/24  Next appointment: 6/14/24  Previous refill encounter(s): 2/16/24 #90    Requested Prescriptions     Pending Prescriptions Disp Refills    ALPRAZolam (XANAX) 0.5 MG tablet [Pharmacy Med Name: ALPRAZOLAM 0.5 MG TABLET] 90 tablet 0     Sig: Take 1 tablet by mouth 3 times daily as needed for Sleep for up to 30 days. Max Daily Amount: 1.5 mg         For Pharmacy Admin Tracking Only    Program: Medication Refill  CPA in place:    Recommendation Provided To:   Intervention Detail: New Rx: 1, reason: Patient Preference  Intervention Accepted By:   Gap Closed?:    Time Spent (min): 5

## 2024-03-27 NOTE — TELEPHONE ENCOUNTER
Last appointment: 3/13/24  Next appointment: 6/14/24  Previous refill encounter(s): 2/14/24 Phenergan #50 with 1 refill, 3/13/24 Zanaflex & Flexeril #50    Requested Prescriptions     Pending Prescriptions Disp Refills    promethazine (PHENERGAN) 25 MG tablet [Pharmacy Med Name: PROMETHAZINE 25 MG TABLET] 50 tablet 1     Sig: TAKE 1 TABLET BY MOUTH EVERY 8 HOURS AS NEEDED FOR NAUSEA    tiZANidine (ZANAFLEX) 4 MG tablet [Pharmacy Med Name: TIZANIDINE HCL 4 MG TABLET] 50 tablet 0     Sig: TAKE 1 TABLET BY MOUTH THREE TIMES A DAY AS NEEDED FOR MUSCLE SPASM    cyclobenzaprine (FLEXERIL) 10 MG tablet [Pharmacy Med Name: CYCLOBENZAPRINE 10 MG TABLET] 50 tablet 0     Sig: TAKE 1 TABLET BY MOUTH THREE TIMES A DAY WITH MEALS         For Pharmacy Admin Tracking Only    Program: Medication Refill  CPA in place:    Recommendation Provided To:   Intervention Detail: New Rx: 3, reason: Patient Preference  Intervention Accepted By:   Gap Closed?:    Time Spent (min): 5

## 2024-03-29 RX ORDER — HYDROCODONE BITARTRATE AND ACETAMINOPHEN 7.5; 325 MG/1; MG/1
1 TABLET ORAL EVERY 8 HOURS PRN
Qty: 50 TABLET | Refills: 0 | Status: SHIPPED | OUTPATIENT
Start: 2024-03-29 | End: 2024-04-28

## 2024-04-13 DIAGNOSIS — R60.9 EDEMA, UNSPECIFIED: ICD-10-CM

## 2024-04-14 ENCOUNTER — HOSPITAL ENCOUNTER (OUTPATIENT)
Facility: HOSPITAL | Age: 49
Setting detail: OBSERVATION
Discharge: HOME | End: 2024-04-15
Attending: EMERGENCY MEDICINE | Admitting: SURGERY
Payer: MEDICARE

## 2024-04-14 ENCOUNTER — APPOINTMENT (OUTPATIENT)
Facility: HOSPITAL | Age: 49
End: 2024-04-14
Payer: MEDICARE

## 2024-04-14 ENCOUNTER — ANESTHESIA EVENT (OUTPATIENT)
Facility: HOSPITAL | Age: 49
End: 2024-04-14
Payer: MEDICARE

## 2024-04-14 ENCOUNTER — ANESTHESIA (OUTPATIENT)
Facility: HOSPITAL | Age: 49
End: 2024-04-14
Payer: MEDICARE

## 2024-04-14 DIAGNOSIS — K80.00 ACUTE CALCULOUS CHOLECYSTITIS: Primary | ICD-10-CM

## 2024-04-14 LAB
ALBUMIN SERPL-MCNC: 3 G/DL (ref 3.5–5)
ALBUMIN/GLOB SERPL: 0.6 (ref 1.1–2.2)
ALP SERPL-CCNC: 82 U/L (ref 45–117)
ALT SERPL-CCNC: 18 U/L (ref 12–78)
ANION GAP SERPL CALC-SCNC: 0 MMOL/L (ref 5–15)
APPEARANCE UR: ABNORMAL
AST SERPL-CCNC: 32 U/L (ref 15–37)
BACTERIA URNS QL MICRO: ABNORMAL /HPF
BASOPHILS # BLD: 0.1 K/UL (ref 0–0.1)
BASOPHILS NFR BLD: 1 % (ref 0–1)
BILIRUB SERPL-MCNC: 0.4 MG/DL (ref 0.2–1)
BILIRUB UR QL: NEGATIVE
BUN SERPL-MCNC: 13 MG/DL (ref 6–20)
BUN/CREAT SERPL: 17 (ref 12–20)
CALCIUM SERPL-MCNC: 8.7 MG/DL (ref 8.5–10.1)
CHLORIDE SERPL-SCNC: 109 MMOL/L (ref 97–108)
CO2 SERPL-SCNC: 26 MMOL/L (ref 21–32)
COLOR UR: ABNORMAL
CREAT SERPL-MCNC: 0.76 MG/DL (ref 0.55–1.02)
DIFFERENTIAL METHOD BLD: ABNORMAL
EOSINOPHIL # BLD: 0 K/UL (ref 0–0.4)
EOSINOPHIL NFR BLD: 0 % (ref 0–7)
EPITH CASTS URNS QL MICRO: ABNORMAL /LPF
ERYTHROCYTE [DISTWIDTH] IN BLOOD BY AUTOMATED COUNT: 15.7 % (ref 11.5–14.5)
GLOBULIN SER CALC-MCNC: 4.7 G/DL (ref 2–4)
GLUCOSE SERPL-MCNC: 156 MG/DL (ref 65–100)
GLUCOSE UR STRIP.AUTO-MCNC: NEGATIVE MG/DL
HCG SERPL QL: NEGATIVE
HCT VFR BLD AUTO: 38.8 % (ref 35–47)
HGB BLD-MCNC: 12.1 G/DL (ref 11.5–16)
HGB UR QL STRIP: NEGATIVE
HYALINE CASTS URNS QL MICRO: ABNORMAL /LPF (ref 0–5)
IMM GRANULOCYTES # BLD AUTO: 0.1 K/UL (ref 0–0.04)
IMM GRANULOCYTES NFR BLD AUTO: 1 % (ref 0–0.5)
KETONES UR QL STRIP.AUTO: NEGATIVE MG/DL
LEUKOCYTE ESTERASE UR QL STRIP.AUTO: ABNORMAL
LIPASE SERPL-CCNC: 41 U/L (ref 13–75)
LYMPHOCYTES # BLD: 3.3 K/UL (ref 0.8–3.5)
LYMPHOCYTES NFR BLD: 26 % (ref 12–49)
MCH RBC QN AUTO: 29.1 PG (ref 26–34)
MCHC RBC AUTO-ENTMCNC: 31.2 G/DL (ref 30–36.5)
MCV RBC AUTO: 93.3 FL (ref 80–99)
MONOCYTES # BLD: 0.6 K/UL (ref 0–1)
MONOCYTES NFR BLD: 5 % (ref 5–13)
NEUTS SEG # BLD: 8.8 K/UL (ref 1.8–8)
NEUTS SEG NFR BLD: 68 % (ref 32–75)
NITRITE UR QL STRIP.AUTO: NEGATIVE
NRBC # BLD: 0 K/UL (ref 0–0.01)
NRBC BLD-RTO: 0 PER 100 WBC
PH UR STRIP: 6 (ref 5–8)
PLATELET # BLD AUTO: 541 K/UL (ref 150–400)
PMV BLD AUTO: 9.4 FL (ref 8.9–12.9)
POTASSIUM SERPL-SCNC: 4.5 MMOL/L (ref 3.5–5.1)
PROT SERPL-MCNC: 7.7 G/DL (ref 6.4–8.2)
PROT UR STRIP-MCNC: NEGATIVE MG/DL
RBC # BLD AUTO: 4.16 M/UL (ref 3.8–5.2)
RBC #/AREA URNS HPF: ABNORMAL /HPF (ref 0–5)
SODIUM SERPL-SCNC: 135 MMOL/L (ref 136–145)
SP GR UR REFRACTOMETRY: 1.02
URINE CULTURE IF INDICATED: ABNORMAL
UROBILINOGEN UR QL STRIP.AUTO: 0.2 EU/DL (ref 0.2–1)
WBC # BLD AUTO: 12.8 K/UL (ref 3.6–11)
WBC URNS QL MICRO: ABNORMAL /HPF (ref 0–4)

## 2024-04-14 PROCEDURE — 83690 ASSAY OF LIPASE: CPT

## 2024-04-14 PROCEDURE — 2580000003 HC RX 258: Performed by: EMERGENCY MEDICINE

## 2024-04-14 PROCEDURE — 36415 COLL VENOUS BLD VENIPUNCTURE: CPT

## 2024-04-14 PROCEDURE — 2500000003 HC RX 250 WO HCPCS: Performed by: SURGERY

## 2024-04-14 PROCEDURE — 3600000019 HC SURGERY ROBOT ADDTL 15MIN: Performed by: SURGERY

## 2024-04-14 PROCEDURE — 6360000002 HC RX W HCPCS: Performed by: SURGERY

## 2024-04-14 PROCEDURE — 6360000002 HC RX W HCPCS: Performed by: NURSE ANESTHETIST, CERTIFIED REGISTERED

## 2024-04-14 PROCEDURE — 84703 CHORIONIC GONADOTROPIN ASSAY: CPT

## 2024-04-14 PROCEDURE — 81001 URINALYSIS AUTO W/SCOPE: CPT

## 2024-04-14 PROCEDURE — 87086 URINE CULTURE/COLONY COUNT: CPT

## 2024-04-14 PROCEDURE — 2500000003 HC RX 250 WO HCPCS: Performed by: NURSE ANESTHETIST, CERTIFIED REGISTERED

## 2024-04-14 PROCEDURE — 47562 LAPAROSCOPIC CHOLECYSTECTOMY: CPT | Performed by: SURGERY

## 2024-04-14 PROCEDURE — 3700000001 HC ADD 15 MINUTES (ANESTHESIA): Performed by: SURGERY

## 2024-04-14 PROCEDURE — 7100000001 HC PACU RECOVERY - ADDTL 15 MIN: Performed by: SURGERY

## 2024-04-14 PROCEDURE — 88304 TISSUE EXAM BY PATHOLOGIST: CPT

## 2024-04-14 PROCEDURE — 99285 EMERGENCY DEPT VISIT HI MDM: CPT

## 2024-04-14 PROCEDURE — 85025 COMPLETE CBC W/AUTO DIFF WBC: CPT

## 2024-04-14 PROCEDURE — G0378 HOSPITAL OBSERVATION PER HR: HCPCS

## 2024-04-14 PROCEDURE — 2580000003 HC RX 258: Performed by: SURGERY

## 2024-04-14 PROCEDURE — 2580000003 HC RX 258: Performed by: STUDENT IN AN ORGANIZED HEALTH CARE EDUCATION/TRAINING PROGRAM

## 2024-04-14 PROCEDURE — 76705 ECHO EXAM OF ABDOMEN: CPT

## 2024-04-14 PROCEDURE — 96375 TX/PRO/DX INJ NEW DRUG ADDON: CPT

## 2024-04-14 PROCEDURE — 6370000000 HC RX 637 (ALT 250 FOR IP): Performed by: EMERGENCY MEDICINE

## 2024-04-14 PROCEDURE — 96376 TX/PRO/DX INJ SAME DRUG ADON: CPT

## 2024-04-14 PROCEDURE — 2500000003 HC RX 250 WO HCPCS: Performed by: EMERGENCY MEDICINE

## 2024-04-14 PROCEDURE — 1100000000 HC RM PRIVATE

## 2024-04-14 PROCEDURE — 2580000003 HC RX 258: Performed by: NURSE ANESTHETIST, CERTIFIED REGISTERED

## 2024-04-14 PROCEDURE — 7100000000 HC PACU RECOVERY - FIRST 15 MIN: Performed by: SURGERY

## 2024-04-14 PROCEDURE — 6370000000 HC RX 637 (ALT 250 FOR IP): Performed by: SURGERY

## 2024-04-14 PROCEDURE — 3700000000 HC ANESTHESIA ATTENDED CARE: Performed by: SURGERY

## 2024-04-14 PROCEDURE — 99222 1ST HOSP IP/OBS MODERATE 55: CPT | Performed by: SURGERY

## 2024-04-14 PROCEDURE — 6360000002 HC RX W HCPCS

## 2024-04-14 PROCEDURE — 2709999900 HC NON-CHARGEABLE SUPPLY: Performed by: SURGERY

## 2024-04-14 PROCEDURE — 96365 THER/PROPH/DIAG IV INF INIT: CPT

## 2024-04-14 PROCEDURE — 3600000009 HC SURGERY ROBOT BASE: Performed by: SURGERY

## 2024-04-14 PROCEDURE — 6360000002 HC RX W HCPCS: Performed by: EMERGENCY MEDICINE

## 2024-04-14 PROCEDURE — 2720000010 HC SURG SUPPLY STERILE: Performed by: SURGERY

## 2024-04-14 PROCEDURE — S2900 ROBOTIC SURGICAL SYSTEM: HCPCS | Performed by: SURGERY

## 2024-04-14 PROCEDURE — 96366 THER/PROPH/DIAG IV INF ADDON: CPT

## 2024-04-14 PROCEDURE — 80053 COMPREHEN METABOLIC PANEL: CPT

## 2024-04-14 RX ORDER — SODIUM CHLORIDE, SODIUM LACTATE, POTASSIUM CHLORIDE, CALCIUM CHLORIDE 600; 310; 30; 20 MG/100ML; MG/100ML; MG/100ML; MG/100ML
INJECTION, SOLUTION INTRAVENOUS CONTINUOUS PRN
Status: DISCONTINUED | OUTPATIENT
Start: 2024-04-14 | End: 2024-04-14 | Stop reason: SDUPTHER

## 2024-04-14 RX ORDER — BUPIVACAINE HYDROCHLORIDE 5 MG/ML
INJECTION, SOLUTION EPIDURAL; INTRACAUDAL PRN
Status: DISCONTINUED | OUTPATIENT
Start: 2024-04-14 | End: 2024-04-14 | Stop reason: ALTCHOICE

## 2024-04-14 RX ORDER — ONDANSETRON 2 MG/ML
4 INJECTION INTRAMUSCULAR; INTRAVENOUS
Status: COMPLETED | OUTPATIENT
Start: 2024-04-14 | End: 2024-04-14

## 2024-04-14 RX ORDER — ONDANSETRON 2 MG/ML
INJECTION INTRAMUSCULAR; INTRAVENOUS
Status: COMPLETED
Start: 2024-04-14 | End: 2024-04-14

## 2024-04-14 RX ORDER — FENTANYL CITRATE 50 UG/ML
INJECTION, SOLUTION INTRAMUSCULAR; INTRAVENOUS PRN
Status: DISCONTINUED | OUTPATIENT
Start: 2024-04-14 | End: 2024-04-14 | Stop reason: SDUPTHER

## 2024-04-14 RX ORDER — MAGNESIUM SULFATE IN WATER 40 MG/ML
2000 INJECTION, SOLUTION INTRAVENOUS PRN
Status: DISCONTINUED | OUTPATIENT
Start: 2024-04-14 | End: 2024-04-14

## 2024-04-14 RX ORDER — KETOROLAC TROMETHAMINE 30 MG/ML
INJECTION, SOLUTION INTRAMUSCULAR; INTRAVENOUS PRN
Status: DISCONTINUED | OUTPATIENT
Start: 2024-04-14 | End: 2024-04-14 | Stop reason: SDUPTHER

## 2024-04-14 RX ORDER — PROCHLORPERAZINE EDISYLATE 5 MG/ML
10 INJECTION INTRAMUSCULAR; INTRAVENOUS EVERY 6 HOURS PRN
Status: DISCONTINUED | OUTPATIENT
Start: 2024-04-14 | End: 2024-04-14

## 2024-04-14 RX ORDER — SODIUM CHLORIDE 9 MG/ML
INJECTION, SOLUTION INTRAVENOUS PRN
Status: DISCONTINUED | OUTPATIENT
Start: 2024-04-14 | End: 2024-04-14

## 2024-04-14 RX ORDER — DEXTROSE MONOHYDRATE 100 MG/ML
INJECTION, SOLUTION INTRAVENOUS CONTINUOUS PRN
Status: DISCONTINUED | OUTPATIENT
Start: 2024-04-14 | End: 2024-04-15 | Stop reason: HOSPADM

## 2024-04-14 RX ORDER — ONDANSETRON 2 MG/ML
INJECTION INTRAMUSCULAR; INTRAVENOUS PRN
Status: DISCONTINUED | OUTPATIENT
Start: 2024-04-14 | End: 2024-04-14 | Stop reason: SDUPTHER

## 2024-04-14 RX ORDER — INDOCYANINE GREEN AND WATER 25 MG
5 KIT INJECTION
Status: COMPLETED | OUTPATIENT
Start: 2024-04-14 | End: 2024-04-14

## 2024-04-14 RX ORDER — SODIUM CHLORIDE 0.9 % (FLUSH) 0.9 %
5-40 SYRINGE (ML) INJECTION PRN
Status: DISCONTINUED | OUTPATIENT
Start: 2024-04-14 | End: 2024-04-14

## 2024-04-14 RX ORDER — GABAPENTIN 300 MG/1
600 CAPSULE ORAL 3 TIMES DAILY
Status: DISCONTINUED | OUTPATIENT
Start: 2024-04-14 | End: 2024-04-15 | Stop reason: HOSPADM

## 2024-04-14 RX ORDER — DEXTROSE MONOHYDRATE, SODIUM CHLORIDE, AND POTASSIUM CHLORIDE 50; 1.49; 4.5 G/1000ML; G/1000ML; G/1000ML
INJECTION, SOLUTION INTRAVENOUS CONTINUOUS
Status: DISCONTINUED | OUTPATIENT
Start: 2024-04-14 | End: 2024-04-15 | Stop reason: HOSPADM

## 2024-04-14 RX ORDER — GLUCAGON 1 MG/ML
1 KIT INJECTION PRN
Status: DISCONTINUED | OUTPATIENT
Start: 2024-04-14 | End: 2024-04-15 | Stop reason: HOSPADM

## 2024-04-14 RX ORDER — SODIUM CHLORIDE 0.9 % (FLUSH) 0.9 %
5-40 SYRINGE (ML) INJECTION EVERY 12 HOURS SCHEDULED
Status: DISCONTINUED | OUTPATIENT
Start: 2024-04-14 | End: 2024-04-15 | Stop reason: HOSPADM

## 2024-04-14 RX ORDER — SODIUM CHLORIDE 9 MG/ML
INJECTION, SOLUTION INTRAVENOUS PRN
Status: DISCONTINUED | OUTPATIENT
Start: 2024-04-14 | End: 2024-04-15 | Stop reason: HOSPADM

## 2024-04-14 RX ORDER — HYDROMORPHONE HYDROCHLORIDE 1 MG/ML
1 INJECTION, SOLUTION INTRAMUSCULAR; INTRAVENOUS; SUBCUTANEOUS ONCE
Status: COMPLETED | OUTPATIENT
Start: 2024-04-14 | End: 2024-04-14

## 2024-04-14 RX ORDER — FENTANYL CITRATE 50 UG/ML
25 INJECTION, SOLUTION INTRAMUSCULAR; INTRAVENOUS EVERY 5 MIN PRN
Status: DISCONTINUED | OUTPATIENT
Start: 2024-04-14 | End: 2024-04-15

## 2024-04-14 RX ORDER — DEXTROSE MONOHYDRATE, SODIUM CHLORIDE, AND POTASSIUM CHLORIDE 50; 1.49; 4.5 G/1000ML; G/1000ML; G/1000ML
INJECTION, SOLUTION INTRAVENOUS CONTINUOUS
Status: DISCONTINUED | OUTPATIENT
Start: 2024-04-14 | End: 2024-04-14

## 2024-04-14 RX ORDER — HYDROMORPHONE HYDROCHLORIDE 1 MG/ML
1 INJECTION, SOLUTION INTRAMUSCULAR; INTRAVENOUS; SUBCUTANEOUS
Status: COMPLETED | OUTPATIENT
Start: 2024-04-14 | End: 2024-04-14

## 2024-04-14 RX ORDER — SODIUM CHLORIDE 0.9 % (FLUSH) 0.9 %
5-40 SYRINGE (ML) INJECTION PRN
Status: DISCONTINUED | OUTPATIENT
Start: 2024-04-14 | End: 2024-04-15 | Stop reason: HOSPADM

## 2024-04-14 RX ORDER — POTASSIUM CHLORIDE 7.45 MG/ML
10 INJECTION INTRAVENOUS PRN
Status: DISCONTINUED | OUTPATIENT
Start: 2024-04-14 | End: 2024-04-14

## 2024-04-14 RX ORDER — DEXAMETHASONE SODIUM PHOSPHATE 4 MG/ML
INJECTION, SOLUTION INTRA-ARTICULAR; INTRALESIONAL; INTRAMUSCULAR; INTRAVENOUS; SOFT TISSUE PRN
Status: DISCONTINUED | OUTPATIENT
Start: 2024-04-14 | End: 2024-04-14 | Stop reason: SDUPTHER

## 2024-04-14 RX ORDER — HYDROMORPHONE HYDROCHLORIDE 1 MG/ML
0.5 INJECTION, SOLUTION INTRAMUSCULAR; INTRAVENOUS; SUBCUTANEOUS EVERY 5 MIN PRN
Status: DISCONTINUED | OUTPATIENT
Start: 2024-04-14 | End: 2024-04-15

## 2024-04-14 RX ORDER — LIDOCAINE HYDROCHLORIDE 20 MG/ML
INJECTION, SOLUTION EPIDURAL; INFILTRATION; INTRACAUDAL; PERINEURAL PRN
Status: DISCONTINUED | OUTPATIENT
Start: 2024-04-14 | End: 2024-04-14 | Stop reason: SDUPTHER

## 2024-04-14 RX ORDER — 0.9 % SODIUM CHLORIDE 0.9 %
1000 INTRAVENOUS SOLUTION INTRAVENOUS ONCE
Status: COMPLETED | OUTPATIENT
Start: 2024-04-14 | End: 2024-04-14

## 2024-04-14 RX ORDER — ONDANSETRON 4 MG/1
4 TABLET, ORALLY DISINTEGRATING ORAL EVERY 8 HOURS PRN
Status: DISCONTINUED | OUTPATIENT
Start: 2024-04-14 | End: 2024-04-15 | Stop reason: HOSPADM

## 2024-04-14 RX ORDER — HYDROMORPHONE HYDROCHLORIDE 1 MG/ML
0.25 INJECTION, SOLUTION INTRAMUSCULAR; INTRAVENOUS; SUBCUTANEOUS
Status: DISCONTINUED | OUTPATIENT
Start: 2024-04-14 | End: 2024-04-14

## 2024-04-14 RX ORDER — ROCURONIUM BROMIDE 10 MG/ML
INJECTION, SOLUTION INTRAVENOUS PRN
Status: DISCONTINUED | OUTPATIENT
Start: 2024-04-14 | End: 2024-04-14 | Stop reason: SDUPTHER

## 2024-04-14 RX ORDER — NALOXONE HYDROCHLORIDE 0.4 MG/ML
INJECTION, SOLUTION INTRAMUSCULAR; INTRAVENOUS; SUBCUTANEOUS PRN
Status: DISCONTINUED | OUTPATIENT
Start: 2024-04-14 | End: 2024-04-15 | Stop reason: HOSPADM

## 2024-04-14 RX ORDER — VENLAFAXINE HYDROCHLORIDE 150 MG/1
150 CAPSULE, EXTENDED RELEASE ORAL NIGHTLY
Status: DISCONTINUED | OUTPATIENT
Start: 2024-04-14 | End: 2024-04-15 | Stop reason: HOSPADM

## 2024-04-14 RX ORDER — MORPHINE SULFATE 4 MG/ML
4 INJECTION, SOLUTION INTRAMUSCULAR; INTRAVENOUS ONCE
Status: COMPLETED | OUTPATIENT
Start: 2024-04-14 | End: 2024-04-14

## 2024-04-14 RX ORDER — ACETAMINOPHEN 325 MG/1
650 TABLET ORAL EVERY 4 HOURS PRN
Status: DISCONTINUED | OUTPATIENT
Start: 2024-04-14 | End: 2024-04-15 | Stop reason: HOSPADM

## 2024-04-14 RX ORDER — ENOXAPARIN SODIUM 100 MG/ML
30 INJECTION SUBCUTANEOUS 2 TIMES DAILY
Status: DISCONTINUED | OUTPATIENT
Start: 2024-04-15 | End: 2024-04-15 | Stop reason: HOSPADM

## 2024-04-14 RX ORDER — HYDROMORPHONE HYDROCHLORIDE 1 MG/ML
0.25 INJECTION, SOLUTION INTRAMUSCULAR; INTRAVENOUS; SUBCUTANEOUS
Status: DISCONTINUED | OUTPATIENT
Start: 2024-04-14 | End: 2024-04-15 | Stop reason: HOSPADM

## 2024-04-14 RX ORDER — SODIUM CHLORIDE 0.9 % (FLUSH) 0.9 %
5-40 SYRINGE (ML) INJECTION EVERY 12 HOURS SCHEDULED
Status: DISCONTINUED | OUTPATIENT
Start: 2024-04-14 | End: 2024-04-14

## 2024-04-14 RX ORDER — OXYCODONE HYDROCHLORIDE 5 MG/1
5 TABLET ORAL EVERY 4 HOURS PRN
Status: DISCONTINUED | OUTPATIENT
Start: 2024-04-14 | End: 2024-04-15 | Stop reason: HOSPADM

## 2024-04-14 RX ORDER — ONDANSETRON 2 MG/ML
4 INJECTION INTRAMUSCULAR; INTRAVENOUS EVERY 6 HOURS PRN
Status: DISCONTINUED | OUTPATIENT
Start: 2024-04-14 | End: 2024-04-14

## 2024-04-14 RX ORDER — HYDROMORPHONE HYDROCHLORIDE 1 MG/ML
0.5 INJECTION, SOLUTION INTRAMUSCULAR; INTRAVENOUS; SUBCUTANEOUS
Status: DISCONTINUED | OUTPATIENT
Start: 2024-04-14 | End: 2024-04-14

## 2024-04-14 RX ORDER — MIDAZOLAM HYDROCHLORIDE 1 MG/ML
INJECTION INTRAMUSCULAR; INTRAVENOUS PRN
Status: DISCONTINUED | OUTPATIENT
Start: 2024-04-14 | End: 2024-04-14 | Stop reason: SDUPTHER

## 2024-04-14 RX ORDER — ONDANSETRON 2 MG/ML
4 INJECTION INTRAMUSCULAR; INTRAVENOUS EVERY 6 HOURS PRN
Status: DISCONTINUED | OUTPATIENT
Start: 2024-04-14 | End: 2024-04-15 | Stop reason: HOSPADM

## 2024-04-14 RX ORDER — HYDROMORPHONE HYDROCHLORIDE 1 MG/ML
0.5 INJECTION, SOLUTION INTRAMUSCULAR; INTRAVENOUS; SUBCUTANEOUS
Status: DISCONTINUED | OUTPATIENT
Start: 2024-04-14 | End: 2024-04-15 | Stop reason: HOSPADM

## 2024-04-14 RX ORDER — POTASSIUM CHLORIDE 20 MEQ/1
40 TABLET, EXTENDED RELEASE ORAL PRN
Status: DISCONTINUED | OUTPATIENT
Start: 2024-04-14 | End: 2024-04-14

## 2024-04-14 RX ORDER — IPRATROPIUM BROMIDE AND ALBUTEROL SULFATE 2.5; .5 MG/3ML; MG/3ML
1 SOLUTION RESPIRATORY (INHALATION)
Status: ACTIVE | OUTPATIENT
Start: 2024-04-14 | End: 2024-04-15

## 2024-04-14 RX ORDER — SUCCINYLCHOLINE CHLORIDE 20 MG/ML
INJECTION INTRAMUSCULAR; INTRAVENOUS PRN
Status: DISCONTINUED | OUTPATIENT
Start: 2024-04-14 | End: 2024-04-14 | Stop reason: SDUPTHER

## 2024-04-14 RX ADMIN — POTASSIUM CHLORIDE, DEXTROSE MONOHYDRATE AND SODIUM CHLORIDE: 150; 5; 450 INJECTION, SOLUTION INTRAVENOUS at 08:58

## 2024-04-14 RX ADMIN — ROCURONIUM BROMIDE 10 MG: 10 INJECTION INTRAVENOUS at 13:45

## 2024-04-14 RX ADMIN — PROPOFOL 200 MG: 10 INJECTION, EMULSION INTRAVENOUS at 13:16

## 2024-04-14 RX ADMIN — SODIUM CHLORIDE 1000 ML: 9 INJECTION, SOLUTION INTRAVENOUS at 03:00

## 2024-04-14 RX ADMIN — POTASSIUM CHLORIDE, DEXTROSE MONOHYDRATE AND SODIUM CHLORIDE: 150; 5; 450 INJECTION, SOLUTION INTRAVENOUS at 17:01

## 2024-04-14 RX ADMIN — SODIUM CHLORIDE, PRESERVATIVE FREE 10 ML: 5 INJECTION INTRAVENOUS at 20:10

## 2024-04-14 RX ADMIN — DEXAMETHASONE SODIUM PHOSPHATE 8 MG: 4 INJECTION, SOLUTION INTRAMUSCULAR; INTRAVENOUS at 13:24

## 2024-04-14 RX ADMIN — GABAPENTIN 600 MG: 300 CAPSULE ORAL at 21:59

## 2024-04-14 RX ADMIN — LIDOCAINE HYDROCHLORIDE 100 MG: 20 INJECTION, SOLUTION EPIDURAL; INFILTRATION; INTRACAUDAL; PERINEURAL at 13:14

## 2024-04-14 RX ADMIN — PIPERACILLIN AND TAZOBACTAM 4500 MG: 4; .5 INJECTION, POWDER, LYOPHILIZED, FOR SOLUTION INTRAVENOUS at 13:24

## 2024-04-14 RX ADMIN — ONDANSETRON 4 MG: 2 INJECTION INTRAMUSCULAR; INTRAVENOUS at 14:35

## 2024-04-14 RX ADMIN — SODIUM CHLORIDE, PRESERVATIVE FREE 5 ML: 5 INJECTION INTRAVENOUS at 10:44

## 2024-04-14 RX ADMIN — ROCURONIUM BROMIDE 40 MG: 10 INJECTION INTRAVENOUS at 13:24

## 2024-04-14 RX ADMIN — ONDANSETRON 4 MG: 2 INJECTION INTRAMUSCULAR; INTRAVENOUS at 22:52

## 2024-04-14 RX ADMIN — ROCURONIUM BROMIDE 20 MG: 10 INJECTION INTRAVENOUS at 14:26

## 2024-04-14 RX ADMIN — FENTANYL CITRATE 50 MCG: 50 INJECTION, SOLUTION INTRAMUSCULAR; INTRAVENOUS at 14:07

## 2024-04-14 RX ADMIN — HYDROMORPHONE HYDROCHLORIDE 1 MG: 1 INJECTION, SOLUTION INTRAMUSCULAR; INTRAVENOUS; SUBCUTANEOUS at 06:10

## 2024-04-14 RX ADMIN — PIPERACILLIN AND TAZOBACTAM 4500 MG: 4; .5 INJECTION, POWDER, LYOPHILIZED, FOR SOLUTION INTRAVENOUS at 08:53

## 2024-04-14 RX ADMIN — HYDROMORPHONE HYDROCHLORIDE 0.5 MG: 1 INJECTION, SOLUTION INTRAMUSCULAR; INTRAVENOUS; SUBCUTANEOUS at 20:09

## 2024-04-14 RX ADMIN — FENTANYL CITRATE 50 MCG: 50 INJECTION, SOLUTION INTRAMUSCULAR; INTRAVENOUS at 13:11

## 2024-04-14 RX ADMIN — SODIUM CHLORIDE, POTASSIUM CHLORIDE, SODIUM LACTATE AND CALCIUM CHLORIDE: 600; 310; 30; 20 INJECTION, SOLUTION INTRAVENOUS at 13:11

## 2024-04-14 RX ADMIN — PROPOFOL 50 MG: 10 INJECTION, EMULSION INTRAVENOUS at 15:01

## 2024-04-14 RX ADMIN — ACETAMINOPHEN 650 MG: 325 TABLET ORAL at 21:15

## 2024-04-14 RX ADMIN — HYDROMORPHONE HYDROCHLORIDE 0.5 MG: 1 INJECTION, SOLUTION INTRAMUSCULAR; INTRAVENOUS; SUBCUTANEOUS at 11:34

## 2024-04-14 RX ADMIN — SUGAMMADEX 200 MG: 100 INJECTION, SOLUTION INTRAVENOUS at 15:00

## 2024-04-14 RX ADMIN — INDOCYANINE GREEN AND WATER 5 MG: KIT at 12:32

## 2024-04-14 RX ADMIN — ONDANSETRON 4 MG: 2 INJECTION INTRAMUSCULAR; INTRAVENOUS at 15:54

## 2024-04-14 RX ADMIN — HYOSCYAMINE SULFATE 125 MCG: 0.12 TABLET ORAL; SUBLINGUAL at 02:59

## 2024-04-14 RX ADMIN — KETOROLAC TROMETHAMINE 15 MG: 30 INJECTION, SOLUTION INTRAMUSCULAR; INTRAVENOUS at 14:35

## 2024-04-14 RX ADMIN — MIDAZOLAM HYDROCHLORIDE 2 MG: 1 INJECTION, SOLUTION INTRAMUSCULAR; INTRAVENOUS at 13:11

## 2024-04-14 RX ADMIN — HYDROMORPHONE HYDROCHLORIDE 0.5 MG: 1 INJECTION, SOLUTION INTRAMUSCULAR; INTRAVENOUS; SUBCUTANEOUS at 22:56

## 2024-04-14 RX ADMIN — ONDANSETRON 4 MG: 2 INJECTION INTRAMUSCULAR; INTRAVENOUS at 02:59

## 2024-04-14 RX ADMIN — OXYCODONE 5 MG: 5 TABLET ORAL at 21:15

## 2024-04-14 RX ADMIN — POTASSIUM CHLORIDE, DEXTROSE MONOHYDRATE AND SODIUM CHLORIDE: 150; 5; 450 INJECTION, SOLUTION INTRAVENOUS at 22:56

## 2024-04-14 RX ADMIN — HYDROMORPHONE HYDROCHLORIDE 1 MG: 1 INJECTION, SOLUTION INTRAMUSCULAR; INTRAVENOUS; SUBCUTANEOUS at 08:00

## 2024-04-14 RX ADMIN — MORPHINE SULFATE 4 MG: 4 INJECTION INTRAVENOUS at 02:59

## 2024-04-14 RX ADMIN — HYDROMORPHONE HYDROCHLORIDE 1 MG: 1 INJECTION, SOLUTION INTRAMUSCULAR; INTRAVENOUS; SUBCUTANEOUS at 04:01

## 2024-04-14 RX ADMIN — FENTANYL CITRATE 50 MCG: 50 INJECTION, SOLUTION INTRAMUSCULAR; INTRAVENOUS at 14:27

## 2024-04-14 RX ADMIN — FENTANYL CITRATE 50 MCG: 50 INJECTION, SOLUTION INTRAMUSCULAR; INTRAVENOUS at 13:15

## 2024-04-14 RX ADMIN — VENLAFAXINE HYDROCHLORIDE 150 MG: 150 CAPSULE, EXTENDED RELEASE ORAL at 21:59

## 2024-04-14 RX ADMIN — SUCCINYLCHOLINE CHLORIDE 180 MG: 20 INJECTION, SOLUTION INTRAMUSCULAR; INTRAVENOUS at 13:16

## 2024-04-14 ASSESSMENT — PAIN DESCRIPTION - DESCRIPTORS
DESCRIPTORS: PRESSURE
DESCRIPTORS: ACHING
DESCRIPTORS: SQUEEZING
DESCRIPTORS: ACHING

## 2024-04-14 ASSESSMENT — PAIN DESCRIPTION - ORIENTATION
ORIENTATION: RIGHT
ORIENTATION: ANTERIOR;LEFT;RIGHT
ORIENTATION: RIGHT;LEFT;ANTERIOR
ORIENTATION: RIGHT
ORIENTATION: RIGHT;LEFT;ANTERIOR
ORIENTATION: RIGHT
ORIENTATION: RIGHT;UPPER

## 2024-04-14 ASSESSMENT — PAIN SCALES - GENERAL
PAINLEVEL_OUTOF10: 8
PAINLEVEL_OUTOF10: 8
PAINLEVEL_OUTOF10: 9
PAINLEVEL_OUTOF10: 10
PAINLEVEL_OUTOF10: 8
PAINLEVEL_OUTOF10: 10
PAINLEVEL_OUTOF10: 8
PAINLEVEL_OUTOF10: 10

## 2024-04-14 ASSESSMENT — LIFESTYLE VARIABLES
HOW OFTEN DO YOU HAVE A DRINK CONTAINING ALCOHOL: MONTHLY OR LESS
HOW MANY STANDARD DRINKS CONTAINING ALCOHOL DO YOU HAVE ON A TYPICAL DAY: 1 OR 2

## 2024-04-14 ASSESSMENT — PAIN DESCRIPTION - LOCATION
LOCATION: ABDOMEN

## 2024-04-14 ASSESSMENT — PAIN - FUNCTIONAL ASSESSMENT
PAIN_FUNCTIONAL_ASSESSMENT: PREVENTS OR INTERFERES SOME ACTIVE ACTIVITIES AND ADLS
PAIN_FUNCTIONAL_ASSESSMENT: 0-10

## 2024-04-14 NOTE — ED NOTES
Patient beginning yelling/moaning in room again, states the pain medicine she received did not work. Provider notified.

## 2024-04-14 NOTE — ED PROVIDER NOTES
EKG 12 lead    Date/Time: 4/14/2024 9:04 AM    Performed by: Ruperto Hobson MD  Authorized by: Christiano Story MD    ECG interpreted by ED Physician in the absence of a cardiologist: yes    Interpretation:     Interpretation: normal    Rate:     ECG rate assessment: normal    Rhythm:     Rhythm: sinus rhythm    Ectopy:     Ectopy: none            Ruperto Hobson MD  04/14/24 0904    
(DILAUDID) injection 1 mg (1 mg IntraVENous Given 4/14/24 0610)   ondansetron (ZOFRAN) injection 4 mg (4 mg IntraVENous Given 4/14/24 1554)   HYDROmorphone HCl PF (DILAUDID) injection 1 mg (1 mg IntraVENous Given 4/14/24 0800)   indocyanine green (IC-GREEN) syringe 5 mg (5 mg IntraVENous Given 4/14/24 1232)       Medical Decision Making  Patient presenting with right upper quadrant abdominal pain.  Most likely cholecystitis, differential also includes biliary colic, choledocholithiasis, pancreatitis.  Will obtain labs including CBC, CMP, lipase.  Will obtain an ultrasound of the right upper quadrant.  Disposition pending laboratory workup and imaging and symptomatic management.    Amount and/or Complexity of Data Reviewed  Labs: ordered.  Radiology: ordered.  ECG/medicine tests: independent interpretation performed.    Risk  Prescription drug management.  Decision regarding hospitalization.        Discussed with Dr. Christiano Story, he will see and evaluate the patient and decide if surgical intervention for possible cholecystitis is needed.               FINAL IMPRESSION     1. Acute calculous cholecystitis          DISPOSITION/PLAN   Belem Xiao's  results have been reviewed with her.  She has been counseled regarding her diagnosis, treatment, and plan.  She verbally conveys understanding and agreement of the signs, symptoms, diagnosis, treatment and prognosis and additionally agrees to follow up as discussed.  She also agrees with the care-plan and conveys that all of her questions have been answered.  I have also provided discharge instructions for her that include: educational information regarding their diagnosis and treatment, and list of reasons why they would want to return to the ED prior to their follow-up appointment, should her condition change.     CLINICAL IMPRESSION    Admit Note: Pt is being admitted by Dr. Story. The results of their tests and reason(s) for their admission have been discussed with

## 2024-04-14 NOTE — ED NOTES
TRANSFER - OUT REPORT:    Verbal report given to Carina on Belem Xiao  being transferred to [facility] for routine progression of patient care       Report consisted of patient's Situation, Background, Assessment and   Recommendations(SBAR).     Information from the following report(s) ED SBAR, Adult Overview, MAR, Recent Results, and Neuro Assessment was reviewed with the receiving nurse.    [place] Fall Assessment:    Presents to emergency department  because of falls (Syncope, seizure, or loss of consciousness): No  Age > 70: No  Altered Mental Status, Intoxication with alcohol or substance confusion (Disorientation, impaired judgment, poor safety awaremess, or inability to follow instructions): No  Impaired Mobility: Ambulates or transfers with assistive devices or assistance; Unable to ambulate or transer.: No  Nursing Judgement: No          Lines:   Peripheral IV 04/14/24 Proximal;Right;Anterior Forearm (Active)        Opportunity for questions and clarification was provided.

## 2024-04-14 NOTE — OP NOTE
[facility]  OPERATIVE REPORT     PATIENT:  Belem Xiao    YOB: 1975    PATIENT ID: [id number]    DATE OF OPERATION: 4/14/2024    PREOPERATIVE DIAGNOSIS:  Cholecystitis with cholelithiasis    POSTOPERATIVE DIAGNOSIS:  same as preop    PROCEDURE: Robotic Laparoscopic Cholecystectomy    SURGEON:  Christiano Story MD, FACS.    ANESTHESIA:  General Endotracheal Anesthesia  Circulator: Hailee Elliott RN  Surgical Assistant: Jose De Jesus Cooney  Scrub Person First: Mitzy Lai  Circulator Assist: Jessica Ramirez RN    FINDINGS:  Cholecystitis with cholelithiasis    SPECIMENS REMOVED:  Gallbladder and Contents    DESCRIPTION OF OPERATION:  After appropriate consent was obtained, the patient who was competent was brought to the operating room, made comfortable in the supine position, administered general endotracheal anesthesia.  The patient was prepped and draped in standard fashion and a time-out was completed.  0.5% plain Marcaine solution was used to infiltrate planned trocar sites.  An incision was made in the left abdomen and an Optiview trocar was placed under direct visualization.  The abdominal cavity was insufflated with CO2 gas and under direct visualization, three additional trocars were placed and the Optiview trocar was replaced with a 12 mm trocar.    The [employer] camera was inserted, the robot arms were targeted and the robot was docked.  The right upper quadrant was examined.  The gallbladder was visualized.  The gallbladder was grasped with a fenestrated bipolar grasper, retracted laterally and a ProGrasp forceps was used to elevate the gallbladder superiorly in the upper abdomen.  The fenestrated grasper and the hook cautery were used to take down the peritoneum and expose the cystic duct and cystic artery.  Dissection was completed along the cystic plate to establish a critical view of safety.  Firefly visualization was used to confirm the anatomy and once the

## 2024-04-14 NOTE — PROGRESS NOTES
End of Shift Note    Bedside shift change report given to DARRIUS Sanchez (oncoming nurse) by Jeniffer Barone RN (offgoing nurse).  Report included the following information SBAR, Kardex, Intake/Output, MAR, Recent Results, and Cardiac Rhythm NSR    Shift worked:  3442-4964     Shift summary and any significant changes:    Upon return to surgery, pt requesting to ambulate to bathroom. Pt ambulated to the bathroom with walker without any difficulty. Pt expresses feeling much better since surgery.     Concerns for physician to address:       Zone phone for oncoming shift:          Activity:     Number times ambulated in hallways past shift: 0  Number of times OOB to chair past shift: 1    Cardiac:   Cardiac Monitoring: Yes           Access:  Current line(s): PIV     Genitourinary:   Urinary status: voiding    Respiratory:      Chronic home O2 use?: N/A  Incentive spirometer at bedside: YES       GI:     Current diet:  ADULT DIET; Clear Liquid  Passing flatus: YES  Tolerating current diet: YES       Pain Management:   Patient states pain is manageable on current regimen: YES    Skin:     Interventions: float heels and increase time out of bed    Patient Safety:  Fall Score:    Interventions: bed/chair alarm, assistive device (walker, cane. etc), gripper socks, pt to call before getting OOB, and stay with me (per policy)       Length of Stay:  Expected LOS: 1  Actual LOS: 0      Jeniffer Barone RN

## 2024-04-14 NOTE — H&P
HISTORY AND PHYSICAL             Date: 4/14/2024        Patient Name: Belem Xiao     YOB: 1975      Age:  49 y.o.    Chief Complaint     Chief Complaint   Patient presents with    Abdominal Pain     Pt arrives via EMS from home for RUQ abdominal pain radiating to back since midnight. Per pt, multiple vomiting episodes. Hx of gallstones and pelvic mass per pt. Has not seen GI/OB yet.           History Obtained From   patient    History of Present Illness   50 yo woman with known cholelithiasis, has been working to lose weight, about 70 pounds so far.  She developed acute onset upper abdominal/epigastric pain with protracted nausea and vomiting and radiation into the back.  She is very anxious and states she is still in 10/10 pain but appears comfortable when distracted.      Past Medical History     Past Medical History:   Diagnosis Date    Anxiety     Currently quite worse while my brother with Metastatic Stage IV Lunch Cancer lives with me now & has chemo & radiation.    Arthritis     DDD    Cancer (HCC)     Dont know if this counts had to have double cutouts on rightarm to have pre-melanoma cells removed    Carpal tunnel syndrome on left 2/22/2011    Cervical spondylarthritis 2/20/2011    Chronic back pain     T11 & T12 misshappen & surrounded w/ arthritis, 3 places of spinal stenosis & Cervical Degenerative Disc Disease    Depressive disorder, not elsewhere classified 2/17/2011    Encounter for long-term (current) use of other medications 2/20/2011    Fibromyalgia     GERD (gastroesophageal reflux disease)     Headache(784.0)     Migraines    Hepatic hemangioma 2/17/2011    Hypertension     IBS (irritable bowel syndrome) 2/20/2011    Migraine syndrome 2/17/2011    Obesity 2/20/2011    Other ill-defined conditions(799.89)     hx.of syncope    Psychiatric disorder     depression    Sleep apnea     Picking up test on Nov 6 per Dr. Humphrey    Type 2 diabetes mellitus without complication ([facility])

## 2024-04-14 NOTE — ED NOTES
Patient in room sitting up, dry heaving into emesis bag. No emesis noted. Patient screaming/moaning loudly, states she's in pain. Patient talking to herself in third person, yelling \"Belem, get it together! Get your shit together. Oh holy hell I thought we were done with this!\"     Patient medicated with nausea/pain meds at this time.

## 2024-04-14 NOTE — ED NOTES
Assumed care from DARRIUS Robledo. Patient readjusted in bed. Bed in the lowest position and call bell within reach.

## 2024-04-15 ENCOUNTER — TELEPHONE (OUTPATIENT)
Age: 49
End: 2024-04-15

## 2024-04-15 VITALS
RESPIRATION RATE: 17 BRPM | OXYGEN SATURATION: 96 % | TEMPERATURE: 98.2 F | HEART RATE: 90 BPM | HEIGHT: 66 IN | DIASTOLIC BLOOD PRESSURE: 79 MMHG | SYSTOLIC BLOOD PRESSURE: 141 MMHG | BODY MASS INDEX: 47.09 KG/M2 | WEIGHT: 293 LBS

## 2024-04-15 LAB
ALBUMIN SERPL-MCNC: 2.6 G/DL (ref 3.5–5)
ALBUMIN/GLOB SERPL: 0.7 (ref 1.1–2.2)
ALP SERPL-CCNC: 60 U/L (ref 45–117)
ALT SERPL-CCNC: 29 U/L (ref 12–78)
ANION GAP SERPL CALC-SCNC: 4 MMOL/L (ref 5–15)
AST SERPL-CCNC: 22 U/L (ref 15–37)
BACTERIA SPEC CULT: NORMAL
BILIRUB SERPL-MCNC: 0.5 MG/DL (ref 0.2–1)
BUN SERPL-MCNC: 6 MG/DL (ref 6–20)
BUN/CREAT SERPL: 9 (ref 12–20)
CALCIUM SERPL-MCNC: 8.2 MG/DL (ref 8.5–10.1)
CHLORIDE SERPL-SCNC: 111 MMOL/L (ref 97–108)
CO2 SERPL-SCNC: 22 MMOL/L (ref 21–32)
CREAT SERPL-MCNC: 0.66 MG/DL (ref 0.55–1.02)
GLOBULIN SER CALC-MCNC: 3.7 G/DL (ref 2–4)
GLUCOSE SERPL-MCNC: 134 MG/DL (ref 65–100)
POTASSIUM SERPL-SCNC: 3.9 MMOL/L (ref 3.5–5.1)
PROT SERPL-MCNC: 6.3 G/DL (ref 6.4–8.2)
SERVICE CMNT-IMP: NORMAL
SODIUM SERPL-SCNC: 137 MMOL/L (ref 136–145)

## 2024-04-15 PROCEDURE — 80053 COMPREHEN METABOLIC PANEL: CPT

## 2024-04-15 PROCEDURE — 36415 COLL VENOUS BLD VENIPUNCTURE: CPT

## 2024-04-15 PROCEDURE — G0378 HOSPITAL OBSERVATION PER HR: HCPCS

## 2024-04-15 PROCEDURE — 6370000000 HC RX 637 (ALT 250 FOR IP): Performed by: SURGERY

## 2024-04-15 PROCEDURE — 2500000003 HC RX 250 WO HCPCS: Performed by: SURGERY

## 2024-04-15 PROCEDURE — 99024 POSTOP FOLLOW-UP VISIT: CPT | Performed by: NURSE PRACTITIONER

## 2024-04-15 PROCEDURE — 6360000002 HC RX W HCPCS: Performed by: SURGERY

## 2024-04-15 RX ORDER — HYDROCHLOROTHIAZIDE 25 MG/1
25 TABLET ORAL DAILY
Qty: 90 TABLET | Refills: 1 | Status: SHIPPED | OUTPATIENT
Start: 2024-04-15

## 2024-04-15 RX ORDER — PANTOPRAZOLE SODIUM 40 MG/1
40 TABLET, DELAYED RELEASE ORAL
Status: DISCONTINUED | OUTPATIENT
Start: 2024-04-16 | End: 2024-04-15 | Stop reason: HOSPADM

## 2024-04-15 RX ORDER — OXYCODONE HYDROCHLORIDE 5 MG/1
5 TABLET ORAL EVERY 6 HOURS PRN
Qty: 10 TABLET | Refills: 0 | Status: SHIPPED | OUTPATIENT
Start: 2024-04-15 | End: 2024-04-18

## 2024-04-15 RX ADMIN — HYDROMORPHONE HYDROCHLORIDE 0.5 MG: 1 INJECTION, SOLUTION INTRAMUSCULAR; INTRAVENOUS; SUBCUTANEOUS at 04:05

## 2024-04-15 RX ADMIN — OXYCODONE 5 MG: 5 TABLET ORAL at 01:32

## 2024-04-15 RX ADMIN — HYDROMORPHONE HYDROCHLORIDE 0.5 MG: 1 INJECTION, SOLUTION INTRAMUSCULAR; INTRAVENOUS; SUBCUTANEOUS at 08:07

## 2024-04-15 RX ADMIN — OXYCODONE 5 MG: 5 TABLET ORAL at 11:11

## 2024-04-15 RX ADMIN — ACETAMINOPHEN 650 MG: 325 TABLET ORAL at 01:32

## 2024-04-15 RX ADMIN — ONDANSETRON 4 MG: 2 INJECTION INTRAMUSCULAR; INTRAVENOUS at 08:12

## 2024-04-15 RX ADMIN — GABAPENTIN 600 MG: 300 CAPSULE ORAL at 14:56

## 2024-04-15 RX ADMIN — GABAPENTIN 600 MG: 300 CAPSULE ORAL at 09:27

## 2024-04-15 RX ADMIN — HYDROMORPHONE HYDROCHLORIDE 0.5 MG: 1 INJECTION, SOLUTION INTRAMUSCULAR; INTRAVENOUS; SUBCUTANEOUS at 13:09

## 2024-04-15 RX ADMIN — OXYCODONE 5 MG: 5 TABLET ORAL at 15:45

## 2024-04-15 RX ADMIN — OXYCODONE 5 MG: 5 TABLET ORAL at 05:57

## 2024-04-15 RX ADMIN — ACETAMINOPHEN 650 MG: 325 TABLET ORAL at 05:57

## 2024-04-15 RX ADMIN — ENOXAPARIN SODIUM 30 MG: 100 INJECTION SUBCUTANEOUS at 09:28

## 2024-04-15 ASSESSMENT — PAIN - FUNCTIONAL ASSESSMENT
PAIN_FUNCTIONAL_ASSESSMENT: ACTIVITIES ARE NOT PREVENTED
PAIN_FUNCTIONAL_ASSESSMENT: PREVENTS OR INTERFERES SOME ACTIVE ACTIVITIES AND ADLS

## 2024-04-15 ASSESSMENT — PAIN DESCRIPTION - ORIENTATION
ORIENTATION: RIGHT;LEFT;MID
ORIENTATION: LEFT;RIGHT;MID
ORIENTATION: RIGHT;LEFT;MID
ORIENTATION: RIGHT;LEFT;ANTERIOR
ORIENTATION: LEFT;RIGHT;MID

## 2024-04-15 ASSESSMENT — PAIN SCALES - GENERAL
PAINLEVEL_OUTOF10: 8
PAINLEVEL_OUTOF10: 9
PAINLEVEL_OUTOF10: 9
PAINLEVEL_OUTOF10: 7
PAINLEVEL_OUTOF10: 9
PAINLEVEL_OUTOF10: 7
PAINLEVEL_OUTOF10: 9
PAINLEVEL_OUTOF10: 7

## 2024-04-15 ASSESSMENT — PAIN DESCRIPTION - LOCATION
LOCATION: ABDOMEN

## 2024-04-15 ASSESSMENT — PAIN DESCRIPTION - DESCRIPTORS
DESCRIPTORS: TIGHTNESS
DESCRIPTORS: ACHING
DESCRIPTORS: ACHING
DESCRIPTORS: TIGHTNESS
DESCRIPTORS: ACHING
DESCRIPTORS: TIGHTNESS

## 2024-04-15 NOTE — DISCHARGE INSTRUCTIONS
Gallbladder Removal Surgery: What to Expect at Home  Your Recovery  After your surgery, you will likely feel weak and tired for several days after you return home. Your belly may be swollen. If you had laparoscopic surgery, it's normal to also have some shoulder pain. This is caused by the air that your doctor put in your belly to help see your organs better.  You may have gas or need to burp a lot at first. A few people get diarrhea. The diarrhea usually goes away in 2 to 4 weeks, but it may last longer.  How quickly you recover depends on whether you had a laparoscopic or open surgery.  For a laparoscopic surgery, most people can go back to work or their normal routine in 1 to 2 weeks. But it may take longer, depending on the type of work you do.  For an open surgery, it will probably take 4 to 6 weeks before you get back to your normal routine.  This care sheet gives you a general idea about how long it will take for you to recover. However, each person recovers at a different pace. Follow the steps below to get better as quickly as possible.  How can you care for yourself at home?  Activity    Rest when you feel tired. Getting enough sleep will help you recover.     Try to walk each day. Start out by walking a little more than you did the day before. Walking helps prevent blood clots in your legs and pneumonia.     For about 2 to 4 weeks, avoid lifting anything that would make you strain. This may include a child, heavy grocery bags and milk containers, a heavy briefcase or backpack, cat litter or dog food bags, or a vacuum .     Avoid strenuous activities, such as biking, jogging, weightlifting, and aerobic exercise, until your doctor says it is okay.     Ask your doctor when you can drive again.     For a laparoscopic surgery, most people can go back to work or their normal routine in 1 to 2 weeks, but it may take longer. For an open surgery, it will probably take 4 to 6 weeks before you get back

## 2024-04-15 NOTE — PROGRESS NOTES
DISCHARGE NOTE FROM SURGICAL-TELEMETRY NURSE    Patient determined to be stable for discharge by attending provider. I have reviewed the discharge instructions and follow-up appointments with the Patient. They verbalized understanding and all questions were answered to their satisfaction. No complaints or further questions were expressed.      Medications sent to pharmacy Appropriate educational materials and medication side effect teaching were provided.      PIV were removed prior to discharge.     Patient did not discharge with any line, rider, or drain.    All personal items collected during admission were returned to the patient prior to discharge.    Post-op patient: Yes - Patient given post-op discharge kit and instructed on use.      Renae Alexander LPN

## 2024-04-15 NOTE — DISCHARGE SUMMARY
Post- Surgical Discharge Summary    Patient ID:  Belem Xiao  [id number]  female  49 y.o.  1975    Admit date: 4/14/2024    Discharge date: 04/15/24     Admitting Physician: Christiano Story MD     Date of Surgery:   4/14/2024     Preoperative Diagnosis:  Acute cholecystitis [K81.0]    Postoperative Diagnosis:   * No post-op diagnosis entered *    Procedure(s):  CHOLECYSTECTOMY LAPAROSCOPIC ROBOTIC WITH ICG     Anesthesia Type:   General     Surgeon: Christiano Story MD                            HPI:  Pt is a 49 y.o. female who has a history of Acute cholecystitis [K81.0] who presents at this time for a laparoscopic cholecystectomy.    Problem List:   Patient Active Problem List   Diagnosis    Obesity, morbid (more than 100 lbs over ideal weight or BMI > 40) (HCC)    Cervical spondylarthritis    Esophageal reflux    Psychological factors affecting morbid obesity (HCC)    Hyperglycemia    Mixed hyperlipidemia    Carpal tunnel syndrome on left    Opioid use, unspecified with unspecified opioid-induced disorder (HCC)    Hepatic hemangioma    Encounter for long-term (current) use of other medications    IBS (irritable bowel syndrome)    Type 2 diabetes mellitus (HCC)    Obesity    Fibromyalgia    Migraine syndrome    Chronic systolic (congestive) heart failure    Puncture wound of left elbow    Acute calculous cholecystitis        Hospital Course:  The patient underwent surgery. Intra-operative complications: None; patient tolerated the procedure well. Was taken to the PACU in stable condition and then transferred to the surgical floor.      Pathology is pending. Surgeon will follow results as outpatient.    Perioperative Antibiotics: Piperacillin/Tazobactam    Postoperative Pain Management:  Oxycodone     Postoperative transfusions:    Number of units banked PRBCs =   none     Post Op complications: None     Incisions  - clean, dry and intact. No significant erythema or swelling. Wound(s) appear to be healing

## 2024-04-15 NOTE — TELEPHONE ENCOUNTER
Last visit: 3/13/24   Next visit: 6/14/24    Patient in hospital 4/14/24-current for acute calculous cholecystitis at [facility]

## 2024-04-15 NOTE — PROGRESS NOTES
End of Shift Note    Bedside shift change report given to TANA Grajeda (oncoming nurse) by Yuliya Baumann RN (offgoing nurse).  Report included the following information SBAR, Kardex, ED Summary, OR Summary, Procedure Summary, Intake/Output, and Recent Results    Shift worked:  5754-8251     Shift summary and any significant changes:     Pain managed w/ 3x PO 5 mg oxicodone and 650 mg tylenol, and 3x 0.5 mg IV dilaudid.    Pt ambulating to bathroom over shift and voiding, 3x    No AM labs ordered.     Concerns for physician to address:  Protonix order     Zone phone for oncoming shift:   [phone]           Yuliya Baumann, RN

## 2024-04-15 NOTE — CARE COORDINATION
Transition of Care Plan:    RUR: OBS  Prior Level of Functioning:   Disposition: Home  If SNF or IPR: Date FOC offered:   Date FOC received:   Accepting facility:   Date authorization started with reference number:   Date authorization received and expires:   Follow up appointments:   DME needed: n/a  Transportation at discharge: sibling  IM/IMM Medicare/ letter given: n/a  Is patient a  and connected with [facility]?    If yes, was [place] transfer form completed and [facility] notified?   Caregiver Contact:   Discharge Caregiver contacted prior to discharge?   Care Conference needed?   Barriers to discharge:     Patient is expected to d/c home without any needs.  OBS letter signed & placed on pt's chart.    Jazmín Ingram  Ext [phone]

## 2024-04-19 DIAGNOSIS — M54.9 DORSALGIA, UNSPECIFIED: ICD-10-CM

## 2024-04-19 DIAGNOSIS — R25.2 CRAMP AND SPASM: ICD-10-CM

## 2024-04-22 RX ORDER — TIZANIDINE 4 MG/1
TABLET ORAL
Qty: 50 TABLET | Refills: 0 | Status: SHIPPED | OUTPATIENT
Start: 2024-04-22

## 2024-04-22 RX ORDER — CYCLOBENZAPRINE HCL 10 MG
10 TABLET ORAL 3 TIMES DAILY PRN
Qty: 50 TABLET | Refills: 0 | Status: SHIPPED | OUTPATIENT
Start: 2024-04-22

## 2024-04-22 NOTE — TELEPHONE ENCOUNTER
Last appointment: 3/13/24  Next appointment: 5/3/24, 6/14/24  Previous refill encounter(s): 3/27/24 #50    Requested Prescriptions     Pending Prescriptions Disp Refills    tiZANidine (ZANAFLEX) 4 MG tablet [Pharmacy Med Name: TIZANIDINE HCL 4 MG TABLET] 50 tablet 0     Sig: TAKE 1 TABLET BY MOUTH THREE TIMES A DAY AS NEEDED FOR MUSCLE SPASM    cyclobenzaprine (FLEXERIL) 10 MG tablet [Pharmacy Med Name: CYCLOBENZAPRINE 10 MG TABLET] 50 tablet 0     Sig: Take 1 tablet by mouth 3 times daily as needed for Muscle spasms         For Pharmacy Admin Tracking Only    Program: Medication Refill  CPA in place:    Recommendation Provided To:   Intervention Detail: New Rx: 2, reason: Patient Preference  Intervention Accepted By:   Gap Closed?:    Time Spent (min): 5

## 2024-04-24 ENCOUNTER — OFFICE VISIT (OUTPATIENT)
Age: 49
End: 2024-04-24

## 2024-04-24 VITALS
WEIGHT: 293 LBS | BODY MASS INDEX: 47.09 KG/M2 | RESPIRATION RATE: 18 BRPM | TEMPERATURE: 100.3 F | HEART RATE: 112 BPM | OXYGEN SATURATION: 94 % | DIASTOLIC BLOOD PRESSURE: 78 MMHG | HEIGHT: 66 IN | SYSTOLIC BLOOD PRESSURE: 137 MMHG

## 2024-04-24 DIAGNOSIS — Z09 POSTOPERATIVE EXAMINATION: Primary | ICD-10-CM

## 2024-04-24 PROCEDURE — 99024 POSTOP FOLLOW-UP VISIT: CPT | Performed by: SURGERY

## 2024-04-24 ASSESSMENT — PATIENT HEALTH QUESTIONNAIRE - PHQ9
SUM OF ALL RESPONSES TO PHQ QUESTIONS 1-9: 0
2. FEELING DOWN, DEPRESSED OR HOPELESS: NOT AT ALL
SUM OF ALL RESPONSES TO PHQ QUESTIONS 1-9: 0
SUM OF ALL RESPONSES TO PHQ9 QUESTIONS 1 & 2: 0
SUM OF ALL RESPONSES TO PHQ QUESTIONS 1-9: 0
SUM OF ALL RESPONSES TO PHQ QUESTIONS 1-9: 0
1. LITTLE INTEREST OR PLEASURE IN DOING THINGS: NOT AT ALL

## 2024-04-24 NOTE — PROGRESS NOTES
Identified pt with two pt identifiers (name and ). Reviewed chart in preparation for visit and have obtained necessary documentation.    Belem Xiao is a 49 y.o. female  Chief Complaint   Patient presents with    Post-Op Check     S/P cholecystectomy laparoscopic robotic with icg     /78 (Site: Right Upper Arm, Position: Sitting, Cuff Size: Large Adult)   Pulse (!) 112   Temp 100.3 °F (37.9 °C) (Oral)   Resp 18   Ht 1.676 m (5' 6\")   Wt 134 kg (295 lb 6.4 oz)   SpO2 94%   BMI 47.68 kg/m²     1. Have you been to the ER, urgent care clinic since your last visit?  Hospitalized since your last visit?no    2. Have you seen or consulted any other health care providers outside of the [facility] since your last visit?  Include any pap smears or colon screening. no

## 2024-04-24 NOTE — PROGRESS NOTES
Postop Progress Note    Subjective    Belem Xiao presents to the office for postop follow up.  She is s/p robotic cholecystectomy 10 days ago. She c/o pain at the lateral 12 mm trocar site.    Objective    Vitals:    04/24/24 1330   BP: 137/78   Pulse: (!) 112   Resp: 18   Temp: 100.3 °F (37.9 °C)   SpO2: 94%     General: alert, cooperative and no distress  Incisions: healing well.  12 mm trocar site opened and moderate bland seroma identified and drained.  Packed with nu gauze.    Assessment  Doing well postoperatively.  Postop seroma.    Plan  1. Continue any current medications  2. Wound care discussed - bid packing until closed  3. Pt is to increase activities as tolerated  4. Usual diet  5. Follow up: as needed    Electronically signed by Christiano Story MD on 4/24/2024 at 1:54 PM

## 2024-04-26 DIAGNOSIS — G43.709 CHRONIC MIGRAINE WITHOUT AURA, NOT INTRACTABLE, WITHOUT STATUS MIGRAINOSUS: ICD-10-CM

## 2024-04-26 DIAGNOSIS — F41.1 GENERALIZED ANXIETY DISORDER: ICD-10-CM

## 2024-04-26 RX ORDER — HYDROCODONE BITARTRATE AND ACETAMINOPHEN 7.5; 325 MG/1; MG/1
1 TABLET ORAL EVERY 8 HOURS PRN
Qty: 50 TABLET | Refills: 0 | Status: SHIPPED | OUTPATIENT
Start: 2024-04-26 | End: 2024-05-26

## 2024-04-26 RX ORDER — ALPRAZOLAM 0.5 MG/1
0.5 TABLET ORAL 3 TIMES DAILY PRN
Qty: 90 TABLET | Refills: 0 | Status: SHIPPED | OUTPATIENT
Start: 2024-04-26 | End: 2024-05-26

## 2024-04-26 NOTE — TELEPHONE ENCOUNTER
Spoke to patient, she would like a refill on her alprazolam 0.5 mg and hydrocodone with acetaminophen 7.5-325 mg

## 2024-04-29 ENCOUNTER — TELEPHONE (OUTPATIENT)
Age: 49
End: 2024-04-29

## 2024-04-29 NOTE — TELEPHONE ENCOUNTER
Patient requesting a call back today from Dr. Barrett to discuss getting Home Health for wound care. [phone].

## 2024-05-01 ENCOUNTER — TELEPHONE (OUTPATIENT)
Age: 49
End: 2024-05-01

## 2024-05-01 ENCOUNTER — APPOINTMENT (OUTPATIENT)
Facility: HOSPITAL | Age: 49
End: 2024-05-01
Payer: MEDICARE

## 2024-05-01 ENCOUNTER — HOSPITAL ENCOUNTER (EMERGENCY)
Facility: HOSPITAL | Age: 49
Discharge: HOME | End: 2024-05-01
Attending: STUDENT IN AN ORGANIZED HEALTH CARE EDUCATION/TRAINING PROGRAM
Payer: MEDICARE

## 2024-05-01 VITALS
HEIGHT: 66 IN | RESPIRATION RATE: 18 BRPM | BODY MASS INDEX: 46.56 KG/M2 | HEART RATE: 95 BPM | OXYGEN SATURATION: 98 % | DIASTOLIC BLOOD PRESSURE: 96 MMHG | TEMPERATURE: 98.6 F | SYSTOLIC BLOOD PRESSURE: 111 MMHG | WEIGHT: 289.68 LBS

## 2024-05-01 DIAGNOSIS — T14.8XXA WOUND INFECTION: Primary | ICD-10-CM

## 2024-05-01 DIAGNOSIS — E87.6 HYPOKALEMIA: ICD-10-CM

## 2024-05-01 DIAGNOSIS — L08.9 WOUND INFECTION: Primary | ICD-10-CM

## 2024-05-01 LAB
ALBUMIN SERPL-MCNC: 3.4 G/DL (ref 3.5–5)
ALBUMIN/GLOB SERPL: 0.7 (ref 1.1–2.2)
ALP SERPL-CCNC: 81 U/L (ref 45–117)
ALT SERPL-CCNC: 19 U/L (ref 12–78)
ANION GAP SERPL CALC-SCNC: 5 MMOL/L (ref 5–15)
AST SERPL-CCNC: 12 U/L (ref 15–37)
BASOPHILS # BLD: 0.1 K/UL (ref 0–0.1)
BASOPHILS NFR BLD: 1 % (ref 0–1)
BILIRUB SERPL-MCNC: 0.2 MG/DL (ref 0.2–1)
BUN SERPL-MCNC: 16 MG/DL (ref 6–20)
BUN/CREAT SERPL: 20 (ref 12–20)
CALCIUM SERPL-MCNC: 9.3 MG/DL (ref 8.5–10.1)
CHLORIDE SERPL-SCNC: 99 MMOL/L (ref 97–108)
CO2 SERPL-SCNC: 31 MMOL/L (ref 21–32)
CREAT SERPL-MCNC: 0.8 MG/DL (ref 0.55–1.02)
DIFFERENTIAL METHOD BLD: ABNORMAL
EOSINOPHIL # BLD: 0 K/UL (ref 0–0.4)
EOSINOPHIL NFR BLD: 0 % (ref 0–7)
ERYTHROCYTE [DISTWIDTH] IN BLOOD BY AUTOMATED COUNT: 15 % (ref 11.5–14.5)
GLOBULIN SER CALC-MCNC: 4.7 G/DL (ref 2–4)
GLUCOSE SERPL-MCNC: 100 MG/DL (ref 65–100)
HCT VFR BLD AUTO: 35.8 % (ref 35–47)
HGB BLD-MCNC: 11.4 G/DL (ref 11.5–16)
IMM GRANULOCYTES # BLD AUTO: 0 K/UL (ref 0–0.04)
IMM GRANULOCYTES NFR BLD AUTO: 0 % (ref 0–0.5)
LYMPHOCYTES # BLD: 4.4 K/UL (ref 0.8–3.5)
LYMPHOCYTES NFR BLD: 35 % (ref 12–49)
MCH RBC QN AUTO: 27.8 PG (ref 26–34)
MCHC RBC AUTO-ENTMCNC: 31.8 G/DL (ref 30–36.5)
MCV RBC AUTO: 87.3 FL (ref 80–99)
MONOCYTES # BLD: 0.5 K/UL (ref 0–1)
MONOCYTES NFR BLD: 4 % (ref 5–13)
NEUTS SEG # BLD: 7.6 K/UL (ref 1.8–8)
NEUTS SEG NFR BLD: 60 % (ref 32–75)
NRBC # BLD: 0 K/UL (ref 0–0.01)
NRBC BLD-RTO: 0 PER 100 WBC
PLATELET # BLD AUTO: 786 K/UL (ref 150–400)
PLATELET COMMENT: ABNORMAL
PMV BLD AUTO: 8.5 FL (ref 8.9–12.9)
POTASSIUM SERPL-SCNC: 2.9 MMOL/L (ref 3.5–5.1)
PROT SERPL-MCNC: 8.1 G/DL (ref 6.4–8.2)
RBC # BLD AUTO: 4.1 M/UL (ref 3.8–5.2)
RBC MORPH BLD: ABNORMAL
SODIUM SERPL-SCNC: 135 MMOL/L (ref 136–145)
WBC # BLD AUTO: 12.6 K/UL (ref 3.6–11)

## 2024-05-01 PROCEDURE — 6360000004 HC RX CONTRAST MEDICATION: Performed by: STUDENT IN AN ORGANIZED HEALTH CARE EDUCATION/TRAINING PROGRAM

## 2024-05-01 PROCEDURE — 74177 CT ABD & PELVIS W/CONTRAST: CPT

## 2024-05-01 PROCEDURE — 80053 COMPREHEN METABOLIC PANEL: CPT

## 2024-05-01 PROCEDURE — 6370000000 HC RX 637 (ALT 250 FOR IP): Performed by: STUDENT IN AN ORGANIZED HEALTH CARE EDUCATION/TRAINING PROGRAM

## 2024-05-01 PROCEDURE — 99285 EMERGENCY DEPT VISIT HI MDM: CPT

## 2024-05-01 PROCEDURE — 85025 COMPLETE CBC W/AUTO DIFF WBC: CPT

## 2024-05-01 PROCEDURE — 36415 COLL VENOUS BLD VENIPUNCTURE: CPT

## 2024-05-01 RX ORDER — DOXYCYCLINE HYCLATE 100 MG
100 TABLET ORAL 2 TIMES DAILY
Qty: 20 TABLET | Refills: 0 | Status: SHIPPED | OUTPATIENT
Start: 2024-05-01 | End: 2024-05-11

## 2024-05-01 RX ORDER — DOXYCYCLINE HYCLATE 100 MG
100 TABLET ORAL ONCE
Status: COMPLETED | OUTPATIENT
Start: 2024-05-01 | End: 2024-05-01

## 2024-05-01 RX ORDER — POTASSIUM CHLORIDE 20 MEQ/1
40 TABLET, EXTENDED RELEASE ORAL ONCE
Status: COMPLETED | OUTPATIENT
Start: 2024-05-01 | End: 2024-05-01

## 2024-05-01 RX ADMIN — DOXYCYCLINE HYCLATE 100 MG: 100 TABLET, COATED ORAL at 22:23

## 2024-05-01 RX ADMIN — POTASSIUM CHLORIDE 40 MEQ: 1500 TABLET, EXTENDED RELEASE ORAL at 22:23

## 2024-05-01 RX ADMIN — IOPAMIDOL 100 ML: 755 INJECTION, SOLUTION INTRAVENOUS at 21:42

## 2024-05-01 ASSESSMENT — PAIN DESCRIPTION - LOCATION: LOCATION: ABDOMEN

## 2024-05-01 ASSESSMENT — PAIN DESCRIPTION - PAIN TYPE: TYPE: ACUTE PAIN

## 2024-05-01 ASSESSMENT — PAIN SCALES - GENERAL: PAINLEVEL_OUTOF10: 8

## 2024-05-01 ASSESSMENT — PAIN DESCRIPTION - DESCRIPTORS: DESCRIPTORS: BURNING

## 2024-05-01 ASSESSMENT — PAIN DESCRIPTION - ORIENTATION: ORIENTATION: LEFT

## 2024-05-01 ASSESSMENT — PAIN - FUNCTIONAL ASSESSMENT: PAIN_FUNCTIONAL_ASSESSMENT: 0-10

## 2024-05-01 NOTE — TELEPHONE ENCOUNTER
Patient called still has fever and pain has been packing wound and wound looks worse, has a hard knot around incision and red ring around it as well    [phone]

## 2024-05-01 NOTE — TELEPHONE ENCOUNTER
Spoke with patient SP Robotic Laparoscopic Cholecystectomy 04/14/2024, states had a fever earlier today 100.2 after tylenol now 97.9 and wound looks infected, worse since last seen via Dr. Story 04/24/2024. Encouraged patient to go to ER for evaluation. Express understanding.

## 2024-05-02 ENCOUNTER — TELEPHONE (OUTPATIENT)
Age: 49
End: 2024-05-02

## 2024-05-02 NOTE — ED TRIAGE NOTES
Patient wheeled into triage from waiting room with complaint of left sided pain at surgical site. Patient reports open surgical site for gallbladder removal that occurred on April 14. Patient reports incision site purposefully left open by MD Jez due to suspected infection. Patient has been cleaning and packing wound at home, denies antibiotic prescription. Patient reports having a low grade fever of 100.3 at 1600 self medicated with tylenol.

## 2024-05-02 NOTE — TELEPHONE ENCOUNTER
Patient was told by ER md to follow up in office in 1 week with md for wound check up, md is not scheduled until 5.15.24 please advise    [phone]

## 2024-05-02 NOTE — ED PROVIDER NOTES
[facility] EMERGENCY DEPT  EMERGENCY DEPARTMENT ENCOUNTER       Pt Name: Belem Xiao  MRN: [id number]  Birthdate 1975  Date of evaluation: 5/1/2024  Provider: Jose De Jesus Gomez MD   PCP: Gerardo Barrett MD  Note Started: 9:29 PM EDT 5/1/24     CHIEF COMPLAINT       Chief Complaint   Patient presents with    Post-op Problem     Tenderness and redness at gallbladder surgical site, patient reports low grade fever treating with tylenol        HISTORY OF PRESENT ILLNESS: 1 or more elements      History From: patient, History limited by: none     Belem Xiao is a 49 y.o. female presenting with abdominal wound drainage.  Notes she had cholecystectomy on April 14.  Was seen in follow-up surgery clinic and had her wound opened and was draining.  She did not pack it since that time.  She notes a persistent fever.  Also has worsening nausea.  Notes persistent drainage at the site.  Not on any antibiotics currently.       Please See MDM for Additional Details of the HPI/PMH  Nursing Notes were all reviewed and agreed with or any disagreements were addressed in the HPI.     REVIEW OF SYSTEMS        Positives and Pertinent negatives as per HPI.    PAST HISTORY     Past Medical History:  Past Medical History:   Diagnosis Date    Anxiety     Currently quite worse while my brother with Metastatic Stage IV Lunch Cancer lives with me now & has chemo & radiation.    Arthritis     DDD    Cancer (HCC)     Dont know if this counts had to have double cutouts on rightarm to have pre-melanoma cells removed    Carpal tunnel syndrome on left 2/22/2011    Cervical spondylarthritis 2/20/2011    Chronic back pain     T11 & T12 misshappen & surrounded w/ arthritis, 3 places of spinal stenosis & Cervical Degenerative Disc Disease    Depressive disorder, not elsewhere classified 2/17/2011    Encounter for long-term (current) use of other medications 2/20/2011    Fibromyalgia     GERD (gastroesophageal reflux disease)

## 2024-05-03 NOTE — TELEPHONE ENCOUNTER
Patient was seen in ED 5/1/24 and will be seen by Dr. Blanca 5/8/24 Needs a follow up ED appointment. Next appointment 5/15/24/. [phone]

## 2024-05-03 NOTE — TELEPHONE ENCOUNTER
Per ER instructions patient is instructed to see Dr. Story in one week for incision infection SP laparoscopic cholecystectomy 04/14/2024 , how ever Dr. Blanca is in office and Jez next office is 05/15/24. Will schedule patient for Wednesday 05/08/2024.

## 2024-05-06 DIAGNOSIS — R25.2 CRAMP AND SPASM: ICD-10-CM

## 2024-05-06 DIAGNOSIS — M54.9 DORSALGIA, UNSPECIFIED: ICD-10-CM

## 2024-05-07 RX ORDER — CYCLOBENZAPRINE HCL 10 MG
TABLET ORAL
Qty: 50 TABLET | Refills: 0 | Status: SHIPPED | OUTPATIENT
Start: 2024-05-07

## 2024-05-07 RX ORDER — TIZANIDINE 4 MG/1
TABLET ORAL
Qty: 50 TABLET | Refills: 0 | Status: SHIPPED | OUTPATIENT
Start: 2024-05-07

## 2024-05-07 NOTE — TELEPHONE ENCOUNTER
Last appointment: 3/13/24  Next appointment: 5/15/24, 6/14/24  Previous refill encounter(s): 4/22/24 #50    Requested Prescriptions     Pending Prescriptions Disp Refills    tiZANidine (ZANAFLEX) 4 MG tablet [Pharmacy Med Name: TIZANIDINE HCL 4 MG TABLET] 50 tablet 0     Sig: TAKE 1 TABLET BY MOUTH THREE TIMES A DAY AS NEEDED FOR MUSCLE SPASM    cyclobenzaprine (FLEXERIL) 10 MG tablet [Pharmacy Med Name: CYCLOBENZAPRINE 10 MG TABLET] 50 tablet 0     Sig: TAKE 1 TABLET BY MOUTH THREE TIMES A DAY AS NEEDED FOR MUSCLE SPASM         For Pharmacy Admin Tracking Only    Program: Medication Refill  CPA in place:    Recommendation Provided To:   Intervention Detail: New Rx: 2, reason: Patient Preference  Intervention Accepted By:   Gap Closed?:    Time Spent (min): 5

## 2024-05-15 ENCOUNTER — OFFICE VISIT (OUTPATIENT)
Age: 49
End: 2024-05-15
Payer: MEDICARE

## 2024-05-15 VITALS
OXYGEN SATURATION: 100 % | TEMPERATURE: 98.2 F | HEIGHT: 66 IN | BODY MASS INDEX: 47.09 KG/M2 | RESPIRATION RATE: 18 BRPM | SYSTOLIC BLOOD PRESSURE: 129 MMHG | WEIGHT: 293 LBS | DIASTOLIC BLOOD PRESSURE: 94 MMHG | HEART RATE: 95 BPM

## 2024-05-15 DIAGNOSIS — Z09 HOSPITAL DISCHARGE FOLLOW-UP: ICD-10-CM

## 2024-05-15 DIAGNOSIS — T81.49XA POSTOPERATIVE WOUND INFECTION: ICD-10-CM

## 2024-05-15 DIAGNOSIS — E87.6 HYPOKALEMIA: ICD-10-CM

## 2024-05-15 DIAGNOSIS — Z90.49 S/P LAPAROSCOPIC CHOLECYSTECTOMY: Primary | ICD-10-CM

## 2024-05-15 DIAGNOSIS — E11.9 TYPE 2 DIABETES MELLITUS WITHOUT COMPLICATION, WITHOUT LONG-TERM CURRENT USE OF INSULIN (HCC): ICD-10-CM

## 2024-05-15 PROCEDURE — 3046F HEMOGLOBIN A1C LEVEL >9.0%: CPT | Performed by: FAMILY MEDICINE

## 2024-05-15 PROCEDURE — G8427 DOCREV CUR MEDS BY ELIG CLIN: HCPCS | Performed by: FAMILY MEDICINE

## 2024-05-15 PROCEDURE — 99213 OFFICE O/P EST LOW 20 MIN: CPT | Performed by: FAMILY MEDICINE

## 2024-05-15 PROCEDURE — G8417 CALC BMI ABV UP PARAM F/U: HCPCS | Performed by: FAMILY MEDICINE

## 2024-05-15 PROCEDURE — 3017F COLORECTAL CA SCREEN DOC REV: CPT | Performed by: FAMILY MEDICINE

## 2024-05-15 PROCEDURE — 1036F TOBACCO NON-USER: CPT | Performed by: FAMILY MEDICINE

## 2024-05-15 PROCEDURE — 2022F DILAT RTA XM EVC RTNOPTHY: CPT | Performed by: FAMILY MEDICINE

## 2024-05-15 PROCEDURE — 1111F DSCHRG MED/CURRENT MED MERGE: CPT | Performed by: FAMILY MEDICINE

## 2024-05-15 ASSESSMENT — PATIENT HEALTH QUESTIONNAIRE - PHQ9
SUM OF ALL RESPONSES TO PHQ QUESTIONS 1-9: 2
SUM OF ALL RESPONSES TO PHQ QUESTIONS 1-9: 2
9. THOUGHTS THAT YOU WOULD BE BETTER OFF DEAD, OR OF HURTING YOURSELF: NOT AT ALL
3. TROUBLE FALLING OR STAYING ASLEEP: NOT AT ALL
10. IF YOU CHECKED OFF ANY PROBLEMS, HOW DIFFICULT HAVE THESE PROBLEMS MADE IT FOR YOU TO DO YOUR WORK, TAKE CARE OF THINGS AT HOME, OR GET ALONG WITH OTHER PEOPLE: NOT DIFFICULT AT ALL
SUM OF ALL RESPONSES TO PHQ QUESTIONS 1-9: 2
7. TROUBLE CONCENTRATING ON THINGS, SUCH AS READING THE NEWSPAPER OR WATCHING TELEVISION: NOT AT ALL
5. POOR APPETITE OR OVEREATING: NOT AT ALL
6. FEELING BAD ABOUT YOURSELF - OR THAT YOU ARE A FAILURE OR HAVE LET YOURSELF OR YOUR FAMILY DOWN: NOT AT ALL
4. FEELING TIRED OR HAVING LITTLE ENERGY: NOT AT ALL
SUM OF ALL RESPONSES TO PHQ QUESTIONS 1-9: 2
SUM OF ALL RESPONSES TO PHQ9 QUESTIONS 1 & 2: 2
8. MOVING OR SPEAKING SO SLOWLY THAT OTHER PEOPLE COULD HAVE NOTICED. OR THE OPPOSITE, BEING SO FIGETY OR RESTLESS THAT YOU HAVE BEEN MOVING AROUND A LOT MORE THAN USUAL: NOT AT ALL
1. LITTLE INTEREST OR PLEASURE IN DOING THINGS: SEVERAL DAYS
2. FEELING DOWN, DEPRESSED OR HOPELESS: SEVERAL DAYS

## 2024-05-15 ASSESSMENT — ENCOUNTER SYMPTOMS
DIARRHEA: 0
ABDOMINAL PAIN: 1

## 2024-05-15 NOTE — PROGRESS NOTES
NAME:  Belem Xiao   :   1975   MRN:   [id number]     Date/Time:  5/15/2024 1:08 PM  Subjective:f/u lap cholecystectomy with post op wound infection,now healing though remains painful.Migraines have been improved.Tolerating diet   Abdominal Pain  This is a new problem. The current episode started 1 to 4 weeks ago. The most recent episode lasted 7 days. The problem has been gradually improving. The pain is located in the generalized abdominal region. The pain is at a severity of 4/10. The quality of the pain is aching. The abdominal pain does not radiate. Associated symptoms include headaches. Pertinent negatives include no arthralgias or diarrhea.        Review of Systems   HENT:  Negative for congestion.    Gastrointestinal:  Positive for abdominal pain. Negative for diarrhea.   Musculoskeletal:  Negative for arthralgias.   Neurological:  Positive for headaches. Negative for dizziness.   Psychiatric/Behavioral:  Positive for agitation.              Medications reviewed:  Current Outpatient Medications   Medication Sig    tiZANidine (ZANAFLEX) 4 MG tablet TAKE 1 TABLET BY MOUTH THREE TIMES A DAY AS NEEDED FOR MUSCLE SPASM    cyclobenzaprine (FLEXERIL) 10 MG tablet TAKE 1 TABLET BY MOUTH THREE TIMES A DAY AS NEEDED FOR MUSCLE SPASM    ALPRAZolam (XANAX) 0.5 MG tablet Take 1 tablet by mouth 3 times daily as needed for Sleep for up to 30 days. Max Daily Amount: 1.5 mg    HYDROcodone-acetaminophen (NORCO) 7.5-325 MG per tablet Take 1 tablet by mouth every 8 hours as needed for Pain for up to 30 days. Max Daily Amount: 3 tablets    hydroCHLOROthiazide (HYDRODIURIL) 25 MG tablet TAKE 1 TABLET BY MOUTH EVERY DAY    promethazine (PHENERGAN) 25 MG tablet TAKE 1 TABLET BY MOUTH EVERY 8 HOURS AS NEEDED FOR NAUSEA    omeprazole (PRILOSEC) 40 MG delayed release capsule TAKE 1 CAPSULE BY MOUTH EVERY DAY    verapamil (CALAN SR) 240 MG extended release tablet TAKE 1 TABLET BY MOUTH EVERYDAY AT BEDTIME

## 2024-05-15 NOTE — PROGRESS NOTES
NAME:  Belem Xiao   :   1975   MRN:   [id number]     Date/Time:  5/15/2024 1:04 PM  Subjective: f/u  post op lap alessandra incision,slowly improving after draining seroma.Feeling some better.   HPI     Review of Systems          Medications reviewed:  Current Outpatient Medications   Medication Sig    tiZANidine (ZANAFLEX) 4 MG tablet TAKE 1 TABLET BY MOUTH THREE TIMES A DAY AS NEEDED FOR MUSCLE SPASM    cyclobenzaprine (FLEXERIL) 10 MG tablet TAKE 1 TABLET BY MOUTH THREE TIMES A DAY AS NEEDED FOR MUSCLE SPASM    ALPRAZolam (XANAX) 0.5 MG tablet Take 1 tablet by mouth 3 times daily as needed for Sleep for up to 30 days. Max Daily Amount: 1.5 mg    HYDROcodone-acetaminophen (NORCO) 7.5-325 MG per tablet Take 1 tablet by mouth every 8 hours as needed for Pain for up to 30 days. Max Daily Amount: 3 tablets    hydroCHLOROthiazide (HYDRODIURIL) 25 MG tablet TAKE 1 TABLET BY MOUTH EVERY DAY    promethazine (PHENERGAN) 25 MG tablet TAKE 1 TABLET BY MOUTH EVERY 8 HOURS AS NEEDED FOR NAUSEA    omeprazole (PRILOSEC) 40 MG delayed release capsule TAKE 1 CAPSULE BY MOUTH EVERY DAY    verapamil (CALAN SR) 240 MG extended release tablet TAKE 1 TABLET BY MOUTH EVERYDAY AT BEDTIME    gabapentin (NEURONTIN) 600 MG tablet Take 1 tablet by mouth 3 times daily for 180 days. Max Daily Amount: 1,800 mg    potassium chloride (KLOR-CON M) 10 MEQ extended release tablet Take 2 tablets by mouth daily    topiramate (TOPAMAX) 100 MG tablet TAKE 1 TABLET BY MOUTH TWICE A DAY    metFORMIN (GLUCOPHAGE-XR) 500 MG extended release tablet TAKE 1 TABLET BY MOUTH EVERY DAY WITH DINNER    venlafaxine (EFFEXOR XR) 150 MG extended release capsule TAKE 1 CAPSULE BY MOUTH EVERY DAY    cyanocobalamin 1000 MCG tablet Take 2 tablets by mouth daily    furosemide (LASIX) 20 MG tablet Take 1 tablet by mouth daily as needed    naloxone 4 MG/0.1ML LIQD nasal spray Use 1 spray intranasally, then discard. Repeat with new spray every 2 min as

## 2024-05-15 NOTE — PROGRESS NOTES
Chief Complaint   Patient presents with    Follow-Up from Hospital     Patient is here today for a hospital follow up.      1. Have you been to the ER, urgent care clinic since your last visit?  Hospitalized since your last visit? 4/14/24-4/15/24 [facility] for [facility]cyst[facility], [facility]    2. Have you seen or consulted any other health care providers outside of the [facility] since your last visit?  Include any pap smears or colon screening. No

## 2024-05-16 LAB
ALBUMIN SERPL-MCNC: 3.5 G/DL (ref 3.5–5)
ALBUMIN/GLOB SERPL: 0.9 (ref 1.1–2.2)
ALP SERPL-CCNC: 80 U/L (ref 45–117)
ALT SERPL-CCNC: 18 U/L (ref 12–78)
ANION GAP SERPL CALC-SCNC: 7 MMOL/L (ref 5–15)
AST SERPL-CCNC: 10 U/L (ref 15–37)
BILIRUB SERPL-MCNC: 0.4 MG/DL (ref 0.2–1)
BUN SERPL-MCNC: 12 MG/DL (ref 6–20)
BUN/CREAT SERPL: 15 (ref 12–20)
CALCIUM SERPL-MCNC: 9.2 MG/DL (ref 8.5–10.1)
CHLORIDE SERPL-SCNC: 106 MMOL/L (ref 97–108)
CO2 SERPL-SCNC: 29 MMOL/L (ref 21–32)
CREAT SERPL-MCNC: 0.8 MG/DL (ref 0.55–1.02)
GLOBULIN SER CALC-MCNC: 3.7 G/DL (ref 2–4)
GLUCOSE SERPL-MCNC: 95 MG/DL (ref 65–100)
POTASSIUM SERPL-SCNC: 3.8 MMOL/L (ref 3.5–5.1)
PROT SERPL-MCNC: 7.2 G/DL (ref 6.4–8.2)
SODIUM SERPL-SCNC: 142 MMOL/L (ref 136–145)

## 2024-05-23 DIAGNOSIS — G43.709 CHRONIC MIGRAINE WITHOUT AURA, NOT INTRACTABLE, WITHOUT STATUS MIGRAINOSUS: ICD-10-CM

## 2024-05-23 DIAGNOSIS — M54.9 DORSALGIA, UNSPECIFIED: ICD-10-CM

## 2024-05-23 DIAGNOSIS — R25.2 CRAMP AND SPASM: ICD-10-CM

## 2024-05-23 NOTE — TELEPHONE ENCOUNTER
Last appointment: 5/15/24  Next appointment: 6/14/24  Previous refill encounter(s): 5/7/24 Flexeril & Zanaflex #50, 3/27/24 Phenergan #50 with 1 refill    Requested Prescriptions     Pending Prescriptions Disp Refills    promethazine (PHENERGAN) 25 MG tablet [Pharmacy Med Name: PROMETHAZINE 25 MG TABLET] 50 tablet 0     Sig: TAKE 1 TABLET BY MOUTH EVERY 8 HOURS AS NEEDED FOR NAUSEA    tiZANidine (ZANAFLEX) 4 MG tablet [Pharmacy Med Name: TIZANIDINE HCL 4 MG TABLET] 50 tablet 0     Sig: TAKE 1 TABLET BY MOUTH THREE TIMES A DAY AS NEEDED FOR MUSCLE SPASM    cyclobenzaprine (FLEXERIL) 10 MG tablet [Pharmacy Med Name: CYCLOBENZAPRINE 10 MG TABLET] 50 tablet 0     Sig: TAKE 1 TABLET BY MOUTH THREE TIMES A DAY AS NEEDED FOR MUSCLE SPASM         For Pharmacy Admin Tracking Only    Program: Medication Refill  CPA in place:    Recommendation Provided To:   Intervention Detail: New Rx: 3, reason: Patient Preference  Intervention Accepted By:   Gap Closed?:    Time Spent (min): 5

## 2024-05-24 RX ORDER — TIZANIDINE 4 MG/1
TABLET ORAL
Qty: 50 TABLET | Refills: 0 | Status: SHIPPED | OUTPATIENT
Start: 2024-05-24

## 2024-05-24 RX ORDER — PROMETHAZINE HYDROCHLORIDE 25 MG/1
25 TABLET ORAL EVERY 8 HOURS PRN
Qty: 50 TABLET | Refills: 0 | Status: SHIPPED | OUTPATIENT
Start: 2024-05-24

## 2024-05-24 RX ORDER — TOPIRAMATE 100 MG/1
100 TABLET, FILM COATED ORAL 2 TIMES DAILY
Qty: 60 TABLET | Refills: 0 | Status: SHIPPED | OUTPATIENT
Start: 2024-05-24

## 2024-05-24 RX ORDER — CYCLOBENZAPRINE HCL 10 MG
TABLET ORAL
Qty: 50 TABLET | Refills: 0 | Status: SHIPPED | OUTPATIENT
Start: 2024-05-24

## 2024-05-24 NOTE — TELEPHONE ENCOUNTER
Last appointment: 5/15/24  Next appointment: 6/14/24  Previous refill encounter(s): 11/14/23 #180 with 1 refill    Requested Prescriptions     Pending Prescriptions Disp Refills    topiramate (TOPAMAX) 100 MG tablet [Pharmacy Med Name: TOPIRAMATE 100 MG TABLET] 60 tablet 0     Sig: TAKE 1 TABLET BY MOUTH TWICE A DAY         For Pharmacy Admin Tracking Only    Program: Medication Refill  CPA in place:    Recommendation Provided To:   Intervention Detail: New Rx: 1, reason: Patient Preference  Intervention Accepted By:   Gap Closed?:    Time Spent (min): 5

## 2024-05-28 DIAGNOSIS — F41.1 GENERALIZED ANXIETY DISORDER: ICD-10-CM

## 2024-05-29 RX ORDER — ALPRAZOLAM 0.5 MG/1
0.5 TABLET ORAL 3 TIMES DAILY PRN
Qty: 90 TABLET | Refills: 2 | Status: SHIPPED | OUTPATIENT
Start: 2024-05-29 | End: 2024-06-28

## 2024-06-04 DIAGNOSIS — G43.709 CHRONIC MIGRAINE WITHOUT AURA, NOT INTRACTABLE, WITHOUT STATUS MIGRAINOSUS: Primary | ICD-10-CM

## 2024-06-04 NOTE — TELEPHONE ENCOUNTER
Last appointment: 5/15/24  Next appointment: 6/14/24  Previous refill encounter(s): 4/26/24 #50    Requested Prescriptions     Pending Prescriptions Disp Refills    HYDROcodone-acetaminophen (NORCO) 7.5-325 MG per tablet 50 tablet 0     Sig: Take 1 tablet by mouth every 8 hours as needed for Pain for up to 30 days. Max Daily Amount: 3 tablets         For Pharmacy Admin Tracking Only    Program: Medication Refill  CPA in place:    Recommendation Provided To:   Intervention Detail: New Rx: 1, reason: Patient Preference  Intervention Accepted By:   Gap Closed?:    Time Spent (min): 5

## 2024-06-04 NOTE — TELEPHONE ENCOUNTER
Patient requesting a refill on Hydrocodone 7.5/325 mg  sent to [facility] Fax #[phone]  Patient can be reached at [phone]

## 2024-06-05 RX ORDER — HYDROCODONE BITARTRATE AND ACETAMINOPHEN 7.5; 325 MG/1; MG/1
1 TABLET ORAL EVERY 8 HOURS PRN
Qty: 50 TABLET | Refills: 0 | Status: SHIPPED | OUTPATIENT
Start: 2024-06-05 | End: 2024-07-05

## 2024-06-08 DIAGNOSIS — E87.6 HYPOKALEMIA: ICD-10-CM

## 2024-06-10 RX ORDER — POTASSIUM CHLORIDE 750 MG/1
20 TABLET, EXTENDED RELEASE ORAL DAILY
Qty: 180 TABLET | Refills: 1 | Status: SHIPPED | OUTPATIENT
Start: 2024-06-10

## 2024-06-10 NOTE — TELEPHONE ENCOUNTER
Last appointment: 5/15/24  Next appointment: 6/14/24  Previous refill encounter(s): 12/6/23 #180 with 1 refill    Requested Prescriptions     Pending Prescriptions Disp Refills    KLOR-CON M10 10 MEQ extended release tablet [Pharmacy Med Name: KLOR-CON M10 TABLET] 180 tablet 3     Sig: TAKE 2 TABLETS BY MOUTH EVERY DAY         For Pharmacy Admin Tracking Only    Program: Medication Refill  CPA in place:    Recommendation Provided To:   Intervention Detail: New Rx: 1, reason: Patient Preference  Intervention Accepted By:   Gap Closed?:    Time Spent (min): 5

## 2024-06-13 ENCOUNTER — TELEPHONE (OUTPATIENT)
Dept: PHARMACY | Facility: CLINIC | Age: 49
End: 2024-06-13

## 2024-06-13 ENCOUNTER — OFFICE VISIT (OUTPATIENT)
Age: 49
End: 2024-06-13
Payer: MEDICARE

## 2024-06-13 VITALS
SYSTOLIC BLOOD PRESSURE: 118 MMHG | BODY MASS INDEX: 46.48 KG/M2 | RESPIRATION RATE: 18 BRPM | DIASTOLIC BLOOD PRESSURE: 75 MMHG | OXYGEN SATURATION: 100 % | WEIGHT: 289.2 LBS | TEMPERATURE: 98.6 F | HEIGHT: 66 IN | HEART RATE: 89 BPM

## 2024-06-13 DIAGNOSIS — E11.9 TYPE 2 DIABETES MELLITUS WITHOUT COMPLICATION, WITHOUT LONG-TERM CURRENT USE OF INSULIN (HCC): ICD-10-CM

## 2024-06-13 DIAGNOSIS — E78.1 HYPERTRIGLYCERIDEMIA: ICD-10-CM

## 2024-06-13 DIAGNOSIS — G43.709 CHRONIC MIGRAINE WITHOUT AURA, NOT INTRACTABLE, WITHOUT STATUS MIGRAINOSUS: Primary | ICD-10-CM

## 2024-06-13 DIAGNOSIS — Z90.49 S/P LAPAROSCOPIC CHOLECYSTECTOMY: ICD-10-CM

## 2024-06-13 LAB — HBA1C MFR BLD: 5.1 %

## 2024-06-13 PROCEDURE — 2022F DILAT RTA XM EVC RTNOPTHY: CPT | Performed by: FAMILY MEDICINE

## 2024-06-13 PROCEDURE — 99213 OFFICE O/P EST LOW 20 MIN: CPT | Performed by: FAMILY MEDICINE

## 2024-06-13 PROCEDURE — G8417 CALC BMI ABV UP PARAM F/U: HCPCS | Performed by: FAMILY MEDICINE

## 2024-06-13 PROCEDURE — 83036 HEMOGLOBIN GLYCOSYLATED A1C: CPT | Performed by: FAMILY MEDICINE

## 2024-06-13 PROCEDURE — 1036F TOBACCO NON-USER: CPT | Performed by: FAMILY MEDICINE

## 2024-06-13 PROCEDURE — G8427 DOCREV CUR MEDS BY ELIG CLIN: HCPCS | Performed by: FAMILY MEDICINE

## 2024-06-13 PROCEDURE — PBSHW AMB POC HEMOGLOBIN A1C: Performed by: FAMILY MEDICINE

## 2024-06-13 PROCEDURE — 3046F HEMOGLOBIN A1C LEVEL >9.0%: CPT | Performed by: FAMILY MEDICINE

## 2024-06-13 PROCEDURE — 3017F COLORECTAL CA SCREEN DOC REV: CPT | Performed by: FAMILY MEDICINE

## 2024-06-13 ASSESSMENT — PATIENT HEALTH QUESTIONNAIRE - PHQ9
6. FEELING BAD ABOUT YOURSELF - OR THAT YOU ARE A FAILURE OR HAVE LET YOURSELF OR YOUR FAMILY DOWN: NOT AT ALL
SUM OF ALL RESPONSES TO PHQ QUESTIONS 1-9: 2
7. TROUBLE CONCENTRATING ON THINGS, SUCH AS READING THE NEWSPAPER OR WATCHING TELEVISION: NOT AT ALL
2. FEELING DOWN, DEPRESSED OR HOPELESS: SEVERAL DAYS
9. THOUGHTS THAT YOU WOULD BE BETTER OFF DEAD, OR OF HURTING YOURSELF: NOT AT ALL
SUM OF ALL RESPONSES TO PHQ QUESTIONS 1-9: 2
SUM OF ALL RESPONSES TO PHQ9 QUESTIONS 1 & 2: 2
4. FEELING TIRED OR HAVING LITTLE ENERGY: NOT AT ALL
5. POOR APPETITE OR OVEREATING: NOT AT ALL
SUM OF ALL RESPONSES TO PHQ QUESTIONS 1-9: 2
SUM OF ALL RESPONSES TO PHQ QUESTIONS 1-9: 2
8. MOVING OR SPEAKING SO SLOWLY THAT OTHER PEOPLE COULD HAVE NOTICED. OR THE OPPOSITE, BEING SO FIGETY OR RESTLESS THAT YOU HAVE BEEN MOVING AROUND A LOT MORE THAN USUAL: NOT AT ALL
10. IF YOU CHECKED OFF ANY PROBLEMS, HOW DIFFICULT HAVE THESE PROBLEMS MADE IT FOR YOU TO DO YOUR WORK, TAKE CARE OF THINGS AT HOME, OR GET ALONG WITH OTHER PEOPLE: NOT DIFFICULT AT ALL
1. LITTLE INTEREST OR PLEASURE IN DOING THINGS: SEVERAL DAYS
3. TROUBLE FALLING OR STAYING ASLEEP: NOT AT ALL

## 2024-06-13 ASSESSMENT — ENCOUNTER SYMPTOMS
ABDOMINAL PAIN: 1
BLURRED VISION: 0
CHEST TIGHTNESS: 0
CONSTIPATION: 1

## 2024-06-13 NOTE — PROGRESS NOTES
Chief Complaint   Patient presents with    Follow-up     Patient is here today for a 4 week follow up.      1. Have you been to the ER, urgent care clinic since your last visit?  Hospitalized since your last visit?No    2. Have you seen or consulted any other health care providers outside of the [facility] since your last visit?  Include any pap smears or colon screening. No

## 2024-06-13 NOTE — PROGRESS NOTES
NAME:  Belem Xiao   :   1975   MRN:   [id number]     Date/Time:  2024 2:13 PM  Subjective:f/u dm2 ,chol,depression,chronic migraine.Recovering from recent lap alessandra   Diabetes  She presents for her follow-up diabetic visit. She has type 2 diabetes mellitus. Her disease course has been improving. Hypoglycemia symptoms include headaches. Pertinent negatives for diabetes include no blurred vision, no polyphagia, no polyuria and no weight loss.   Migraine   This is a chronic problem. The current episode started more than 1 year ago. The problem occurs daily. The problem has been unchanged. The pain is located in the Occipital region. Associated symptoms include abdominal pain. Pertinent negatives include no blurred vision or weight loss. Her past medical history is significant for obesity.   Hyperlipidemia  This is a chronic problem. The problem is uncontrolled. Recent lipid tests were reviewed and are high. Exacerbating diseases include obesity. She has no history of hypothyroidism. The current treatment provides mild improvement of lipids. There are no compliance problems.    Depression  Visit Type: follow-up  Patient presents with the following symptoms: decreased concentration, depressed mood, dry mouth and psychomotor retardation.  Patient is not experiencing: suicidal planning, thoughts of death and weight loss.       Review of Systems   Constitutional:  Negative for weight loss.   HENT:  Negative for congestion.    Eyes:  Negative for blurred vision.   Respiratory:  Negative for chest tightness.    Gastrointestinal:  Positive for abdominal pain and constipation.   Endocrine: Negative for polyphagia and polyuria.   Neurological:  Positive for headaches.   Psychiatric/Behavioral:  Positive for decreased concentration, depression and dysphoric mood.            Medications reviewed:  Current Outpatient Medications   Medication Sig    potassium chloride (KLOR-CON M10) 10 MEQ extended

## 2024-06-14 LAB
CHOLEST SERPL-MCNC: 226 MG/DL
HDLC SERPL-MCNC: 40 MG/DL
HDLC SERPL: 5.7 (ref 0–5)
LDLC SERPL CALC-MCNC: ABNORMAL MG/DL (ref 0–100)
LDLC SERPL DIRECT ASSAY-MCNC: 146 MG/DL (ref 0–100)
TRIGL SERPL-MCNC: 460 MG/DL
VLDLC SERPL CALC-MCNC: ABNORMAL MG/DL

## 2024-06-16 DIAGNOSIS — R25.2 CRAMP AND SPASM: ICD-10-CM

## 2024-06-16 DIAGNOSIS — M54.9 DORSALGIA, UNSPECIFIED: ICD-10-CM

## 2024-06-17 RX ORDER — CYCLOBENZAPRINE HCL 10 MG
TABLET ORAL
Qty: 50 TABLET | Refills: 0 | Status: SHIPPED | OUTPATIENT
Start: 2024-06-17

## 2024-06-17 RX ORDER — TIZANIDINE 4 MG/1
TABLET ORAL
Qty: 50 TABLET | Refills: 0 | Status: SHIPPED | OUTPATIENT
Start: 2024-06-17

## 2024-06-17 NOTE — TELEPHONE ENCOUNTER
Last appointment: 6/13/24  Next appointment: 8/14/24  Previous refill encounter(s): 5/24/24 #50    Requested Prescriptions     Pending Prescriptions Disp Refills    tiZANidine (ZANAFLEX) 4 MG tablet [Pharmacy Med Name: TIZANIDINE HCL 4 MG TABLET] 50 tablet 0     Sig: TAKE 1 TABLET BY MOUTH THREE TIMES A DAY AS NEEDED FOR MUSCLE SPASM    cyclobenzaprine (FLEXERIL) 10 MG tablet [Pharmacy Med Name: CYCLOBENZAPRINE 10 MG TABLET] 50 tablet 0     Sig: TAKE 1 TABLET BY MOUTH THREE TIMES A DAY AS NEEDED FOR MUSCLE SPASM         For Pharmacy Admin Tracking Only    Program: Medication Refill  CPA in place:    Recommendation Provided To:   Intervention Detail: New Rx: 2, reason: Patient Preference  Intervention Accepted By:   Gap Closed?:    Time Spent (min): 5

## 2024-06-20 ENCOUNTER — TELEPHONE (OUTPATIENT)
Age: 49
End: 2024-06-20

## 2024-06-20 NOTE — TELEPHONE ENCOUNTER
----- Message from Belem Xiao sent at 2024  9:33 AM EDT -----  Regarding: Cholesterol & Triglycerides   Contact: [phone]  Dr. Barrett,       I'm so confused now I've followed everything for the last year you've told me to do to get my values down! Plus more, so what on earth can I do to get both values down as quickly as possible? I'm at a complete & total loss.  Are there any other foods I need to cut out of my diet? As it is since my gallbladder removal & the cost of groceries I'm doing good to eat 800 calories a day. I would have thought the weight would be falling off of me by now. But It's not & I'm just hanging around 282!        So I was wondering if there is a way, with the outcome of the test results & the fact I'm not losing any weight is there anyway, that I can take one of the new weight loss medications? I worked so hard to get off of the Metformin for my kidneys. Now I have to re-double my efforts to lose weight at a faster clip to get those numbers down so my heart & vascular system if anyway can get much, much better! I'm fighting against both of my parents medical conditions! I will be completely alone in this world very soon so I need all of this weight & it's potential health issues gone ASAP! I need a Panniculectomy quickly as well. It's getting in my way terribly now & causing friction issues on top of causing me to push hard to urinate!        I need to get as healthy as I can before Juan passes on top of [place] & they gave him 5 years from his two massive heart attacks the week before Dad . Currently he is not doing well either. I will be alone!       Thanks,        Belem ROUSE

## 2024-06-27 RX ORDER — TOPIRAMATE 100 MG/1
100 TABLET, FILM COATED ORAL 2 TIMES DAILY
Qty: 180 TABLET | Refills: 1 | Status: SHIPPED | OUTPATIENT
Start: 2024-06-27

## 2024-06-27 NOTE — TELEPHONE ENCOUNTER
Last appointment: 6/13/24  Next appointment: 8/14/24  Previous refill encounter(s): 5/24/24 #60    Requested Prescriptions     Pending Prescriptions Disp Refills    topiramate (TOPAMAX) 100 MG tablet [Pharmacy Med Name: TOPIRAMATE 100 MG TABLET] 180 tablet 1     Sig: TAKE 1 TABLET BY MOUTH TWICE A DAY         For Pharmacy Admin Tracking Only    Program: Medication Refill  CPA in place:    Recommendation Provided To:   Intervention Detail: New Rx: 1, reason: Patient Preference  Intervention Accepted By:   Gap Closed?:    Time Spent (min): 5

## 2024-06-28 DIAGNOSIS — G43.709 CHRONIC MIGRAINE WITHOUT AURA, NOT INTRACTABLE, WITHOUT STATUS MIGRAINOSUS: ICD-10-CM

## 2024-06-28 DIAGNOSIS — G43.909 MIGRAINE WITHOUT STATUS MIGRAINOSUS, NOT INTRACTABLE, UNSPECIFIED MIGRAINE TYPE: ICD-10-CM

## 2024-06-28 RX ORDER — GABAPENTIN 600 MG/1
600 TABLET ORAL 3 TIMES DAILY
Qty: 270 TABLET | Refills: 1 | Status: SHIPPED | OUTPATIENT
Start: 2024-06-28 | End: 2024-12-25

## 2024-06-28 RX ORDER — PROMETHAZINE HYDROCHLORIDE 25 MG/1
25 TABLET ORAL EVERY 8 HOURS PRN
Qty: 50 TABLET | Refills: 0 | Status: SHIPPED | OUTPATIENT
Start: 2024-06-28

## 2024-06-28 NOTE — TELEPHONE ENCOUNTER
Last appointment: 6/13/24  Next appointment: 8/14/24  Previous refill encounter(s): 12/22/23 #270 with 1 refill    Requested Prescriptions     Pending Prescriptions Disp Refills    gabapentin (NEURONTIN) 600 MG tablet [Pharmacy Med Name: GABAPENTIN 600 MG TABLET] 270 tablet 1     Sig: Take 1 tablet by mouth 3 times daily for 180 days. Max Daily Amount: 1,800 mg         For Pharmacy Admin Tracking Only    Program: Medication Refill  CPA in place:    Recommendation Provided To:   Intervention Detail: New Rx: 1, reason: Patient Preference  Intervention Accepted By:   Gap Closed?:    Time Spent (min): 5

## 2024-07-06 DIAGNOSIS — M54.9 DORSALGIA, UNSPECIFIED: ICD-10-CM

## 2024-07-08 DIAGNOSIS — G43.709 CHRONIC MIGRAINE WITHOUT AURA, NOT INTRACTABLE, WITHOUT STATUS MIGRAINOSUS: Primary | ICD-10-CM
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
## 2024-07-09 DIAGNOSIS — R25.2 CRAMP AND SPASM: ICD-10-CM

## 2024-07-09 RX ORDER — ZOLPIDEM TARTRATE 5 MG/1
5 TABLET ORAL NIGHTLY PRN
Qty: 30 TABLET | Refills: 0 | Status: SHIPPED | OUTPATIENT
Start: 2024-07-09 | End: 2024-08-08

## 2024-07-09 RX ORDER — HYDROCODONE BITARTRATE AND ACETAMINOPHEN 7.5; 325 MG/1; MG/1
1 TABLET ORAL EVERY 8 HOURS PRN
Qty: 50 TABLET | Refills: 0 | Status: SHIPPED | OUTPATIENT
Start: 2024-07-09 | End: 2024-08-08

## 2024-07-10 RX ORDER — CYCLOBENZAPRINE HCL 10 MG
TABLET ORAL
Qty: 50 TABLET | Refills: 0 | Status: SHIPPED | OUTPATIENT
Start: 2024-07-10

## 2024-07-10 RX ORDER — TIZANIDINE 4 MG/1
TABLET ORAL
Qty: 50 TABLET | Refills: 0 | Status: SHIPPED | OUTPATIENT
Start: 2024-07-10

## 2024-07-27 DIAGNOSIS — F32.A DEPRESSION, UNSPECIFIED: ICD-10-CM

## 2024-07-27 DIAGNOSIS — F41.1 GENERALIZED ANXIETY DISORDER: ICD-10-CM

## 2024-07-29 RX ORDER — VENLAFAXINE HYDROCHLORIDE 150 MG/1
CAPSULE, EXTENDED RELEASE ORAL
Qty: 90 CAPSULE | Refills: 1 | Status: SHIPPED | OUTPATIENT
Start: 2024-07-29

## 2024-07-29 NOTE — TELEPHONE ENCOUNTER
Last appointment: 6/13/24  Next appointment: 8/14/24  Previous refill encounter(s): 6/14/23    Requested Prescriptions     Pending Prescriptions Disp Refills    venlafaxine (EFFEXOR XR) 150 MG extended release capsule [Pharmacy Med Name: VENLAFAXINE HCL  MG CAP] 90 capsule 3     Sig: TAKE 1 CAPSULE BY MOUTH EVERY DAY         For Pharmacy Admin Tracking Only    Program: Medication Refill  CPA in place:    Recommendation Provided To:   Intervention Detail: New Rx: 1, reason: Patient Preference  Intervention Accepted By: Gap Closed?:    Time Spent (min): 5

## 2024-07-31 DIAGNOSIS — G43.709 CHRONIC MIGRAINE WITHOUT AURA, NOT INTRACTABLE, WITHOUT STATUS MIGRAINOSUS: ICD-10-CM

## 2024-07-31 DIAGNOSIS — R25.2 CRAMP AND SPASM: ICD-10-CM

## 2024-07-31 DIAGNOSIS — M54.9 DORSALGIA, UNSPECIFIED: ICD-10-CM

## 2024-07-31 RX ORDER — CYCLOBENZAPRINE HCL 10 MG
TABLET ORAL
Qty: 50 TABLET | Refills: 0 | Status: SHIPPED | OUTPATIENT
Start: 2024-07-31

## 2024-07-31 RX ORDER — PROMETHAZINE HYDROCHLORIDE 25 MG/1
25 TABLET ORAL EVERY 8 HOURS PRN
Qty: 50 TABLET | Refills: 0 | Status: SHIPPED | OUTPATIENT
Start: 2024-07-31

## 2024-07-31 RX ORDER — TIZANIDINE 4 MG/1
TABLET ORAL
Qty: 50 TABLET | Refills: 0 | Status: SHIPPED | OUTPATIENT
Start: 2024-07-31

## 2024-07-31 NOTE — TELEPHONE ENCOUNTER
Last appointment: 6/13/24  Next appointment: 8/14/24  Previous refill encounter(s): 7/10/24 Zanaflex & Flexeril #50, 6/28/24 Phenergan #50    Requested Prescriptions     Pending Prescriptions Disp Refills    tiZANidine (ZANAFLEX) 4 MG tablet [Pharmacy Med Name: TIZANIDINE HCL 4 MG TABLET] 50 tablet 0     Sig: TAKE 1 TABLET BY MOUTH THREE TIMES A DAY AS NEEDED FOR MUSCLE SPASM    cyclobenzaprine (FLEXERIL) 10 MG tablet [Pharmacy Med Name: CYCLOBENZAPRINE 10 MG TABLET] 50 tablet 0     Sig: TAKE 1 TABLET BY MOUTH THREE TIMES A DAY AS NEEDED FOR MUSCLE SPASM    promethazine (PHENERGAN) 25 MG tablet [Pharmacy Med Name: PROMETHAZINE 25 MG TABLET] 50 tablet 0     Sig: TAKE 1 TABLET BY MOUTH EVERY 8 HOURS AS NEEDED FOR NAUSEA         For Pharmacy Admin Tracking Only    Program: Medication Refill  CPA in place:    Recommendation Provided To:   Intervention Detail: New Rx: 3, reason: Patient Preference  Intervention Accepted By:   Gap Closed?:    Time Spent (min): 5

## 2024-08-12 ENCOUNTER — TELEPHONE (OUTPATIENT)
Age: 49
End: 2024-08-12

## 2024-08-12 DIAGNOSIS — G43.709 CHRONIC MIGRAINE WITHOUT AURA, NOT INTRACTABLE, WITHOUT STATUS MIGRAINOSUS: Primary | ICD-10-CM

## 2024-08-12 RX ORDER — HYDROCODONE BITARTRATE AND ACETAMINOPHEN 7.5; 325 MG/1; MG/1
1 TABLET ORAL EVERY 8 HOURS PRN
Qty: 50 TABLET | Refills: 0 | Status: SHIPPED | OUTPATIENT
Start: 2024-08-12 | End: 2024-09-11
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
## 2024-08-14 DIAGNOSIS — R25.2 CRAMP AND SPASM: ICD-10-CM

## 2024-08-14 DIAGNOSIS — M54.9 DORSALGIA, UNSPECIFIED: ICD-10-CM

## 2024-08-14 NOTE — TELEPHONE ENCOUNTER
Last appointment: 6/13/24  Next appointment: 11/25/24  Previous refill encounter(s): 7/31/24 #50    Requested Prescriptions     Pending Prescriptions Disp Refills    tiZANidine (ZANAFLEX) 4 MG tablet [Pharmacy Med Name: TIZANIDINE HCL 4 MG TABLET] 50 tablet 0     Sig: TAKE 1 TABLET BY MOUTH THREE TIMES A DAY AS NEEDED FOR MUSCLE SPASM    cyclobenzaprine (FLEXERIL) 10 MG tablet [Pharmacy Med Name: CYCLOBENZAPRINE 10 MG TABLET] 50 tablet 0     Sig: TAKE 1 TABLET BY MOUTH THREE TIMES A DAY AS NEEDED FOR MUSCLE SPASM         For Pharmacy Admin Tracking Only    Program: Medication Refill  CPA in place:    Recommendation Provided To:   Intervention Detail: New Rx: 2, reason: Patient Preference  Intervention Accepted By:   Gap Closed?:    Time Spent (min): 5

## 2024-08-15 RX ORDER — CYCLOBENZAPRINE HCL 10 MG
TABLET ORAL
Qty: 50 TABLET | Refills: 0 | Status: SHIPPED | OUTPATIENT
Start: 2024-08-15

## 2024-08-15 RX ORDER — TIZANIDINE 4 MG/1
TABLET ORAL
Qty: 50 TABLET | Refills: 0 | Status: SHIPPED | OUTPATIENT
Start: 2024-08-15

## 2024-08-23 DIAGNOSIS — G43.709 CHRONIC MIGRAINE WITHOUT AURA, NOT INTRACTABLE, WITHOUT STATUS MIGRAINOSUS: ICD-10-CM

## 2024-08-23 RX ORDER — ZOLPIDEM TARTRATE 5 MG/1
5 TABLET ORAL NIGHTLY PRN
Qty: 30 TABLET | Refills: 0 | Status: SHIPPED | OUTPATIENT
Start: 2024-08-23 | End: 2024-09-22

## 2024-08-23 RX ORDER — PROMETHAZINE HYDROCHLORIDE 25 MG/1
25 TABLET ORAL EVERY 8 HOURS PRN
Qty: 50 TABLET | Refills: 0 | Status: SHIPPED | OUTPATIENT
Start: 2024-08-23

## 2024-08-23 NOTE — TELEPHONE ENCOUNTER
Last appointment: 6/13/24  Next appointment: 11/25/24  Previous refill encounter(s): 7/9/24 Ambien #30, 7/31/24 Phenergan #50    Requested Prescriptions     Pending Prescriptions Disp Refills    promethazine (PHENERGAN) 25 MG tablet [Pharmacy Med Name: PROMETHAZINE 25 MG TABLET] 50 tablet 0     Sig: TAKE 1 TABLET BY MOUTH EVERY 8 HOURS AS NEEDED FOR NAUSEA    zolpidem (AMBIEN) 5 MG tablet [Pharmacy Med Name: ZOLPIDEM TARTRATE 5 MG TABLET] 30 tablet 0     Sig: Take 1 tablet by mouth nightly as needed for Sleep for up to 30 days. Max Daily Amount: 5 mg         For Pharmacy Admin Tracking Only    Program: Medication Refill  CPA in place:    Recommendation Provided To:   Intervention Detail: New Rx: 2, reason: Patient Preference  Intervention Accepted By:   Gap Closed?:    Time Spent (min): 5

## 2024-08-29 DIAGNOSIS — R25.2 CRAMP AND SPASM: ICD-10-CM

## 2024-08-29 DIAGNOSIS — F41.1 GENERALIZED ANXIETY DISORDER: ICD-10-CM

## 2024-08-29 DIAGNOSIS — M54.9 DORSALGIA, UNSPECIFIED: ICD-10-CM

## 2024-08-29 RX ORDER — ALPRAZOLAM 0.5 MG
0.5 TABLET ORAL 3 TIMES DAILY PRN
Qty: 90 TABLET | Refills: 2 | Status: SHIPPED | OUTPATIENT
Start: 2024-08-29 | End: 2024-11-27

## 2024-08-29 RX ORDER — METFORMIN HCL 500 MG
TABLET, EXTENDED RELEASE 24 HR ORAL
Qty: 90 TABLET | Refills: 3 | Status: SHIPPED | OUTPATIENT
Start: 2024-08-29

## 2024-08-29 RX ORDER — CYCLOBENZAPRINE HCL 10 MG
TABLET ORAL
Qty: 50 TABLET | Refills: 0 | Status: SHIPPED | OUTPATIENT
Start: 2024-08-29

## 2024-08-29 NOTE — TELEPHONE ENCOUNTER
Last appointment: 6/13/24  Next appointment: 11/25/24  Previous refill encounter(s): 5/29/24 Xanax #90 with 2 refills, 8/15/24 Zanaflex & Flexeril #50    Requested Prescriptions     Pending Prescriptions Disp Refills    ALPRAZolam (XANAX) 0.5 MG tablet [Pharmacy Med Name: ALPRAZOLAM 0.5 MG TABLET] 90 tablet 2     Sig: Take 1 tablet by mouth 3 times daily as needed for Sleep for up to 90 days. Max Daily Amount: 1.5 mg    cyclobenzaprine (FLEXERIL) 10 MG tablet [Pharmacy Med Name: CYCLOBENZAPRINE 10 MG TABLET] 50 tablet 0     Sig: TAKE 1 TABLET BY MOUTH THREE TIMES A DAY AS NEEDED FOR MUSCLE SPASM    tiZANidine (ZANAFLEX) 4 MG tablet [Pharmacy Med Name: TIZANIDINE HCL 4 MG TABLET] 50 tablet 0     Sig: TAKE 1 TABLET BY MOUTH THREE TIMES A DAY AS NEEDED FOR MUSCLE SPASM         For Pharmacy Admin Tracking Only    Program: Medication Refill  CPA in place:    Recommendation Provided To:   Intervention Detail: New Rx: 3, reason: Patient Preference  Intervention Accepted By:   Gap Closed?:    Time Spent (min): 5

## 2024-08-29 NOTE — TELEPHONE ENCOUNTER
I show this was d/c on 6/13/24. Please sign if appropriate.    Last appointment: 6/13/24  Next appointment: 11/25/24  Previous refill encounter(s): 8/18/23    Requested Prescriptions     Pending Prescriptions Disp Refills    metFORMIN (GLUCOPHAGE-XR) 500 MG extended release tablet [Pharmacy Med Name: METFORMIN HCL  MG TABLET] 90 tablet 3     Sig: TAKE 1 TABLET BY MOUTH EVERY DAY WITH DINNER         For Pharmacy Admin Tracking Only    Program: Medication Refill  CPA in place:    Recommendation Provided To:   Intervention Detail: New Rx: 1, reason: Patient Preference  Intervention Accepted By:   Gap Closed?:    Time Spent (min): 5

## 2024-09-10 DIAGNOSIS — M54.9 DORSALGIA, UNSPECIFIED: ICD-10-CM

## 2024-09-10 DIAGNOSIS — R25.2 CRAMP AND SPASM: ICD-10-CM

## 2024-09-13 DIAGNOSIS — M54.50 CHRONIC BILATERAL LOW BACK PAIN WITHOUT SCIATICA: Primary | ICD-10-CM

## 2024-09-13 DIAGNOSIS — R60.9 EDEMA, UNSPECIFIED: ICD-10-CM

## 2024-09-13 DIAGNOSIS — G89.29 CHRONIC BILATERAL LOW BACK PAIN WITHOUT SCIATICA: Primary | ICD-10-CM

## 2024-09-13 DIAGNOSIS — G43.709 CHRONIC MIGRAINE WITHOUT AURA, NOT INTRACTABLE, WITHOUT STATUS MIGRAINOSUS: ICD-10-CM

## 2024-09-13 RX ORDER — OMEPRAZOLE 40 MG/1
CAPSULE, DELAYED RELEASE ORAL
Qty: 30 CAPSULE | Refills: 0 | Status: SHIPPED | OUTPATIENT
Start: 2024-09-13

## 2024-09-13 RX ORDER — HYDROCODONE BITARTRATE AND ACETAMINOPHEN 7.5; 325 MG/1; MG/1
1 TABLET ORAL EVERY 8 HOURS PRN
Qty: 50 TABLET | Refills: 0 | Status: SHIPPED | OUTPATIENT
Start: 2024-09-13 | End: 2024-10-13

## 2024-09-13 RX ORDER — CYCLOBENZAPRINE HCL 10 MG
TABLET ORAL
Qty: 50 TABLET | Refills: 0 | Status: SHIPPED | OUTPATIENT
Start: 2024-09-13

## 2024-09-13 RX ORDER — PROMETHAZINE HYDROCHLORIDE 25 MG/1
25 TABLET ORAL EVERY 8 HOURS PRN
Qty: 50 TABLET | Refills: 0 | Status: SHIPPED | OUTPATIENT
Start: 2024-09-13

## 2024-09-13 RX ORDER — VERAPAMIL HYDROCHLORIDE 240 MG/1
TABLET, FILM COATED, EXTENDED RELEASE ORAL
Qty: 30 TABLET | Refills: 0 | Status: SHIPPED | OUTPATIENT
Start: 2024-09-13

## 2024-09-13 RX ORDER — HYDROCHLOROTHIAZIDE 25 MG/1
25 TABLET ORAL DAILY
Qty: 30 TABLET | Refills: 0 | Status: SHIPPED | OUTPATIENT
Start: 2024-09-13

## 2024-09-27 DIAGNOSIS — R25.2 CRAMP AND SPASM: ICD-10-CM

## 2024-09-27 RX ORDER — CYCLOBENZAPRINE HCL 10 MG
TABLET ORAL
Qty: 50 TABLET | Refills: 0 | Status: SHIPPED | OUTPATIENT
Start: 2024-09-27

## 2024-10-01 DIAGNOSIS — E87.6 HYPOKALEMIA: ICD-10-CM

## 2024-10-02 RX ORDER — POTASSIUM CHLORIDE 750 MG/1
20 TABLET, EXTENDED RELEASE ORAL DAILY
Qty: 60 TABLET | Refills: 0 | Status: SHIPPED | OUTPATIENT
Start: 2024-10-02

## 2024-10-02 RX ORDER — TOPIRAMATE 100 MG/1
100 TABLET, FILM COATED ORAL 2 TIMES DAILY
Qty: 60 TABLET | Refills: 0 | Status: SHIPPED | OUTPATIENT
Start: 2024-10-02

## 2024-10-02 NOTE — TELEPHONE ENCOUNTER
Last appointment: 6/13/24  Next appointment: 11/25/24  Previous refill encounter(s): 6/27/24 Topamax #180 with 1 refill, 6/10/24 Klor-Con #180 with 1 refill    Requested Prescriptions     Pending Prescriptions Disp Refills    topiramate (TOPAMAX) 100 MG tablet [Pharmacy Med Name: TOPIRAMATE 100 MG TABLET] 60 tablet 0     Sig: TAKE 1 TABLET BY MOUTH TWICE A DAY    KLOR-CON M10 10 MEQ extended release tablet [Pharmacy Med Name: KLOR-CON M10 TABLET] 60 tablet 0     Sig: TAKE 2 TABLETS BY MOUTH EVERY DAY         For Pharmacy Admin Tracking Only    Program: Medication Refill  CPA in place:    Recommendation Provided To:   Intervention Detail: New Rx: 2, reason: Patient Preference  Intervention Accepted By:   Gap Closed?:    Time Spent (min): 5

## 2024-10-06 DIAGNOSIS — G43.709 CHRONIC MIGRAINE WITHOUT AURA, NOT INTRACTABLE, WITHOUT STATUS MIGRAINOSUS: ICD-10-CM

## 2024-10-08 RX ORDER — ZOLPIDEM TARTRATE 5 MG/1
5 TABLET ORAL NIGHTLY PRN
Qty: 30 TABLET | Refills: 0 | Status: SHIPPED | OUTPATIENT
Start: 2024-10-08 | End: 2024-11-07

## 2024-10-08 NOTE — TELEPHONE ENCOUNTER
Last appointment: 6/13/24  Next appointment: 11/25/24  Previous refill encounter(s): 8/23/24 #30    Requested Prescriptions     Pending Prescriptions Disp Refills    zolpidem (AMBIEN) 5 MG tablet [Pharmacy Med Name: ZOLPIDEM TARTRATE 5 MG TABLET] 30 tablet 0     Sig: Take 1 tablet by mouth nightly as needed for Sleep for up to 30 days. Max Daily Amount: 5 mg         For Pharmacy Admin Tracking Only    Program: Medication Refill  CPA in place:    Recommendation Provided To:   Intervention Detail: New Rx: 1, reason: Patient Preference  Intervention Accepted By:   Gap Closed?:    Time Spent (min): 5

## 2024-10-09 DIAGNOSIS — M54.9 DORSALGIA, UNSPECIFIED: ICD-10-CM

## 2024-10-09 DIAGNOSIS — G43.709 CHRONIC MIGRAINE WITHOUT AURA, NOT INTRACTABLE, WITHOUT STATUS MIGRAINOSUS: ICD-10-CM

## 2024-10-09 RX ORDER — PROMETHAZINE HYDROCHLORIDE 25 MG/1
25 TABLET ORAL EVERY 8 HOURS PRN
Qty: 50 TABLET | Refills: 0 | Status: SHIPPED | OUTPATIENT
Start: 2024-10-09

## 2024-10-09 NOTE — TELEPHONE ENCOUNTER
Last appointment: 6/13/24  Next appointment: 11/25/24  Previous refill encounter(s): 9/13/24 #50    Requested Prescriptions     Pending Prescriptions Disp Refills    promethazine (PHENERGAN) 25 MG tablet [Pharmacy Med Name: PROMETHAZINE 25 MG TABLET] 50 tablet 0     Sig: TAKE 1 TABLET BY MOUTH EVERY 8 HOURS AS NEEDED FOR NAUSEA         For Pharmacy Admin Tracking Only    Program: Medication Refill  CPA in place:    Recommendation Provided To:   Intervention Detail: New Rx: 1, reason: Patient Preference  Intervention Accepted By:   Gap Closed?:    Time Spent (min): 5

## 2024-10-09 NOTE — TELEPHONE ENCOUNTER
Last appointment: 6/13/24  Next appointment: 11/25/24  Previous refill encounter(s): 9/13/24 #50    Requested Prescriptions     Pending Prescriptions Disp Refills    tiZANidine (ZANAFLEX) 4 MG tablet [Pharmacy Med Name: TIZANIDINE HCL 4 MG TABLET] 50 tablet 0     Sig: TAKE 1 TABLET BY MOUTH THREE TIMES A DAY AS NEEDED FOR MUSCLE SPASM         For Pharmacy Admin Tracking Only    Program: Medication Refill  CPA in place:    Recommendation Provided To:   Intervention Detail: New Rx: 1, reason: Patient Preference  Intervention Accepted By:   Gap Closed?:    Time Spent (min): 5

## 2024-10-22 DIAGNOSIS — G43.709 CHRONIC MIGRAINE WITHOUT AURA, NOT INTRACTABLE, WITHOUT STATUS MIGRAINOSUS: ICD-10-CM

## 2024-10-22 DIAGNOSIS — M54.50 CHRONIC BILATERAL LOW BACK PAIN WITHOUT SCIATICA: Primary | ICD-10-CM

## 2024-10-22 DIAGNOSIS — G89.29 CHRONIC BILATERAL LOW BACK PAIN WITHOUT SCIATICA: Primary | ICD-10-CM

## 2024-10-22 DIAGNOSIS — R25.2 CRAMP AND SPASM: ICD-10-CM

## 2024-10-22 DIAGNOSIS — M54.9 DORSALGIA, UNSPECIFIED: ICD-10-CM

## 2024-10-22 RX ORDER — HYDROCODONE BITARTRATE AND ACETAMINOPHEN 7.5; 325 MG/1; MG/1
1 TABLET ORAL EVERY 8 HOURS PRN
Qty: 50 TABLET | Refills: 0 | Status: SHIPPED | OUTPATIENT
Start: 2024-10-22 | End: 2024-11-21

## 2024-10-23 RX ORDER — CYCLOBENZAPRINE HCL 10 MG
TABLET ORAL
Qty: 50 TABLET | Refills: 0 | Status: SHIPPED | OUTPATIENT
Start: 2024-10-23

## 2024-10-23 RX ORDER — PROMETHAZINE HYDROCHLORIDE 25 MG/1
25 TABLET ORAL EVERY 8 HOURS PRN
Qty: 50 TABLET | Refills: 0 | Status: SHIPPED | OUTPATIENT
Start: 2024-10-23

## 2024-10-23 NOTE — TELEPHONE ENCOUNTER
Last appointment: 6/13/24  Next appointment: 11/25/24  Previous refill encounter(s): 10/9/24 #50    Requested Prescriptions     Pending Prescriptions Disp Refills    tiZANidine (ZANAFLEX) 4 MG tablet [Pharmacy Med Name: TIZANIDINE HCL 4 MG TABLET] 50 tablet 0     Sig: TAKE 1 TABLET BY MOUTH THREE TIMES A DAY AS NEEDED FOR MUSCLE SPASM    promethazine (PHENERGAN) 25 MG tablet [Pharmacy Med Name: PROMETHAZINE 25 MG TABLET] 50 tablet 0     Sig: TAKE 1 TABLET BY MOUTH EVERY 8 HOURS AS NEEDED FOR NAUSEA         For Pharmacy Admin Tracking Only    Program: Medication Refill  CPA in place:    Recommendation Provided To:   Intervention Detail: New Rx: 2, reason: Patient Preference  Intervention Accepted By:   Gap Closed?:    Time Spent (min): 5

## 2024-10-23 NOTE — TELEPHONE ENCOUNTER
Last appointment: 6/13/24  Next appointment: 11/25/24  Previous refill encounter(s): 9/27/24 #50    Requested Prescriptions     Pending Prescriptions Disp Refills    cyclobenzaprine (FLEXERIL) 10 MG tablet [Pharmacy Med Name: CYCLOBENZAPRINE 10 MG TABLET] 50 tablet 0     Sig: TAKE 1 TABLET BY MOUTH THREE TIMES A DAY AS NEEDED FOR MUSCLE SPASM         For Pharmacy Admin Tracking Only    Program: Medication Refill  CPA in place:    Recommendation Provided To:   Intervention Detail: New Rx: 1, reason: Patient Preference  Intervention Accepted By:   Gap Closed?:    Time Spent (min): 5

## 2024-11-01 DIAGNOSIS — K21.00 GASTROESOPHAGEAL REFLUX DISEASE WITH ESOPHAGITIS WITHOUT HEMORRHAGE: Primary | ICD-10-CM

## 2024-11-01 RX ORDER — OMEPRAZOLE 40 MG/1
CAPSULE, DELAYED RELEASE ORAL
Qty: 90 CAPSULE | Refills: 1 | Status: SHIPPED | OUTPATIENT
Start: 2024-11-01

## 2024-11-01 RX ORDER — VERAPAMIL HYDROCHLORIDE 240 MG/1
TABLET, FILM COATED, EXTENDED RELEASE ORAL
Qty: 90 TABLET | Refills: 1 | Status: SHIPPED | OUTPATIENT
Start: 2024-11-01

## 2024-11-09 DIAGNOSIS — G43.709 CHRONIC MIGRAINE WITHOUT AURA, NOT INTRACTABLE, WITHOUT STATUS MIGRAINOSUS: ICD-10-CM

## 2024-11-09 DIAGNOSIS — F41.1 GENERALIZED ANXIETY DISORDER: ICD-10-CM

## 2024-11-09 DIAGNOSIS — R60.9 EDEMA, UNSPECIFIED: ICD-10-CM

## 2024-11-09 DIAGNOSIS — R25.2 CRAMP AND SPASM: ICD-10-CM

## 2024-11-09 DIAGNOSIS — M54.9 DORSALGIA, UNSPECIFIED: ICD-10-CM

## 2024-11-09 DIAGNOSIS — F32.A DEPRESSION, UNSPECIFIED: ICD-10-CM

## 2024-11-12 ENCOUNTER — CLINICAL DOCUMENTATION (OUTPATIENT)
Age: 49
End: 2024-11-12

## 2024-11-12 RX ORDER — VENLAFAXINE HYDROCHLORIDE 150 MG/1
CAPSULE, EXTENDED RELEASE ORAL
Qty: 90 CAPSULE | Refills: 3 | Status: SHIPPED | OUTPATIENT
Start: 2024-11-12

## 2024-11-12 RX ORDER — CYCLOBENZAPRINE HCL 10 MG
TABLET ORAL
Qty: 50 TABLET | Refills: 0 | Status: SHIPPED | OUTPATIENT
Start: 2024-11-12

## 2024-11-12 RX ORDER — PROMETHAZINE HYDROCHLORIDE 25 MG/1
25 TABLET ORAL EVERY 8 HOURS PRN
Qty: 50 TABLET | Refills: 0 | Status: SHIPPED | OUTPATIENT
Start: 2024-11-12

## 2024-11-12 RX ORDER — HYDROCHLOROTHIAZIDE 25 MG/1
25 TABLET ORAL DAILY
Qty: 90 TABLET | Refills: 3 | Status: SHIPPED | OUTPATIENT
Start: 2024-11-12

## 2024-11-12 RX ORDER — ZOLPIDEM TARTRATE 5 MG/1
5 TABLET ORAL NIGHTLY PRN
Qty: 30 TABLET | Refills: 5 | Status: SHIPPED | OUTPATIENT
Start: 2024-11-12 | End: 2025-05-11

## 2024-11-12 NOTE — TELEPHONE ENCOUNTER
Last appointment: 6/13/24  Next appointment: 11/25/24  Previous refill encounter(s): 10/23/24 Flexeril #50, 9/13/24 HCTZ #30, 10/8/24 Ambien #30    Requested Prescriptions     Pending Prescriptions Disp Refills    cyclobenzaprine (FLEXERIL) 10 MG tablet [Pharmacy Med Name: CYCLOBENZAPRINE 10 MG TABLET] 50 tablet 0     Sig: TAKE 1 TABLET BY MOUTH THREE TIMES A DAY AS NEEDED FOR MUSCLE SPASM    zolpidem (AMBIEN) 5 MG tablet [Pharmacy Med Name: ZOLPIDEM TARTRATE 5 MG TABLET] 30 tablet 5     Sig: Take 1 tablet by mouth nightly as needed for Sleep for up to 180 days. Max Daily Amount: 5 mg    hydroCHLOROthiazide (HYDRODIURIL) 25 MG tablet [Pharmacy Med Name: HYDROCHLOROTHIAZIDE 25 MG TAB] 90 tablet 3     Sig: TAKE 1 TABLET BY MOUTH EVERY DAY         For Pharmacy Admin Tracking Only    Program: Medication Refill  CPA in place:    Recommendation Provided To:   Intervention Detail: New Rx: 3, reason: Patient Preference  Intervention Accepted By:   Gap Closed?:    Time Spent (min): 5

## 2024-11-12 NOTE — TELEPHONE ENCOUNTER
Last appointment: 6/13/24  Next appointment: 11/25/24  Previous refill encounter(s): 7/29/24 Effexor #90 with 1 refill, 10/23/24 Phenergan & Zanaflex #50    Requested Prescriptions     Pending Prescriptions Disp Refills    venlafaxine (EFFEXOR XR) 150 MG extended release capsule [Pharmacy Med Name: VENLAFAXINE HCL  MG CAP] 90 capsule 3     Sig: TAKE 1 CAPSULE BY MOUTH EVERY DAY    tiZANidine (ZANAFLEX) 4 MG tablet [Pharmacy Med Name: TIZANIDINE HCL 4 MG TABLET] 50 tablet 0     Sig: TAKE 1 TABLET BY MOUTH THREE TIMES A DAY AS NEEDED FOR MUSCLE SPASM    promethazine (PHENERGAN) 25 MG tablet [Pharmacy Med Name: PROMETHAZINE 25 MG TABLET] 50 tablet 0     Sig: TAKE 1 TABLET BY MOUTH EVERY 8 HOURS AS NEEDED FOR NAUSEA         For Pharmacy Admin Tracking Only    Program: Medication Refill  CPA in place:    Recommendation Provided To:   Intervention Detail: New Rx: 3, reason: Patient Preference  Intervention Accepted By:   Gap Closed?:    Time Spent (min): 5

## 2024-11-22 DIAGNOSIS — F41.1 GENERALIZED ANXIETY DISORDER: ICD-10-CM

## 2024-11-22 DIAGNOSIS — G89.29 CHRONIC BILATERAL LOW BACK PAIN WITHOUT SCIATICA: Primary | ICD-10-CM

## 2024-11-22 DIAGNOSIS — R25.2 CRAMP AND SPASM: ICD-10-CM

## 2024-11-22 DIAGNOSIS — G43.709 CHRONIC MIGRAINE WITHOUT AURA, NOT INTRACTABLE, WITHOUT STATUS MIGRAINOSUS: ICD-10-CM

## 2024-11-22 DIAGNOSIS — M54.50 CHRONIC BILATERAL LOW BACK PAIN WITHOUT SCIATICA: Primary | ICD-10-CM

## 2024-11-22 DIAGNOSIS — M54.9 DORSALGIA, UNSPECIFIED: ICD-10-CM

## 2024-11-22 RX ORDER — FUROSEMIDE 20 MG/1
TABLET ORAL
Qty: 30 TABLET | Refills: 3 | Status: SHIPPED | OUTPATIENT
Start: 2024-11-22

## 2024-11-22 RX ORDER — ALPRAZOLAM 0.5 MG
0.5 TABLET ORAL 3 TIMES DAILY PRN
Qty: 90 TABLET | Refills: 2 | Status: SHIPPED | OUTPATIENT
Start: 2024-11-22 | End: 2025-02-20

## 2024-11-22 RX ORDER — HYDROCODONE BITARTRATE AND ACETAMINOPHEN 7.5; 325 MG/1; MG/1
1 TABLET ORAL EVERY 8 HOURS PRN
Qty: 50 TABLET | Refills: 0 | Status: SHIPPED | OUTPATIENT
Start: 2024-11-22 | End: 2024-12-22

## 2024-11-22 NOTE — TELEPHONE ENCOUNTER
Last appointment: 6/13/24  Next appointment: 11/25/24  Previous refill encounter(s): 8/29/24 #90 with 2 refills    Requested Prescriptions     Pending Prescriptions Disp Refills    ALPRAZolam (XANAX) 0.5 MG tablet 90 tablet 2     Sig: Take 1 tablet by mouth 3 times daily as needed for Sleep for up to 90 days. Max Daily Amount: 1.5 mg         For Pharmacy Admin Tracking Only    Program: Medication Refill  CPA in place:    Recommendation Provided To:   Intervention Detail: New Rx: 1, reason: Patient Preference  Intervention Accepted By:   Gap Closed?:    Time Spent (min): 5

## 2024-11-23 NOTE — PROGRESS NOTES
NAME:  Belem Xiao   :   1975   MRN:   [id number]     Date/Time:  2024 7:18 AM  Subjective: f/u chronic migraine,chronic pain,depression,fibro,hbp,dm.Stable c/o.Meds reviewed,encouraged to reduce sedating meds     HPI   Diabetes  She presents for her follow-up diabetic visit. She has type 2 diabetes mellitus. Her disease course has been improving. Hypoglycemia symptoms include headaches. Pertinent negatives for diabetes include no blurred vision, no polyphagia, no polyuria and no weight loss.   Migraine   This is a chronic problem. The current episode started more than 1 year ago. The problem occurs daily. The problem has been unchanged. The pain is located in the Occipital region. Associated symptoms include abdominal pain. Pertinent negatives include no blurred vision or weight loss. Her past medical history is significant for obesity.   Hyperlipidemia  This is a chronic problem. The problem is uncontrolled. Recent lipid tests were reviewed and are high. Exacerbating diseases include obesity. She has no history of hypothyroidism. The current treatment provides mild improvement of lipids. There are no compliance problems.    Depression  Visit Type: follow-up  Patient presents with the following symptoms: decreased concentration, depressed mood, dry mouth and psychomotor retardation.  Patient is not experiencing: suicidal planning, thoughts of death and weight loss.    Review of Systems   Constitutional:  Positive for fatigue. Negative for activity change.   HENT:  Negative for congestion.    Respiratory:  Positive for cough. Negative for chest tightness.    Cardiovascular:  Negative for leg swelling.   Gastrointestinal:  Positive for abdominal pain. Negative for anal bleeding.   Neurological:  Positive for headaches.   Psychiatric/Behavioral:  Positive for agitation and dysphoric mood.              Medications reviewed:  Current Outpatient Medications   Medication Sig    furosemide

## 2024-11-25 ENCOUNTER — OFFICE VISIT (OUTPATIENT)
Age: 49
End: 2024-11-25
Payer: MEDICARE

## 2024-11-25 DIAGNOSIS — M54.50 CHRONIC BILATERAL LOW BACK PAIN WITHOUT SCIATICA: ICD-10-CM

## 2024-11-25 DIAGNOSIS — Z12.11 SCREEN FOR COLON CANCER: ICD-10-CM

## 2024-11-25 DIAGNOSIS — F41.1 GENERALIZED ANXIETY DISORDER: ICD-10-CM

## 2024-11-25 DIAGNOSIS — F32.A DEPRESSION, UNSPECIFIED DEPRESSION TYPE: ICD-10-CM

## 2024-11-25 DIAGNOSIS — E11.9 TYPE 2 DIABETES MELLITUS WITHOUT COMPLICATION, WITHOUT LONG-TERM CURRENT USE OF INSULIN (HCC): ICD-10-CM

## 2024-11-25 DIAGNOSIS — Z12.31 ENCOUNTER FOR SCREENING MAMMOGRAM FOR BREAST CANCER: ICD-10-CM

## 2024-11-25 DIAGNOSIS — M54.9 DORSALGIA, UNSPECIFIED: ICD-10-CM

## 2024-11-25 DIAGNOSIS — G43.709 CHRONIC MIGRAINE WITHOUT AURA, NOT INTRACTABLE, WITHOUT STATUS MIGRAINOSUS: ICD-10-CM

## 2024-11-25 DIAGNOSIS — G89.29 CHRONIC BILATERAL LOW BACK PAIN WITHOUT SCIATICA: ICD-10-CM

## 2024-11-25 DIAGNOSIS — G43.709 CHRONIC MIGRAINE WITHOUT AURA, NOT INTRACTABLE, WITHOUT STATUS MIGRAINOSUS: Primary | ICD-10-CM

## 2024-11-25 DIAGNOSIS — E78.1 HYPERTRIGLYCERIDEMIA: ICD-10-CM

## 2024-11-25 DIAGNOSIS — M79.7 FIBROMYALGIA: ICD-10-CM

## 2024-11-25 DIAGNOSIS — R25.2 CRAMP AND SPASM: ICD-10-CM

## 2024-11-25 DIAGNOSIS — E66.01 OBESITY, MORBID (MORE THAN 100 LBS OVER IDEAL WEIGHT OR BMI > 40): ICD-10-CM

## 2024-11-25 LAB
COMMENT:: NORMAL
SPECIMEN HOLD: NORMAL

## 2024-11-25 PROCEDURE — 3017F COLORECTAL CA SCREEN DOC REV: CPT | Performed by: FAMILY MEDICINE

## 2024-11-25 PROCEDURE — 99214 OFFICE O/P EST MOD 30 MIN: CPT | Performed by: FAMILY MEDICINE

## 2024-11-25 PROCEDURE — G8428 CUR MEDS NOT DOCUMENT: HCPCS | Performed by: FAMILY MEDICINE

## 2024-11-25 PROCEDURE — G8484 FLU IMMUNIZE NO ADMIN: HCPCS | Performed by: FAMILY MEDICINE

## 2024-11-25 PROCEDURE — 1036F TOBACCO NON-USER: CPT | Performed by: FAMILY MEDICINE

## 2024-11-25 PROCEDURE — 3046F HEMOGLOBIN A1C LEVEL >9.0%: CPT | Performed by: FAMILY MEDICINE

## 2024-11-25 PROCEDURE — 2022F DILAT RTA XM EVC RTNOPTHY: CPT | Performed by: FAMILY MEDICINE

## 2024-11-25 PROCEDURE — G8417 CALC BMI ABV UP PARAM F/U: HCPCS | Performed by: FAMILY MEDICINE

## 2024-11-25 RX ORDER — CYCLOBENZAPRINE HCL 10 MG
TABLET ORAL
Qty: 50 TABLET | Refills: 0 | OUTPATIENT
Start: 2024-11-25

## 2024-11-25 RX ORDER — PROMETHAZINE HYDROCHLORIDE 25 MG/1
25 TABLET ORAL EVERY 8 HOURS PRN
Qty: 50 TABLET | Refills: 0 | OUTPATIENT
Start: 2024-11-25

## 2024-11-25 ASSESSMENT — ENCOUNTER SYMPTOMS
ABDOMINAL PAIN: 1
ANAL BLEEDING: 0
COUGH: 1
CHEST TIGHTNESS: 0

## 2024-11-25 NOTE — TELEPHONE ENCOUNTER
Last appointment: 6/13/24  Next appointment: today @ 3:20  Previous refill encounter(s): 11/12/24 #50    Requested Prescriptions     Pending Prescriptions Disp Refills    tiZANidine (ZANAFLEX) 4 MG tablet [Pharmacy Med Name: TIZANIDINE HCL 4 MG TABLET] 50 tablet 0     Sig: TAKE 1 TABLET BY MOUTH THREE TIMES A DAY AS NEEDED FOR MUSCLE SPASM    promethazine (PHENERGAN) 25 MG tablet [Pharmacy Med Name: PROMETHAZINE 25 MG TABLET] 50 tablet 0     Sig: TAKE 1 TABLET BY MOUTH EVERY 8 HOURS AS NEEDED FOR NAUSEA    cyclobenzaprine (FLEXERIL) 10 MG tablet [Pharmacy Med Name: CYCLOBENZAPRINE 10 MG TABLET] 50 tablet 0     Sig: TAKE 1 TABLET BY MOUTH THREE TIMES A DAY AS NEEDED FOR MUSCLE SPASM         For Pharmacy Admin Tracking Only    Program: Medication Refill  CPA in place:    Recommendation Provided To:   Intervention Detail: New Rx: 3, reason: Patient Preference  Intervention Accepted By:   Gap Closed?:    Time Spent (min): 5

## 2024-11-26 VITALS
BODY MASS INDEX: 43.58 KG/M2 | SYSTOLIC BLOOD PRESSURE: 108 MMHG | HEART RATE: 104 BPM | RESPIRATION RATE: 20 BRPM | TEMPERATURE: 99.3 F | DIASTOLIC BLOOD PRESSURE: 84 MMHG | OXYGEN SATURATION: 96 % | WEIGHT: 270 LBS

## 2024-11-26 LAB
ALBUMIN SERPL-MCNC: 4.3 G/DL (ref 3.5–5)
ALBUMIN/GLOB SERPL: 1 (ref 1.1–2.2)
ALP SERPL-CCNC: 96 U/L (ref 45–117)
ALT SERPL-CCNC: 22 U/L (ref 12–78)
ANION GAP SERPL CALC-SCNC: 10 MMOL/L (ref 2–12)
AST SERPL-CCNC: 16 U/L (ref 15–37)
BILIRUB SERPL-MCNC: 0.6 MG/DL (ref 0.2–1)
BUN SERPL-MCNC: 16 MG/DL (ref 6–20)
BUN/CREAT SERPL: 16 (ref 12–20)
CALCIUM SERPL-MCNC: 10.1 MG/DL (ref 8.5–10.1)
CHLORIDE SERPL-SCNC: 101 MMOL/L (ref 97–108)
CHOLEST SERPL-MCNC: 259 MG/DL
CO2 SERPL-SCNC: 27 MMOL/L (ref 21–32)
CREAT SERPL-MCNC: 1.01 MG/DL (ref 0.55–1.02)
ERYTHROCYTE [DISTWIDTH] IN BLOOD BY AUTOMATED COUNT: 15.2 % (ref 11.5–14.5)
EST. AVERAGE GLUCOSE BLD GHB EST-MCNC: 108 MG/DL
GLOBULIN SER CALC-MCNC: 4.5 G/DL (ref 2–4)
GLUCOSE SERPL-MCNC: 85 MG/DL (ref 65–100)
HBA1C MFR BLD: 5.4 % (ref 4–5.6)
HCT VFR BLD AUTO: 41.9 % (ref 35–47)
HGB BLD-MCNC: 13.7 G/DL (ref 11.5–16)
MCH RBC QN AUTO: 29.8 PG (ref 26–34)
MCHC RBC AUTO-ENTMCNC: 32.7 G/DL (ref 30–36.5)
MCV RBC AUTO: 91.3 FL (ref 80–99)
NRBC # BLD: 0 K/UL (ref 0–0.01)
NRBC BLD-RTO: 0 PER 100 WBC
PLATELET # BLD AUTO: 542 K/UL (ref 150–400)
PMV BLD AUTO: 9.7 FL (ref 8.9–12.9)
POTASSIUM SERPL-SCNC: 3.5 MMOL/L (ref 3.5–5.1)
PROT SERPL-MCNC: 8.8 G/DL (ref 6.4–8.2)
RBC # BLD AUTO: 4.59 M/UL (ref 3.8–5.2)
SODIUM SERPL-SCNC: 138 MMOL/L (ref 136–145)
WBC # BLD AUTO: 16 K/UL (ref 3.6–11)

## 2024-11-26 ASSESSMENT — PATIENT HEALTH QUESTIONNAIRE - PHQ9
SUM OF ALL RESPONSES TO PHQ QUESTIONS 1-9: 8
10. IF YOU CHECKED OFF ANY PROBLEMS, HOW DIFFICULT HAVE THESE PROBLEMS MADE IT FOR YOU TO DO YOUR WORK, TAKE CARE OF THINGS AT HOME, OR GET ALONG WITH OTHER PEOPLE: NOT DIFFICULT AT ALL
6. FEELING BAD ABOUT YOURSELF - OR THAT YOU ARE A FAILURE OR HAVE LET YOURSELF OR YOUR FAMILY DOWN: NOT AT ALL
SUM OF ALL RESPONSES TO PHQ9 QUESTIONS 1 & 2: 5
9. THOUGHTS THAT YOU WOULD BE BETTER OFF DEAD, OR OF HURTING YOURSELF: NOT AT ALL
5. POOR APPETITE OR OVEREATING: NOT AT ALL
1. LITTLE INTEREST OR PLEASURE IN DOING THINGS: MORE THAN HALF THE DAYS
2. FEELING DOWN, DEPRESSED OR HOPELESS: NEARLY EVERY DAY
SUM OF ALL RESPONSES TO PHQ QUESTIONS 1-9: 8
4. FEELING TIRED OR HAVING LITTLE ENERGY: NOT AT ALL
SUM OF ALL RESPONSES TO PHQ QUESTIONS 1-9: 8
7. TROUBLE CONCENTRATING ON THINGS, SUCH AS READING THE NEWSPAPER OR WATCHING TELEVISION: NOT AT ALL
8. MOVING OR SPEAKING SO SLOWLY THAT OTHER PEOPLE COULD HAVE NOTICED. OR THE OPPOSITE, BEING SO FIGETY OR RESTLESS THAT YOU HAVE BEEN MOVING AROUND A LOT MORE THAN USUAL: NOT AT ALL
3. TROUBLE FALLING OR STAYING ASLEEP: NEARLY EVERY DAY
SUM OF ALL RESPONSES TO PHQ QUESTIONS 1-9: 8

## 2024-11-26 NOTE — PROGRESS NOTES
Chief Complaint   Patient presents with    Other       \"Have you been to the ER, urgent care clinic since your last visit?  Hospitalized since your last visit?\"    NO    “Have you seen or consulted any other health care providers outside of [facility] since your last visit?”    NO    Have you had a mammogram?”   NO    No breast cancer screening on file      “Have you had a pap smear?”    NO    No cervical cancer screening on file         “Have you had a colorectal cancer screening such as a colonoscopy/FIT/Cologuard?    NO    Date of last Colonoscopy: 2008  No cologuard on file  No FIT/FOBT on file   No flexible sigmoidoscopy on file         Click Here for Release of Records Request       There were no vitals filed for this visit.   Health Maintenance Due   Topic Date Due    Pneumococcal 0-64 years Vaccine (1 of 2 - PCV) Never done    Diabetic foot exam  Never done    HIV screen  Never done    Diabetic Alb to Cr ratio (uACR) test  Never done    Diabetic retinal exam  Never done    Hepatitis B vaccine (1 of 3 - 19+ 3-dose series) Never done    Cervical cancer screen  Never done    Breast cancer screen  Never done    Colorectal Cancer Screen  2018    Shingles vaccine (1 of 2) Never done    Annual Wellness Visit (Medicare Advantage)  Never done    Flu vaccine (1) 2024    COVID-19 Vaccine (2023- season) Never done        The patient, Belem BARRIENTOS Dameon, identity was verified by name and .

## 2024-11-27 ENCOUNTER — TELEPHONE (OUTPATIENT)
Age: 49
End: 2024-11-27

## 2024-11-27 RX ORDER — FUROSEMIDE 20 MG/1
TABLET ORAL
Qty: 90 TABLET | Refills: 1 | OUTPATIENT
Start: 2024-11-27

## 2024-11-27 RX ORDER — CYCLOBENZAPRINE HCL 10 MG
10 TABLET ORAL 2 TIMES DAILY PRN
Qty: 50 TABLET | Refills: 0 | Status: SHIPPED | OUTPATIENT
Start: 2024-11-27

## 2024-11-27 RX ORDER — PROMETHAZINE HYDROCHLORIDE 25 MG/1
25 TABLET ORAL EVERY 8 HOURS PRN
Qty: 50 TABLET | Refills: 0 | Status: SHIPPED | OUTPATIENT
Start: 2024-11-27
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
## 2024-11-27 NOTE — TELEPHONE ENCOUNTER
Last appointment: 11/25/24  Next appointment: none  Previous refill encounter(s): 11/12/24 #50    Requested Prescriptions     Pending Prescriptions Disp Refills    tiZANidine (ZANAFLEX) 4 MG tablet 50 tablet 0     Sig: Take 1 tablet by mouth 3 times daily as needed (muscle spasm)    promethazine (PHENERGAN) 25 MG tablet 50 tablet 0     Sig: Take 1 tablet by mouth every 8 hours as needed for Nausea for nausea         For Pharmacy Admin Tracking Only    Program: Medication Refill  CPA in place:    Recommendation Provided To:   Intervention Detail: New Rx: 2, reason: Patient Preference  Intervention Accepted By:   Gap Closed?:    Time Spent (min): 5

## 2024-12-10 DIAGNOSIS — R25.2 CRAMP AND SPASM: ICD-10-CM

## 2024-12-11 NOTE — TELEPHONE ENCOUNTER
Last appointment: 11/25/24  Next appointment: none  Previous refill encounter(s): 11/27/24 #50    Requested Prescriptions     Pending Prescriptions Disp Refills    cyclobenzaprine (FLEXERIL) 10 MG tablet [Pharmacy Med Name: CYCLOBENZAPRINE 10 MG TABLET] 50 tablet 0     Sig: TAKE 1 TABLET BY MOUTH 2 TIMES DAILY AS NEEDED FOR MUSCLE SPASMS.         For Pharmacy Admin Tracking Only    Program: Medication Refill  CPA in place:    Recommendation Provided To:   Intervention Detail: New Rx: 1, reason: Patient Preference  Intervention Accepted By:   Gap Closed?:    Time Spent (min): 5

## 2024-12-13 RX ORDER — CYCLOBENZAPRINE HCL 10 MG
10 TABLET ORAL 2 TIMES DAILY PRN
Qty: 50 TABLET | Refills: 0 | Status: SHIPPED | OUTPATIENT
Start: 2024-12-13

## 2024-12-15 DIAGNOSIS — M54.9 DORSALGIA, UNSPECIFIED: ICD-10-CM

## 2024-12-15 DIAGNOSIS — R25.2 CRAMP AND SPASM: ICD-10-CM

## 2024-12-15 DIAGNOSIS — G43.709 CHRONIC MIGRAINE WITHOUT AURA, NOT INTRACTABLE, WITHOUT STATUS MIGRAINOSUS: ICD-10-CM

## 2024-12-16 RX ORDER — CYCLOBENZAPRINE HCL 10 MG
TABLET ORAL
Qty: 50 TABLET | Refills: 0 | OUTPATIENT
Start: 2024-12-16

## 2024-12-16 NOTE — TELEPHONE ENCOUNTER
Flexeril was sent on 12/13/24.    Last appointment: 11/25/24  Next appointment: 2/11/25  Previous refill encounter(s): 11/27/24 Zanaflex & Phenergan #50    Requested Prescriptions     Pending Prescriptions Disp Refills    tiZANidine (ZANAFLEX) 4 MG tablet [Pharmacy Med Name: TIZANIDINE HCL 4 MG TABLET] 50 tablet 0     Sig: TAKE 1 TABLET BY MOUTH 3 TIMES DAILY AS NEEDED (MUSCLE SPASM).    promethazine (PHENERGAN) 25 MG tablet [Pharmacy Med Name: PROMETHAZINE 25 MG TABLET] 50 tablet 0     Sig: TAKE 1 TABLET BY MOUTH EVERY 8 HOURS AS NEEDED FOR NAUSEA FOR NAUSEA     Refused Prescriptions Disp Refills    cyclobenzaprine (FLEXERIL) 10 MG tablet [Pharmacy Med Name: CYCLOBENZAPRINE 10 MG TABLET] 50 tablet 0     Sig: TAKE 1 TABLET BY MOUTH THREE TIMES A DAY AS NEEDED FOR MUSCLE SPASM         For Pharmacy Admin Tracking Only    Program: Medication Refill  CPA in place:    Recommendation Provided To:   Intervention Detail: Discontinued Rx: 1, reason: Duplicate Therapy and New Rx: 2, reason: Patient Preference  Intervention Accepted By:   Gap Closed?:    Time Spent (min): 5

## 2024-12-18 RX ORDER — PROMETHAZINE HYDROCHLORIDE 25 MG/1
25 TABLET ORAL EVERY 8 HOURS PRN
Qty: 50 TABLET | Refills: 0 | Status: SHIPPED | OUTPATIENT
Start: 2024-12-18

## 2024-12-20 DIAGNOSIS — G43.909 MIGRAINE WITHOUT STATUS MIGRAINOSUS, NOT INTRACTABLE, UNSPECIFIED MIGRAINE TYPE: ICD-10-CM

## 2024-12-20 DIAGNOSIS — G89.29 CHRONIC BILATERAL LOW BACK PAIN WITHOUT SCIATICA: ICD-10-CM

## 2024-12-20 DIAGNOSIS — M54.50 CHRONIC BILATERAL LOW BACK PAIN WITHOUT SCIATICA: ICD-10-CM

## 2024-12-20 RX ORDER — GABAPENTIN 600 MG/1
600 TABLET ORAL 3 TIMES DAILY
Qty: 270 TABLET | Refills: 1 | OUTPATIENT
Start: 2024-12-20 | End: 2025-06-18

## 2024-12-20 RX ORDER — HYDROCODONE BITARTRATE AND ACETAMINOPHEN 7.5; 325 MG/1; MG/1
1 TABLET ORAL EVERY 8 HOURS PRN
Qty: 50 TABLET | Refills: 0 | Status: SHIPPED | OUTPATIENT
Start: 2024-12-20 | End: 2025-01-19

## 2024-12-20 NOTE — TELEPHONE ENCOUNTER
Last appointment: 11/25/24  Next appointment: 2/11/25  Previous refill encounter(s): 11/22/24 Norco #50, 6/28/24 Neurontin #270 with 1 refill    Requested Prescriptions     Pending Prescriptions Disp Refills    gabapentin (NEURONTIN) 600 MG tablet 270 tablet 1     Sig: Take 1 tablet by mouth 3 times daily for 180 days. Max Daily Amount: 1,800 mg    HYDROcodone-acetaminophen (NORCO) 7.5-325 MG per tablet 50 tablet 0     Sig: Take 1 tablet by mouth every 8 hours as needed for Pain for up to 30 days. Intended supply: 30 days Max Daily Amount: 3 tablets         For Pharmacy Admin Tracking Only    Program: Medication Refill  CPA in place:    Recommendation Provided To:   Intervention Detail: New Rx: 2, reason: Patient Preference  Intervention Accepted By:   Gap Closed?:    Time Spent (min): 5

## 2025-01-04 DIAGNOSIS — R25.2 CRAMP AND SPASM: ICD-10-CM

## 2025-01-04 DIAGNOSIS — G43.909 MIGRAINE WITHOUT STATUS MIGRAINOSUS, NOT INTRACTABLE, UNSPECIFIED MIGRAINE TYPE: ICD-10-CM

## 2025-01-04 DIAGNOSIS — M54.9 DORSALGIA, UNSPECIFIED: ICD-10-CM

## 2025-01-06 RX ORDER — GABAPENTIN 600 MG/1
600 TABLET ORAL 3 TIMES DAILY
Qty: 270 TABLET | Refills: 0 | Status: SHIPPED | OUTPATIENT
Start: 2025-01-06 | End: 2025-07-05

## 2025-01-06 RX ORDER — CYCLOBENZAPRINE HCL 10 MG
10 TABLET ORAL 2 TIMES DAILY PRN
Qty: 50 TABLET | Refills: 0 | Status: SHIPPED | OUTPATIENT
Start: 2025-01-06

## 2025-01-09 DIAGNOSIS — G43.709 CHRONIC MIGRAINE WITHOUT AURA, NOT INTRACTABLE, WITHOUT STATUS MIGRAINOSUS: ICD-10-CM

## 2025-01-10 RX ORDER — PROMETHAZINE HYDROCHLORIDE 25 MG/1
25 TABLET ORAL EVERY 8 HOURS PRN
Qty: 50 TABLET | Refills: 0 | Status: SHIPPED | OUTPATIENT
Start: 2025-01-10

## 2025-01-10 NOTE — TELEPHONE ENCOUNTER
Last appointment: 11/25/24  Next appointment: 2/11/25  Previous refill encounter(s): 12/18/24 #50    Requested Prescriptions     Pending Prescriptions Disp Refills    promethazine (PHENERGAN) 25 MG tablet [Pharmacy Med Name: PROMETHAZINE 25 MG TABLET] 50 tablet 0     Sig: TAKE 1 TABLET BY MOUTH EVERY 8 HOURS AS NEEDED FOR NAUSEA FOR NAUSEA         For Pharmacy Admin Tracking Only    Program: Medication Refill  CPA in place:    Recommendation Provided To:   Intervention Detail: New Rx: 1, reason: Patient Preference  Intervention Accepted By:   Gap Closed?:    Time Spent (min): 5

## 2025-01-19 DIAGNOSIS — G89.29 CHRONIC BILATERAL LOW BACK PAIN WITHOUT SCIATICA: ICD-10-CM

## 2025-01-19 DIAGNOSIS — M54.50 CHRONIC BILATERAL LOW BACK PAIN WITHOUT SCIATICA: ICD-10-CM

## 2025-01-20 RX ORDER — HYDROCODONE BITARTRATE AND ACETAMINOPHEN 7.5; 325 MG/1; MG/1
1 TABLET ORAL EVERY 8 HOURS PRN
Qty: 50 TABLET | Refills: 0 | OUTPATIENT
Start: 2025-01-20 | End: 2025-02-19

## 2025-01-21 DIAGNOSIS — G43.909 MIGRAINE WITHOUT STATUS MIGRAINOSUS, NOT INTRACTABLE, UNSPECIFIED MIGRAINE TYPE: Primary | ICD-10-CM

## 2025-01-21 RX ORDER — HYDROCODONE BITARTRATE AND ACETAMINOPHEN 7.5; 325 MG/1; MG/1
1 TABLET ORAL EVERY 8 HOURS PRN
Qty: 50 TABLET | Refills: 0 | Status: SHIPPED | OUTPATIENT
Start: 2025-01-21 | End: 2025-02-20

## 2025-01-21 NOTE — TELEPHONE ENCOUNTER
Patient is requesting a RX refill  HYDROcodone-acetaminophen (NORCO) 7.5-325 MG per tablet she can be reached @ [phone]

## 2025-01-29 DIAGNOSIS — M54.9 DORSALGIA, UNSPECIFIED: ICD-10-CM

## 2025-01-30 NOTE — TELEPHONE ENCOUNTER
Last appointment: 11/25/24  Next appointment: 2/11/25  Previous refill encounter(s): 1/6/25 #50    Requested Prescriptions     Pending Prescriptions Disp Refills    tiZANidine (ZANAFLEX) 4 MG tablet [Pharmacy Med Name: TIZANIDINE HCL 4 MG TABLET] 50 tablet 0     Sig: TAKE 1 TABLET BY MOUTH 3 TIMES DAILY AS NEEDED (MUSCLE SPASM).         For Pharmacy Admin Tracking Only    Program: Medication Refill  CPA in place:    Recommendation Provided To:   Intervention Detail: New Rx: 1, reason: Patient Preference  Intervention Accepted By:   Gap Closed?:    Time Spent (min): 5

## 2025-02-07 ENCOUNTER — TELEPHONE (OUTPATIENT)
Age: 50
End: 2025-02-07

## 2025-02-07 NOTE — TELEPHONE ENCOUNTER
Attempted to contact patient regarding upcoming Medicare wellness appointment and completion of HRA questionnaire. LVM for patient to please return call at  [phone].

## 2025-02-08 SDOH — ECONOMIC STABILITY: INCOME INSECURITY: IN THE LAST 12 MONTHS, WAS THERE A TIME WHEN YOU WERE NOT ABLE TO PAY THE MORTGAGE OR RENT ON TIME?: YES

## 2025-02-08 SDOH — ECONOMIC STABILITY: FOOD INSECURITY: WITHIN THE PAST 12 MONTHS, YOU WORRIED THAT YOUR FOOD WOULD RUN OUT BEFORE YOU GOT MONEY TO BUY MORE.: OFTEN TRUE

## 2025-02-08 SDOH — HEALTH STABILITY: PHYSICAL HEALTH: ON AVERAGE, HOW MANY DAYS PER WEEK DO YOU ENGAGE IN MODERATE TO STRENUOUS EXERCISE (LIKE A BRISK WALK)?: 4 DAYS

## 2025-02-08 SDOH — HEALTH STABILITY: PHYSICAL HEALTH: ON AVERAGE, HOW MANY MINUTES DO YOU ENGAGE IN EXERCISE AT THIS LEVEL?: 30 MIN

## 2025-02-08 SDOH — ECONOMIC STABILITY: FOOD INSECURITY: WITHIN THE PAST 12 MONTHS, THE FOOD YOU BOUGHT JUST DIDN'T LAST AND YOU DIDN'T HAVE MONEY TO GET MORE.: OFTEN TRUE

## 2025-02-08 SDOH — ECONOMIC STABILITY: TRANSPORTATION INSECURITY
IN THE PAST 12 MONTHS, HAS LACK OF TRANSPORTATION KEPT YOU FROM MEETINGS, WORK, OR FROM GETTING THINGS NEEDED FOR DAILY LIVING?: YES

## 2025-02-08 SDOH — ECONOMIC STABILITY: TRANSPORTATION INSECURITY
IN THE PAST 12 MONTHS, HAS THE LACK OF TRANSPORTATION KEPT YOU FROM MEDICAL APPOINTMENTS OR FROM GETTING MEDICATIONS?: YES

## 2025-02-08 ASSESSMENT — PATIENT HEALTH QUESTIONNAIRE - PHQ9
SUM OF ALL RESPONSES TO PHQ QUESTIONS 1-9: 22
2. FEELING DOWN, DEPRESSED OR HOPELESS: NEARLY EVERY DAY
8. MOVING OR SPEAKING SO SLOWLY THAT OTHER PEOPLE COULD HAVE NOTICED. OR THE OPPOSITE, BEING SO FIGETY OR RESTLESS THAT YOU HAVE BEEN MOVING AROUND A LOT MORE THAN USUAL: MORE THAN HALF THE DAYS
4. FEELING TIRED OR HAVING LITTLE ENERGY: NEARLY EVERY DAY
5. POOR APPETITE OR OVEREATING: NEARLY EVERY DAY
SUM OF ALL RESPONSES TO PHQ QUESTIONS 1-9: 22
SUM OF ALL RESPONSES TO PHQ QUESTIONS 1-9: 22
9. THOUGHTS THAT YOU WOULD BE BETTER OFF DEAD, OR OF HURTING YOURSELF: NOT AT ALL
7. TROUBLE CONCENTRATING ON THINGS, SUCH AS READING THE NEWSPAPER OR WATCHING TELEVISION: MORE THAN HALF THE DAYS
SUM OF ALL RESPONSES TO PHQ QUESTIONS 1-9: 22
1. LITTLE INTEREST OR PLEASURE IN DOING THINGS: NEARLY EVERY DAY
3. TROUBLE FALLING OR STAYING ASLEEP: NEARLY EVERY DAY
10. IF YOU CHECKED OFF ANY PROBLEMS, HOW DIFFICULT HAVE THESE PROBLEMS MADE IT FOR YOU TO DO YOUR WORK, TAKE CARE OF THINGS AT HOME, OR GET ALONG WITH OTHER PEOPLE: EXTREMELY DIFFICULT
6. FEELING BAD ABOUT YOURSELF - OR THAT YOU ARE A FAILURE OR HAVE LET YOURSELF OR YOUR FAMILY DOWN: NEARLY EVERY DAY

## 2025-02-08 ASSESSMENT — LIFESTYLE VARIABLES
HAS A RELATIVE, FRIEND, DOCTOR, OR ANOTHER HEALTH PROFESSIONAL EXPRESSED CONCERN ABOUT YOUR DRINKING OR SUGGESTED YOU CUT DOWN: NO
HOW OFTEN DO YOU HAVE A DRINK CONTAINING ALCOHOL: 2-4 TIMES A MONTH
HOW MANY STANDARD DRINKS CONTAINING ALCOHOL DO YOU HAVE ON A TYPICAL DAY: 3 OR 4
HOW OFTEN DURING THE LAST YEAR HAVE YOU FOUND THAT YOU WERE NOT ABLE TO STOP DRINKING ONCE YOU HAD STARTED: NEVER
HOW OFTEN DURING THE LAST YEAR HAVE YOU HAD A FEELING OF GUILT OR REMORSE AFTER DRINKING: NEVER
HOW OFTEN DURING THE LAST YEAR HAVE YOU NEEDED AN ALCOHOLIC DRINK FIRST THING IN THE MORNING TO GET YOURSELF GOING AFTER A NIGHT OF HEAVY DRINKING: NEVER
HAVE YOU OR SOMEONE ELSE BEEN INJURED AS A RESULT OF YOUR DRINKING: NO
HOW OFTEN DURING THE LAST YEAR HAVE YOU FAILED TO DO WHAT WAS NORMALLY EXPECTED FROM YOU BECAUSE OF DRINKING: NEVER
HOW OFTEN DURING THE LAST YEAR HAVE YOU BEEN UNABLE TO REMEMBER WHAT HAPPENED THE NIGHT BEFORE BECAUSE YOU HAD BEEN DRINKING: NEVER

## 2025-02-10 PROBLEM — S51.032A: Status: RESOLVED | Noted: 2024-01-17 | Resolved: 2025-02-10

## 2025-02-10 PROBLEM — K80.00 ACUTE CALCULOUS CHOLECYSTITIS: Status: RESOLVED | Noted: 2024-04-14 | Resolved: 2025-02-10

## 2025-02-10 PROBLEM — E11.9 TYPE 2 DIABETES MELLITUS WITHOUT COMPLICATION, WITHOUT LONG-TERM CURRENT USE OF INSULIN (HCC): Status: ACTIVE | Noted: 2021-11-23

## 2025-02-13 DIAGNOSIS — M54.9 DORSALGIA, UNSPECIFIED: ICD-10-CM

## 2025-02-13 DIAGNOSIS — G43.709 CHRONIC MIGRAINE WITHOUT AURA, NOT INTRACTABLE, WITHOUT STATUS MIGRAINOSUS: ICD-10-CM

## 2025-02-13 DIAGNOSIS — R25.2 CRAMP AND SPASM: ICD-10-CM

## 2025-02-13 NOTE — TELEPHONE ENCOUNTER
Last appointment: 11/25/24  Next appointment: 4/2/25  Previous refill encounter(s): 1/10/25 Phenergan #50, 1/6/25 Flexeril #50    Requested Prescriptions     Pending Prescriptions Disp Refills    cyclobenzaprine (FLEXERIL) 10 MG tablet [Pharmacy Med Name: CYCLOBENZAPRINE 10 MG TABLET] 50 tablet 1     Sig: TAKE 1 TABLET BY MOUTH 2 TIMES DAILY AS NEEDED FOR MUSCLE SPASMS.    promethazine (PHENERGAN) 25 MG tablet [Pharmacy Med Name: PROMETHAZINE 25 MG TABLET] 50 tablet 1     Sig: TAKE 1 TABLET BY MOUTH EVERY 8 HOURS AS NEEDED FOR NAUSEA FOR NAUSEA         For Pharmacy Admin Tracking Only    Program: Medication Refill  CPA in place:    Recommendation Provided To:   Intervention Detail: New Rx: 2, reason: Patient Preference  Intervention Accepted By:   Gap Closed?:    Time Spent (min): 5

## 2025-02-13 NOTE — TELEPHONE ENCOUNTER
Last appointment: 11/25/24  Next appointment: 4/2/25  Previous refill encounter(s): 1/30/25 #50    Requested Prescriptions     Pending Prescriptions Disp Refills    tiZANidine (ZANAFLEX) 4 MG tablet 50 tablet 1     Sig: Take 1 tablet by mouth 3 times daily as needed (muscle spasm)         For Pharmacy Admin Tracking Only    Program: Medication Refill  CPA in place:    Recommendation Provided To:   Intervention Detail: New Rx: 1, reason: Patient Preference  Intervention Accepted By:   Gap Closed?:    Time Spent (min): 5

## 2025-02-14 RX ORDER — CYCLOBENZAPRINE HCL 10 MG
10 TABLET ORAL 2 TIMES DAILY PRN
Qty: 50 TABLET | Refills: 1 | Status: SHIPPED | OUTPATIENT
Start: 2025-02-14

## 2025-02-14 RX ORDER — PROMETHAZINE HYDROCHLORIDE 25 MG/1
25 TABLET ORAL EVERY 8 HOURS PRN
Qty: 50 TABLET | Refills: 1 | Status: SHIPPED | OUTPATIENT
Start: 2025-02-14

## 2025-02-21 DIAGNOSIS — G43.709 CHRONIC MIGRAINE WITHOUT AURA, NOT INTRACTABLE, WITHOUT STATUS MIGRAINOSUS: Primary | ICD-10-CM

## 2025-02-21 DIAGNOSIS — F41.1 GENERALIZED ANXIETY DISORDER: ICD-10-CM

## 2025-02-21 RX ORDER — HYDROCODONE BITARTRATE AND ACETAMINOPHEN 7.5; 325 MG/1; MG/1
1 TABLET ORAL EVERY 8 HOURS PRN
Qty: 50 TABLET | Refills: 0 | Status: SHIPPED | OUTPATIENT
Start: 2025-02-21 | End: 2025-03-23

## 2025-02-21 RX ORDER — ALPRAZOLAM 0.5 MG
0.5 TABLET ORAL 3 TIMES DAILY PRN
Qty: 90 TABLET | Refills: 2 | Status: SHIPPED | OUTPATIENT
Start: 2025-02-21 | End: 2025-05-22

## 2025-02-21 NOTE — TELEPHONE ENCOUNTER
Last appointment: 11/25/24  Next appointment: 4/2/25  Previous refill encounter(s): 11/22/24 #90 with 2 refills    Requested Prescriptions     Pending Prescriptions Disp Refills    ALPRAZolam (XANAX) 0.5 MG tablet [Pharmacy Med Name: ALPRAZOLAM 0.5 MG TABLET] 90 tablet 2     Sig: Take 1 tablet by mouth 3 times daily as needed for Sleep for up to 90 days. Max Daily Amount: 1.5 mg         For Pharmacy Admin Tracking Only    Program: Medication Refill  CPA in place:    Recommendation Provided To:   Intervention Detail: New Rx: 1, reason: Patient Preference  Intervention Accepted By:   Gap Closed?:    Time Spent (min): 5

## 2025-02-21 NOTE — TELEPHONE ENCOUNTER
----- Message from Arianna REYNA sent at 2/21/2025 10:12 AM EST -----  Regarding: ECC Message to Provider  ECC Message to Provider    Relationship to Patient: Self     Additional Information Patient just want to send this message because to inform you that she will be needing a medication refill. The medication is about the headache she is having that her doctor is aware. She don't want to have an appointment she just want to have her medication refill. A hydrocodone refill for a chronic disorder.  --------------------------------------------------------------------------------------------------------------------------    Call Back Information: OK to leave message on voicemail  Preferred Call Back Number: Phone [phone]

## 2025-03-04 DIAGNOSIS — R25.2 CRAMP AND SPASM: ICD-10-CM

## 2025-03-05 RX ORDER — CYCLOBENZAPRINE HCL 10 MG
10 TABLET ORAL 2 TIMES DAILY PRN
Qty: 50 TABLET | Refills: 1 | OUTPATIENT
Start: 2025-03-05

## 2025-03-05 NOTE — TELEPHONE ENCOUNTER
Last appointment: 11/25/24  Next appointment: 4/2/25  Previous refill encounter(s): 2/14/25 #50 with 1 refill    Requested Prescriptions     Pending Prescriptions Disp Refills    cyclobenzaprine (FLEXERIL) 10 MG tablet 50 tablet 1     Sig: Take 1 tablet by mouth 2 times daily as needed for Muscle spasms         For Pharmacy Admin Tracking Only    Program: Medication Refill  CPA in place:    Recommendation Provided To:   Intervention Detail: New Rx: 1, reason: Patient Preference  Intervention Accepted By:   Gap Closed?:    Time Spent (min): 5

## 2025-03-20 DIAGNOSIS — G43.709 CHRONIC MIGRAINE WITHOUT AURA, NOT INTRACTABLE, WITHOUT STATUS MIGRAINOSUS: ICD-10-CM

## 2025-03-20 DIAGNOSIS — F41.1 GENERALIZED ANXIETY DISORDER: ICD-10-CM

## 2025-03-20 DIAGNOSIS — M54.9 DORSALGIA, UNSPECIFIED: ICD-10-CM

## 2025-03-21 DIAGNOSIS — G43.709 CHRONIC MIGRAINE WITHOUT AURA, NOT INTRACTABLE, WITHOUT STATUS MIGRAINOSUS: ICD-10-CM

## 2025-03-21 RX ORDER — PROMETHAZINE HYDROCHLORIDE 25 MG/1
25 TABLET ORAL EVERY 8 HOURS PRN
Qty: 50 TABLET | Refills: 1 | Status: SHIPPED | OUTPATIENT
Start: 2025-03-21

## 2025-03-21 RX ORDER — HYDROCODONE BITARTRATE AND ACETAMINOPHEN 7.5; 325 MG/1; MG/1
1 TABLET ORAL EVERY 8 HOURS PRN
Qty: 50 TABLET | Refills: 0 | Status: SHIPPED | OUTPATIENT
Start: 2025-03-21 | End: 2025-04-20

## 2025-03-21 RX ORDER — ALPRAZOLAM 0.5 MG
0.5 TABLET ORAL 3 TIMES DAILY PRN
Qty: 90 TABLET | Refills: 2 | OUTPATIENT
Start: 2025-03-21 | End: 2025-06-19

## 2025-03-21 NOTE — TELEPHONE ENCOUNTER
Xanax was sent on 2/21/25 #90 with 2 refills.    Last appointment: 11/25/24  Next appointment: 4/2/25  Previous refill encounter(s): 2/21/25 Norco #50, 2/14/25 Phenergan & Zanaflex #50 with 1 refill    Requested Prescriptions     Pending Prescriptions Disp Refills    promethazine (PHENERGAN) 25 MG tablet 50 tablet 1     Sig: Take 1 tablet by mouth every 8 hours as needed for Nausea for nausea    tiZANidine (ZANAFLEX) 4 MG tablet 50 tablet 1     Sig: Take 1 tablet by mouth 3 times daily as needed (muscle spasm)    HYDROcodone-acetaminophen (NORCO) 7.5-325 MG per tablet 50 tablet 0     Sig: Take 1 tablet by mouth every 8 hours as needed for Pain for up to 30 days. Max Daily Amount: 3 tablets     Refused Prescriptions Disp Refills    ALPRAZolam (XANAX) 0.5 MG tablet 90 tablet 2     Sig: Take 1 tablet by mouth 3 times daily as needed for Sleep for up to 90 days. Max Daily Amount: 1.5 mg         For Pharmacy Admin Tracking Only    Program: Medication Refill  CPA in place:    Recommendation Provided To:   Intervention Detail: Discontinued Rx: 1, reason: Duplicate Therapy and New Rx: 3, reason: Patient Preference  Intervention Accepted By:   Gap Closed?:    Time Spent (min): 5

## 2025-03-24 RX ORDER — PROMETHAZINE HYDROCHLORIDE 25 MG/1
25 TABLET ORAL EVERY 8 HOURS PRN
Qty: 50 TABLET | Refills: 1 | OUTPATIENT
Start: 2025-03-24

## 2025-03-27 NOTE — TELEPHONE ENCOUNTER
VOICEMAIL    Pt got days mixed up. Thought today was Monday. Please call to r/s [employer] appt.  [phone]

## 2025-03-30 SDOH — HEALTH STABILITY: PHYSICAL HEALTH: ON AVERAGE, HOW MANY MINUTES DO YOU ENGAGE IN EXERCISE AT THIS LEVEL?: 30 MIN

## 2025-03-30 SDOH — HEALTH STABILITY: PHYSICAL HEALTH: ON AVERAGE, HOW MANY DAYS PER WEEK DO YOU ENGAGE IN MODERATE TO STRENUOUS EXERCISE (LIKE A BRISK WALK)?: 4 DAYS

## 2025-03-30 ASSESSMENT — PATIENT HEALTH QUESTIONNAIRE - PHQ9
4. FEELING TIRED OR HAVING LITTLE ENERGY: NEARLY EVERY DAY
5. POOR APPETITE OR OVEREATING: NEARLY EVERY DAY
6. FEELING BAD ABOUT YOURSELF - OR THAT YOU ARE A FAILURE OR HAVE LET YOURSELF OR YOUR FAMILY DOWN: NEARLY EVERY DAY
SUM OF ALL RESPONSES TO PHQ QUESTIONS 1-9: 24
1. LITTLE INTEREST OR PLEASURE IN DOING THINGS: NEARLY EVERY DAY
2. FEELING DOWN, DEPRESSED OR HOPELESS: NEARLY EVERY DAY
SUM OF ALL RESPONSES TO PHQ QUESTIONS 1-9: 24
10. IF YOU CHECKED OFF ANY PROBLEMS, HOW DIFFICULT HAVE THESE PROBLEMS MADE IT FOR YOU TO DO YOUR WORK, TAKE CARE OF THINGS AT HOME, OR GET ALONG WITH OTHER PEOPLE: EXTREMELY DIFFICULT
SUM OF ALL RESPONSES TO PHQ QUESTIONS 1-9: 22
8. MOVING OR SPEAKING SO SLOWLY THAT OTHER PEOPLE COULD HAVE NOTICED. OR THE OPPOSITE, BEING SO FIGETY OR RESTLESS THAT YOU HAVE BEEN MOVING AROUND A LOT MORE THAN USUAL: MORE THAN HALF THE DAYS
9. THOUGHTS THAT YOU WOULD BE BETTER OFF DEAD, OR OF HURTING YOURSELF: MORE THAN HALF THE DAYS
SUM OF ALL RESPONSES TO PHQ QUESTIONS 1-9: 24
7. TROUBLE CONCENTRATING ON THINGS, SUCH AS READING THE NEWSPAPER OR WATCHING TELEVISION: MORE THAN HALF THE DAYS
3. TROUBLE FALLING OR STAYING ASLEEP: NEARLY EVERY DAY

## 2025-03-30 ASSESSMENT — LIFESTYLE VARIABLES
HOW OFTEN DO YOU HAVE A DRINK CONTAINING ALCOHOL: 2-4 TIMES A MONTH
HOW MANY STANDARD DRINKS CONTAINING ALCOHOL DO YOU HAVE ON A TYPICAL DAY: 1
HOW OFTEN DO YOU HAVE SIX OR MORE DRINKS ON ONE OCCASION: 1
HOW MANY STANDARD DRINKS CONTAINING ALCOHOL DO YOU HAVE ON A TYPICAL DAY: 1 OR 2
HOW OFTEN DO YOU HAVE A DRINK CONTAINING ALCOHOL: 3

## 2025-04-02 ENCOUNTER — OFFICE VISIT (OUTPATIENT)
Age: 50
End: 2025-04-02
Payer: MEDICARE

## 2025-04-02 VITALS
DIASTOLIC BLOOD PRESSURE: 80 MMHG | BODY MASS INDEX: 42.62 KG/M2 | SYSTOLIC BLOOD PRESSURE: 128 MMHG | WEIGHT: 265.2 LBS | HEART RATE: 106 BPM | RESPIRATION RATE: 18 BRPM | OXYGEN SATURATION: 97 % | TEMPERATURE: 98.7 F | HEIGHT: 66 IN

## 2025-04-02 DIAGNOSIS — E66.01 OBESITY, MORBID (MORE THAN 100 LBS OVER IDEAL WEIGHT OR BMI > 40): ICD-10-CM

## 2025-04-02 DIAGNOSIS — Z12.11 SCREEN FOR COLON CANCER: ICD-10-CM

## 2025-04-02 DIAGNOSIS — E11.9 TYPE 2 DIABETES MELLITUS WITHOUT COMPLICATION, WITHOUT LONG-TERM CURRENT USE OF INSULIN: ICD-10-CM

## 2025-04-02 DIAGNOSIS — G43.909 MIGRAINE SYNDROME: ICD-10-CM

## 2025-04-02 DIAGNOSIS — F41.1 GENERALIZED ANXIETY DISORDER: ICD-10-CM

## 2025-04-02 DIAGNOSIS — N95.1 PERIMENOPAUSAL: ICD-10-CM

## 2025-04-02 DIAGNOSIS — E78.2 MIXED HYPERLIPIDEMIA: ICD-10-CM

## 2025-04-02 DIAGNOSIS — K58.1 IRRITABLE BOWEL SYNDROME WITH CONSTIPATION: ICD-10-CM

## 2025-04-02 DIAGNOSIS — M47.812 SPONDYLOSIS OF CERVICAL REGION WITHOUT MYELOPATHY OR RADICULOPATHY: ICD-10-CM

## 2025-04-02 DIAGNOSIS — Z12.31 ENCOUNTER FOR SCREENING MAMMOGRAM FOR BREAST CANCER: ICD-10-CM

## 2025-04-02 DIAGNOSIS — M79.7 FIBROMYALGIA: ICD-10-CM

## 2025-04-02 DIAGNOSIS — K21.9 GASTROESOPHAGEAL REFLUX DISEASE, UNSPECIFIED WHETHER ESOPHAGITIS PRESENT: ICD-10-CM

## 2025-04-02 DIAGNOSIS — Z00.00 MEDICARE ANNUAL WELLNESS VISIT, SUBSEQUENT: Primary | ICD-10-CM

## 2025-04-02 LAB
HBA1C MFR BLD: 5.5 %
HCG, PREGNANCY, URINE, POC: NEGATIVE
VALID INTERNAL CONTROL, POC: NORMAL

## 2025-04-02 PROCEDURE — 83036 HEMOGLOBIN GLYCOSYLATED A1C: CPT | Performed by: FAMILY MEDICINE

## 2025-04-02 PROCEDURE — 81025 URINE PREGNANCY TEST: CPT | Performed by: FAMILY MEDICINE

## 2025-04-02 PROCEDURE — 99213 OFFICE O/P EST LOW 20 MIN: CPT | Performed by: FAMILY MEDICINE

## 2025-04-02 ASSESSMENT — ENCOUNTER SYMPTOMS
BACK PAIN: 1
ABDOMINAL PAIN: 0
ABDOMINAL DISTENTION: 0

## 2025-04-02 NOTE — PATIENT INSTRUCTIONS
[facility] Food Resources*  (Call [facility]/ 211 if need more resources.)   SNAP (formerly [employer] [place])  What they offer: SNAP is used like cash to buy eligible food items from authorized retailers.  Apply for benefits online: https://www.commonhelp.[place].gov/  Apply for benefits by phone: [phone] (M-F 8AM - 7PM; Sat 9AM - 12PM)  [employer] Hunger Hotline  What they offer:  The "[employer] Hunger Hotline connects individuals in need of food with a local food pantry or program across [address] [facility] [address] [facility] in [facility].   Website: https://Alfred/store-/ (search zip code)   Hunger Hotline Inquiry Form: https://[employer]fred/hunger-hotline/  Hunger Hotline Phone Number:  804-521-2500 x [phone] (M-F 9:00AM-4:00PM)    Meals on Wheels  Meals on Wheels is a program that delivers meals to individuals who have no reliable means for maintaining a healthy diet.  Services are provided by area.   "[employer] Service Area:   [facility], and [place].  [facility], [facility], [facility], [place], and [place]  Website:  https://[place].org/how-we-help/meals-on-wheels/  To Apply:  Ages 18-59:  Apply online: [email or web address]  Apply by phone: [phone]  To Apply:  Ages 60+:  Apply Online: [email or web address]  Apply By Phone: [phone] (M-F 8:30AM-5PM)  For [facility], [facility], [place], [place], [place] and [place]: You may also apply by calling [place], [facility] Agency on [phone]  For [facility], and [place] as well as [facility], [facility], [place], [place] and [place]:    Contact [facility] Agency on [phone]    [facility] Service Area:   [place], [place], [place], [place],

## 2025-04-02 NOTE — PROGRESS NOTES
Chief Complaint   Patient presents with    Medicare AWV     Patient is here today for her medicare annual wellness exam and 2 month follow up.      \"Have you been to the ER, urgent care clinic since your last visit?  Hospitalized since your last visit?\"    NO    “Have you seen or consulted any other health care providers outside of [facility] since your last visit?”    NO    Have you had a mammogram?”   NO    No breast cancer screening on file      “Have you had a pap smear?”    NO    No cervical cancer screening on file         “Have you had a colorectal cancer screening such as a colonoscopy/FIT/Cologuard?    NO    Date of last Colonoscopy: 5/8/2008  No cologuard on file  No FIT/FOBT on file   No flexible sigmoidoscopy on file         Click Here for Release of Records Request    
  venlafaxine (EFFEXOR XR) 150 MG extended release capsule TAKE 1 CAPSULE BY MOUTH EVERY DAY Yes Gerardo Barrett MD   hydroCHLOROthiazide (HYDRODIURIL) 25 MG tablet TAKE 1 TABLET BY MOUTH EVERY DAY Yes Gerardo Barrett MD   omeprazole (PRILOSEC) 40 MG delayed release capsule TAKE 1 CAPSULE BY MOUTH EVERY DAY Yes Gerardo Barrett MD   verapamil (CALAN SR) 240 MG extended release tablet TAKE 1 TABLET BY MOUTH EVERYDAY AT BEDTIME Yes Gerardo Barrett MD   KLOR-CON M10 10 MEQ extended release tablet TAKE 2 TABLETS BY MOUTH EVERY DAY Yes José Miguel Hoover MD   cyanocobalamin 1000 MCG tablet Take 2 tablets by mouth daily Yes Automatic Reconciliation, Ar   naloxone 4 MG/0.1ML LIQD nasal spray Use 1 spray intranasally, then discard. Repeat with new spray every 2 min as needed for opioid overdose symptoms, alternating nostrils. Yes Automatic Reconciliation, Ar       CareTeam (Including outside providers/suppliers regularly involved in providing care):   Patient Care Team:  Gerardo Barrett MD as PCP - General  Gerardo Barrett MD as PCP - Empaneled Provider  Karolina Allison [facility] as Pharmacist (Pharmacist)  Christiano Story MD (General Surgery)     Recommendations for Preventive Services Due: see orders and patient instructions/AVS.  Recommended screening schedule for the next 5-10 years is provided to the patient in written form: see Patient Instructions/AVS.     Reviewed and updated this visit:  Tobacco  Allergies  Meds  Sexual Hx                    Attending Physician: Gerardo Barrett MD

## 2025-04-03 LAB
ALBUMIN SERPL-MCNC: 4.2 G/DL (ref 3.5–5)
ALBUMIN/GLOB SERPL: 1 (ref 1.1–2.2)
ALP SERPL-CCNC: 82 U/L (ref 45–117)
ALT SERPL-CCNC: 25 U/L (ref 12–78)
ANION GAP SERPL CALC-SCNC: 10 MMOL/L (ref 2–12)
AST SERPL-CCNC: 22 U/L (ref 15–37)
BASOPHILS # BLD: 0.05 K/UL (ref 0–0.1)
BASOPHILS NFR BLD: 0.3 % (ref 0–1)
BILIRUB SERPL-MCNC: 0.5 MG/DL (ref 0.2–1)
BUN SERPL-MCNC: 16 MG/DL (ref 6–20)
BUN/CREAT SERPL: 16 (ref 12–20)
CALCIUM SERPL-MCNC: 9.6 MG/DL (ref 8.5–10.1)
CHLORIDE SERPL-SCNC: 99 MMOL/L (ref 97–108)
CHOLEST SERPL-MCNC: 258 MG/DL
CO2 SERPL-SCNC: 28 MMOL/L (ref 21–32)
CREAT SERPL-MCNC: 1.01 MG/DL (ref 0.55–1.02)
CREAT UR-MCNC: 396 MG/DL
DIFFERENTIAL METHOD BLD: ABNORMAL
EOSINOPHIL # BLD: 0.02 K/UL (ref 0–0.4)
EOSINOPHIL NFR BLD: 0.1 % (ref 0–7)
ERYTHROCYTE [DISTWIDTH] IN BLOOD BY AUTOMATED COUNT: 14.7 % (ref 11.5–14.5)
GLOBULIN SER CALC-MCNC: 4.1 G/DL (ref 2–4)
GLUCOSE SERPL-MCNC: 101 MG/DL (ref 65–100)
HCT VFR BLD AUTO: 40 % (ref 35–47)
HDLC SERPL-MCNC: 46 MG/DL
HDLC SERPL: 5.6 (ref 0–5)
HGB BLD-MCNC: 13 G/DL (ref 11.5–16)
IMM GRANULOCYTES # BLD AUTO: 0.14 K/UL (ref 0–0.04)
IMM GRANULOCYTES NFR BLD AUTO: 0.9 % (ref 0–0.5)
LDLC SERPL CALC-MCNC: 159.8 MG/DL (ref 0–100)
LYMPHOCYTES # BLD: 3.98 K/UL (ref 0.8–3.5)
LYMPHOCYTES NFR BLD: 25.6 % (ref 12–49)
MCH RBC QN AUTO: 29.8 PG (ref 26–34)
MCHC RBC AUTO-ENTMCNC: 32.5 G/DL (ref 30–36.5)
MCV RBC AUTO: 91.7 FL (ref 80–99)
MICROALBUMIN UR-MCNC: 3.26 MG/DL
MICROALBUMIN/CREAT UR-RTO: 8 MG/G (ref 0–30)
MONOCYTES # BLD: 1.04 K/UL (ref 0–1)
MONOCYTES NFR BLD: 6.7 % (ref 5–13)
NEUTS SEG # BLD: 10.31 K/UL (ref 1.8–8)
NEUTS SEG NFR BLD: 66.4 % (ref 32–75)
NRBC # BLD: 0 K/UL (ref 0–0.01)
NRBC BLD-RTO: 0 PER 100 WBC
PLATELET # BLD AUTO: 472 K/UL (ref 150–400)
PMV BLD AUTO: 9.7 FL (ref 8.9–12.9)
POTASSIUM SERPL-SCNC: 3.6 MMOL/L (ref 3.5–5.1)
PROT SERPL-MCNC: 8.3 G/DL (ref 6.4–8.2)
RBC # BLD AUTO: 4.36 M/UL (ref 3.8–5.2)
SODIUM SERPL-SCNC: 137 MMOL/L (ref 136–145)
TRIGL SERPL-MCNC: 261 MG/DL
VLDLC SERPL CALC-MCNC: 52.2 MG/DL
WBC # BLD AUTO: 15.5 K/UL (ref 3.6–11)

## 2025-04-04 ENCOUNTER — RESULTS FOLLOW-UP (OUTPATIENT)
Age: 50
End: 2025-04-04

## 2025-04-04 DIAGNOSIS — E78.2 MIXED HYPERLIPIDEMIA: Primary | ICD-10-CM

## 2025-04-04 RX ORDER — PRAVASTATIN SODIUM 20 MG
20 TABLET ORAL DAILY
Qty: 30 TABLET | Refills: 5 | Status: SHIPPED | OUTPATIENT
Start: 2025-04-04

## 2025-04-06 DIAGNOSIS — M54.9 DORSALGIA, UNSPECIFIED: ICD-10-CM

## 2025-04-06 DIAGNOSIS — G43.909 MIGRAINE WITHOUT STATUS MIGRAINOSUS, NOT INTRACTABLE, UNSPECIFIED MIGRAINE TYPE: ICD-10-CM

## 2025-04-06 RX ORDER — GABAPENTIN 600 MG/1
600 TABLET ORAL 3 TIMES DAILY
Qty: 270 TABLET | Refills: 0 | Status: CANCELLED | OUTPATIENT
Start: 2025-04-06 | End: 2025-10-03

## 2025-04-07 ENCOUNTER — TELEPHONE (OUTPATIENT)
Age: 50
End: 2025-04-07

## 2025-04-07 DIAGNOSIS — G43.909 MIGRAINE WITHOUT STATUS MIGRAINOSUS, NOT INTRACTABLE, UNSPECIFIED MIGRAINE TYPE: ICD-10-CM

## 2025-04-07 RX ORDER — GABAPENTIN 600 MG/1
600 TABLET ORAL 3 TIMES DAILY
Qty: 270 TABLET | Refills: 0 | Status: SHIPPED | OUTPATIENT
Start: 2025-04-07 | End: 2025-10-04

## 2025-04-07 NOTE — TELEPHONE ENCOUNTER
Zanaflex was sent on 3/21/25 for #50 with 1 refill    For Pharmacy Admin Tracking Only    Program: Medication Refill  CPA in place:    Recommendation Provided To:   Intervention Detail: Discontinued Rx: 1, reason: Duplicate Therapy  Intervention Accepted By:   Gap Closed?:    Time Spent (min): 5

## 2025-04-07 NOTE — TELEPHONE ENCOUNTER
Belem Xiao to [facility] Clinical Staff (supporting Gerardo Barrett MD)       4/6/25  9:10 PM  Dr. Barrett,     I was wondering if you had the ability to test for Under or Non-Functional Methylation?     I just happened across it in some general reading today. 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 Including my Aunt Elizabet Choi & her daughter Kaelyn Klein who sees Dr. Haney.      The effects are astonishing & read like a laundry list of conditions many of us share. Including all three of my siblings. I feel absolutely gobsmacked!     Just something new out here that's placed 95% of my medical issues in the same basket for the very first time.     See you on [place],  Belem Xiao  [phone]

## 2025-04-12 DIAGNOSIS — R25.2 CRAMP AND SPASM: ICD-10-CM

## 2025-04-14 ENCOUNTER — TELEPHONE (OUTPATIENT)
Age: 50
End: 2025-04-14

## 2025-04-14 DIAGNOSIS — D32.0 MENINGIOMA OF CEREBELLUM (HCC): Primary | ICD-10-CM

## 2025-04-14 RX ORDER — CYCLOBENZAPRINE HCL 10 MG
10 TABLET ORAL 2 TIMES DAILY PRN
Qty: 50 TABLET | Refills: 1 | Status: SHIPPED | OUTPATIENT
Start: 2025-04-14

## 2025-04-14 NOTE — TELEPHONE ENCOUNTER
Belem Xiao to [facility] Clinical Staff (supporting Gerardo Barrett MD) (Selected Message)        4/14/25  5:51 AM  Dr. Barrett,     I just woke up in total agony barely able to move my neck, shoulders & head on my pillow. I'm desperately asking for your help! I have no idea who to trust in Neurology. I'll be a year overdue for my MRI in June. I truly want to see Dr. Moncada since he is the one that finally found the Meningioma. I've tried calling his office multiple times & can't get past the receptionists. The [facility] has no idea how to give the injections. You know I don't have an issue with needles. 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 But the amount of pain she put me through & the bleeding from each site, which I had never had before, truly made me wonder if the Botox was even remaining or just running out with the blood.      I miss Dr. Bolivar so badly. He understood that I had days like right now where I flat can't drive & that Dad had lost his driving ability. He didn't want me to get behind the wheel of my car in excruciating pain with my vision disturbed like this & would rather I reschedule the appointment. He is a true loss to the Neurology community in the [facility]. He evaluated the persons entire life & took his time with each & every patient. He remembered those conversations from appointment to appointment & would literally catch up on my life in them. Just like you do. I sincerely need to get the days back that the Botox can afford me!     Dr. Barrett I'm begging you please I'm going insane from only having  3 or 4 good days at a level 6 a month!  Belem  [phone]

## 2025-04-17 DIAGNOSIS — G43.709 CHRONIC MIGRAINE WITHOUT AURA, NOT INTRACTABLE, WITHOUT STATUS MIGRAINOSUS: ICD-10-CM

## 2025-04-17 RX ORDER — HYDROCODONE BITARTRATE AND ACETAMINOPHEN 7.5; 325 MG/1; MG/1
1 TABLET ORAL EVERY 8 HOURS PRN
Qty: 50 TABLET | Refills: 0 | Status: CANCELLED | OUTPATIENT
Start: 2025-04-17 | End: 2025-05-17

## 2025-04-21 DIAGNOSIS — G43.909 MIGRAINE WITHOUT STATUS MIGRAINOSUS, NOT INTRACTABLE, UNSPECIFIED MIGRAINE TYPE: Primary | ICD-10-CM

## 2025-04-21 RX ORDER — HYDROCODONE BITARTRATE AND ACETAMINOPHEN 7.5; 325 MG/1; MG/1
1 TABLET ORAL EVERY 8 HOURS PRN
Qty: 50 TABLET | Refills: 0 | Status: SHIPPED | OUTPATIENT
Start: 2025-04-21 | End: 2025-05-21

## 2025-05-02 NOTE — PROGRESS NOTES
NAME:  Belem Xiao   :   1975   MRN:   [id number]     Date/Time:  2025 6:13 AM  Subjective:   HPI   Migraine   This is a chronic problem. The current episode started more than 1 year ago. The problem occurs daily. The problem has been unchanged. The pain is located in the Occipital region. Associated symptoms include abdominal pain. Pertinent negatives include no blurred vision or weight loss. Her past medical history is significant for obesity.   Hyperlipidemia  This is a chronic problem. The problem is uncontrolled. Recent lipid tests were reviewed and are high. Exacerbating diseases include obesity. She has no history of hypothyroidism. The current treatment provides mild improvement of lipids. There are no compliance problems.    Depression  Visit Type: follow-up  Patient presents with the following symptoms: decreased concentration, depressed mood, dry mouth and psychomotor agitation,anxious  Patient is not experiencing: suicidal planning, thoughts of death and weight loss.  Review of Systems          Medications reviewed:  Current Outpatient Medications   Medication Sig    HYDROcodone-acetaminophen (NORCO) 7.5-325 MG per tablet Take 1 tablet by mouth every 8 hours as needed for Pain for up to 30 days. Intended supply: 7 days Max Daily Amount: 3 tablets    cyclobenzaprine (FLEXERIL) 10 MG tablet TAKE 1 TABLET BY MOUTH 2 TIMES DAILY AS NEEDED FOR MUSCLE SPASMS.    gabapentin (NEURONTIN) 600 MG tablet Take 1 tablet by mouth 3 times daily for 180 days. Max Daily Amount: 1,800 mg    pravastatin (PRAVACHOL) 20 MG tablet Take 1 tablet by mouth daily    promethazine (PHENERGAN) 25 MG tablet Take 1 tablet by mouth every 8 hours as needed for Nausea for nausea    tiZANidine (ZANAFLEX) 4 MG tablet Take 1 tablet by mouth 3 times daily as needed (muscle spasm)    ALPRAZolam (XANAX) 0.5 MG tablet Take 1 tablet by mouth 3 times daily as needed for Sleep for up to 90 days. Max Daily Amount: 1.5 mg

## 2025-05-05 ENCOUNTER — OFFICE VISIT (OUTPATIENT)
Age: 50
End: 2025-05-05

## 2025-05-05 DIAGNOSIS — D32.0 MENINGIOMA OF CEREBELLUM (HCC): ICD-10-CM

## 2025-05-05 DIAGNOSIS — G43.709 CHRONIC MIGRAINE WITHOUT AURA, NOT INTRACTABLE, WITHOUT STATUS MIGRAINOSUS: Primary | ICD-10-CM

## 2025-05-05 DIAGNOSIS — I50.22 CHRONIC SYSTOLIC (CONGESTIVE) HEART FAILURE (HCC): ICD-10-CM

## 2025-05-05 DIAGNOSIS — F11.99 OPIOID USE, UNSPECIFIED WITH UNSPECIFIED OPIOID-INDUCED DISORDER (HCC): ICD-10-CM

## 2025-05-06 DIAGNOSIS — M54.9 DORSALGIA, UNSPECIFIED: ICD-10-CM

## 2025-05-06 DIAGNOSIS — G43.709 CHRONIC MIGRAINE WITHOUT AURA, NOT INTRACTABLE, WITHOUT STATUS MIGRAINOSUS: ICD-10-CM

## 2025-05-06 RX ORDER — VERAPAMIL HYDROCHLORIDE 240 MG/1
240 TABLET, FILM COATED, EXTENDED RELEASE ORAL
Qty: 90 TABLET | Refills: 1 | Status: SHIPPED | OUTPATIENT
Start: 2025-05-06

## 2025-05-06 RX ORDER — PROMETHAZINE HYDROCHLORIDE 25 MG/1
25 TABLET ORAL EVERY 8 HOURS PRN
Qty: 50 TABLET | Refills: 1 | Status: SHIPPED | OUTPATIENT
Start: 2025-05-06

## 2025-05-11 DIAGNOSIS — K21.00 GASTROESOPHAGEAL REFLUX DISEASE WITH ESOPHAGITIS WITHOUT HEMORRHAGE: ICD-10-CM

## 2025-05-12 DIAGNOSIS — F41.1 GENERALIZED ANXIETY DISORDER: ICD-10-CM

## 2025-05-13 RX ORDER — ALPRAZOLAM 0.5 MG
0.5 TABLET ORAL 3 TIMES DAILY PRN
Qty: 90 TABLET | Refills: 2 | OUTPATIENT
Start: 2025-05-13 | End: 2025-08-11

## 2025-05-13 RX ORDER — OMEPRAZOLE 40 MG/1
CAPSULE, DELAYED RELEASE ORAL DAILY
Qty: 90 CAPSULE | Refills: 3 | Status: SHIPPED | OUTPATIENT
Start: 2025-05-13

## 2025-05-13 NOTE — TELEPHONE ENCOUNTER
Last appointment: 4/2/25  Next appointment: 6/24/25  Previous refill encounter(s): 2/21/25 #90 with 2 refills    Requested Prescriptions     Pending Prescriptions Disp Refills    ALPRAZolam (XANAX) 0.5 MG tablet [Pharmacy Med Name: ALPRAZOLAM 0.5 MG TABLET] 90 tablet 2     Sig: Take 1 tablet by mouth 3 times daily as needed for Sleep for up to 90 days. Max Daily Amount: 1.5 mg         For Pharmacy Admin Tracking Only    Program: Medication Refill  CPA in place:    Recommendation Provided To:   Intervention Detail: New Rx: 1, reason: Patient Preference  Intervention Accepted By:   Gap Closed?:    Time Spent (min): 5

## 2025-05-13 NOTE — TELEPHONE ENCOUNTER
Last appointment: 5/5/25  Next appointment: 6/24/25  Previous refill encounter(s): 11/1/24 #90 with 1 refill    Requested Prescriptions     Pending Prescriptions Disp Refills    omeprazole (PRILOSEC) 40 MG delayed release capsule [Pharmacy Med Name: OMEPRAZOLE DR 40 MG CAPSULE] 90 capsule 3     Sig: TAKE 1 CAPSULE BY MOUTH EVERY DAY         For Pharmacy Admin Tracking Only    Program: Medication Refill  CPA in place:    Recommendation Provided To:   Intervention Detail: New Rx: 1, reason: Patient Preference  Intervention Accepted By:   Gap Closed?:    Time Spent (min): 5

## 2025-05-14 ENCOUNTER — TELEPHONE (OUTPATIENT)
Age: 50
End: 2025-05-14
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
## 2025-05-20 DIAGNOSIS — F41.1 GENERALIZED ANXIETY DISORDER: ICD-10-CM

## 2025-05-20 DIAGNOSIS — G43.909 MIGRAINE WITHOUT STATUS MIGRAINOSUS, NOT INTRACTABLE, UNSPECIFIED MIGRAINE TYPE: ICD-10-CM

## 2025-05-20 RX ORDER — ALPRAZOLAM 0.5 MG
0.5 TABLET ORAL 3 TIMES DAILY PRN
Qty: 90 TABLET | Refills: 2 | Status: CANCELLED | OUTPATIENT
Start: 2025-05-20 | End: 2025-08-18

## 2025-05-20 RX ORDER — HYDROCODONE BITARTRATE AND ACETAMINOPHEN 7.5; 325 MG/1; MG/1
1 TABLET ORAL EVERY 8 HOURS PRN
Qty: 50 TABLET | Refills: 0 | Status: CANCELLED | OUTPATIENT
Start: 2025-05-20 | End: 2025-06-19

## 2025-05-21 DIAGNOSIS — G43.909 MIGRAINE WITHOUT STATUS MIGRAINOSUS, NOT INTRACTABLE, UNSPECIFIED MIGRAINE TYPE: ICD-10-CM

## 2025-05-21 DIAGNOSIS — F41.1 GENERALIZED ANXIETY DISORDER: ICD-10-CM

## 2025-05-21 NOTE — TELEPHONE ENCOUNTER
Patient would like to get a call from nurse regarding her medication      HYDROcodone-acetaminophen (NORCO) 7.5-325 MG per tablet    ALPRAZolam (XANAX) 0.5 MG tablet they are showing cancelled at [facility] please give her a call @ [phone]

## 2025-05-22 ENCOUNTER — CLINICAL DOCUMENTATION (OUTPATIENT)
Age: 50
End: 2025-05-22

## 2025-05-22 RX ORDER — HYDROCODONE BITARTRATE AND ACETAMINOPHEN 7.5; 325 MG/1; MG/1
1 TABLET ORAL EVERY 8 HOURS PRN
Qty: 50 TABLET | Refills: 0 | Status: SHIPPED | OUTPATIENT
Start: 2025-05-22 | End: 2025-06-21

## 2025-05-22 RX ORDER — ALPRAZOLAM 0.5 MG
0.5 TABLET ORAL 3 TIMES DAILY PRN
Qty: 90 TABLET | Refills: 2 | Status: SHIPPED | OUTPATIENT
Start: 2025-05-22 | End: 2025-08-20

## 2025-05-22 NOTE — PROGRESS NOTES
Chronic Condition Release of Information Form was faxed to [facility] at [phone].

## 2025-06-08 DIAGNOSIS — M54.9 DORSALGIA, UNSPECIFIED: ICD-10-CM

## 2025-06-09 NOTE — TELEPHONE ENCOUNTER
Last appointment: 5/5/25  Next appointment: 6/24/25  Previous refill encounter(s): 5/6/25 #50 with 1 refill    Requested Prescriptions     Pending Prescriptions Disp Refills    tiZANidine (ZANAFLEX) 4 MG tablet [Pharmacy Med Name: TIZANIDINE HCL 4 MG TABLET] 50 tablet 0     Sig: TAKE 1 TABLET BY MOUTH 3 TIMES DAILY AS NEEDED (MUSCLE SPASM).         For Pharmacy Admin Tracking Only    Program: Medication Refill  CPA in place:    Recommendation Provided To:   Intervention Detail: New Rx: 1, reason: Patient Preference  Intervention Accepted By:   Gap Closed?:    Time Spent (min): 5

## 2025-06-12 DIAGNOSIS — R25.2 CRAMP AND SPASM: ICD-10-CM

## 2025-06-12 RX ORDER — CYCLOBENZAPRINE HCL 10 MG
10 TABLET ORAL 2 TIMES DAILY PRN
Qty: 50 TABLET | Refills: 0 | Status: SHIPPED | OUTPATIENT
Start: 2025-06-12

## 2025-06-12 NOTE — TELEPHONE ENCOUNTER
Last appointment: 5/5/25  Next appointment: 6/24/25  Previous refill encounter(s): 4/14/25 #50 with 1 refill    Requested Prescriptions     Pending Prescriptions Disp Refills    cyclobenzaprine (FLEXERIL) 10 MG tablet [Pharmacy Med Name: CYCLOBENZAPRINE 10 MG TABLET] 50 tablet 0     Sig: TAKE 1 TABLET BY MOUTH 2 TIMES DAILY AS NEEDED FOR MUSCLE SPASMS.         For Pharmacy Admin Tracking Only    Program: Medication Refill  CPA in place:    Recommendation Provided To:   Intervention Detail: New Rx: 1, reason: Patient Preference  Intervention Accepted By:   Gap Closed?:    Time Spent (min): 5

## 2025-06-15 DIAGNOSIS — G43.709 CHRONIC MIGRAINE WITHOUT AURA, NOT INTRACTABLE, WITHOUT STATUS MIGRAINOSUS: ICD-10-CM

## 2025-06-17 RX ORDER — PROMETHAZINE HYDROCHLORIDE 25 MG/1
25 TABLET ORAL EVERY 8 HOURS PRN
Qty: 50 TABLET | Refills: 1 | Status: SHIPPED | OUTPATIENT
Start: 2025-06-17

## 2025-06-17 NOTE — TELEPHONE ENCOUNTER
Last appointment: 5/5/25  Next appointment: 6/24/25  Previous refill encounter(s): 5/6/25 #50 with 1 refill    Requested Prescriptions     Pending Prescriptions Disp Refills    promethazine (PHENERGAN) 25 MG tablet [Pharmacy Med Name: PROMETHAZINE 25 MG TABLET] 50 tablet 1     Sig: TAKE 1 TABLET BY MOUTH EVERY 8 HOURS AS NEEDED FOR NAUSEA FOR NAUSEA         For Pharmacy Admin Tracking Only    Program: Medication Refill  CPA in place:    Recommendation Provided To:   Intervention Detail: New Rx: 1, reason: Patient Preference  Intervention Accepted By:   Gap Closed?:    Time Spent (min): 5

## 2025-06-18 DIAGNOSIS — F41.1 GENERALIZED ANXIETY DISORDER: ICD-10-CM

## 2025-06-18 DIAGNOSIS — G43.909 MIGRAINE WITHOUT STATUS MIGRAINOSUS, NOT INTRACTABLE, UNSPECIFIED MIGRAINE TYPE: ICD-10-CM

## 2025-06-19 RX ORDER — ALPRAZOLAM 0.5 MG
0.5 TABLET ORAL 3 TIMES DAILY PRN
Qty: 90 TABLET | Refills: 2 | OUTPATIENT
Start: 2025-06-19 | End: 2025-09-17

## 2025-06-19 RX ORDER — HYDROCODONE BITARTRATE AND ACETAMINOPHEN 7.5; 325 MG/1; MG/1
1 TABLET ORAL EVERY 8 HOURS PRN
Qty: 50 TABLET | Refills: 0 | Status: SHIPPED | OUTPATIENT
Start: 2025-06-19 | End: 2025-07-19

## 2025-06-19 NOTE — TELEPHONE ENCOUNTER
Xanax was sent on 5/22/25 for #90 with 2 refills.    Last appointment: 4/2/25  Next appointment: 6/24/25  Previous refill encounter(s): 5/22/25 [place] #50    Requested Prescriptions     Pending Prescriptions Disp Refills    HYDROcodone-acetaminophen (NORCO) 7.5-325 MG per tablet 50 tablet 0     Sig: Take 1 tablet by mouth every 8 hours as needed for Pain for up to 30 days. Max Daily Amount: 3 tablets     Refused Prescriptions Disp Refills    ALPRAZolam (XANAX) 0.5 MG tablet 90 tablet 2     Sig: Take 1 tablet by mouth 3 times daily as needed for Sleep for up to 90 days. Max Daily Amount: 1.5 mg         For Pharmacy Admin Tracking Only    Program: Medication Refill  CPA in place:    Recommendation Provided To:   Intervention Detail: Discontinued Rx: 1, reason: Duplicate Therapy and New Rx: 1, reason: Patient Preference  Intervention Accepted By:   Gap Closed?:    Time Spent (min): 5

## 2025-06-22 NOTE — PROGRESS NOTES
NAME:  Belem Xiao   :   1975   MRN:   [id number]     Date/Time:  2025 7:05 AM  Subjective: continued financial and emotional stress,related to being unable to afford to stay in her parents home.Chronic migraine,fibro sx unchanged   Anxiety  Presents for follow-up visit. Symptoms include decreased concentration, depressed mood, irritability and restlessness. Patient reports no chest pain.          Migraine   This is a chronic problem. The current episode started more than 1 year ago. The problem occurs daily. The problem has been unchanged. The pain is located in the Occipital region. Associated symptoms include abdominal pain. Pertinent negatives include no blurred vision or weight loss. Her past medical history is significant for obesity.   Hyperlipidemia  This is a chronic problem. The problem is uncontrolled. Recent lipid tests were reviewed and are high. Exacerbating diseases include obesity. She has no history of hypothyroidism. The current treatment provides mild improvement of lipids. There are no compliance problems.    Depression  Visit Type: follow-up  Patient presents with the following symptoms: decreased concentration, depressed mood, dry mouth and psychomotor agitation,anxious  Patient is not experiencing: suicidal planning, thoughts of death and weight loss.    Review of Systems   Constitutional:  Positive for irritability. Negative for activity change.   HENT:  Negative for congestion.    Cardiovascular:  Negative for chest pain.   Neurological:  Positive for light-headedness and headaches.   Psychiatric/Behavioral:  Positive for agitation, decreased concentration and dysphoric mood.              Medications reviewed:  Current Outpatient Medications   Medication Sig    HYDROcodone-acetaminophen (NORCO) 7.5-325 MG per tablet Take 1 tablet by mouth every 8 hours as needed for Pain for up to 30 days. Max Daily Amount: 3 tablets    promethazine (PHENERGAN) 25 MG tablet TAKE 1

## 2025-06-24 ENCOUNTER — OFFICE VISIT (OUTPATIENT)
Age: 50
End: 2025-06-24
Payer: MEDICARE

## 2025-06-24 VITALS
OXYGEN SATURATION: 96 % | HEART RATE: 110 BPM | SYSTOLIC BLOOD PRESSURE: 121 MMHG | RESPIRATION RATE: 18 BRPM | WEIGHT: 260.2 LBS | DIASTOLIC BLOOD PRESSURE: 83 MMHG | TEMPERATURE: 98.1 F | HEIGHT: 66 IN | BODY MASS INDEX: 41.82 KG/M2

## 2025-06-24 DIAGNOSIS — G43.709 CHRONIC MIGRAINE WITHOUT AURA, NOT INTRACTABLE, WITHOUT STATUS MIGRAINOSUS: Primary | ICD-10-CM

## 2025-06-24 DIAGNOSIS — F32.A DEPRESSION, UNSPECIFIED DEPRESSION TYPE: ICD-10-CM

## 2025-06-24 DIAGNOSIS — K58.1 IRRITABLE BOWEL SYNDROME WITH CONSTIPATION: ICD-10-CM

## 2025-06-24 DIAGNOSIS — E78.2 MIXED HYPERLIPIDEMIA: ICD-10-CM

## 2025-06-24 DIAGNOSIS — K21.9 GASTROESOPHAGEAL REFLUX DISEASE, UNSPECIFIED WHETHER ESOPHAGITIS PRESENT: ICD-10-CM

## 2025-06-24 DIAGNOSIS — D32.0 MENINGIOMA OF CEREBELLUM (HCC): ICD-10-CM

## 2025-06-24 DIAGNOSIS — M79.7 FIBROMYALGIA: ICD-10-CM

## 2025-06-24 DIAGNOSIS — I50.22 CHRONIC SYSTOLIC (CONGESTIVE) HEART FAILURE (HCC): ICD-10-CM

## 2025-06-24 PROCEDURE — PBSHW PBB SHADOW CHARGE: Performed by: FAMILY MEDICINE

## 2025-06-24 PROCEDURE — 99213 OFFICE O/P EST LOW 20 MIN: CPT | Performed by: FAMILY MEDICINE

## 2025-06-24 RX ORDER — KETOROLAC TROMETHAMINE 30 MG/ML
30 INJECTION, SOLUTION INTRAMUSCULAR; INTRAVENOUS ONCE
Status: COMPLETED | OUTPATIENT
Start: 2025-06-24 | End: 2025-06-24

## 2025-06-24 RX ADMIN — KETOROLAC TROMETHAMINE 30 MG: 30 INJECTION, SOLUTION INTRAMUSCULAR; INTRAVENOUS at 15:17

## 2025-06-24 ASSESSMENT — PATIENT HEALTH QUESTIONNAIRE - PHQ9
1. LITTLE INTEREST OR PLEASURE IN DOING THINGS: NEARLY EVERY DAY
3. TROUBLE FALLING OR STAYING ASLEEP: NEARLY EVERY DAY
9. THOUGHTS THAT YOU WOULD BE BETTER OFF DEAD, OR OF HURTING YOURSELF: MORE THAN HALF THE DAYS
2. FEELING DOWN, DEPRESSED OR HOPELESS: NEARLY EVERY DAY
6. FEELING BAD ABOUT YOURSELF - OR THAT YOU ARE A FAILURE OR HAVE LET YOURSELF OR YOUR FAMILY DOWN: NEARLY EVERY DAY
SUM OF ALL RESPONSES TO PHQ QUESTIONS 1-9: 24
5. POOR APPETITE OR OVEREATING: NEARLY EVERY DAY
SUM OF ALL RESPONSES TO PHQ QUESTIONS 1-9: 22
SUM OF ALL RESPONSES TO PHQ QUESTIONS 1-9: 24
7. TROUBLE CONCENTRATING ON THINGS, SUCH AS READING THE NEWSPAPER OR WATCHING TELEVISION: MORE THAN HALF THE DAYS
8. MOVING OR SPEAKING SO SLOWLY THAT OTHER PEOPLE COULD HAVE NOTICED. OR THE OPPOSITE, BEING SO FIGETY OR RESTLESS THAT YOU HAVE BEEN MOVING AROUND A LOT MORE THAN USUAL: MORE THAN HALF THE DAYS
4. FEELING TIRED OR HAVING LITTLE ENERGY: NEARLY EVERY DAY
10. IF YOU CHECKED OFF ANY PROBLEMS, HOW DIFFICULT HAVE THESE PROBLEMS MADE IT FOR YOU TO DO YOUR WORK, TAKE CARE OF THINGS AT HOME, OR GET ALONG WITH OTHER PEOPLE: EXTREMELY DIFFICULT
SUM OF ALL RESPONSES TO PHQ QUESTIONS 1-9: 24

## 2025-06-24 ASSESSMENT — COLUMBIA-SUICIDE SEVERITY RATING SCALE - C-SSRS
6. HAVE YOU EVER DONE ANYTHING, STARTED TO DO ANYTHING, OR PREPARED TO DO ANYTHING TO END YOUR LIFE?: NO
1. WITHIN THE PAST MONTH, HAVE YOU WISHED YOU WERE DEAD OR WISHED YOU COULD GO TO SLEEP AND NOT WAKE UP?: NO
2. HAVE YOU ACTUALLY HAD ANY THOUGHTS OF KILLING YOURSELF?: NO

## 2025-06-24 NOTE — PROGRESS NOTES
Chief Complaint   Patient presents with    Follow-up     Patient is here today for a 1 month follow up.      \"Have you been to the ER, urgent care clinic since your last visit?  Hospitalized since your last visit?\"    NO    “Have you seen or consulted any other health care providers outside of [facility] since your last visit?”    NO    Have you had a mammogram?”   NO    No breast cancer screening on file      “Have you had a pap smear?”    NO    No cervical cancer screening on file         “Have you had a colorectal cancer screening such as a colonoscopy/FIT/Cologuard?    NO    Date of last Colonoscopy: 5/8/2008  No cologuard on file  No FIT/FOBT on file   No flexible sigmoidoscopy on file         Click Here for Release of Records Request

## 2025-07-05 DIAGNOSIS — M54.9 DORSALGIA, UNSPECIFIED: ICD-10-CM

## 2025-07-17 DIAGNOSIS — G43.909 MIGRAINE WITHOUT STATUS MIGRAINOSUS, NOT INTRACTABLE, UNSPECIFIED MIGRAINE TYPE: ICD-10-CM

## 2025-07-17 DIAGNOSIS — R25.2 CRAMP AND SPASM: ICD-10-CM

## 2025-07-17 NOTE — TELEPHONE ENCOUNTER
Last appointment: 6/24/25  Next appointment: 7/29/25  Previous refill encounter(s): 6/12/25 Flexeril #50, 4/7/25 Neurontin #270    Requested Prescriptions     Pending Prescriptions Disp Refills    gabapentin (NEURONTIN) 600 MG tablet 270 tablet 1     Sig: Take 1 tablet by mouth 3 times daily for 180 days. Max Daily Amount: 1,800 mg    cyclobenzaprine (FLEXERIL) 10 MG tablet 50 tablet 0     Sig: Take 1 tablet by mouth 2 times daily as needed for Muscle spasms         For Pharmacy Admin Tracking Only    Program: Medication Refill  CPA in place:    Recommendation Provided To:   Intervention Detail: New Rx: 2, reason: Patient Preference  Intervention Accepted By:   Gap Closed?:    Time Spent (min): 5

## 2025-07-18 DIAGNOSIS — G43.709 CHRONIC MIGRAINE WITHOUT AURA, NOT INTRACTABLE, WITHOUT STATUS MIGRAINOSUS: Primary | ICD-10-CM

## 2025-07-18 RX ORDER — CYCLOBENZAPRINE HCL 10 MG
10 TABLET ORAL 2 TIMES DAILY PRN
Qty: 50 TABLET | Refills: 0 | Status: SHIPPED | OUTPATIENT
Start: 2025-07-18

## 2025-07-18 RX ORDER — GABAPENTIN 600 MG/1
600 TABLET ORAL 3 TIMES DAILY
Qty: 270 TABLET | Refills: 1 | Status: SHIPPED | OUTPATIENT
Start: 2025-07-18 | End: 2026-01-14

## 2025-07-18 RX ORDER — HYDROCODONE BITARTRATE AND ACETAMINOPHEN 7.5; 325 MG/1; MG/1
1 TABLET ORAL EVERY 8 HOURS PRN
Qty: 50 TABLET | Refills: 0 | Status: SHIPPED | OUTPATIENT
Start: 2025-07-18 | End: 2025-08-17

## 2025-07-18 NOTE — TELEPHONE ENCOUNTER
[phone] Patient is requesting a refill on Hydrocodone 7.5/325.  [facility] [place]

## 2025-07-21 DIAGNOSIS — R25.2 CRAMP AND SPASM: ICD-10-CM

## 2025-07-21 DIAGNOSIS — G43.709 CHRONIC MIGRAINE WITHOUT AURA, NOT INTRACTABLE, WITHOUT STATUS MIGRAINOSUS: ICD-10-CM

## 2025-07-22 RX ORDER — HYDROCODONE BITARTRATE AND ACETAMINOPHEN 7.5; 325 MG/1; MG/1
1 TABLET ORAL EVERY 8 HOURS PRN
Qty: 50 TABLET | Refills: 0 | OUTPATIENT
Start: 2025-07-22 | End: 2025-08-21

## 2025-07-22 RX ORDER — CYCLOBENZAPRINE HCL 10 MG
10 TABLET ORAL 2 TIMES DAILY PRN
Qty: 50 TABLET | Refills: 0 | OUTPATIENT
Start: 2025-07-22

## 2025-08-04 DIAGNOSIS — G43.709 CHRONIC MIGRAINE WITHOUT AURA, NOT INTRACTABLE, WITHOUT STATUS MIGRAINOSUS: ICD-10-CM

## 2025-08-06 RX ORDER — PROMETHAZINE HYDROCHLORIDE 25 MG/1
25 TABLET ORAL EVERY 8 HOURS PRN
Qty: 50 TABLET | Refills: 1 | Status: SHIPPED | OUTPATIENT
Start: 2025-08-06

## 2025-08-09 DIAGNOSIS — R25.2 CRAMP AND SPASM: ICD-10-CM

## 2025-08-11 RX ORDER — CYCLOBENZAPRINE HCL 10 MG
10 TABLET ORAL 2 TIMES DAILY PRN
Qty: 50 TABLET | Refills: 0 | Status: SHIPPED | OUTPATIENT
Start: 2025-08-11

## 2025-08-20 DIAGNOSIS — F41.1 GENERALIZED ANXIETY DISORDER: ICD-10-CM

## 2025-08-21 DIAGNOSIS — G43.709 CHRONIC MIGRAINE WITHOUT AURA, NOT INTRACTABLE, WITHOUT STATUS MIGRAINOSUS: Primary | ICD-10-CM

## 2025-08-21 RX ORDER — ALPRAZOLAM 0.5 MG
0.5 TABLET ORAL 3 TIMES DAILY PRN
Qty: 90 TABLET | Refills: 2 | Status: SHIPPED | OUTPATIENT
Start: 2025-08-21 | End: 2025-11-19

## 2025-08-21 RX ORDER — HYDROCODONE BITARTRATE AND ACETAMINOPHEN 7.5; 325 MG/1; MG/1
1 TABLET ORAL EVERY 8 HOURS PRN
Qty: 50 TABLET | Refills: 0 | Status: SHIPPED | OUTPATIENT
Start: 2025-08-21 | End: 2025-09-20

## 2025-08-25 ENCOUNTER — OFFICE VISIT (OUTPATIENT)
Age: 50
End: 2025-08-25
Payer: MEDICARE

## 2025-08-25 VITALS
BODY MASS INDEX: 43.3 KG/M2 | RESPIRATION RATE: 18 BRPM | HEART RATE: 72 BPM | WEIGHT: 269.4 LBS | DIASTOLIC BLOOD PRESSURE: 82 MMHG | TEMPERATURE: 97.5 F | HEIGHT: 66 IN | SYSTOLIC BLOOD PRESSURE: 118 MMHG | OXYGEN SATURATION: 98 %

## 2025-08-25 DIAGNOSIS — M54.9 DORSALGIA, UNSPECIFIED: ICD-10-CM

## 2025-08-25 DIAGNOSIS — M67.441 GANGLION OF RIGHT HAND: Primary | ICD-10-CM

## 2025-08-25 PROCEDURE — 99213 OFFICE O/P EST LOW 20 MIN: CPT | Performed by: FAMILY MEDICINE

## 2025-08-25 RX ORDER — PREDNISONE 5 MG/1
TABLET ORAL
Qty: 1 EACH | Refills: 0 | Status: SHIPPED | OUTPATIENT
Start: 2025-08-25

## 2025-08-25 ASSESSMENT — PATIENT HEALTH QUESTIONNAIRE - PHQ9
SUM OF ALL RESPONSES TO PHQ QUESTIONS 1-9: 24
SUM OF ALL RESPONSES TO PHQ QUESTIONS 1-9: 24
1. LITTLE INTEREST OR PLEASURE IN DOING THINGS: NEARLY EVERY DAY
4. FEELING TIRED OR HAVING LITTLE ENERGY: NEARLY EVERY DAY
SUM OF ALL RESPONSES TO PHQ QUESTIONS 1-9: 22
9. THOUGHTS THAT YOU WOULD BE BETTER OFF DEAD, OR OF HURTING YOURSELF: MORE THAN HALF THE DAYS
SUM OF ALL RESPONSES TO PHQ QUESTIONS 1-9: 24
5. POOR APPETITE OR OVEREATING: NEARLY EVERY DAY
8. MOVING OR SPEAKING SO SLOWLY THAT OTHER PEOPLE COULD HAVE NOTICED. OR THE OPPOSITE, BEING SO FIGETY OR RESTLESS THAT YOU HAVE BEEN MOVING AROUND A LOT MORE THAN USUAL: MORE THAN HALF THE DAYS
2. FEELING DOWN, DEPRESSED OR HOPELESS: NEARLY EVERY DAY
7. TROUBLE CONCENTRATING ON THINGS, SUCH AS READING THE NEWSPAPER OR WATCHING TELEVISION: MORE THAN HALF THE DAYS
10. IF YOU CHECKED OFF ANY PROBLEMS, HOW DIFFICULT HAVE THESE PROBLEMS MADE IT FOR YOU TO DO YOUR WORK, TAKE CARE OF THINGS AT HOME, OR GET ALONG WITH OTHER PEOPLE: EXTREMELY DIFFICULT
3. TROUBLE FALLING OR STAYING ASLEEP: NEARLY EVERY DAY
6. FEELING BAD ABOUT YOURSELF - OR THAT YOU ARE A FAILURE OR HAVE LET YOURSELF OR YOUR FAMILY DOWN: NEARLY EVERY DAY

## 2025-08-25 ASSESSMENT — ENCOUNTER SYMPTOMS: CHEST TIGHTNESS: 0

## 2025-08-25 ASSESSMENT — COLUMBIA-SUICIDE SEVERITY RATING SCALE - C-SSRS
2. HAVE YOU ACTUALLY HAD ANY THOUGHTS OF KILLING YOURSELF?: NO
1. WITHIN THE PAST MONTH, HAVE YOU WISHED YOU WERE DEAD OR WISHED YOU COULD GO TO SLEEP AND NOT WAKE UP?: NO
6. HAVE YOU EVER DONE ANYTHING, STARTED TO DO ANYTHING, OR PREPARED TO DO ANYTHING TO END YOUR LIFE?: NO

## 2025-09-03 DIAGNOSIS — R25.2 CRAMP AND SPASM: ICD-10-CM

## 2025-09-03 RX ORDER — CYCLOBENZAPRINE HCL 10 MG
10 TABLET ORAL 2 TIMES DAILY PRN
Qty: 50 TABLET | Refills: 0 | Status: SHIPPED | OUTPATIENT
Start: 2025-09-03

## (undated) DEVICE — SUTURE MONOCRYL SZ 4-0 L27IN ABSRB UD L19MM PS-2 1/2 CIR PRIM Y426H

## (undated) DEVICE — SOLUTION ANTIFOG VIS SYS CLEARIFY LAPSCP

## (undated) DEVICE — TROCAR LAP L100MM DIA5MM BLDELSS W/ STBL SL ENDOPATH XCEL

## (undated) DEVICE — HYPODERMIC SAFETY NEEDLE: Brand: MONOJECT

## (undated) DEVICE — TIP COVER ACCESSORY

## (undated) DEVICE — Device

## (undated) DEVICE — BLADE CLIPPER GEN PURP NS

## (undated) DEVICE — GLOVE SURG SZ 8 L12IN FNGR THK79MIL GRN LTX FREE

## (undated) DEVICE — TISSUE RETRIEVAL SYSTEM: Brand: INZII RETRIEVAL SYSTEM

## (undated) DEVICE — GLOVE SURG SZ 75 CRM LTX FREE POLYISOPRENE POLYMER BEAD ANTI

## (undated) DEVICE — DECANTER BAG 9": Brand: MEDLINE INDUSTRIES, INC.

## (undated) DEVICE — GENERAL LAPAROSCOPY-MRMC: Brand: MEDLINE INDUSTRIES, INC.

## (undated) DEVICE — BLADELESS OBTURATOR: Brand: WECK VISTA

## (undated) DEVICE — SEAL

## (undated) DEVICE — SUTURE VICRYL + SZ 0 L27IN ABSRB VLT L26MM UR-6 5/8 CIR VCP603H

## (undated) DEVICE — SUCTION IRRIGATOR: Brand: ENDOWRIST

## (undated) DEVICE — GOWN,SIRUS,NONRNF,SETINSLV,XL,20/CS: Brand: MEDLINE

## (undated) DEVICE — ARM DRAPE

## (undated) DEVICE — SOLUTION IV 1000ML 0.9% SOD CHL PH 5 INJ USP VIAFLX PLAS

## (undated) DEVICE — SYRINGE MED 10ML LUERLOCK TIP W/O SFTY DISP